# Patient Record
Sex: FEMALE | Race: WHITE | NOT HISPANIC OR LATINO | Employment: FULL TIME | URBAN - METROPOLITAN AREA
[De-identification: names, ages, dates, MRNs, and addresses within clinical notes are randomized per-mention and may not be internally consistent; named-entity substitution may affect disease eponyms.]

---

## 2017-01-10 ENCOUNTER — OFFICE VISIT (OUTPATIENT)
Dept: OBSTETRICS AND GYNECOLOGY | Facility: CLINIC | Age: 32
End: 2017-01-10
Payer: COMMERCIAL

## 2017-01-10 VITALS
SYSTOLIC BLOOD PRESSURE: 118 MMHG | WEIGHT: 153.88 LBS | HEIGHT: 60 IN | BODY MASS INDEX: 30.21 KG/M2 | DIASTOLIC BLOOD PRESSURE: 80 MMHG

## 2017-01-10 PROCEDURE — 99999 PR PBB SHADOW E&M-EST. PATIENT-LVL III: CPT | Mod: PBBFAC,,, | Performed by: OBSTETRICS & GYNECOLOGY

## 2017-01-10 PROCEDURE — 0503F POSTPARTUM CARE VISIT: CPT | Mod: S$GLB,,, | Performed by: OBSTETRICS & GYNECOLOGY

## 2017-01-10 NOTE — PROGRESS NOTES
Johanna Ocasio is a 31 y.o. female  presents for a postpartum visit.  She is status post  6 weeks ago.  Her hospitalization was not complicated.  She is breastfeeding.  She desires condoms for contraception, WILL CALL WHEN SHE FINISHED PUMPING FOR COMBO OCP.  She denies postpartum depression.    female VE FOR EK  Her last pap was     Past Medical History   Diagnosis Date    Abnormal Pap smear of cervix      Colposcopy    Early childhood epilepsy, myoclonic      last seizure age 14; Venous hemangioma on MRI    History of migraine headaches 2016    Migraine headache      History reviewed. No pertinent past surgical history.  Review of patient's allergies indicates:  No Known Allergies    Current Outpatient Prescriptions:     albuterol 90 mcg/actuation inhaler, Inhale 2 puffs into the lungs every 6 (six) hours as needed for Wheezing., Disp: 18 g, Rfl: 0    oxycodone-acetaminophen (PERCOCET) 5-325 mg per tablet, Take 1 tablet by mouth every 4 (four) hours as needed., Disp: 20 tablet, Rfl: 0      Vitals:    01/10/17 0923   BP: 118/80       ABDOMEN: Soft. No tenderness or masses. No hepatosplenomegaly. No hernias.  BREASTS: normal  PELVIC: External female genitalia without lesions, well-healed.  Female hair distribution. Adequate perineal body without evident defect, Normal urethral meatus. Vagina moist and well rugated without lesions or discharge. Cervix pink without lesions, discharge or tenderness. Uterus is 4-6 week size, regular, mobile and nontender. Adnexa without masses or tenderness.    Assessment:  Normal postpartum exam    Plan:  Routine follow up.

## 2017-02-07 ENCOUNTER — PATIENT MESSAGE (OUTPATIENT)
Dept: OBSTETRICS AND GYNECOLOGY | Facility: CLINIC | Age: 32
End: 2017-02-07

## 2017-03-31 ENCOUNTER — PATIENT MESSAGE (OUTPATIENT)
Dept: OBSTETRICS AND GYNECOLOGY | Facility: CLINIC | Age: 32
End: 2017-03-31

## 2017-05-03 ENCOUNTER — PATIENT MESSAGE (OUTPATIENT)
Dept: OBSTETRICS AND GYNECOLOGY | Facility: CLINIC | Age: 32
End: 2017-05-03

## 2017-06-10 ENCOUNTER — NURSE TRIAGE (OUTPATIENT)
Dept: ADMINISTRATIVE | Facility: CLINIC | Age: 32
End: 2017-06-10

## 2017-06-10 NOTE — TELEPHONE ENCOUNTER
"  Reason for Disposition   Eye pain present > 24 hours    Answer Assessment - Initial Assessment Questions  1. LOCATION: Location: "What's red, the eyeball or the outer eyelids?" (Note: when callers say the eye is red, they usually mean the sclera is red)        Corner of right eye  2. REDNESS OF SCLERA: "Is the redness in one or both eyes?" "When did the redness start?"       Yes   3. ONSET: "When did the eye become red?" (e.g., hours, days)       Three days   4. EYELIDS: "Are the eyelids red or swollen?" If so, ask: "How much?"       No   5. VISION: "Is there any difficulty seeing clearly?"       No   6. ITCHING: "Does it feel itchy?" If so ask: "How bad is it" (e.g., Scale 1-10; or mild, moderate, severe)      No   7. PAIN: "Is there any pain? If so, ask: "How bad is it?" (e.g., Scale 1-10; or mild, moderate, severe)      1/10  8. CONTACT LENS: "Do you wear contacts?"      No   9. CAUSE: "What do you think is causing the redness?"      Unsure   10. OTHER SYMPTOMS: "Do you have any other symptoms?" (e.g., fever, runny nose, cough, vomiting)        No    Protocols used: ST EYE - RED WITHOUT PUS-A-AH    "

## 2017-06-12 ENCOUNTER — PATIENT MESSAGE (OUTPATIENT)
Dept: OBSTETRICS AND GYNECOLOGY | Facility: CLINIC | Age: 32
End: 2017-06-12

## 2017-06-12 DIAGNOSIS — Z20.828 VIRAL DISEASE EXPOSURE: Primary | ICD-10-CM

## 2017-06-12 NOTE — TELEPHONE ENCOUNTER
MESSAGE TO PATIENT:  Per ACOG criteria, we don't routinely screen for Hepatitis C.  I will enter an order for you to be screened so you can be sure that you're not infected.   I hope that things settle down for him and that the Hepatitis C ends up being manageable.

## 2017-06-13 ENCOUNTER — LAB VISIT (OUTPATIENT)
Dept: LAB | Facility: HOSPITAL | Age: 32
End: 2017-06-13
Attending: OBSTETRICS & GYNECOLOGY
Payer: COMMERCIAL

## 2017-06-13 DIAGNOSIS — Z3A.35 PREGNANCY WITH 35 COMPLETED WEEKS GESTATION: ICD-10-CM

## 2017-06-13 DIAGNOSIS — Z20.828 VIRAL DISEASE EXPOSURE: ICD-10-CM

## 2017-06-13 LAB
BASOPHILS # BLD AUTO: 0.03 K/UL
BASOPHILS NFR BLD: 0.4 %
DIFFERENTIAL METHOD: NORMAL
EOSINOPHIL # BLD AUTO: 0.5 K/UL
EOSINOPHIL NFR BLD: 5.7 %
ERYTHROCYTE [DISTWIDTH] IN BLOOD BY AUTOMATED COUNT: 12.9 %
HCT VFR BLD AUTO: 41.9 %
HGB BLD-MCNC: 13.5 G/DL
HIV 1+2 AB+HIV1 P24 AG SERPL QL IA: NEGATIVE
LYMPHOCYTES # BLD AUTO: 2.5 K/UL
LYMPHOCYTES NFR BLD: 31.7 %
MCH RBC QN AUTO: 29.2 PG
MCHC RBC AUTO-ENTMCNC: 32.2 %
MCV RBC AUTO: 91 FL
MONOCYTES # BLD AUTO: 0.5 K/UL
MONOCYTES NFR BLD: 6 %
NEUTROPHILS # BLD AUTO: 4.4 K/UL
NEUTROPHILS NFR BLD: 56.1 %
PLATELET # BLD AUTO: 305 K/UL
PMV BLD AUTO: 9.8 FL
RBC # BLD AUTO: 4.62 M/UL
RPR SER QL: NORMAL
WBC # BLD AUTO: 7.85 K/UL

## 2017-06-13 PROCEDURE — 36415 COLL VENOUS BLD VENIPUNCTURE: CPT

## 2017-06-13 PROCEDURE — 85025 COMPLETE CBC W/AUTO DIFF WBC: CPT

## 2017-06-13 PROCEDURE — 87522 HEPATITIS C REVRS TRNSCRPJ: CPT

## 2017-06-13 PROCEDURE — 86592 SYPHILIS TEST NON-TREP QUAL: CPT

## 2017-06-13 PROCEDURE — 86703 HIV-1/HIV-2 1 RESULT ANTBDY: CPT

## 2017-06-14 LAB
HCV LOG: <1.08 LOG (10) IU/ML
HCV RNA QUANT PCR: <12 IU/ML
HCV, QUALITATIVE: NOT DETECTED IU/ML

## 2017-07-24 ENCOUNTER — OFFICE VISIT (OUTPATIENT)
Dept: FAMILY MEDICINE | Facility: CLINIC | Age: 32
End: 2017-07-24
Payer: COMMERCIAL

## 2017-07-24 VITALS
TEMPERATURE: 98 F | WEIGHT: 153.69 LBS | BODY MASS INDEX: 30.17 KG/M2 | OXYGEN SATURATION: 98 % | SYSTOLIC BLOOD PRESSURE: 118 MMHG | HEIGHT: 60 IN | DIASTOLIC BLOOD PRESSURE: 78 MMHG | HEART RATE: 88 BPM

## 2017-07-24 DIAGNOSIS — M77.12 LATERAL EPICONDYLITIS OF LEFT ELBOW: ICD-10-CM

## 2017-07-24 DIAGNOSIS — M79.602 LEFT ARM PAIN: Primary | ICD-10-CM

## 2017-07-24 PROCEDURE — 99999 PR PBB SHADOW E&M-EST. PATIENT-LVL III: CPT | Mod: PBBFAC,,, | Performed by: NURSE PRACTITIONER

## 2017-07-24 PROCEDURE — 99204 OFFICE O/P NEW MOD 45 MIN: CPT | Mod: S$GLB,,, | Performed by: NURSE PRACTITIONER

## 2017-07-24 RX ORDER — NAPROXEN 375 MG/1
375 TABLET ORAL 2 TIMES DAILY WITH MEALS
Qty: 60 TABLET | Refills: 1 | Status: SHIPPED | OUTPATIENT
Start: 2017-07-24 | End: 2017-08-23

## 2017-07-24 RX ORDER — CYCLOBENZAPRINE HCL 5 MG
5 TABLET ORAL 3 TIMES DAILY PRN
Qty: 30 TABLET | Refills: 1 | Status: SHIPPED | OUTPATIENT
Start: 2017-07-24 | End: 2017-08-03

## 2017-07-24 NOTE — PROGRESS NOTES
This dictation has been generated using Dragon Dictation some phonetic errors may occur.     Johanna was seen today for hand pain.    Diagnoses and all orders for this visit:    Left arm pain    Lateral epicondylitis of left elbow    Other orders  -     naproxen (NAPROSYN) 375 MG tablet; Take 1 tablet (375 mg total) by mouth 2 (two) times daily with meals.  -     cyclobenzaprine (FLEXERIL) 5 MG tablet; Take 1 tablet (5 mg total) by mouth 3 (three) times daily as needed for Muscle spasms.      nsaid therapy for at least 2 weeks.   4mo daughter. Does not breast feed.    Return if symptoms worsen or fail to improve.      ________________________________________________________________  ________________________________________________________________        Chief Complaint   Patient presents with    Hand Pain     History of present illness  This 32 y.o. presents today for complaint of left elbow pain.  This patient is right-hand dominant.  She denies a history of injury.  She works as a nurse and radiology.  She does have a 4-month-old daughter who is in an infant carrier.  We discussed the mechanics of lifting and identified this as a possible source for exacerbation of her problems.  I asked her to be more aware of the movements that might exacerbate.  She is not breast-feeding her daughter.  She hasn't tried any medications.  Symptoms have been present for 2-3 weeks.  Review of systems  No fever or chills  No chest pain or shortness of breath.  Patient denies rash  Past medical and social history reviewed.    Past Medical History:   Diagnosis Date    Abnormal Pap smear of cervix 2011    Colposcopy    Early childhood epilepsy, myoclonic     last seizure age 14; Venous hemangioma on MRI    History of migraine headaches 5/12/2016    Migraine headache        History reviewed. No pertinent surgical history.    Family History   Problem Relation Age of Onset    Hypertension Mother     Hypertension Father     Cataracts  Maternal Grandfather     Diabetes Paternal Grandmother     Cancer Paternal Grandfather      colon cancer    Breast cancer Neg Hx     Colon cancer Neg Hx     Ovarian cancer Neg Hx     Melanoma Neg Hx     Lupus Neg Hx     Psoriasis Neg Hx        Social History     Social History    Marital status:      Spouse name: N/A    Number of children: N/A    Years of education: N/A     Social History Main Topics    Smoking status: Never Smoker    Smokeless tobacco: None      Comment: The patient has just graduated from nursing school.  She will begin working at [employer] in 3 Days    Alcohol use No      Comment: Socially    Drug use: No    Sexual activity: Yes     Partners: Male     Birth control/ protection: None     Other Topics Concern    None     Social History Narrative    None       Current Outpatient Prescriptions   Medication Sig Dispense Refill    cyclobenzaprine (FLEXERIL) 5 MG tablet Take 1 tablet (5 mg total) by mouth 3 (three) times daily as needed for Muscle spasms. 30 tablet 1    naproxen (NAPROSYN) 375 MG tablet Take 1 tablet (375 mg total) by mouth 2 (two) times daily with meals. 60 tablet 1     No current facility-administered medications for this visit.        Review of patient's allergies indicates:  No Known Allergies    Physical examination  Vitals Reviewed  Gen. Well-dressed well-nourished no apparent distress  Skin warm dry and intact.  No rashes noted.  Neck is supple without adenopathy  Chest.  Respirations are even unlabored.    Neuro. Awake alert oriented x4.  Normal judgment and cognition noted.  Tinels and Phalen is negative.  Extremities no clubbing cyanosis or edema noted.  Full range of motion of left elbow.  Tenderness with palpation over the lateral epicondyles.  Patient does have tenderness with rotation of the radial head.  She has some palpable spasms of the forearm.    Call or return to clinic prn if these symptoms worsen or fail to improve as  anticipated.    Answers for HPI/ROS submitted by the patient on 7/21/2017   activity change: No  unexpected weight change: No  neck pain: No  hearing loss: No  rhinorrhea: No  trouble swallowing: No  eye discharge: No  visual disturbance: No  chest tightness: No  wheezing: No  chest pain: No  palpitations: No  blood in stool: No  constipation: No  vomiting: No  diarrhea: No  polydipsia: No  polyuria: No  difficulty urinating: No  hematuria: No  menstrual problem: No  dysuria: No  joint swelling: No  arthralgias: No  headaches: Yes  weakness: No  confusion: No  dysphoric mood: No

## 2017-08-30 ENCOUNTER — OFFICE VISIT (OUTPATIENT)
Dept: OPTOMETRY | Facility: CLINIC | Age: 32
End: 2017-08-30
Payer: COMMERCIAL

## 2017-08-30 DIAGNOSIS — H52.13 MYOPIA, BILATERAL: Primary | ICD-10-CM

## 2017-08-30 DIAGNOSIS — H49.9 EXTRAOCULAR MUSCLE PALSY OF LEFT EYE: ICD-10-CM

## 2017-08-30 DIAGNOSIS — H21.301: ICD-10-CM

## 2017-08-30 PROCEDURE — 99999 PR PBB SHADOW E&M-EST. PATIENT-LVL II: CPT | Mod: PBBFAC,,, | Performed by: OPTOMETRIST

## 2017-08-30 PROCEDURE — 92014 COMPRE OPH EXAM EST PT 1/>: CPT | Mod: S$GLB,,, | Performed by: OPTOMETRIST

## 2017-08-30 PROCEDURE — 92015 DETERMINE REFRACTIVE STATE: CPT | Mod: S$GLB,,, | Performed by: OPTOMETRIST

## 2017-08-30 NOTE — PROGRESS NOTES
HPI     Patient's age: 32 y.o.    Approximate date of last eye examination:  1/5/16  Name of last eye doctor seen: Dr. Mariano    Pt states that she is here to get exam and new prescription for glasses    Wears glasses? yes       Wears CLs?:  no             Headaches?  no  Eye pain/discomfort?  no                                                                                     Flashes?  no  Floaters?  no  Diplopia/Double vision?  No    Patient's Ocular History:         Any eye surgeries? no         Any eye injury?  no         Any treatment for eye disease?  no  Family history of eye disease?  No    Significant patient medical history:         1. Diabetes?  no       If yes, IDDM or NIDDM? no   2. HBP?  no                 ! OTC eyedrops currently using:  no   ! Prescription eye meds currently using:  no         Last edited by Fatmata Curry MA on 8/30/2017  8:16 AM. (History)            Assessment /Plan     For exam results, see Encounter Report.    Myopia, bilateral   Rx specs    Cyst of iris, right   Stable, normal angle with gonio    Extraocular muscle palsy of left eye   Pt unable to look to left with OS   Longstanding, stable    Good internal ocular health, monitor yearly

## 2018-04-26 NOTE — PROGRESS NOTES
This note was created by combination of typed  and Dragon dictation.  Transcription errors may be present.  If there are any questions, please contact me.    Assessment / Plan:   Bacterial sinusitis - amoxicillin BID x 10 days.  -     amoxicillin (AMOXIL) 875 MG tablet; Take 1 tablet (875 mg total) by mouth every 12 (twelve) hours.  Dispense: 20 tablet; Refill: 0          There are no discontinued medications.  Modified Medications    No medications on file     New Prescriptions    No medications on file         Follow Up: No Follow-up on file.      Subjective:     Chief Complaint   Patient presents with    Sinus Problem    Cough       HPI  Johanna is a 33 y.o. female, last appointment with this clinic was Visit date not found.    Patient's last menstrual period was 04/19/2018.    Healthy  Hx of migraine    2 weeks of sinus congestion and now purulent sputum.  Sick contacts- daughter with similar symptoms.  Some allergies as well.  OTC mucinex D, dayquil, sudafed. Some cough but no real chest congestion.      Answers for HPI/ROS submitted by the patient on 4/26/2018   activity change: No  unexpected weight change: No  rhinorrhea: Yes  trouble swallowing: No  visual disturbance: No  chest tightness: No  polyuria: No  difficulty urinating: No  menstrual problem: No  joint swelling: No  arthralgias: No  confusion: No  dysphoric mood: No      Patient Care Team:  Primary Doctor No as PCP - General    Patient Active Problem List    Diagnosis Date Noted    GBS (group B Streptococcus carrier), +RV culture, currently pregnant 11/05/2016    Prenatal care, first pregnancy 05/12/2016    History of migraine headaches 05/12/2016    History of seizures as a child - last seizure age 14, history of venous hemangioma on brain 05/12/2016     SEE 2006 MRI [facility]          PAST MEDICAL HISTORY:  Past Medical History:   Diagnosis Date    Abnormal Pap smear of cervix 2011    Colposcopy    Early childhood epilepsy,  myoclonic     last seizure age 14; Venous hemangioma on MRI    History of migraine headaches 5/12/2016    Migraine headache        PAST SURGICAL HISTORY:  History reviewed. No pertinent surgical history.    SOCIAL HISTORY:  Social History     Social History    Marital status:      Spouse name: N/A    Number of children: N/A    Years of education: N/A     Occupational History    Not on file.     Social History Main Topics    Smoking status: Never Smoker    Smokeless tobacco: Never Used    Alcohol use No      Comment: Socially    Drug use: No    Sexual activity: Yes     Partners: Male     Birth control/ protection: None     Other Topics Concern    Not on file     Social History Narrative    No narrative on file       ALLERGIES AND MEDICATIONS: updated and reviewed.  Review of patient's allergies indicates:  No Known Allergies  No current outpatient prescriptions on file.     No current facility-administered medications for this visit.        Review of Systems   HENT: Negative for hearing loss.    Eyes: Negative for discharge.   Respiratory: Negative for wheezing.    Cardiovascular: Negative for chest pain and palpitations.   Gastrointestinal: Negative for blood in stool, constipation, diarrhea and vomiting.   Genitourinary: Negative for dysuria and hematuria.   Musculoskeletal: Negative for neck pain.   Neurological: Positive for headaches. Negative for weakness.   Endo/Heme/Allergies: Negative for polydipsia.       Objective:   Physical Exam   Vitals:    04/27/18 0812   BP: 118/74   Pulse: 78   Temp: 98.3 °F (36.8 °C)   SpO2: 97%   Weight: 69 kg (152 lb 1.9 oz)   Height: 5' (1.524 m)    Body mass index is 29.71 kg/m².  Weight: 69 kg (152 lb 1.9 oz)   Height: 5' (152.4 cm)     Physical Exam   Constitutional: She is oriented to person, place, and time. She appears well-developed and well-nourished.   HENT:   TMs grey/clear bilaterally.  OP no erythema no exudates   Eyes: EOM are normal.   Neck: Neck  supple.   Cardiovascular: Normal rate, regular rhythm and normal heart sounds.    Pulmonary/Chest: Effort normal and breath sounds normal. She has no wheezes.   Lymphadenopathy:     She has no cervical adenopathy.   Neurological: She is alert and oriented to person, place, and time.   Skin: Skin is warm and dry.   Psychiatric: She has a normal mood and affect. Her behavior is normal.

## 2018-04-27 ENCOUNTER — OFFICE VISIT (OUTPATIENT)
Dept: FAMILY MEDICINE | Facility: CLINIC | Age: 33
End: 2018-04-27
Payer: COMMERCIAL

## 2018-04-27 VITALS
DIASTOLIC BLOOD PRESSURE: 74 MMHG | HEIGHT: 60 IN | SYSTOLIC BLOOD PRESSURE: 118 MMHG | OXYGEN SATURATION: 97 % | TEMPERATURE: 98 F | WEIGHT: 152.13 LBS | BODY MASS INDEX: 29.86 KG/M2 | HEART RATE: 78 BPM

## 2018-04-27 DIAGNOSIS — B96.89 BACTERIAL SINUSITIS: Primary | ICD-10-CM

## 2018-04-27 DIAGNOSIS — J32.9 BACTERIAL SINUSITIS: Primary | ICD-10-CM

## 2018-04-27 PROCEDURE — 99999 PR PBB SHADOW E&M-EST. PATIENT-LVL III: CPT | Mod: PBBFAC,,, | Performed by: INTERNAL MEDICINE

## 2018-04-27 PROCEDURE — 99213 OFFICE O/P EST LOW 20 MIN: CPT | Mod: S$GLB,,, | Performed by: INTERNAL MEDICINE

## 2018-04-27 RX ORDER — AMOXICILLIN 875 MG/1
875 TABLET, FILM COATED ORAL EVERY 12 HOURS
Qty: 20 TABLET | Refills: 0 | Status: SHIPPED | OUTPATIENT
Start: 2018-04-27 | End: 2018-05-07

## 2018-07-31 ENCOUNTER — OFFICE VISIT (OUTPATIENT)
Dept: FAMILY MEDICINE | Facility: CLINIC | Age: 33
End: 2018-07-31
Payer: COMMERCIAL

## 2018-07-31 VITALS
OXYGEN SATURATION: 98 % | WEIGHT: 151.69 LBS | BODY MASS INDEX: 29.78 KG/M2 | TEMPERATURE: 99 F | HEART RATE: 70 BPM | HEIGHT: 60 IN | SYSTOLIC BLOOD PRESSURE: 118 MMHG | DIASTOLIC BLOOD PRESSURE: 68 MMHG

## 2018-07-31 DIAGNOSIS — S63.502A SPRAIN OF LEFT WRIST, INITIAL ENCOUNTER: Primary | ICD-10-CM

## 2018-07-31 PROCEDURE — 99214 OFFICE O/P EST MOD 30 MIN: CPT | Mod: S$GLB,,, | Performed by: NURSE PRACTITIONER

## 2018-07-31 PROCEDURE — 3008F BODY MASS INDEX DOCD: CPT | Mod: CPTII,S$GLB,, | Performed by: NURSE PRACTITIONER

## 2018-07-31 PROCEDURE — 99999 PR PBB SHADOW E&M-EST. PATIENT-LVL III: CPT | Mod: PBBFAC,,, | Performed by: NURSE PRACTITIONER

## 2018-07-31 RX ORDER — NAPROXEN 500 MG/1
500 TABLET ORAL 2 TIMES DAILY WITH MEALS
Qty: 28 TABLET | Refills: 0 | Status: SHIPPED | OUTPATIENT
Start: 2018-07-31 | End: 2018-08-14

## 2018-09-04 ENCOUNTER — TELEPHONE (OUTPATIENT)
Dept: OBSTETRICS AND GYNECOLOGY | Facility: CLINIC | Age: 33
End: 2018-09-04

## 2018-09-04 DIAGNOSIS — Z34.90 PREGNANCY, UNSPECIFIED GESTATIONAL AGE: Primary | ICD-10-CM

## 2018-09-04 NOTE — TELEPHONE ENCOUNTER
----- Message from Christie Mckeon MA sent at 9/4/2018  3:57 PM CDT -----  Contact: Johanna  Please scheduled dating and new ob visit LMP 08/08/2018  ----- Message -----  From: Jacqui Stanford  Sent: 9/4/2018   3:53 PM  To: Ke BRASWELL Staff    Can the clinic reply in [facility]: yes    Who Called: Johanna    Date of Positive Preg Test: 09/04/2018    1st day of Last Menstrual Cycle: 08/08/2018    List Any Difficulties: no    What Number to Call Back: [phone]

## 2018-09-24 ENCOUNTER — PATIENT MESSAGE (OUTPATIENT)
Dept: OBSTETRICS AND GYNECOLOGY | Facility: CLINIC | Age: 33
End: 2018-09-24

## 2018-10-03 ENCOUNTER — TELEPHONE (OUTPATIENT)
Dept: NEUROLOGY | Facility: CLINIC | Age: 33
End: 2018-10-03

## 2018-10-03 ENCOUNTER — DOCUMENTATION ONLY (OUTPATIENT)
Dept: NEUROLOGY | Facility: HOSPITAL | Age: 33
End: 2018-10-03

## 2018-10-03 ENCOUNTER — HOSPITAL ENCOUNTER (OUTPATIENT)
Dept: RADIOLOGY | Facility: HOSPITAL | Age: 33
Discharge: HOME | End: 2018-10-03
Attending: PSYCHIATRY & NEUROLOGY
Payer: COMMERCIAL

## 2018-10-03 DIAGNOSIS — D18.02 BRAIN VENOUS ANGIOMA: ICD-10-CM

## 2018-10-03 DIAGNOSIS — D18.02 BRAIN VENOUS ANGIOMA: Primary | ICD-10-CM

## 2018-10-03 PROCEDURE — 70551 MRI BRAIN STEM W/O DYE: CPT | Mod: 26,,, | Performed by: RADIOLOGY

## 2018-10-03 PROCEDURE — 70551 MRI BRAIN STEM W/O DYE: CPT | Mod: TC

## 2018-10-03 NOTE — PROGRESS NOTES
32 yo female with hx of cavernous angioma. Contacted me with concerns after severe sudden onset headache worrisome for sentinel hemorrhage from cerebellar angioma.  Will order cerebral angiogram to further evaluate headache etiology and to ensure stable angioma which may be the cause of her symptoms.    Yonny Garcia MD  Vascular and Interventional Neurology Staff  Director of [facility]  [facility]  [phone]

## 2018-10-04 ENCOUNTER — OFFICE VISIT (OUTPATIENT)
Dept: OBSTETRICS AND GYNECOLOGY | Facility: CLINIC | Age: 33
End: 2018-10-04
Payer: COMMERCIAL

## 2018-10-04 ENCOUNTER — LAB VISIT (OUTPATIENT)
Dept: LAB | Facility: OTHER | Age: 33
End: 2018-10-04
Payer: COMMERCIAL

## 2018-10-04 ENCOUNTER — PROCEDURE VISIT (OUTPATIENT)
Dept: OBSTETRICS AND GYNECOLOGY | Facility: CLINIC | Age: 33
End: 2018-10-04
Payer: COMMERCIAL

## 2018-10-04 VITALS
HEIGHT: 60 IN | DIASTOLIC BLOOD PRESSURE: 80 MMHG | BODY MASS INDEX: 29.7 KG/M2 | SYSTOLIC BLOOD PRESSURE: 110 MMHG | WEIGHT: 151.25 LBS

## 2018-10-04 DIAGNOSIS — N91.5 OLIGOMENORRHEA, UNSPECIFIED TYPE: ICD-10-CM

## 2018-10-04 DIAGNOSIS — Z34.90 PREGNANCY, UNSPECIFIED GESTATIONAL AGE: ICD-10-CM

## 2018-10-04 DIAGNOSIS — N91.5 OLIGOMENORRHEA, UNSPECIFIED TYPE: Primary | ICD-10-CM

## 2018-10-04 DIAGNOSIS — Z32.01 POSITIVE PREGNANCY TEST: ICD-10-CM

## 2018-10-04 DIAGNOSIS — Z36.82 ENCOUNTER FOR (NT) NUCHAL TRANSLUCENCY SCAN: ICD-10-CM

## 2018-10-04 LAB
ABO + RH BLD: NORMAL
B-HCG UR QL: POSITIVE
BASOPHILS # BLD AUTO: 0.04 K/UL
BASOPHILS NFR BLD: 0.6 %
BLD GP AB SCN CELLS X3 SERPL QL: NORMAL
C TRACH DNA SPEC QL NAA+PROBE: NOT DETECTED
CTP QC/QA: YES
DIFFERENTIAL METHOD: NORMAL
EOSINOPHIL # BLD AUTO: 0.4 K/UL
EOSINOPHIL NFR BLD: 5.2 %
ERYTHROCYTE [DISTWIDTH] IN BLOOD BY AUTOMATED COUNT: 13.2 %
HCT VFR BLD AUTO: 39.3 %
HGB BLD-MCNC: 12.9 G/DL
LYMPHOCYTES # BLD AUTO: 2.2 K/UL
LYMPHOCYTES NFR BLD: 30.2 %
MCH RBC QN AUTO: 30.4 PG
MCHC RBC AUTO-ENTMCNC: 32.8 G/DL
MCV RBC AUTO: 93 FL
MONOCYTES # BLD AUTO: 0.5 K/UL
MONOCYTES NFR BLD: 7 %
N GONORRHOEA DNA SPEC QL NAA+PROBE: NOT DETECTED
NEUTROPHILS # BLD AUTO: 4.1 K/UL
NEUTROPHILS NFR BLD: 56.9 %
PLATELET # BLD AUTO: 299 K/UL
PMV BLD AUTO: 10.3 FL
RBC # BLD AUTO: 4.25 M/UL
RPR SER QL: NORMAL
WBC # BLD AUTO: 7.24 K/UL

## 2018-10-04 PROCEDURE — 86703 HIV-1/HIV-2 1 RESULT ANTBDY: CPT

## 2018-10-04 PROCEDURE — 81025 URINE PREGNANCY TEST: CPT | Mod: S$GLB,,, | Performed by: NURSE PRACTITIONER

## 2018-10-04 PROCEDURE — 87491 CHLMYD TRACH DNA AMP PROBE: CPT

## 2018-10-04 PROCEDURE — 76815 OB US LIMITED FETUS(S): CPT | Mod: S$GLB,,, | Performed by: PEDIATRICS

## 2018-10-04 PROCEDURE — 99214 OFFICE O/P EST MOD 30 MIN: CPT | Mod: 25,S$GLB,, | Performed by: NURSE PRACTITIONER

## 2018-10-04 PROCEDURE — 3008F BODY MASS INDEX DOCD: CPT | Mod: CPTII,S$GLB,, | Performed by: NURSE PRACTITIONER

## 2018-10-04 PROCEDURE — 99999 PR PBB SHADOW E&M-EST. PATIENT-LVL III: CPT | Mod: PBBFAC,,, | Performed by: NURSE PRACTITIONER

## 2018-10-04 PROCEDURE — 86762 RUBELLA ANTIBODY: CPT

## 2018-10-04 PROCEDURE — 87340 HEPATITIS B SURFACE AG IA: CPT

## 2018-10-04 PROCEDURE — 86592 SYPHILIS TEST NON-TREP QUAL: CPT

## 2018-10-04 PROCEDURE — 87086 URINE CULTURE/COLONY COUNT: CPT

## 2018-10-04 PROCEDURE — 86901 BLOOD TYPING SEROLOGIC RH(D): CPT

## 2018-10-04 PROCEDURE — 85025 COMPLETE CBC W/AUTO DIFF WBC: CPT

## 2018-10-04 PROCEDURE — 76817 TRANSVAGINAL US OBSTETRIC: CPT | Mod: S$GLB,,, | Performed by: PEDIATRICS

## 2018-10-04 NOTE — PROGRESS NOTES
CC: Positive Pregnancy Test    HISTORY OF PRESENT ILLNESS:    Johanna Ocasio is a 33 y.o. female, ,  Presents today for a routine exam complaining of amenorrhea and positive home urine pregnancy test.  Patient's last menstrual period was 2018 (approximate).   She is not currently on any contraception.  Reports nausea. Reports breast tenderness. Denies vaginal bleeding and pelvic pain.  Medical h/o migraines, epilepsy (last seizure at age 14), and has h/o venous hemangioma on brain. Reports she was doing Cross Fit for the past several months and began having HA after exercise.  She had a MRI yesterday with out contract- no change from last MRI several years ago.   Prior  X 1 - full term, pregnancy was uncomplicated.  She pushed for 1 hour and then had a severe PP HA- which resolved with 1 dose of IV Tylenol.   Reports family h/o Down's syndrome - paternal cousin.  FOB with family h;o Down's syndrome in distant cousin.  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        ROS:  GENERAL: No weight changes. No swelling. No fatigue. No fever.  CARDIOVASCULAR: No chest pain. No shortness of breath. No leg cramps.   NEUROLOGICAL: No headaches. No vision changes.  BREASTS: No pain. No lumps. No discharge.  ABDOMEN: No pain. No diarrhea. No constipation.  REPRODUCTIVE: No abnormal bleeding.   VULVA: No pain. No lesions. No itching.  VAGINA: No relaxation. No itching. No odor. No discharge. No lesions.  URINARY: No incontinence. No nocturia. No frequency. No dysuria.    MEDICATIONS AND ALLERGIES:  Reviewed        COMPREHENSIVE GYN HISTORY:  PAP History: Denies abnormal Paps.  Infection History: Denies STDs. Denies PID.  Benign History: Denies uterine fibroids. Denies ovarian cysts. Denies endometriosis. Denies other conditions.  Cancer History: Denies cervical cancer. Denies uterine cancer or hyperplasia. Denies ovarian cancer. Denies vulvar cancer or pre-cancer. Denies vaginal cancer or pre-cancer. Denies breast  cancer. Denies colon cancer.  Sexual Activity History: Reports currently being sexually active  Menstrual History: None.  Contraception: None    /80 (BP Location: Right arm, Patient Position: Sitting, BP Method: Medium (Manual))   Ht 5' (1.524 m)   Wt 68.6 kg (151 lb 3.8 oz)   LMP 2018 (Approximate)   Breastfeeding? No   BMI 29.54 kg/m²     PE:  AFFECT: Calm, alert and oriented X 3. Interactive during exam  GENERAL: Appears well-nourished, well-developed, in no acute distress.  HEAD: Normocephalic, atruamatic  TEETH: Good dentition.  THYROID: No thyromegally   BREASTS: No masses, skin changes, nipple discharge or adenopathy bilaterally.  SKIN: Normal for race, warm, & dry. No lesions or rashes.  LUNGS: Easy and unlabored, clear to auscultation bilaterally.  HEART: Regular rate and rhythm   ABDOMEN: Soft and nontender without masses or organomegally.  VULVA: No lesions, masses or tenderness.  VAGINA: Moist and well rugated without lesions or discharge.  CERVIX: Moist and pink without lesions, discharge or tenderness.      UTERUS SIZE: 8 week size, nontender and without masses.  ADNEXA: No masses or tenderness.  ESTIMATE OF PELVIC CAPACITY: Adequate  EXTREMITIES: No cyanosis, clubbing or edema. No calf tenderness.  LYMPH NODES: No axillary or inguinal adenopathy.    PROCEDURES:  UPT Positive  Genprobe  Pap      ASSESSMENT/PLAN:  Amenorrhea  Positive urine pregnancy test (ANURAG: 5/15/19, EGA: 8w1d based on LMP)    -  Routine prenatal care    Nausea and vomiting in pregnancy    -  Education regarding lifestyle and dietary modifications    -  Advised use of B6/Unisom. Pt will notify us if no relief/worsening symptoms, will consider Zofran if needed.      1st TRIMESTER COUNSELING: Discussed all, booklet provided:  Common complaints of pregnancy  HIV and other routine prenatal tests including  genetic screening  Risk factors identified by prenatal history  Oriented to practice - discussed  anticipated course of prenatal care & indications for Ultrasound  Childbirth classes/Hospital facilities   Nutrition and weight gain counseling  Toxoplasmosis precautions (Cats/Raw Meat)  Sexual activity and exercise  Environmental/Work hazards  Travel  Tobacco (Ask, Advise, Assess, Assist, and Arrange), as well as alcohol and drug use  Use of any medications (Including supplements, Vitamins, Herbs, or OTC Drugs)  Domestic violence  Seat belt use      TERATOLOGY COUNSELING:   Discussed indications and options for aneuploidy screening - pamphlets given    -  Pt desires NT  and orders placed    Dating US later today  FOLLOW-UP in 4 weeks with Dr. Ke Fuchs NP    OB/GYN

## 2018-10-04 NOTE — PROCEDURES
Indication  ========    Estimation of gestational age.    History  ======    Previous Outcomes   2  Para 1    Method  ======    Transvaginal ultrasound examination. View: Good view.    Pregnancy  =========    Fishman pregnancy. Number of fetuses: 1.    Dating  ======    LMP on: 2018  GA by LMP 8 w + 1 d  ANURAG by LMP: 5/15/2019  Ultrasound examination on: 10/4/2018  GA by U/S based upon: CRL  GA by U/S 8 w + 0 d  ANURAG by U/S: 2019    Assessment  ==========    Gestational sac: visualized  Location: intrauterine  Yolk sac: visualized  Amniotic sac: visualized  Embryo: visualized  CRL 15.7 mm 8w 0d Hadlock  Cardiac activity: present   bpm    Maternal Structures  ===============    Uterus / Cervix  Uterus position: retroverted  Ovaries / Tubes / Adnexa  Rt ovary: Visualized  Rt ovary D1 3.1 cm  Rt ovary D2 2.1 cm  Rt ovary D3 1.5 cm  Rt ovary mean 2.2 cm  Rt ovary vol 5.0 cm³  Lt ovary: Visualized  Lt ovarian corpus luteum: visualized  Lt ovary D1 3.5 cm  Lt ovary D2 2.4 cm  Lt ovary D3 2.0 cm  Lt ovary mean 2.6 cm  Lt ovary vol 8.9 cm³  Lt ovarian corpus luteum D1 23.0 mm  Lt ovarian corpus luteum D2 15.0 mm  Lt ovarian corpus luteum D3 19.0 mm  Pouch of Jose / Other Structures  Free fluid: Free fluid visualized  Pouch of Jose largest pool D1 14.0 mm  Pouch of Jose largest pool D2 6.0 mm          Impression  =========    Single viable intrauterine pregnancy consistent with LMP dating.  Embryo grossly WNL with normal cardiac activity.    Normal uterus, cervix and adnexae as noted above.  Small amount of fluid seen in cul-de-sac.            Recommendation  ==============    Repeat ultrasound study as clinically indicated

## 2018-10-05 ENCOUNTER — PATIENT MESSAGE (OUTPATIENT)
Dept: OBSTETRICS AND GYNECOLOGY | Facility: CLINIC | Age: 33
End: 2018-10-05

## 2018-10-05 LAB
BACTERIA UR CULT: NO GROWTH
HBV SURFACE AG SERPL QL IA: NEGATIVE
HIV 1+2 AB+HIV1 P24 AG SERPL QL IA: NEGATIVE
RUBV IGG SER-ACNC: 27.2 IU/ML
RUBV IGG SER-IMP: REACTIVE

## 2018-10-22 ENCOUNTER — PATIENT MESSAGE (OUTPATIENT)
Dept: OBSTETRICS AND GYNECOLOGY | Facility: CLINIC | Age: 33
End: 2018-10-22

## 2018-10-22 RX ORDER — ONDANSETRON 4 MG/1
4 TABLET, FILM COATED ORAL DAILY PRN
Qty: 30 TABLET | Refills: 1 | Status: SHIPPED | OUTPATIENT
Start: 2018-10-22 | End: 2019-02-14 | Stop reason: SDUPTHER

## 2018-10-30 ENCOUNTER — ROUTINE PRENATAL (OUTPATIENT)
Dept: OBSTETRICS AND GYNECOLOGY | Facility: CLINIC | Age: 33
End: 2018-10-30
Payer: COMMERCIAL

## 2018-10-30 VITALS — SYSTOLIC BLOOD PRESSURE: 110 MMHG | BODY MASS INDEX: 28.42 KG/M2 | WEIGHT: 145.5 LBS | DIASTOLIC BLOOD PRESSURE: 78 MMHG

## 2018-10-30 DIAGNOSIS — Z3A.12 PREGNANCY WITH 12 COMPLETED WEEKS GESTATION: Primary | ICD-10-CM

## 2018-10-30 PROCEDURE — 99999 PR PBB SHADOW E&M-EST. PATIENT-LVL II: CPT | Mod: PBBFAC,,, | Performed by: OBSTETRICS & GYNECOLOGY

## 2018-10-30 PROCEDURE — 0500F INITIAL PRENATAL CARE VISIT: CPT | Mod: S$GLB,,, | Performed by: OBSTETRICS & GYNECOLOGY

## 2018-10-30 NOTE — PROGRESS NOTES
"No FM YET, NO STRONG CRAMPING, BLEEDING.  HAS NT SCREENING PLANNED TOMORROW.  NO MORE SEVERE H/A AND WILL GO BACK TO CROSSFIT "LIGHT" - HAD EVAL AND STABLE BRAIN HEMANGIOMA, COUNSELED BY NEURO AND VASC NEUROLOGY SHOULD NOT BE AN ISSUE FOR DELIVERY.  HAS RECEIVED FLU VACCINE.  VISIT 4 WEEKS  "

## 2018-10-31 ENCOUNTER — PROCEDURE VISIT (OUTPATIENT)
Dept: MATERNAL FETAL MEDICINE | Facility: CLINIC | Age: 33
End: 2018-10-31
Payer: COMMERCIAL

## 2018-10-31 ENCOUNTER — LAB VISIT (OUTPATIENT)
Dept: LAB | Facility: OTHER | Age: 33
End: 2018-10-31
Attending: OBSTETRICS & GYNECOLOGY
Payer: COMMERCIAL

## 2018-10-31 VITALS — WEIGHT: 147.94 LBS | BODY MASS INDEX: 28.89 KG/M2

## 2018-10-31 DIAGNOSIS — Z36.82 ENCOUNTER FOR (NT) NUCHAL TRANSLUCENCY SCAN: ICD-10-CM

## 2018-10-31 DIAGNOSIS — Z36.89 ENCOUNTER FOR FETAL ANATOMIC SURVEY: Primary | ICD-10-CM

## 2018-10-31 PROCEDURE — 99499 UNLISTED E&M SERVICE: CPT | Mod: S$GLB,,, | Performed by: PEDIATRICS

## 2018-10-31 PROCEDURE — 76801 OB US < 14 WKS SINGLE FETUS: CPT | Mod: S$GLB,,, | Performed by: PEDIATRICS

## 2018-10-31 PROCEDURE — 36415 COLL VENOUS BLD VENIPUNCTURE: CPT

## 2018-10-31 PROCEDURE — 76813 OB US NUCHAL MEAS 1 GEST: CPT | Mod: S$GLB,,, | Performed by: PEDIATRICS

## 2018-10-31 PROCEDURE — 81508 FTL CGEN ABNOR TWO PROTEINS: CPT

## 2018-10-31 NOTE — PROGRESS NOTES
Indication  ========    First trimester screening.    History  ======    Previous Outcomes   2  Para 1    Method  ======    [place] E10, Transabdominal ultrasound examination. View: Good view.    Pregnancy  =========    Fishman pregnancy. Number of fetuses: 1.    Dating  ======    Cycle: regular cycle  Ultrasound examination on: 10/31/2018  GA by U/S based upon: CRL  GA by U/S 12 w + 0 d  ANURAG by U/S: 5/15/2019    General Evaluation  ==============    Cardiac activity: present.  Amniotic fluid: normal amount.    Fetal Biometry  ============    CRL 54.0 mm 12w 0d Hadlock  NT 0.9 mm   bpm    Fetal Anatomy  ============    The following structures appear normal:  Cranium.    The following structures were visualized:  Arms. Legs.    Maternal Structures  ===============    Uterus / Cervix  Uterus: Normal  Uterus position: retroverted  Ovaries / Tubes / Adnexa  Rt ovary: Visualized  Lt ovary: Visualized  Pouch of Jose / Other Structures  Cul de Sac: Visualized          Impression  =========    Fetal size is consistent with established dating, and normal fetal heart motion is seen. The nuchal translucency is measured, and a sample of  maternal blood will be sent today for the first portion of the sequential screen.            Recommendation  ==============    First portion of sequential screening completed today. Results to be sent to primary pregnancy care provider. Recommend to complete second  blood draw beyond 15 weeks EGA of pregnancy. Ultrasound for fetal anatomical survey scheduled by [facility] today for 19-20 weeks EGA.    Thank you again for allowing us to participate in the care of your patients. If you have any questions concerning today's consultation, feel free  to contact me or one of my partners. We can be reached at [phone] during normal business hours. If you have a question after normal  business hours, please contact Labor and Delivery at [phone].

## 2018-11-02 LAB
# FETUSES US: NORMAL
AGE AT DELIVERY: 33
B-HCG MOM SERPL: NORMAL
B-HCG SERPL-ACNC: 92.1 IU/ML
FET CRL US.MEAS: 54 MM
FET NASAL BONE LENGTH US.MEAS: NORMAL MM
FET NUCHAL FOLD MOM THICKNESS US.MEAS: NORMAL
FET NUCHAL FOLD THICKNESS US.MEAS: 1 MM
FET TS 21 RISK FROM MAT AGE: NORMAL
GA (DAYS): 0 D
GA (WEEKS): 12 WK
IDDM PATIENT QL: NORMAL
INTEGRATED SCN PATIENT-IMP: NEGATIVE
PAPP-A MOM SERPL: NORMAL
PAPP-A SERPL-MCNC: 738.7 NG/ML
SEQUENTIAL SCREEN I INTERP.: NORMAL
SMOKING STATUS FTND: NO
TS 18 RISK FETUS: NORMAL
TS 21 RISK FETUS: NORMAL
US DATE: NORMAL

## 2018-11-07 ENCOUNTER — PATIENT MESSAGE (OUTPATIENT)
Dept: ADMINISTRATIVE | Facility: OTHER | Age: 33
End: 2018-11-07

## 2018-11-07 ENCOUNTER — PATIENT MESSAGE (OUTPATIENT)
Dept: OBSTETRICS AND GYNECOLOGY | Facility: CLINIC | Age: 33
End: 2018-11-07

## 2018-11-10 ENCOUNTER — PATIENT MESSAGE (OUTPATIENT)
Dept: OBSTETRICS AND GYNECOLOGY | Facility: CLINIC | Age: 33
End: 2018-11-10

## 2018-11-15 ENCOUNTER — PATIENT OUTREACH (OUTPATIENT)
Dept: OTHER | Facility: OTHER | Age: 33
End: 2018-11-15

## 2018-11-16 NOTE — TELEPHONE ENCOUNTER
Initial introduction with Mrs. Johanna Ocasio completed and the role of the health coaches for [facility] was explained.     Reviewed the importance of taking weights and blood pressure readings, using the health  as a resource for physical activity and dietary habits, and that [facility] messages will be sent for weeks 20, 30, and 37 home urine tests.  Reviewed that the patient should contact her OB team with any specific questions regarding her pregnancy, and to contact [facility] or 911 in the case of a medical emergency.

## 2018-11-27 ENCOUNTER — ROUTINE PRENATAL (OUTPATIENT)
Dept: OBSTETRICS AND GYNECOLOGY | Facility: CLINIC | Age: 33
End: 2018-11-27
Payer: COMMERCIAL

## 2018-11-27 ENCOUNTER — TELEPHONE (OUTPATIENT)
Dept: OBSTETRICS AND GYNECOLOGY | Facility: CLINIC | Age: 33
End: 2018-11-27

## 2018-11-27 VITALS
BODY MASS INDEX: 29.15 KG/M2 | SYSTOLIC BLOOD PRESSURE: 113 MMHG | DIASTOLIC BLOOD PRESSURE: 79 MMHG | WEIGHT: 149.25 LBS

## 2018-11-27 DIAGNOSIS — Z34.82 PRENATAL CARE, SUBSEQUENT PREGNANCY IN SECOND TRIMESTER: Primary | ICD-10-CM

## 2018-11-27 PROCEDURE — 99999 PR PBB SHADOW E&M-EST. PATIENT-LVL II: CPT | Mod: PBBFAC,,, | Performed by: NURSE PRACTITIONER

## 2018-11-27 PROCEDURE — 0502F SUBSEQUENT PRENATAL CARE: CPT | Mod: S$GLB,,, | Performed by: NURSE PRACTITIONER

## 2018-11-27 RX ORDER — BUTALBITAL, ACETAMINOPHEN AND CAFFEINE 50; 325; 40 MG/1; MG/1; MG/1
1 CAPSULE ORAL EVERY 6 HOURS PRN
Qty: 30 CAPSULE | Refills: 2 | Status: SHIPPED | OUTPATIENT
Start: 2018-11-27 | End: 2018-12-27

## 2018-11-27 NOTE — PROGRESS NOTES
"C/o clear nasal congestion, throbbing headache ("not sinus") and w/o fever, ST, cough; Tylenol not helping; Advised Sudafed, Flonase, salt water nose drops, fluids, rest and Fiorcet Rx per Dr Dempsey prn; Denies cramping, bleeding; Discussed diet, wt gain, second trimester; Saw the movie; Mat Screen II today; Has MFM Anatomy U/S 12-20-18 and visit in 8 weeks.  "

## 2018-11-29 ENCOUNTER — TELEPHONE (OUTPATIENT)
Dept: OBSTETRICS AND GYNECOLOGY | Facility: CLINIC | Age: 33
End: 2018-11-29

## 2018-11-29 NOTE — TELEPHONE ENCOUNTER
----- Message from Jacqui Stanford sent at 11/29/2018 11:38 AM CST -----  Contact: shyam  Name of Who is Calling: shyam [facility]       What is the request in detail: keyur is requesting that the butalbital-acetaminophen-caff -40 mg -40 mg Cap be changed to tablets so that the patient out of pocket is a little cheaper       Can the clinic reply by [facility]: no      What Number to Call Back if not in [facility]: [phone]

## 2018-11-30 ENCOUNTER — TELEPHONE (OUTPATIENT)
Dept: OBSTETRICS AND GYNECOLOGY | Facility: CLINIC | Age: 33
End: 2018-11-30

## 2018-11-30 ENCOUNTER — PATIENT MESSAGE (OUTPATIENT)
Dept: OBSTETRICS AND GYNECOLOGY | Facility: CLINIC | Age: 33
End: 2018-11-30

## 2018-11-30 RX ORDER — AZITHROMYCIN 250 MG/1
TABLET, FILM COATED ORAL
Qty: 6 TABLET | Refills: 0 | Status: SHIPPED | OUTPATIENT
Start: 2018-11-30 | End: 2018-12-13

## 2018-11-30 NOTE — TELEPHONE ENCOUNTER
----- Message from Rachel Pace MA sent at 11/30/2018  2:20 PM CST -----  Contact: Pt      ----- Message -----  From: Rosaura Sheridan  Sent: 11/30/2018   1:09 PM  To: Ke BRASWELL Staff    Name of Who is Calling:BRAYAN PERKINS [[id number]]    What is the request in detail: patient would l;ludin a call back for antibiotics for sinus infection Please contact to further discuss and advise     Can the clinic reply by [facility]:     What Number to Call Back if not in [facility]: ext [phone]

## 2018-12-01 NOTE — TELEPHONE ENCOUNTER
"  Sounds like bacteria have entered the scenario and you could benefit from antibiotics.  I'll send a prescription for a Z-Pack to your pharmacy; please don't stop attempts to improve drainage.  Hang your head over a steaming pot of water with a towel draped over to make a "tent."  Use a nettipot, nasal saline, flonase.  If theres enough drainage, it can help your sinuses feel better.  Hope this does the trick, but if not please let me know.  "

## 2018-12-13 ENCOUNTER — OFFICE VISIT (OUTPATIENT)
Dept: OPTOMETRY | Facility: CLINIC | Age: 33
End: 2018-12-13
Payer: COMMERCIAL

## 2018-12-13 DIAGNOSIS — H21.301: ICD-10-CM

## 2018-12-13 DIAGNOSIS — H49.9 EXTRAOCULAR MUSCLE PALSY OF LEFT EYE: ICD-10-CM

## 2018-12-13 DIAGNOSIS — H52.13 MYOPIA, BILATERAL: Primary | ICD-10-CM

## 2018-12-13 PROCEDURE — 92012 INTRM OPH EXAM EST PATIENT: CPT | Mod: S$GLB,,, | Performed by: OPTOMETRIST

## 2018-12-13 PROCEDURE — 99999 PR PBB SHADOW E&M-EST. PATIENT-LVL II: CPT | Mod: PBBFAC,,, | Performed by: OPTOMETRIST

## 2018-12-13 PROCEDURE — 92015 DETERMINE REFRACTIVE STATE: CPT | Mod: S$GLB,,, | Performed by: OPTOMETRIST

## 2018-12-13 NOTE — PROGRESS NOTES
HPI     33yr old female present for Routine eye exam. Patient says she's here for   updated glasses prescription. Patient denies any changes with her vision   OU since last eye exam. Patient seeing floaters on occasion after looking   into light/sunlight, no flashes of light. Patient denies eye pain and   headaches. Patient using AT's PRN-OU, for redness and itching.     Last edited by Mario Alexander MA on 12/13/2018  8:12 AM. (History)            Assessment /Plan     For exam results, see Encounter Report.      Myopia, bilateral              Rx specs     Cyst of iris, right              Stable, monitor angle with gonio next visit                 Prior goino WNL     Extraocular muscle palsy of left eye              Pt unable to look to left with OS              Longstanding, stable     Good internal ocular health, monitor yearly

## 2018-12-20 ENCOUNTER — PROCEDURE VISIT (OUTPATIENT)
Dept: MATERNAL FETAL MEDICINE | Facility: CLINIC | Age: 33
End: 2018-12-20
Payer: COMMERCIAL

## 2018-12-20 DIAGNOSIS — Z36.89 ENCOUNTER FOR FETAL ANATOMIC SURVEY: ICD-10-CM

## 2018-12-20 PROCEDURE — 76805 OB US >/= 14 WKS SNGL FETUS: CPT | Mod: S$GLB,,, | Performed by: PEDIATRICS

## 2018-12-20 PROCEDURE — 99499 UNLISTED E&M SERVICE: CPT | Mod: S$GLB,,, | Performed by: PEDIATRICS

## 2018-12-20 NOTE — PROGRESS NOTES
Indication  ========    Fetal anatomy survey.    History  ======    Previous Outcomes   2  Para 1    Pregnancy History  ==============    Maternal Lab Tests  Test: Sequential Screen  Result: Negative  Wants to know gender: yes    Method  ======    2D Color Doppler, , Voluson E10, Transabdominal ultrasound examination. View: Good view.    Pregnancy  =========    Fishman pregnancy. Number of fetuses: 1.    Dating  ======    LMP on: 2018  Cycle: regular cycle  GA by LMP 19 w + 1 d  ANURAG by LMP: 5/15/2019  Ultrasound examination on: 2018  GA by U/S based upon: AC, BPD, Femur, HC  GA by U/S 18 w + 5 d  ANURAG by U/S: 2019  Assigned: Dating performed on 2018, based on the LMP  Assigned GA 19 w + 1 d  Assigned ANURAG: 5/15/2019    General Evaluation  ==============    Cardiac activity: present.  bpm.  Fetal movements: visualized.  Presentation: breech.  Placenta:  Placental site: posterior.  Umbilical cord: Cord vessels: 3 vessel cord. Cord insertion: placental insertion: normal.  Amniotic fluid: normal amount.    Fetal Biometry  ============    Fetal Biometry  BPD 41.0 mm 18w 3d Hadlock  OFD 53.7 mm 19w 1d Alfonso  .5 mm 18w 2d Hadlock  .2 mm 19w 3d Hadlock  Femur 29.0 mm 18w 6d Hadlock  Cerebellum tr 20.1 mm 20w 0d Hurst  CM 3.5 mm  Nuchal fold 4.44 mm  Humerus 30.2 mm 20w 0d Alfonso   g  Calculated by: Hadlock (BPD-HC-AC-FL)  EFW (lb) 0 lb  EFW (oz) 10 oz  Cephalic index 0.76  HC / AC 1.09  FL / BPD 0.71  FL / AC 0.21   bpm  Head / Face / Neck   5.8 mm    Fetal Anatomy  ============    Cranium: normal  Lateral ventricles: normal  Choroid plexus: normal  Midline falx: normal  Cavum septi pellucidi: normal  Cerebellum: normal  Cisterna magna: normal  Rt lateral ventricle: normal  Lt lateral ventricle: normal  Rt choroid plexus: normal  Lt choroid plexus: normal  Lips: normal  Profile: normal  Nose: normal  4-chamber  view: normal  RVOT: normal  LVOT: normal  Situs: normal  Aortic arch: normal  Cardiac position: normal  Cardiac axis: normal  Cardiac size: normal  Cardiac rhythm: normal  Rt lung: normal  Lt lung: normal  Diaphragm: normal  Cord insertion: normal  Stomach: normal  Kidneys: normal  Bladder: normal  Genitals: normal  Abdom. wall: appears normal  Abdom. cavity: normal  Rt kidney: normal  Lt kidney: normal  Liver: normal  Bowel: normal  Rt renal artery: normal  Lt renal artery: normal  Cervical spine: normal  Thoracic spine: normal  Lumbar spine: normal  Sacral spine: normal  Arms: normal  Legs: normal  Rt arm: normal  Lt arm: normal  Rt hand: present  Lt hand: present  Rt leg: normal  Lt leg: normal  Rt foot: normal  Lt foot: normal  Gender: female  Wants to know gender: yes    Maternal Structures  ===============    Uterus / Cervix  Uterus: Normal  Cervical length 42.0 mm  Ovaries / Tubes / Adnexa  Rt ovary: Visualized  Lt ovary: Visualized          Impression  =========    A osorio living IUP is identified. Fetal size is appropriate for established dating. No fetal structural malformations are identified. Cervical  length is normal, and placental location is normal without evidence of previa.    Follow-up ultrasound as clinically indicated.          Recommendation  ==============      Thank you again for allowing us to participate in the care of your patients. If you have any questions concerning today's consultation, feel free  to contact me or one of my partners. We can be reached at [phone] during normal business hours. If you have a question after normal  business hours, please contact Labor and Delivery at [phone].

## 2018-12-27 ENCOUNTER — PATIENT MESSAGE (OUTPATIENT)
Dept: ADMINISTRATIVE | Facility: OTHER | Age: 33
End: 2018-12-27

## 2019-01-22 ENCOUNTER — ROUTINE PRENATAL (OUTPATIENT)
Dept: OBSTETRICS AND GYNECOLOGY | Facility: CLINIC | Age: 34
End: 2019-01-22
Payer: COMMERCIAL

## 2019-01-22 VITALS — DIASTOLIC BLOOD PRESSURE: 80 MMHG | BODY MASS INDEX: 30.57 KG/M2 | WEIGHT: 156.5 LBS | SYSTOLIC BLOOD PRESSURE: 120 MMHG

## 2019-01-22 DIAGNOSIS — Z3A.24 PREGNANCY WITH 24 COMPLETED WEEKS GESTATION: Primary | ICD-10-CM

## 2019-01-22 PROCEDURE — 0502F SUBSEQUENT PRENATAL CARE: CPT | Mod: S$GLB,,, | Performed by: OBSTETRICS & GYNECOLOGY

## 2019-01-22 PROCEDURE — 99999 PR PBB SHADOW E&M-EST. PATIENT-LVL II: CPT | Mod: PBBFAC,,, | Performed by: OBSTETRICS & GYNECOLOGY

## 2019-01-22 PROCEDURE — 99999 PR PBB SHADOW E&M-EST. PATIENT-LVL II: ICD-10-PCS | Mod: PBBFAC,,, | Performed by: OBSTETRICS & GYNECOLOGY

## 2019-01-22 PROCEDURE — 0502F PR SUBSEQUENT PRENATAL CARE: ICD-10-PCS | Mod: S$GLB,,, | Performed by: OBSTETRICS & GYNECOLOGY

## 2019-01-22 NOTE — PROGRESS NOTES
GOOD FM, NO UC AND NO PV LOSS.  NO C/O.  DAUGHTER JUST HAD BMT AND GOING TODAY FOR F/U WITH ENT.  NEXT VISIT 3-4 WEEKS WITH LABS AND TDAP

## 2019-02-04 ENCOUNTER — PATIENT MESSAGE (OUTPATIENT)
Dept: OBSTETRICS AND GYNECOLOGY | Facility: CLINIC | Age: 34
End: 2019-02-04

## 2019-02-04 ENCOUNTER — OFFICE VISIT (OUTPATIENT)
Dept: INTERNAL MEDICINE | Facility: CLINIC | Age: 34
End: 2019-02-04
Payer: COMMERCIAL

## 2019-02-04 VITALS
TEMPERATURE: 99 F | BODY MASS INDEX: 31.68 KG/M2 | DIASTOLIC BLOOD PRESSURE: 70 MMHG | OXYGEN SATURATION: 98 % | SYSTOLIC BLOOD PRESSURE: 114 MMHG | WEIGHT: 161.38 LBS | HEART RATE: 123 BPM | HEIGHT: 60 IN

## 2019-02-04 DIAGNOSIS — R09.89 RESPIRATORY SYMPTOMS: Primary | ICD-10-CM

## 2019-02-04 LAB
INFLUENZA A, MOLECULAR: NEGATIVE
INFLUENZA B, MOLECULAR: NEGATIVE
SPECIMEN SOURCE: NORMAL

## 2019-02-04 PROCEDURE — 99999 PR PBB SHADOW E&M-EST. PATIENT-LVL IV: ICD-10-PCS | Mod: PBBFAC,,, | Performed by: INTERNAL MEDICINE

## 2019-02-04 PROCEDURE — 99213 PR OFFICE/OUTPT VISIT, EST, LEVL III, 20-29 MIN: ICD-10-PCS | Mod: S$GLB,,, | Performed by: INTERNAL MEDICINE

## 2019-02-04 PROCEDURE — 3008F PR BODY MASS INDEX (BMI) DOCUMENTED: ICD-10-PCS | Mod: CPTII,S$GLB,, | Performed by: INTERNAL MEDICINE

## 2019-02-04 PROCEDURE — 87502 INFLUENZA DNA AMP PROBE: CPT

## 2019-02-04 PROCEDURE — 3008F BODY MASS INDEX DOCD: CPT | Mod: CPTII,S$GLB,, | Performed by: INTERNAL MEDICINE

## 2019-02-04 PROCEDURE — 99999 PR PBB SHADOW E&M-EST. PATIENT-LVL IV: CPT | Mod: PBBFAC,,, | Performed by: INTERNAL MEDICINE

## 2019-02-04 PROCEDURE — 99213 OFFICE O/P EST LOW 20 MIN: CPT | Mod: S$GLB,,, | Performed by: INTERNAL MEDICINE

## 2019-02-04 RX ORDER — ALBUTEROL SULFATE 90 UG/1
2 AEROSOL, METERED RESPIRATORY (INHALATION) EVERY 6 HOURS PRN
Qty: 18 G | Refills: 1 | Status: SHIPPED | OUTPATIENT
Start: 2019-02-04 | End: 2020-02-04

## 2019-02-04 RX ORDER — BUTALBITAL, ACETAMINOPHEN AND CAFFEINE 50; 325; 40 MG/1; MG/1; MG/1
1 TABLET ORAL EVERY 4 HOURS PRN
COMMUNITY
End: 2021-06-01

## 2019-02-04 NOTE — PROGRESS NOTES
"Subjective:      Patient ID: Johanna Ocasio is a 33 y.o. female.    Chief Complaint: Cough; Headache; and Chills    HPI:  HPI   Patient is currently 6 months pregnant, primarily wanted to know if she had the flu, she has contacted her OB Dr. Dempsey.    Patient is here because she feels "terrible". Her  was ill last week but the flu was not checked.     She feels hot, chills, but not fever. No sinus congestion, positive cough, no nausea or vomiting. She has had some muscle aches. She is week and tired. She has no new urinary symptoms.  Patient Active Problem List   Diagnosis    Prenatal care, first pregnancy    History of migraine headaches    History of seizures as a child - last seizure age 14, history of venous hemangioma on brain    GBS (group B Streptococcus carrier), +RV culture, currently pregnant    Brain venous angioma     Past Medical History:   Diagnosis Date    Abnormal Pap smear of cervix 2011    Colposcopy    Brain venous angioma 10/3/2018    Early childhood epilepsy, myoclonic     last seizure age 14; Venous hemangioma on MRI    History of migraine headaches 5/12/2016    Migraine headache      No past surgical history on file.  Family History   Problem Relation Age of Onset    Hypertension Mother     Hypertension Father     Cataracts Maternal Grandfather     Diabetes Paternal Grandmother     Cancer Paternal Grandfather         colon cancer    Breast cancer Neg Hx     Colon cancer Neg Hx     Ovarian cancer Neg Hx     Melanoma Neg Hx     Lupus Neg Hx     Psoriasis Neg Hx      Review of Systems see above  Objective:     Vitals:    02/04/19 1536   BP: 114/70   Pulse: (!) 123   Temp: 98.9 °F (37.2 °C)   SpO2: 98%   Weight: 73.2 kg (161 lb 6 oz)   Height: 5' (1.524 m)   PainSc:   5     Body mass index is 31.52 kg/m².  Physical Exam   Constitutional: She is oriented to person, place, and time. She appears well-developed and well-nourished. No distress.   HENT:   Sinus congestion " mild  Post nasal drip  No sinus tenderness  Throat no exudate   Neck: Carotid bruit is not present. No thyromegaly present.   Cardiovascular: Regular rhythm and normal heart sounds. PMI is not displaced.   Initially fast rate after walking   Pulmonary/Chest: Effort normal and breath sounds normal. She has no wheezes.   Cough in upper airways   Abdominal: Soft. Bowel sounds are normal. She exhibits no distension. There is no tenderness.   Musculoskeletal: She exhibits no edema.   Neurological: She is alert and oriented to person, place, and time.     Assessment:     1. Respiratory symptoms    We will check for flu    Nose  Saline wash: Arm and Hammer  Flonase  Antihistamine    Larynx:  Not talking  Hot tea , honey and lemon    Lung:  Albuterol    Patient awaiting response from Dr. Dempsey    Plan:   Johanna was seen today for cough, headache and chills.    Diagnoses and all orders for this visit:    Respiratory symptoms  -     Influenza A & B by Molecular    Other orders  -     albuterol (PROVENTIL HFA) 90 mcg/actuation inhaler; Inhale 2 puffs into the lungs every 6 (six) hours as needed for Wheezing. Rescue        Problem List Items Addressed This Visit     None      Visit Diagnoses     Respiratory symptoms    -  Primary    Relevant Orders    Influenza A & B by Molecular (Completed)        Orders Placed This Encounter   Procedures    Influenza A & B by Molecular     No Follow-up on file.     Medication List           Accurate as of 2/4/19 11:59 PM. If you have any questions, ask your nurse or doctor.               START taking these medications    albuterol 90 mcg/actuation inhaler  Commonly known as:  PROVENTIL HFA  Inhale 2 puffs into the lungs every 6 (six) hours as needed for Wheezing. Rescue  Started by:  Ana Paul MD        CONTINUE taking these medications    butalbital-acetaminophen-caffeine -40 mg -40 mg per tablet  Commonly known as:  FIORICET, ESGIC     ondansetron 4 MG tablet  Commonly  known as:  ZOFRAN  Take 1 tablet (4 mg total) by mouth daily as needed for Nausea.     PRENATAL 1+1 ORAL           Where to Get Your Medications      These medications were sent to [facility] - [place] - [address]  [address] [place] [address]    Phone:  [phone]   · albuterol 90 mcg/actuation inhaler

## 2019-02-04 NOTE — PATIENT INSTRUCTIONS
We will check for flu    Nose  Saline wash: Arm and Hammer  Flonase  Antihistamine    Larynx:  Not talking  Hot tea , honey and lemon    Lung:  Albuterol

## 2019-02-05 ENCOUNTER — PATIENT MESSAGE (OUTPATIENT)
Dept: OBSTETRICS AND GYNECOLOGY | Facility: CLINIC | Age: 34
End: 2019-02-05

## 2019-02-05 ENCOUNTER — PATIENT MESSAGE (OUTPATIENT)
Dept: INTERNAL MEDICINE | Facility: CLINIC | Age: 34
End: 2019-02-05

## 2019-02-05 ENCOUNTER — TELEPHONE (OUTPATIENT)
Dept: INTERNAL MEDICINE | Facility: CLINIC | Age: 34
End: 2019-02-05

## 2019-02-08 ENCOUNTER — TELEPHONE (OUTPATIENT)
Dept: OBSTETRICS AND GYNECOLOGY | Facility: CLINIC | Age: 34
End: 2019-02-08

## 2019-02-08 ENCOUNTER — OFFICE VISIT (OUTPATIENT)
Dept: URGENT CARE | Facility: CLINIC | Age: 34
End: 2019-02-08
Payer: COMMERCIAL

## 2019-02-08 VITALS
BODY MASS INDEX: 30.82 KG/M2 | HEIGHT: 60 IN | WEIGHT: 157 LBS | OXYGEN SATURATION: 100 % | TEMPERATURE: 98 F | HEART RATE: 108 BPM | RESPIRATION RATE: 20 BRPM | DIASTOLIC BLOOD PRESSURE: 82 MMHG | SYSTOLIC BLOOD PRESSURE: 128 MMHG

## 2019-02-08 DIAGNOSIS — H66.91 RIGHT OTITIS MEDIA, UNSPECIFIED OTITIS MEDIA TYPE: ICD-10-CM

## 2019-02-08 DIAGNOSIS — J98.01 BRONCHOSPASM: ICD-10-CM

## 2019-02-08 DIAGNOSIS — J40 BRONCHITIS: Primary | ICD-10-CM

## 2019-02-08 PROCEDURE — 99214 PR OFFICE/OUTPT VISIT, EST, LEVL IV, 30-39 MIN: ICD-10-PCS | Mod: S$GLB,,, | Performed by: FAMILY MEDICINE

## 2019-02-08 PROCEDURE — 99214 OFFICE O/P EST MOD 30 MIN: CPT | Mod: S$GLB,,, | Performed by: FAMILY MEDICINE

## 2019-02-08 PROCEDURE — 94640 AIRWAY INHALATION TREATMENT: CPT | Mod: S$GLB,,, | Performed by: FAMILY MEDICINE

## 2019-02-08 PROCEDURE — 3008F BODY MASS INDEX DOCD: CPT | Mod: CPTII,S$GLB,, | Performed by: FAMILY MEDICINE

## 2019-02-08 PROCEDURE — 94640 PR INHAL RX, AIRWAY OBST/DX SPUTUM INDUCT: ICD-10-PCS | Mod: S$GLB,,, | Performed by: FAMILY MEDICINE

## 2019-02-08 PROCEDURE — 3008F PR BODY MASS INDEX (BMI) DOCUMENTED: ICD-10-PCS | Mod: CPTII,S$GLB,, | Performed by: FAMILY MEDICINE

## 2019-02-08 RX ORDER — ALBUTEROL SULFATE 0.83 MG/ML
2.5 SOLUTION RESPIRATORY (INHALATION) EVERY 4 HOURS PRN
Qty: 1 BOX | Refills: 0 | Status: SHIPPED | OUTPATIENT
Start: 2019-02-08 | End: 2021-12-01 | Stop reason: SDUPTHER

## 2019-02-08 RX ORDER — ALBUTEROL SULFATE 0.83 MG/ML
2.5 SOLUTION RESPIRATORY (INHALATION)
Status: COMPLETED | OUTPATIENT
Start: 2019-02-08 | End: 2019-02-08

## 2019-02-08 RX ORDER — AMOXICILLIN 875 MG/1
875 TABLET, FILM COATED ORAL 2 TIMES DAILY
Qty: 20 TABLET | Refills: 0 | Status: SHIPPED | OUTPATIENT
Start: 2019-02-08 | End: 2019-02-18

## 2019-02-08 RX ADMIN — ALBUTEROL SULFATE 2.5 MG: 0.83 SOLUTION RESPIRATORY (INHALATION) at 06:02

## 2019-02-08 NOTE — TELEPHONE ENCOUNTER
----- Message from Daniela Shepherd sent at 2/8/2019  3:16 PM CST -----  Contact: pt  Name of Who is Calling: BRAYAN PERKINS [[id number]]      What is the request in detail: pt is calling in regards to needing antibiotics.. Please advise      Can the clinic reply by [facility]: no      What [facility] Back [facility]:[phone]

## 2019-02-08 NOTE — PROGRESS NOTES
"Subjective:       Patient ID: Johanna Ocasio is a 33 y.o. female.    Vitals:  height is 5' (1.524 m) and weight is 71.2 kg (157 lb). Her oral temperature is 98.4 °F (36.9 °C). Her blood pressure is 128/82 and her pulse is 108. Her respiration is 20 and oxygen saturation is 100%.     Chief Complaint: URI    This is a 33 y.o. female   with Past Medical History:  2011: Abnormal Pap smear of cervix      Comment:  Colposcopy  10/3/2018: Brain venous angioma  No date: Early childhood epilepsy, myoclonic      Comment:  last seizure age 14; Venous hemangioma on MRI  5/12/2016: History of migraine headaches  No date: Migraine headache   and History reviewed. No pertinent surgical history.    33-year-old female who is at 26 weeks gestation, who presents today with a chief complaint of cold symptoms that began four days ago.  She was seen 2/4/19 with congestion,, wheezing, productive cough and sinus pressure.  She was given an albuterol inhaler, Zyrtec, and Flonase.  She states she was told by her pcp "to come to urgent care to be put on antibiotics because her symptoms have failed to get better."  She does have a history of reactive airways.  Complains of bilateral ear pain, and green sputum for several days      URI    This is a new problem. The current episode started in the past 7 days. The problem has been gradually worsening. There has been no fever. Associated symptoms include congestion, coughing, rhinorrhea and wheezing. Pertinent negatives include no ear pain, nausea, rash, sinus pain, sore throat or vomiting. Treatments tried: tylenol, flonase, zyrtec. The treatment provided mild relief.       Constitution: Negative for chills, sweating, fatigue and fever.   HENT: Positive for congestion. Negative for ear pain, sinus pain, sinus pressure, sore throat and voice change.    Neck: Negative for painful lymph nodes.   Eyes: Negative for eye redness.   Respiratory: Positive for cough, sputum production and wheezing. " Negative for chest tightness, bloody sputum, COPD, shortness of breath, stridor and asthma.    Gastrointestinal: Negative for nausea and vomiting.   Musculoskeletal: Negative for muscle ache.   Skin: Negative for rash.   Allergic/Immunologic: Negative for seasonal allergies and asthma.   Hematologic/Lymphatic: Negative for swollen lymph nodes.       Objective:      Physical Exam   Constitutional: She is oriented to person, place, and time. She appears well-developed and well-nourished. She is cooperative.  Non-toxic appearance. She does not appear ill. No distress.   HENT:   Head: Normocephalic and atraumatic.   Right Ear: Hearing, tympanic membrane and ear canal normal.   Left Ear: Hearing, tympanic membrane and ear canal normal.   Nose: Nose normal. No mucosal edema, rhinorrhea or nasal deformity. No epistaxis. Right sinus exhibits no maxillary sinus tenderness and no frontal sinus tenderness. Left sinus exhibits no maxillary sinus tenderness and no frontal sinus tenderness.   Mouth/Throat: Uvula is midline, oropharynx is clear and moist and mucous membranes are normal. No trismus in the jaw. Normal dentition. No uvula swelling. No oropharyngeal exudate or posterior oropharyngeal erythema.   Right TM is injected, red, with serous changes.\  Left TM injected, less red   Eyes: Conjunctivae, EOM and lids are normal. Pupils are equal, round, and reactive to light. No scleral icterus.   Sclera clear bilat   Neck: Trachea normal, normal range of motion, full passive range of motion without pain and phonation normal. Neck supple.   Cardiovascular: Normal rate, regular rhythm, normal heart sounds, intact distal pulses and normal pulses.   Pulmonary/Chest: Effort normal. No stridor. No respiratory distress. She has wheezes. She has no rales.   Diffuse inspiratory and expiratory wheezes.  Repeat exam after albuterol nebulizer treatment revealed improved air movement, although persistent end expiratory wheezes.  No rales    Abdominal: Soft. Normal appearance and bowel sounds are normal. She exhibits no distension and no mass. There is no tenderness. There is no guarding.   Musculoskeletal: Normal range of motion. She exhibits no edema or deformity.   Lymphadenopathy:     She has no cervical adenopathy.   Neurological: She is alert and oriented to person, place, and time. She exhibits normal muscle tone. Coordination normal.   Skin: Skin is warm, dry and intact. She is not diaphoretic. No pallor.   Psychiatric: She has a normal mood and affect. Her speech is normal and behavior is normal. Judgment and thought content normal. Cognition and memory are normal.   Nursing note and vitals reviewed.      Assessment:       1. Bronchitis    2. Bronchospasm    3. Right otitis media, unspecified otitis media type        Plan:       Does not seem to be using the MDI effectively.  Given Rx for spacer, as well as nebulizer    Bronchitis  -     amoxicillin (AMOXIL) 875 MG tablet; Take 1 tablet (875 mg total) by mouth 2 (two) times daily. for 10 days  Dispense: 20 tablet; Refill: 0    Bronchospasm  -     albuterol nebulizer solution 2.5 mg  -     albuterol (PROVENTIL) 2.5 mg /3 mL (0.083 %) nebulizer solution; Take 3 mLs (2.5 mg total) by nebulization every 4 (four) hours as needed for Wheezing. Rescue  Dispense: 1 Box; Refill: 0    Right otitis media, unspecified otitis media type  -     amoxicillin (AMOXIL) 875 MG tablet; Take 1 tablet (875 mg total) by mouth 2 (two) times daily. for 10 days  Dispense: 20 tablet; Refill: 0    USE THE ALBUTEROL, EITHER NEBULIZER OR INHALER WITH SPACER, EVERY 4 HR, IF YOU ARE WHEEZING AT ALL.    FOLLOW-UP ON MONDAY WITH YOUR PRIMARY CARE PROVIDER TO RE-EVALUATE.    Make sure that you follow up with your primary care doctor in the next 2-5 days if needed .  Return to the Urgent Care if signs or symptoms change and certainly if you have worsening symptoms go to the nearest emergency department for further evaluation.

## 2019-02-08 NOTE — TELEPHONE ENCOUNTER
Spoke with pt  Pt is having a severe sinus infection with green discharge  Pt is not having fever at this time.  Pt reports fluid in her ears.  Pt has been using over the counter medications to treat symptoms.    Pt is wanting antibiotics called in.    Pt was instructed that she should go to Urgent Care or her PCP for evaluation as providers will not call in antibiotics without a exam.    Pt verbalized understanding.

## 2019-02-08 NOTE — TELEPHONE ENCOUNTER
LVM:  Good afternoon Ms Michele, this is Denise from Dr Dempsey's office retuning your call.   Please call our office back at [phone].    Thank You

## 2019-02-08 NOTE — TELEPHONE ENCOUNTER
----- Message from Alma Rosa Reggie sent at 2/8/2019  3:55 PM CST -----  Contact: pt   Name of Who is Calling: BRAYAN PERKINS [[id number]]      What is the request in detail: Patient states she is returning a call from Denise, pleased pt on hold for a seconded and she hung up. Please contact to further discuss and advise      Can the clinic reply by [facility]: No       What [facility] Back [facility] [phone]

## 2019-02-09 NOTE — PATIENT INSTRUCTIONS
Bronchitis with Wheezing (Viral or Bacterial: Adult)    Bronchitis is an infection of the air passages. It often occurs during a cold and is usually caused by a virus. Symptoms include cough with mucus (phlegm) and low-grade fever. This illness is contagious during the first few days and is spread through the air by coughing and sneezing, or by direct contact (touching the sick person and then touching your own eyes, nose, or mouth).  If there is a lot of inflammation, air flow is restricted. The air passages may also go into spasm, especially if you have asthma. This causes wheezing and difficulty breathing even in people who do not have asthma.  Bronchitis usually lasts 7 to 14 days. The wheezing should improve with treatment during the first week. An inhaler is often prescribed to relax the air passages and stop wheezing. Antibiotics will be prescribed if your doctor thinks there is also a secondary bacterial infection.  Home care  · If symptoms are severe, rest at home for the first 2 to 3 days. When you go back to your usual activities, don't let yourself get too tired.  · Do not smoke. Also avoid being exposed to secondhand smoke.  · You may use over-the-counter medicine to control fever or pain, unless another medicine was prescribed. Note: If you have chronic liver or kidney disease or have ever had a stomach ulcer or gastrointestinal bleeding, talk with your healthcare provider before using these medicines. Also talk to your provider if you are taking medicine to prevent blood clots.) Aspirin should never be given to anyone younger than 18 years of age who is ill with a viral infection or fever. It may cause severe liver or brain damage.  · Your appetite may be poor, so a light diet is fine. Avoid dehydration by drinking 6 to 8 glasses of fluids per day (such as water, soft drinks, sports drinks, juices, tea, or soup). Extra fluids will help loosen secretions in the nose and lungs.  · Over-the-counter  cough, cold, and sore-throat medicines will not shorten the length of the illness, but they may be helpful to reduce symptoms. (Note: Do not use decongestants if you have high blood pressure.)  · If you were given an inhaler, use it exactly as directed. If you need to use it more often than prescribed, your condition may be worsening. If this happens, contact your healthcare provider.  · If prescribed, finish all antibiotic medicine, even if you are feeling better after only a few days.  Follow-up care  Follow up with your healthcare provider, or as advised. If you had an X-ray or ECG (electrocardiogram), a specialist will review it. You will be notified of any new findings that may affect your care.  Note: If you are age 65 or older, or if you have a chronic lung disease or condition that affects your immune system, or you smoke, talk to your healthcare provider about having a pneumococcal vaccinations and a yearly influenza vaccination (flu shot).  When to seek medical advice  Call your healthcare provider right away if any of these occur:  · Fever of 100.4°F (38°C) or higher  · Coughing up increasing amounts of colored sputum  · Weakness, drowsiness, headache, facial pain, ear pain, or a stiff neck  Call 911, or get immediate medical care  Contact emergency services right away if any of these occur.  · Coughing up blood  · Worsening weakness, drowsiness, headache, or stiff neck  · Increased wheezing not helped with medication, shortness of breath, or pain with breathing  Date Last Reviewed: 9/13/2015  © 3492-9199 [employer]. [address] [place] [address], [place] [address]. All rights reserved. This information is not intended as a substitute for professional medical care. Always follow your healthcare professional's instructions.         USE THE ALBUTEROL, EITHER NEBULIZER OR INHALER WITH SPACER, EVERY 4 HR, IF YOU ARE WHEEZING AT ALL.    FOLLOW-UP ON MONDAY WITH YOUR PRIMARY CARE PROVIDER TO  RE-EVALUATE.    Make sure that you follow up with your primary care doctor in the next 2-5 days if needed .  Return to the Urgent Care if signs or symptoms change and certainly if you have worsening symptoms go to the nearest emergency department for further evaluation.

## 2019-02-12 ENCOUNTER — LAB VISIT (OUTPATIENT)
Dept: LAB | Facility: HOSPITAL | Age: 34
End: 2019-02-12
Attending: OBSTETRICS & GYNECOLOGY
Payer: COMMERCIAL

## 2019-02-12 DIAGNOSIS — Z3A.24 PREGNANCY WITH 24 COMPLETED WEEKS GESTATION: ICD-10-CM

## 2019-02-12 LAB
BASOPHILS # BLD AUTO: 0.02 K/UL
BASOPHILS NFR BLD: 0.2 %
DIFFERENTIAL METHOD: ABNORMAL
EOSINOPHIL # BLD AUTO: 0.4 K/UL
EOSINOPHIL NFR BLD: 4.3 %
ERYTHROCYTE [DISTWIDTH] IN BLOOD BY AUTOMATED COUNT: 13.1 %
GLUCOSE SERPL-MCNC: 98 MG/DL
HCT VFR BLD AUTO: 36.5 %
HGB BLD-MCNC: 11.8 G/DL
IMM GRANULOCYTES # BLD AUTO: 0.05 K/UL
IMM GRANULOCYTES NFR BLD AUTO: 0.6 %
LYMPHOCYTES # BLD AUTO: 1.8 K/UL
LYMPHOCYTES NFR BLD: 19.6 %
MCH RBC QN AUTO: 30 PG
MCHC RBC AUTO-ENTMCNC: 32.3 G/DL
MCV RBC AUTO: 93 FL
MONOCYTES # BLD AUTO: 0.4 K/UL
MONOCYTES NFR BLD: 4.9 %
NEUTROPHILS # BLD AUTO: 6.3 K/UL
NEUTROPHILS NFR BLD: 70.4 %
NRBC BLD-RTO: 0 /100 WBC
PLATELET # BLD AUTO: 265 K/UL
PMV BLD AUTO: 9.5 FL
RBC # BLD AUTO: 3.93 M/UL
WBC # BLD AUTO: 9.01 K/UL

## 2019-02-12 PROCEDURE — 36415 COLL VENOUS BLD VENIPUNCTURE: CPT

## 2019-02-12 PROCEDURE — 82950 GLUCOSE TEST: CPT

## 2019-02-12 PROCEDURE — 85025 COMPLETE CBC W/AUTO DIFF WBC: CPT

## 2019-02-14 RX ORDER — ONDANSETRON 4 MG/1
4 TABLET, FILM COATED ORAL DAILY PRN
Qty: 30 TABLET | Refills: 1 | Status: SHIPPED | OUTPATIENT
Start: 2019-02-14 | End: 2020-01-15

## 2019-02-19 ENCOUNTER — ROUTINE PRENATAL (OUTPATIENT)
Dept: OBSTETRICS AND GYNECOLOGY | Facility: CLINIC | Age: 34
End: 2019-02-19
Payer: COMMERCIAL

## 2019-02-19 VITALS
DIASTOLIC BLOOD PRESSURE: 70 MMHG | WEIGHT: 160.94 LBS | BODY MASS INDEX: 31.43 KG/M2 | SYSTOLIC BLOOD PRESSURE: 118 MMHG

## 2019-02-19 DIAGNOSIS — Z3A.27 PREGNANCY WITH 27 COMPLETED WEEKS GESTATION: Primary | ICD-10-CM

## 2019-02-19 PROCEDURE — 0502F PR SUBSEQUENT PRENATAL CARE: ICD-10-PCS | Mod: CPTII,S$GLB,, | Performed by: OBSTETRICS & GYNECOLOGY

## 2019-02-19 PROCEDURE — 99999 PR PBB SHADOW E&M-EST. PATIENT-LVL II: CPT | Mod: PBBFAC,,, | Performed by: OBSTETRICS & GYNECOLOGY

## 2019-02-19 PROCEDURE — 99999 PR PBB SHADOW E&M-EST. PATIENT-LVL II: ICD-10-PCS | Mod: PBBFAC,,, | Performed by: OBSTETRICS & GYNECOLOGY

## 2019-02-19 PROCEDURE — 0502F SUBSEQUENT PRENATAL CARE: CPT | Mod: CPTII,S$GLB,, | Performed by: OBSTETRICS & GYNECOLOGY

## 2019-02-19 NOTE — PROGRESS NOTES
GOOD FM, NO UC AND NO PV LOSS.  LABS DONE LAST WEEK AND TDAP NOW SINCE URI HAS GOTTEN BETTER.  VISITS 2 AND 4 WEEKS

## 2019-02-21 ENCOUNTER — IMMUNIZATION (OUTPATIENT)
Dept: PHARMACY | Facility: CLINIC | Age: 34
End: 2019-02-21
Payer: COMMERCIAL

## 2019-03-07 ENCOUNTER — PATIENT MESSAGE (OUTPATIENT)
Dept: ADMINISTRATIVE | Facility: OTHER | Age: 34
End: 2019-03-07

## 2019-03-12 ENCOUNTER — ROUTINE PRENATAL (OUTPATIENT)
Dept: OBSTETRICS AND GYNECOLOGY | Facility: CLINIC | Age: 34
End: 2019-03-12
Payer: COMMERCIAL

## 2019-03-12 VITALS
WEIGHT: 163.13 LBS | BODY MASS INDEX: 31.86 KG/M2 | SYSTOLIC BLOOD PRESSURE: 116 MMHG | DIASTOLIC BLOOD PRESSURE: 68 MMHG

## 2019-03-12 DIAGNOSIS — Z3A.30 PREGNANCY WITH 30 COMPLETED WEEKS GESTATION: Primary | ICD-10-CM

## 2019-03-12 PROCEDURE — 99999 PR PBB SHADOW E&M-EST. PATIENT-LVL II: ICD-10-PCS | Mod: PBBFAC,,, | Performed by: OBSTETRICS & GYNECOLOGY

## 2019-03-12 PROCEDURE — 0502F SUBSEQUENT PRENATAL CARE: CPT | Mod: CPTII,S$GLB,, | Performed by: OBSTETRICS & GYNECOLOGY

## 2019-03-12 PROCEDURE — 99999 PR PBB SHADOW E&M-EST. PATIENT-LVL II: CPT | Mod: PBBFAC,,, | Performed by: OBSTETRICS & GYNECOLOGY

## 2019-03-12 PROCEDURE — 0502F PR SUBSEQUENT PRENATAL CARE: ICD-10-PCS | Mod: CPTII,S$GLB,, | Performed by: OBSTETRICS & GYNECOLOGY

## 2019-03-12 RX ORDER — PEAK FLOW METER
EACH MISCELLANEOUS
Refills: 0 | COMMUNITY
Start: 2019-02-08

## 2019-03-12 NOTE — PROGRESS NOTES
GOOD FM, NO UC AND NO PV LOSS.  COMFORT MEASURES HEARTBURN, BACK PAIN.  GOING TO SEE EZRA IN [place] AT END OF THE MONTH.  F/U 2 WEEKS

## 2019-03-26 ENCOUNTER — PATIENT MESSAGE (OUTPATIENT)
Dept: OBSTETRICS AND GYNECOLOGY | Facility: CLINIC | Age: 34
End: 2019-03-26

## 2019-03-26 ENCOUNTER — ROUTINE PRENATAL (OUTPATIENT)
Dept: OBSTETRICS AND GYNECOLOGY | Facility: CLINIC | Age: 34
End: 2019-03-26
Payer: COMMERCIAL

## 2019-03-26 VITALS
WEIGHT: 169.75 LBS | BODY MASS INDEX: 33.15 KG/M2 | DIASTOLIC BLOOD PRESSURE: 78 MMHG | SYSTOLIC BLOOD PRESSURE: 112 MMHG

## 2019-03-26 DIAGNOSIS — Z3A.32 PREGNANCY WITH 32 COMPLETED WEEKS GESTATION: Primary | ICD-10-CM

## 2019-03-26 PROCEDURE — 99999 PR PBB SHADOW E&M-EST. PATIENT-LVL II: ICD-10-PCS | Mod: PBBFAC,,, | Performed by: OBSTETRICS & GYNECOLOGY

## 2019-03-26 PROCEDURE — 99999 PR PBB SHADOW E&M-EST. PATIENT-LVL II: CPT | Mod: PBBFAC,,, | Performed by: OBSTETRICS & GYNECOLOGY

## 2019-03-26 PROCEDURE — 0502F SUBSEQUENT PRENATAL CARE: CPT | Mod: CPTII,S$GLB,, | Performed by: OBSTETRICS & GYNECOLOGY

## 2019-03-26 PROCEDURE — 0502F PR SUBSEQUENT PRENATAL CARE: ICD-10-PCS | Mod: CPTII,S$GLB,, | Performed by: OBSTETRICS & GYNECOLOGY

## 2019-03-26 NOTE — PROGRESS NOTES
GOOD FM, NO UC AND NO PV LOSS. MORE PELVIC PRESSURE WITH WALKING, REVIEWED WARNING SIGNS AND DELIV AT 39 WEEKS FIRST PREGNANCY.  TO [facility] THIS WEEKEND - FOR PoM CERVIX LFT/ 20%/ -3, MED, POST.   SVE AND GBS NEXT, WAS POS LAST PREGNANCY

## 2019-03-27 ENCOUNTER — HOSPITAL ENCOUNTER (INPATIENT)
Facility: OTHER | Age: 34
LOS: 2 days | Discharge: HOME | End: 2019-03-29
Attending: OBSTETRICS & GYNECOLOGY | Admitting: OBSTETRICS & GYNECOLOGY
Payer: COMMERCIAL

## 2019-03-27 ENCOUNTER — ANESTHESIA (OUTPATIENT)
Dept: OBSTETRICS AND GYNECOLOGY | Facility: OTHER | Age: 34
End: 2019-03-27

## 2019-03-27 ENCOUNTER — ANESTHESIA EVENT (OUTPATIENT)
Dept: OBSTETRICS AND GYNECOLOGY | Facility: OTHER | Age: 34
End: 2019-03-27

## 2019-03-27 DIAGNOSIS — O60.03 PRETERM LABOR IN THIRD TRIMESTER WITHOUT DELIVERY: ICD-10-CM

## 2019-03-27 DIAGNOSIS — O60.00 PRETERM LABOR: ICD-10-CM

## 2019-03-27 LAB
ABO + RH BLD: NORMAL
ALLENS TEST: ABNORMAL
ALLENS TEST: ABNORMAL
BASOPHILS # BLD AUTO: 0.01 K/UL (ref 0–0.2)
BASOPHILS # BLD AUTO: 0.05 K/UL (ref 0–0.2)
BASOPHILS NFR BLD: 0.1 % (ref 0–1.9)
BASOPHILS NFR BLD: 0.5 % (ref 0–1.9)
BLD GP AB SCN CELLS X3 SERPL QL: NORMAL
DELSYS: ABNORMAL
DELSYS: ABNORMAL
DIFFERENTIAL METHOD: ABNORMAL
DIFFERENTIAL METHOD: ABNORMAL
EOSINOPHIL # BLD AUTO: 0 K/UL (ref 0–0.5)
EOSINOPHIL # BLD AUTO: 0.7 K/UL (ref 0–0.5)
EOSINOPHIL NFR BLD: 0 % (ref 0–8)
EOSINOPHIL NFR BLD: 6.2 % (ref 0–8)
ERYTHROCYTE [DISTWIDTH] IN BLOOD BY AUTOMATED COUNT: 13.5 % (ref 11.5–14.5)
ERYTHROCYTE [DISTWIDTH] IN BLOOD BY AUTOMATED COUNT: 13.5 % (ref 11.5–14.5)
HCO3 UR-SCNC: 18.3 MMOL/L (ref 24–28)
HCO3 UR-SCNC: 19.8 MMOL/L (ref 24–28)
HCT VFR BLD AUTO: 33.6 % (ref 37–48.5)
HCT VFR BLD AUTO: 36 % (ref 37–48.5)
HGB BLD-MCNC: 11.2 G/DL (ref 12–16)
HGB BLD-MCNC: 12 G/DL (ref 12–16)
HIV 1+2 AB+HIV1 P24 AG SERPL QL IA: NEGATIVE
LYMPHOCYTES # BLD AUTO: 1.3 K/UL (ref 1–4.8)
LYMPHOCYTES # BLD AUTO: 2.6 K/UL (ref 1–4.8)
LYMPHOCYTES NFR BLD: 25.3 % (ref 18–48)
LYMPHOCYTES NFR BLD: 7.9 % (ref 18–48)
MCH RBC QN AUTO: 29.5 PG (ref 27–31)
MCH RBC QN AUTO: 29.5 PG (ref 27–31)
MCHC RBC AUTO-ENTMCNC: 33.3 G/DL (ref 32–36)
MCHC RBC AUTO-ENTMCNC: 33.3 G/DL (ref 32–36)
MCV RBC AUTO: 88 FL (ref 82–98)
MCV RBC AUTO: 89 FL (ref 82–98)
MONOCYTES # BLD AUTO: 0.7 K/UL (ref 0.3–1)
MONOCYTES # BLD AUTO: 1.1 K/UL (ref 0.3–1)
MONOCYTES NFR BLD: 10.1 % (ref 4–15)
MONOCYTES NFR BLD: 4.1 % (ref 4–15)
NEUTROPHILS # BLD AUTO: 14.9 K/UL (ref 1.8–7.7)
NEUTROPHILS # BLD AUTO: 6 K/UL (ref 1.8–7.7)
NEUTROPHILS NFR BLD: 57.6 % (ref 38–73)
NEUTROPHILS NFR BLD: 87.7 % (ref 38–73)
PCO2 BLDA: 29.5 MMHG (ref 35–45)
PCO2 BLDA: 33.5 MMHG (ref 35–45)
PH SMN: 7.38 [PH] (ref 7.35–7.45)
PH SMN: 7.4 [PH] (ref 7.35–7.45)
PLATELET # BLD AUTO: 280 K/UL (ref 150–350)
PLATELET # BLD AUTO: 285 K/UL (ref 150–350)
PMV BLD AUTO: 10.2 FL (ref 9.2–12.9)
PMV BLD AUTO: 9.9 FL (ref 9.2–12.9)
PO2 BLDA: 22 MMHG (ref 80–100)
PO2 BLDA: 27 MMHG (ref 80–100)
POC BE: -5 MMOL/L
POC BE: -6 MMOL/L
POC SATURATED O2: 36 % (ref 95–100)
POC SATURATED O2: 53 % (ref 95–100)
RBC # BLD AUTO: 3.8 M/UL (ref 4–5.4)
RBC # BLD AUTO: 4.07 M/UL (ref 4–5.4)
SAMPLE: ABNORMAL
SAMPLE: ABNORMAL
SITE: ABNORMAL
SITE: ABNORMAL
WBC # BLD AUTO: 10.45 K/UL (ref 3.9–12.7)
WBC # BLD AUTO: 16.94 K/UL (ref 3.9–12.7)

## 2019-03-27 PROCEDURE — 59400 PR FULL ROUT OBSTE CARE,VAGINAL DELIV: ICD-10-PCS | Mod: AT,,, | Performed by: OBSTETRICS & GYNECOLOGY

## 2019-03-27 PROCEDURE — 25000003 PHARM REV CODE 250: Performed by: STUDENT IN AN ORGANIZED HEALTH CARE EDUCATION/TRAINING PROGRAM

## 2019-03-27 PROCEDURE — 86703 HIV-1/HIV-2 1 RESULT ANTBDY: CPT

## 2019-03-27 PROCEDURE — 88307 TISSUE EXAM BY PATHOLOGIST: CPT | Performed by: PATHOLOGY

## 2019-03-27 PROCEDURE — 59025 OBTAIN FETAL NONSTRESS TEST (NST): ICD-10-PCS | Mod: 26,,, | Performed by: OBSTETRICS & GYNECOLOGY

## 2019-03-27 PROCEDURE — 82803 BLOOD GASES ANY COMBINATION: CPT

## 2019-03-27 PROCEDURE — 99285 EMERGENCY DEPT VISIT HI MDM: CPT | Mod: 25,,, | Performed by: OBSTETRICS & GYNECOLOGY

## 2019-03-27 PROCEDURE — 86850 RBC ANTIBODY SCREEN: CPT

## 2019-03-27 PROCEDURE — 96372 THER/PROPH/DIAG INJ SC/IM: CPT

## 2019-03-27 PROCEDURE — 72200004 HC VAGINAL DELIVERY LEVEL I

## 2019-03-27 PROCEDURE — 99285 EMERGENCY DEPT VISIT HI MDM: CPT | Mod: 25

## 2019-03-27 PROCEDURE — 88307 TISSUE SPECIMEN TO PATHOLOGY - SURGERY: ICD-10-PCS | Mod: 26,,, | Performed by: PATHOLOGY

## 2019-03-27 PROCEDURE — 99285 PR EMERGENCY DEPT VISIT,LEVEL V: ICD-10-PCS | Mod: 25,,, | Performed by: OBSTETRICS & GYNECOLOGY

## 2019-03-27 PROCEDURE — 59025 FETAL NON-STRESS TEST: CPT

## 2019-03-27 PROCEDURE — 36415 COLL VENOUS BLD VENIPUNCTURE: CPT

## 2019-03-27 PROCEDURE — 88307 TISSUE EXAM BY PATHOLOGIST: CPT | Mod: 26,,, | Performed by: PATHOLOGY

## 2019-03-27 PROCEDURE — 59400 OBSTETRICAL CARE: CPT | Mod: AT,,, | Performed by: OBSTETRICS & GYNECOLOGY

## 2019-03-27 PROCEDURE — 59025 FETAL NON-STRESS TEST: CPT | Mod: 26,,, | Performed by: OBSTETRICS & GYNECOLOGY

## 2019-03-27 PROCEDURE — 86592 SYPHILIS TEST NON-TREP QUAL: CPT

## 2019-03-27 PROCEDURE — 11000001 HC ACUTE MED/SURG PRIVATE ROOM

## 2019-03-27 PROCEDURE — 99900035 HC TECH TIME PER 15 MIN (STAT)

## 2019-03-27 PROCEDURE — 94761 N-INVAS EAR/PLS OXIMETRY MLT: CPT

## 2019-03-27 PROCEDURE — 85025 COMPLETE CBC W/AUTO DIFF WBC: CPT

## 2019-03-27 PROCEDURE — 63600175 PHARM REV CODE 636 W HCPCS: Performed by: OBSTETRICS & GYNECOLOGY

## 2019-03-27 RX ORDER — ONDANSETRON 8 MG/1
8 TABLET, ORALLY DISINTEGRATING ORAL EVERY 8 HOURS PRN
Status: DISCONTINUED | OUTPATIENT
Start: 2019-03-27 | End: 2019-03-29 | Stop reason: HOSPADM

## 2019-03-27 RX ORDER — CEFAZOLIN SODIUM 2 G/50ML
2 SOLUTION INTRAVENOUS
Status: DISCONTINUED | OUTPATIENT
Start: 2019-03-27 | End: 2019-03-27

## 2019-03-27 RX ORDER — OXYTOCIN/RINGER'S LACTATE 20/1000 ML
41.7 PLASTIC BAG, INJECTION (ML) INTRAVENOUS CONTINUOUS
Status: DISCONTINUED | OUTPATIENT
Start: 2019-03-27 | End: 2019-03-27

## 2019-03-27 RX ORDER — ONDANSETRON 8 MG/1
8 TABLET, ORALLY DISINTEGRATING ORAL EVERY 8 HOURS PRN
Status: DISCONTINUED | OUTPATIENT
Start: 2019-03-27 | End: 2019-03-27

## 2019-03-27 RX ORDER — ACETAMINOPHEN 325 MG/1
650 TABLET ORAL EVERY 6 HOURS PRN
Status: DISCONTINUED | OUTPATIENT
Start: 2019-03-27 | End: 2019-03-29 | Stop reason: HOSPADM

## 2019-03-27 RX ORDER — LIDOCAINE HYDROCHLORIDE 10 MG/ML
INJECTION INFILTRATION; PERINEURAL
Status: DISPENSED
Start: 2019-03-27 | End: 2019-03-27

## 2019-03-27 RX ORDER — SODIUM CHLORIDE 9 MG/ML
INJECTION, SOLUTION INTRAVENOUS
Status: DISCONTINUED | OUTPATIENT
Start: 2019-03-27 | End: 2019-03-27

## 2019-03-27 RX ORDER — HYDROCODONE BITARTRATE AND ACETAMINOPHEN 10; 325 MG/1; MG/1
1 TABLET ORAL EVERY 4 HOURS PRN
Status: DISCONTINUED | OUTPATIENT
Start: 2019-03-27 | End: 2019-03-29 | Stop reason: HOSPADM

## 2019-03-27 RX ORDER — OXYTOCIN/RINGER'S LACTATE 20/1000 ML
333 PLASTIC BAG, INJECTION (ML) INTRAVENOUS CONTINUOUS
Status: DISCONTINUED | OUTPATIENT
Start: 2019-03-27 | End: 2019-03-27

## 2019-03-27 RX ORDER — MISOPROSTOL 200 UG/1
600 TABLET ORAL
Status: DISCONTINUED | OUTPATIENT
Start: 2019-03-27 | End: 2019-03-29 | Stop reason: HOSPADM

## 2019-03-27 RX ORDER — OXYTOCIN/RINGER'S LACTATE 20/1000 ML
41.65 PLASTIC BAG, INJECTION (ML) INTRAVENOUS CONTINUOUS
Status: ACTIVE | OUTPATIENT
Start: 2019-03-27 | End: 2019-03-27

## 2019-03-27 RX ORDER — HYDROCODONE BITARTRATE AND ACETAMINOPHEN 5; 325 MG/1; MG/1
1 TABLET ORAL EVERY 4 HOURS PRN
Status: DISCONTINUED | OUTPATIENT
Start: 2019-03-27 | End: 2019-03-29 | Stop reason: HOSPADM

## 2019-03-27 RX ORDER — DIPHENHYDRAMINE HCL 25 MG
25 CAPSULE ORAL EVERY 4 HOURS PRN
Status: DISCONTINUED | OUTPATIENT
Start: 2019-03-27 | End: 2019-03-29 | Stop reason: HOSPADM

## 2019-03-27 RX ORDER — DIPHENHYDRAMINE HYDROCHLORIDE 50 MG/ML
25 INJECTION INTRAMUSCULAR; INTRAVENOUS EVERY 4 HOURS PRN
Status: DISCONTINUED | OUTPATIENT
Start: 2019-03-27 | End: 2019-03-29 | Stop reason: HOSPADM

## 2019-03-27 RX ORDER — IBUPROFEN 600 MG/1
600 TABLET ORAL EVERY 6 HOURS
Status: DISCONTINUED | OUTPATIENT
Start: 2019-03-27 | End: 2019-03-29 | Stop reason: HOSPADM

## 2019-03-27 RX ORDER — FENTANYL CITRATE 50 UG/ML
INJECTION, SOLUTION INTRAMUSCULAR; INTRAVENOUS
Status: DISCONTINUED
Start: 2019-03-27 | End: 2019-03-27 | Stop reason: WASHOUT

## 2019-03-27 RX ORDER — ALBUTEROL SULFATE 90 UG/1
2 AEROSOL, METERED RESPIRATORY (INHALATION) EVERY 6 HOURS PRN
Status: DISCONTINUED | OUTPATIENT
Start: 2019-03-27 | End: 2019-03-29 | Stop reason: HOSPADM

## 2019-03-27 RX ORDER — BETAMETHASONE SODIUM PHOSPHATE AND BETAMETHASONE ACETATE 3; 3 MG/ML; MG/ML
12 INJECTION, SUSPENSION INTRA-ARTICULAR; INTRALESIONAL; INTRAMUSCULAR; SOFT TISSUE ONCE
Status: DISCONTINUED | OUTPATIENT
Start: 2019-03-27 | End: 2019-03-27

## 2019-03-27 RX ORDER — SODIUM CHLORIDE, SODIUM LACTATE, POTASSIUM CHLORIDE, CALCIUM CHLORIDE 600; 310; 30; 20 MG/100ML; MG/100ML; MG/100ML; MG/100ML
INJECTION, SOLUTION INTRAVENOUS CONTINUOUS
Status: DISCONTINUED | OUTPATIENT
Start: 2019-03-27 | End: 2019-03-27

## 2019-03-27 RX ORDER — DOCUSATE SODIUM 100 MG/1
200 CAPSULE, LIQUID FILLED ORAL 2 TIMES DAILY PRN
Status: DISCONTINUED | OUTPATIENT
Start: 2019-03-27 | End: 2019-03-29 | Stop reason: HOSPADM

## 2019-03-27 RX ORDER — FENTANYL/BUPIVACAINE/NS/PF 2MCG/ML-.1
PLASTIC BAG, INJECTION (ML) INJECTION
Status: DISCONTINUED
Start: 2019-03-27 | End: 2019-03-27 | Stop reason: WASHOUT

## 2019-03-27 RX ADMIN — IBUPROFEN 600 MG: 600 TABLET ORAL at 11:03

## 2019-03-27 RX ADMIN — IBUPROFEN 600 MG: 600 TABLET ORAL at 12:03

## 2019-03-27 RX ADMIN — IBUPROFEN 600 MG: 600 TABLET ORAL at 06:03

## 2019-03-27 RX ADMIN — BETAMETHASONE SODIUM PHOSPHATE AND BETAMETHASONE ACETATE 12 MG: 3; 3 INJECTION, SUSPENSION INTRA-ARTICULAR; INTRALESIONAL; INTRAMUSCULAR at 04:03

## 2019-03-27 NOTE — H&P
HISTORY AND PHYSICAL                                                OBSTETRICS          Subjective:      Johanna Ocasio is a 33 y.o. [id number] at 33w0d presents complaining of contractions. Patient states that she began to feel increased pelvic pressure yesterday morning. Her cervix was checked in clinic and was fingertip, thick, and high. Around 1700, she began to experience contractions. She states that her contractions are now back to back, about 2 mins apart, with severe pelvic pain and pressure. She denies vaginal bleeding and LOF. Fetal Movement: normal.    In the LISA, patient was found to be 8cm and oscar every 2 mins. Patient admitted to L&D for imminent  delivery.      Review of Systems   Constitutional: Negative for activity change, appetite change, chills, fatigue and fever.   HENT: Negative for congestion and rhinorrhea.    Eyes: Negative for visual disturbance.   Respiratory: Negative for cough, shortness of breath and wheezing.    Cardiovascular: Negative for chest pain and palpitations.   Gastrointestinal: Positive for abdominal pain and nausea. Negative for diarrhea and vomiting.   Genitourinary: Positive for pelvic pain. Negative for dysuria, vaginal bleeding and vaginal discharge.   Musculoskeletal: Positive for back pain.   Skin: Negative for rash.   Neurological: Negative for dizziness, light-headedness and headaches.   Psychiatric/Behavioral: The patient is nervous/anxious.        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    All: Review of patient's allergies indicates:  No Known Allergies    Meds:   Medications Prior to Admission   Medication Sig Dispense Refill Last Dose    albuterol  (PROVENTIL HFA) 90 mcg/actuation inhaler Inhale 2 puffs into the lungs every 6 (six) hours as needed for Wheezing. Rescue 18 g 1 Taking    albuterol (PROVENTIL) 2.5 mg /3 mL (0.083 %) nebulizer solution Take 3 mLs (2.5 mg total) by nebulization every 4 (four) hours as needed for Wheezing. Rescue 1 Box 0 Taking    butalbital-acetaminophen-caffeine -40 mg (FIORICET, ESGIC) -40 mg per tablet Take 1 tablet by mouth every 4 (four) hours as needed for Pain.   Taking    ondansetron (ZOFRAN) 4 MG tablet Take 1 tablet (4 mg total) by mouth daily as needed for Nausea. 30 tablet 1 Taking    prenatal vit/iron fum/folic ac (PRENATAL 1+1 ORAL) Take by mouth.   Taking    VIOS AEROSOL DELIVERY SYSTEM Martha USE AS DIRECTED  0 Taking       SH:   Social History     Socioeconomic History    Marital status:      Spouse name: Not on file    Number of children: Not on file    Years of education: Not on file    Highest education level: Not on file   Occupational History    Not on file   Social Needs    Financial resource strain: Not on file    Food insecurity:     Worry: Not on file     Inability: Not on file    Transportation needs:     Medical: Not on file     Non-medical: Not on file   Tobacco Use    Smoking status: Never Smoker    Smokeless tobacco: Never Used   Substance and Sexual Activity    Alcohol use: No     Comment: Socially/ pre pregnancy     Drug use: No    Sexual activity: Yes     Partners: Male     Birth control/protection: None   Lifestyle    Physical activity:     Days per week: Not on file     Minutes per session: Not on file    Stress: Not on file   Relationships    Social connections:     Talks on phone: Not on file     Gets together: Not on file     Attends Jew service: Not on file     Active member of club or organization: Not on file     Attends meetings of clubs or organizations: Not on file     Relationship status: Not on file    Intimate partner violence:     Fear of  current or ex partner: Not on file     Emotionally abused: Not on file     Physically abused: Not on file     Forced sexual activity: Not on file   Other Topics Concern    Are you pregnant or think you may be? Not Asked    Breast-feeding Not Asked   Social History Narrative    Not on file       FH:   Family History   Problem Relation Age of Onset    Hypertension Mother     Hypertension Father     Cataracts Maternal Grandfather     Diabetes Paternal Grandmother     Cancer Paternal Grandfather         colon cancer    Breast cancer Neg Hx     Colon cancer Neg Hx     Ovarian cancer Neg Hx     Melanoma Neg Hx     Lupus Neg Hx     Psoriasis Neg Hx        OBHx:   OB History    Para Term  AB Living   2 1 1 0 0 1   SAB TAB Ectopic Multiple Live Births   0 0 0 0 1      # Outcome Date GA Lbr Baldemar/2nd Weight Sex Delivery Anes PTL Lv   2 Current            1 Term 16 38w4d  3.204 kg (7 lb 1 oz) F Vag-Spont EPI, Spinal N JOANNE      Name: ADAMA PERKINS      Apgar1: 6  Apgar5: 9       Objective:       BP (!) 140/103   Pulse 97   Temp 97.4 °F (36.3 °C) (Temporal)   Resp 20   Ht 5' (1.524 m)   Wt 76.2 kg (168 lb)   LMP 2018 (Approximate)   SpO2 99%   Breastfeeding? No   BMI 32.81 kg/m²     Vitals:    19 0435 19 0440 19 0445   BP: (!) 137/97 (!) 140/103    Pulse: 93 97    Resp: 20     Temp: 97.4 °F (36.3 °C)     TempSrc: Temporal     SpO2: 99% 99%    Weight:   76.2 kg (168 lb)   Height:   5' (1.524 m)       General:   alert, appears stated age and cooperative, distressed   HENT:  normocephalic, atraumatic   Eyes:  extraocular movements and conjunctivae normal   Neck:  supple, range of motion normal, no thyromegaly   Lungs:   no respiratory distress   Heart:   regular rate   Abdomen:  soft, non-tender, non-distended but gravid, no rebound or guarding    Extremities negative edema, negative erythema   FHT: 130, minimal BTBV, -accels, -decels;  Cat 2 (non-reassuring)                  TOCO: Q 2 minutes   Presentations: cephalic by ultrasound   Cervix:     Dilation: 8cm    Effacement: 90%    Station:  -2    Consistency: soft    Position: anterior     Lab Review  Blood Type A POS  GBBS: unknown  Rubella: Immune  RPR: NR  HIV: negative  HepB: negative       Assessment:     33w0d weeks gestation admitted for imminent  delivery.    Active Hospital Problems    Diagnosis  POA     (spontaneous vaginal delivery) [O80]  Not Applicable      Resolved Hospital Problems   No resolved problems to display.          Plan:   1.  labor:  - Risks, benefits, alternatives and possible complications have been discussed in detail with the patient.   - Consents signed and to chart  - Admit to Labor and Delivery unit  - Draw CBC, T&S  - SVE: /-2 with bulging membranes, CTX every 2 mins  - BMZ injection administered in LISA  - PCN ordered for GBS unknown  - To OR for epidural and delivery      Post-Partum Hemorrhage risk - low     Ana Purdy MD  OBGYN, PGY-1

## 2019-03-27 NOTE — NURSING
SensorDynamicshony pump, tubing, collections containers and labels brought to bedside.  Discussed proper pump setting of initiation phase.  Instructed on proper usage of pump and to adjust suction according to maximum comfort level.  Verified appropriate flange fit.  Educated on the frequency and duration of pumping in order to promote and maintain a full milk supply.  Hands on pumping technique reviewed.  Encouraged hand expression after pumping.  Instructed on cleaning of breast pump parts.  Written instructions also given.  Pt verbalized understanding and appropriate recall for proper milk handling, collection, labeling, storage and transportation.

## 2019-03-27 NOTE — PLAN OF CARE
"Problem: Adult Inpatient Plan of Care  Goal: Plan of Care Review  Outcome: Ongoing (interventions implemented as appropriate)  LACTATION NOTE:  Mother will double pump breasts "8 or more in 24" using the Initiation pumping pattern with the most suction that is comfortable; will care for double collection kit as discussed; will store, label, and transport milk as discussed; encouraged mother to pump at the baby's bedside and to view baby on NICView while  and pumping; encouraged to call for assistance prn.      "

## 2019-03-27 NOTE — ED PROVIDER NOTES
Encounter Date: 3/27/2019       History     Chief Complaint   Patient presents with    Contractions     Johanna Ocasio is a 33 y.o. [id number] at 33w0d presents complaining of contractions. Patient states that she began to feel increased pelvic pressure yesterday morning. Her cervix was checked in clinic and was fingertip, thick, and high. Around 1700, she began to experience contractions. She states that her contractions are now back to back, about 2 mins apart, with severe pelvic pain and pressure. She denies vaginal bleeding and LOF. Fetal Movement: normal.          Review of patient's allergies indicates:  No Known Allergies  Past Medical History:   Diagnosis Date    Abnormal Pap smear of cervix     Colposcopy    Brain venous angioma 10/3/2018    Early childhood epilepsy, myoclonic     last seizure age 14; Venous hemangioma on MRI    History of migraine headaches 2016    Migraine headache      No past surgical history on file.  Family History   Problem Relation Age of Onset    Hypertension Mother     Hypertension Father     Cataracts Maternal Grandfather     Diabetes Paternal Grandmother     Cancer Paternal Grandfather         colon cancer    Breast cancer Neg Hx     Colon cancer Neg Hx     Ovarian cancer Neg Hx     Melanoma Neg Hx     Lupus Neg Hx     Psoriasis Neg Hx      Social History     Tobacco Use    Smoking status: Never Smoker    Smokeless tobacco: Never Used   Substance Use Topics    Alcohol use: No     Comment: Socially/ pre pregnancy     Drug use: No     Review of Systems   Constitutional: Negative for activity change, appetite change, chills, fatigue and fever.   HENT: Negative for congestion and rhinorrhea.    Eyes: Negative for visual disturbance.   Respiratory: Negative for cough, shortness of breath and wheezing.    Cardiovascular: Negative for chest pain and palpitations.   Gastrointestinal: Positive for abdominal pain and nausea. Negative for diarrhea and  vomiting.   Genitourinary: Positive for pelvic pain. Negative for dysuria, vaginal bleeding and vaginal discharge.   Musculoskeletal: Positive for back pain.   Skin: Negative for rash.   Neurological: Negative for dizziness, light-headedness and headaches.   Psychiatric/Behavioral: The patient is nervous/anxious.        Physical Exam     Initial Vitals [03/27/19 0435]   BP Pulse Resp Temp SpO2   (!) 137/97 93 20 97.4 °F (36.3 °C) 99 %      MAP       --         Physical Exam    Vitals reviewed.  Constitutional: She appears well-developed and well-nourished. She is not diaphoretic. She appears distressed.   HENT:   Head: Normocephalic and atraumatic.   Cardiovascular: Normal rate, regular rhythm and normal heart sounds.   Pulmonary/Chest: No respiratory distress.   Abdominal: Soft. There is no tenderness. There is no rebound and no guarding.   Musculoskeletal: She exhibits no edema or tenderness.   Neurological: She is alert and oriented to person, place, and time.   Skin: Skin is warm and dry.   Psychiatric: She has a normal mood and affect. Her behavior is normal. Judgment and thought content normal.     OB LABOR EXAM:   Pre-Term Labor: Yes.   Membranes ruptured: No.   Method: Sterile vaginal exam per MD.   Vaginal Bleeding: none present.   Engagement: engaged.   Dilatation: 8.   Station: -2.   Effacement: 90%.             ED Course   Obtain Fetal nonstress test (NST)  Date/Time: 3/27/2019 6:12 AM  Performed by: Ana Purdy MD  Authorized by: Shadia Arevalo MD     Nonstress Test:     Variability:  <5 BPM    Decelerations:  None    Accelerations:  Absent    Baseline:  130    Contractions:  Regular    Contraction Frequency:  Q 2 mins  Biophysical Profile:     Nonstress Test Interpretation: nonreactive      Overall Impression:  Reassuring and Non - reassuring      Labs Reviewed - No data to display       Imaging Results    None          Medical Decision Making:   ED Management:  - /97,  otherwise vital signs stable  - FHTs baseline 130, non-reactive with minimal variability  - SVE: /-2 with bulging bag  - Admit to L&D for  labor   - Consents reviewed and signed  - Fetus cephalic on bedside ultrasound  - BMZ administered in LISA  - PCN ordered for GBS unknown                      Clinical Impression:       ICD-10-CM ICD-9-CM   1.  labor in third trimester without delivery O60.03 644.03   2.  labor O60.00 644.00

## 2019-03-27 NOTE — NURSING
Instruct the mother to:   Sit upright and lean forward if possible.   Apply warm, wet baby blanket/towel over breasts for a few minutes followed by gentle breast massage.   Form a C with her hand and place it about 1 inch back from the areola with the nipple centered between her thumb and index finger.   Press, compress, relax:  apply pressure in an inward direction toward the breast without stretching the tissue and then compress the breast tissue between her fingers for a few minutes.   Rotate placement of fingers on the breasts to facilitate emptying.   Collect expressed colostrum/ human milk with a spoon/cup and feed immediately to the baby or place it directly into a sterile storage container for later use.  If stored for later use, place the babys breast milk label (with the date and time of collection and the names of medications) on the container.  Educated on proper handling and storage of expressed breastmilk.  Pt states understanding, verbalized appropriate recall and return demonstrated.

## 2019-03-27 NOTE — L&D DELIVERY NOTE
[facility]  Vaginal Delivery   Operative Note    SUMMARY     Patient unable to get epidural in time for delivery. Following SROM of large amount of mostly clear fluid, infant was found to be +3 station. Normal spontaneous vaginal delivery of live infant after about 6 maternal pushes. Skin to skin was unable to be performed due to infant need for further resuscitation at the warmer by NICU.  Infant delivered position OA over intact perineum.  Nuchal cord: No.    Spontaneous delivery of placenta and IV pitocin given noting good uterine tone. Cord gases and cord blood obtained. 2nd degree laceration noted and 11cc of 1% lidocaine was administered at laceration. The laceration was repaired in the normal fashion with 2-0 and 3-0 vicryl. Patient tolerated delivery well. Sponge needle and lap counted correctly x2.    Indications:  labor  Pregnancy complicated by:   Patient Active Problem List   Diagnosis    History of migraine headaches    History of seizures as a child - last seizure age 14, history of venous hemangioma on brain    Brain venous angioma     (spontaneous vaginal delivery)     Admitting GA: 33w0d    Delivery Information for  Morales Ocasio    Birth information:  YOB: 2018   Time of birth: 5:11 AM   Sex: female   Head Delivery Date/Time: 3/27/2019  5:11 AM   Delivery type: Vaginal, Spontaneous   Gestational Age: 33w0d    Delivery Providers    Delivering clinician:  Lise Gonzales MD   Provider Role    Ginger Arteaga RN Circulator    Sherie Avila, KIRAN Charge Nurse    ST [place] Technician    Ana Purdy MD Resident    Anastasiia Rosado MD Resident            Measurements    Weight:  2149 g  Length:           Apgars    Living status:  Living  Apgars:   1 min.:   5 min.:   10 min.:   15 min.:   20 min.:     Skin color:   0  1  2      Heart rate:   1  1  2      Reflex irritability:   1  1  0      Muscle tone:   2  1  1       Respiratory effort:   1  1  2      Total:   5  5  7      Apgars assigned by:  NICU         Operative Delivery    Forceps attempted?:  No  Vacuum extractor attempted?:  No         Shoulder Dystocia    Shoulder dystocia present?:  No           Presentation    Presentation:  Vertex  Position:  Middle Occiput Anterior           Interventions/Resuscitation    Method:  NICU Attended       Cord    Vessels:  3 vessels  Complications:  None  Delayed Cord Clamping?:  No  Cord Blood Disposition:  Sent with Baby  Gases Sent?:  No  Stem Cell Collection (by MD):  No       Placenta    Placenta delivery date/time:  3/27/2019 0515  Placenta removal:  Spontaneous  Placenta appearance:  Intact  Placenta disposition:  pathology           Labor Events:       labor: Yes     Labor Onset Date/Time:         Dilation Complete Date/Time: 2019 05:09     Start Pushing Date/Time: 2019 05:09     Rupture Date/Time:              Rupture type:           Fluid Amount:        Fluid Color:        Fluid Odor:        Membrane Status (PeriCalm): SRM (Spontaneous Rupture)      Rupture Date/Time (PeriCalm): 2019 05:09:00      Fluid Amount (PeriCalm): Large      Fluid Color (PeriCalm):         steroids: Partial Course     Antibiotics given for GBS: No     Induction:       Indications for induction:        Augmentation:       Indications for augmentation:       Labor complications: None     Additional complications:  (Premature) [place] (Other)        Cervical ripening:                     Delivery:      Episiotomy: None     Indication for Episiotomy:       Perineal Lacerations: 2nd Repaired:  Yes   Periurethral Laceration:   Repaired:     Labial Laceration:   Repaired:     Sulcus Laceration:   Repaired:     Vaginal Laceration:   Repaired:     Cervical Laceration:   Repaired:     Repair suture:       Repair # of packets: 3     Last Value - EBL - Nursing (mL): 125     Sum - EBL - Nursing (mL): 125     Last Value - EBL  - Anesthesia (mL):      Calculated QBL (mL):        Vaginal Sweep Performed: Yes     Surgicount Correct: No       Other providers:       Anesthesia    Method:  None          Details (if applicable):  Trial of Labor      Categorization:      Priority:     Indications for :     Incision Type:       Additional  information:  Forceps:    Vacuum:    Breech:    Observed anomalies    Other (Comments):         Ana Purdy MD  OBGYN, PGY-1

## 2019-03-28 LAB — RPR SER QL: NORMAL

## 2019-03-28 PROCEDURE — 25000003 PHARM REV CODE 250: Performed by: STUDENT IN AN ORGANIZED HEALTH CARE EDUCATION/TRAINING PROGRAM

## 2019-03-28 PROCEDURE — 99900035 HC TECH TIME PER 15 MIN (STAT)

## 2019-03-28 PROCEDURE — 94761 N-INVAS EAR/PLS OXIMETRY MLT: CPT

## 2019-03-28 PROCEDURE — 11000001 HC ACUTE MED/SURG PRIVATE ROOM

## 2019-03-28 RX ORDER — HYDROCORTISONE 25 MG/G
CREAM TOPICAL 2 TIMES DAILY
Status: DISCONTINUED | OUTPATIENT
Start: 2019-03-28 | End: 2019-03-29 | Stop reason: HOSPADM

## 2019-03-28 RX ORDER — IBUPROFEN 600 MG/1
600 TABLET ORAL EVERY 6 HOURS
Qty: 30 TABLET | Refills: 0 | Status: SHIPPED | OUTPATIENT
Start: 2019-03-28 | End: 2021-06-01

## 2019-03-28 RX ADMIN — IBUPROFEN 600 MG: 600 TABLET ORAL at 11:03

## 2019-03-28 RX ADMIN — IBUPROFEN 600 MG: 600 TABLET ORAL at 12:03

## 2019-03-28 RX ADMIN — HYDROCORTISONE 2.5%: 25 CREAM TOPICAL at 12:03

## 2019-03-28 RX ADMIN — IBUPROFEN 600 MG: 600 TABLET ORAL at 06:03

## 2019-03-28 RX ADMIN — HYDROCORTISONE 2.5%: 25 CREAM TOPICAL at 08:03

## 2019-03-28 RX ADMIN — IBUPROFEN 600 MG: 600 TABLET ORAL at 05:03

## 2019-03-28 NOTE — PROGRESS NOTES
POSTPARTUM PROGRESS NOTE     Johanna Ocasio is a 33 y.o. female PPD #1 status post Spontaneous vaginal delivery at 33w0d in a pregnancy complicated by  delivery , h/o of brain angioma, h/o of migraines.   Patient is doing well this morning. She denies nausea, vomiting, fever or chills.  Patient reports no abdominal pain that is well relieved by oral pain medications. Lochia is mild to moderate . Patient is voiding without difficulty and ambulating with no difficulty. She has passed flatus, and has not had BM.  Patient does plan to breast feed. Per EK for contraception. Pumping for infant in NICU.     Objective:       Temp:  [96.2 °F (35.7 °C)-98.2 °F (36.8 °C)] 98.2 °F (36.8 °C)  Pulse:  [80-93] 93  Resp:  [18] 18  SpO2:  [97 %-99 %] 97 %  BP: (110-140)/(64-78) 110/64    General:   alert, appears stated age and cooperative   Lungs:   clear to auscultation bilaterally   Heart:   regular rate and rhythm, S1, S2 normal, no murmur, click, rub or gallop   Abdomen:  soft, non-tender; bowel sounds normal; no masses,  no organomegaly   Uterus:  firm located at the umblicus.            Extremities: peripheral pulses normal, no pedal edema, no clubbing or cyanosis     Lab Review  No results found for this or any previous visit (from the past 4 hour(s)).    I/O    Intake/Output Summary (Last 24 hours) at 3/28/2019 0629  Last data filed at 3/27/2019 0700  Gross per 24 hour   Intake 1700 ml   Output 500 ml   Net 1200 ml        Assessment:     Patient Active Problem List   Diagnosis    History of migraine headaches    History of seizures as a child - last seizure age 14, history of venous hemangioma on brain    Brain venous angioma     (spontaneous vaginal delivery)        Plan:   1. Postpartum care:  - Patient doing well. Continue routine management and advances.  - Continue PO pain meds. Pain well controlled.  - Heme: H/h  >   - Encourage ambulation  - Contraception per EK  - Lactation following along    - Rh status A pos          Dispo: As patient meets milestones, will plan to discharge tomorrow.    Maxine Kessler

## 2019-03-29 VITALS
DIASTOLIC BLOOD PRESSURE: 87 MMHG | TEMPERATURE: 98 F | RESPIRATION RATE: 18 BRPM | SYSTOLIC BLOOD PRESSURE: 121 MMHG | OXYGEN SATURATION: 97 % | WEIGHT: 168 LBS | HEIGHT: 60 IN | BODY MASS INDEX: 32.98 KG/M2 | HEART RATE: 86 BPM

## 2019-03-29 PROCEDURE — 25000003 PHARM REV CODE 250: Performed by: STUDENT IN AN ORGANIZED HEALTH CARE EDUCATION/TRAINING PROGRAM

## 2019-03-29 PROCEDURE — 99900035 HC TECH TIME PER 15 MIN (STAT)

## 2019-03-29 RX ADMIN — HYDROCORTISONE 2.5%: 25 CREAM TOPICAL at 12:03

## 2019-03-29 RX ADMIN — IBUPROFEN 600 MG: 600 TABLET ORAL at 05:03

## 2019-03-29 RX ADMIN — IBUPROFEN 600 MG: 600 TABLET ORAL at 12:03

## 2019-03-29 NOTE — PLAN OF CARE
Problem: Breastfeeding  Goal: Effective Breastfeeding  Outcome: Ongoing (interventions implemented as appropriate)  Lactation note:   did DC teaching for NICU mother pumping for her baby. Mother has NICU breastfeeding guide for lactation. Reviewed how to work pump on maintain mode, how to keep track of pumpings, how to label nicu breastmilk, how to clean pump parts and bring milk to NICU. Mother aware to pump 8 or more times a day for 15-20 minutes. She has been pumpingn at least 8 times daily and getting about 1 ounce each time. Mother is aware of proper techniques for transporting her breastmilk and is aware of the written instructions in her blue folder. Mother is using a medela and lansinoh breast pump at home and is aware that she can use the [employer] breastpump at the baby's bedside when she visits. Mother has the [facility] phone number and the [facility] phone number to call for further questions.

## 2019-03-29 NOTE — PLAN OF CARE
Problem: Adult Inpatient Plan of Care  Goal: Plan of Care Review  Outcome: Outcome(s) achieved Date Met: 03/29/19  Pt ambulating, voiding, and passing flatus. Pt tolerating PO well and no SS of distress at this time. Pt's pain well controlled throughout shift by oral pain medication. Pt's bleeding has been light throughout shift and fundus is firm. Mother only care guide was reviewed with pt. Pt verbalized understanding of the mother only care guide. Pt also verbalized understanding of needing to follow up with her OBGYN for a post partum visit. All VSS at this time.

## 2019-03-29 NOTE — DISCHARGE SUMMARY
Delivery Discharge Summary  Obstetrics      Primary OB Clinician: Twyla Dempsey MD      Admission date: 3/27/2019  Discharge date: 2019    Disposition: To home, self care    Discharge Diagnosis List:      Patient Active Problem List   Diagnosis    History of migraine headaches    History of seizures as a child - last seizure age 14, history of venous hemangioma on brain    Brain venous angioma     (spontaneous vaginal delivery)       Procedure:     Hospital Course:  Johanna Ocasio is a 33 y.o. now , PPD #2 who was admitted on 3/27/2019 at 33w0d for  labor. Patient was subsequently admitted to labor and delivery unit with signed consents. She received a dose of steroids and quickly progressed.    Labor course was uncomplicated and resulted in  without complications.     Please see delivery note for further details. Her postpartum course was uncomplicated. On discharge day, patient's pain is controlled with oral pain medications. Pt is tolerating ambulation without SOB or CP, and regular diet without N/V. Reports lochia is mild. Denies any HA, vision changes, F/C, LE swelling. Denies any breast pain/soreness.    Pt in stable condition and ready for discharge. She has been instructed to start and/or continue medications and follow up with her obstetrics provider as listed below.    Pertinent studies:  CBC  Recent Labs   Lab 19  0440 19  1840   WBC 10.45 16.94*   HGB 12.0 11.2*   HCT 36.0* 33.6*   MCV 89 88    285          Immunization History   Administered Date(s) Administered    DTaP 1986, 1986, 1986, 1991    HIB 1988    Hepatitis B 1997, 1997, 1998    Influenza 2011, 2013    MMR 1987, 1998, 2016    Measles 1998    Meningococcal 2007    Meningococcal Conjugate (MCV4P) 2007    Mumps 1998    OPV 1986, 1986, 1986, 1991    Rubella  01/01/1998    Td (ADULT) 11/01/2007    Tdap 11/27/2007, 10/14/2016, 02/21/2019        Delivery:    Episiotomy: None   Lacerations: 2nd   Repair suture:     Repair # of packets: 3   Blood loss (ml): 125     Birth information:  YOB: 2018   Time of birth: 5:11 AM   Sex: female   Delivery type: Vaginal, Spontaneous   Gestational Age: 33w0d    Delivery Clinician:      Other providers:       Additional  information:  Forceps:    Vacuum:    Breech:    Observed anomalies      Living?:           APGARS  One minute Five minutes Ten minutes   Skin color:         Heart rate:         Grimace:         Muscle tone:         Breathing:         Totals: 5  5  7      Placenta: Delivered:       appearance      Patient Instructions:   Current Discharge Medication List      START taking these medications    Details   ibuprofen (ADVIL,MOTRIN) 600 MG tablet Take 1 tablet (600 mg total) by mouth every 6 (six) hours.  Qty: 30 tablet, Refills: 0         CONTINUE these medications which have NOT CHANGED    Details   albuterol (PROVENTIL HFA) 90 mcg/actuation inhaler Inhale 2 puffs into the lungs every 6 (six) hours as needed for Wheezing. Rescue  Qty: 18 g, Refills: 1      albuterol (PROVENTIL) 2.5 mg /3 mL (0.083 %) nebulizer solution Take 3 mLs (2.5 mg total) by nebulization every 4 (four) hours as needed for Wheezing. Rescue  Qty: 1 Box, Refills: 0    Associated Diagnoses: Bronchospasm      butalbital-acetaminophen-caffeine -40 mg (FIORICET, ESGIC) -40 mg per tablet Take 1 tablet by mouth every 4 (four) hours as needed for Pain.      ondansetron (ZOFRAN) 4 MG tablet Take 1 tablet (4 mg total) by mouth daily as needed for Nausea.  Qty: 30 tablet, Refills: 1      prenatal vit/iron fum/folic ac (PRENATAL 1+1 ORAL) Take by mouth.      VIOS AEROSOL DELIVERY SYSTEM Martha USE AS DIRECTED  Refills: 0             Discharge Procedure Orders   Diet Adult Regular     Other restrictions (specify):   Order Comments: Pelvic rest  for at least 6 weeks. Nothing in vagina - no sex, tampons, or douching for 6 weeks     Notify your health care provider if you experience any of the following:   Order Comments: Heavy vaginal bleeding saturating more than 1 pad per hour for at least 2 hours.     Notify your health care provider if you experience any of the following:  increased confusion or weakness     Notify your health care provider if you experience any of the following:  persistent dizziness, light-headedness, or visual disturbances     Notify your health care provider if you experience any of the following:  severe persistent headache     Notify your health care provider if you experience any of the following:  difficulty breathing or increased cough     Notify your health care provider if you experience any of the following:  severe uncontrolled pain     Notify your health care provider if you experience any of the following:  persistent nausea and vomiting or diarrhea     Notify your health care provider if you experience any of the following:  temperature >100.4     Activity as tolerated       Follow-up Information     Schedule an appointment as soon as possible for a visit with Twyla Dempsey MD.    Specialties:  Obstetrics, Obstetrics and Gynecology  Why:  Postpartum visit  Contact information:  [address] [address]  [phone]                    Maxine Kessler M.D.  OBGYN  PGY2

## 2019-03-29 NOTE — PROGRESS NOTES
POSTPARTUM PROGRESS NOTE     Johanna Ocasio is a 33 y.o. female PPD #2 status post Spontaneous vaginal delivery at 33w0d in a pregnancy complicated by  delivery , h/o of brain angioma, h/o of migraines.   Patient is doing well this morning. She denies nausea, vomiting, fever or chills.  Patient reports no abdominal pain that is well relieved by oral pain medications. Lochia is mild to moderate . Patient is voiding without difficulty and ambulating with no difficulty. She has passed flatus, and has not had BM.  Patient does plan to breast feed. Per EK for contraception. Pumping for infant in NICU.     Objective:       Temp:  [98.1 °F (36.7 °C)-98.6 °F (37 °C)] 98.1 °F (36.7 °C)  Pulse:  [85-90] 85  Resp:  [16-18] 16  SpO2:  [96 %-97 %] 96 %  BP: (110-118)/(73-80) 110/73    General:   alert, appears stated age and cooperative   Lungs:   clear to auscultation bilaterally   Heart:   regular rate and rhythm, S1, S2 normal, no murmur, click, rub or gallop   Abdomen:  soft, non-tender; bowel sounds normal; no masses,  no organomegaly   Uterus:  firm located at the umblicus.            Extremities: peripheral pulses normal, no pedal edema, no clubbing or cyanosis     Lab Review  No results found for this or any previous visit (from the past 4 hour(s)).    I/O  No intake or output data in the 24 hours ending 19 0635     Assessment:     Patient Active Problem List   Diagnosis    History of migraine headaches    History of seizures as a child - last seizure age 14, history of venous hemangioma on brain    Brain venous angioma     (spontaneous vaginal delivery)        Plan:   1. Postpartum care:  - Patient doing well. Continue routine management and advances.  - Continue PO pain meds. Pain well controlled.  - Heme: H/h  >   - Encourage ambulation  - Contraception per EK  - Lactation following along   - Rh status A pos          Dispo: As patient meets milestones, will plan to discharge  today    Maxine Kessler

## 2019-03-29 NOTE — PLAN OF CARE
Problem: Adult Inpatient Plan of Care  Goal: Plan of Care Review  Pt currently on RA  . Pt in no distress at this time.  Will continue to monitor.

## 2019-04-01 ENCOUNTER — PATIENT MESSAGE (OUTPATIENT)
Dept: OBSTETRICS AND GYNECOLOGY | Facility: CLINIC | Age: 34
End: 2019-04-01

## 2019-04-03 ENCOUNTER — TELEPHONE (OUTPATIENT)
Dept: OBSTETRICS AND GYNECOLOGY | Facility: OTHER | Age: 34
End: 2019-04-03

## 2019-04-03 NOTE — TELEPHONE ENCOUNTER
Patient doing well. Infant still in the NICU so is back and forth everyday. PP follow up appt is scheduled. No issues or concerns.

## 2019-05-08 ENCOUNTER — POSTPARTUM VISIT (OUTPATIENT)
Dept: OBSTETRICS AND GYNECOLOGY | Facility: CLINIC | Age: 34
End: 2019-05-08
Payer: COMMERCIAL

## 2019-05-08 VITALS — WEIGHT: 154.13 LBS | BODY MASS INDEX: 30.26 KG/M2 | HEIGHT: 60 IN

## 2019-05-08 PROCEDURE — 0503F PR POSTPARTUM CARE VISIT: ICD-10-PCS | Mod: S$GLB,,, | Performed by: OBSTETRICS & GYNECOLOGY

## 2019-05-08 PROCEDURE — 0503F POSTPARTUM CARE VISIT: CPT | Mod: S$GLB,,, | Performed by: OBSTETRICS & GYNECOLOGY

## 2019-05-08 PROCEDURE — 87624 HPV HI-RISK TYP POOLED RSLT: CPT

## 2019-05-08 PROCEDURE — 99999 PR PBB SHADOW E&M-EST. PATIENT-LVL II: CPT | Mod: PBBFAC,,, | Performed by: OBSTETRICS & GYNECOLOGY

## 2019-05-08 PROCEDURE — 88175 CYTOPATH C/V AUTO FLUID REDO: CPT

## 2019-05-08 PROCEDURE — 99999 PR PBB SHADOW E&M-EST. PATIENT-LVL II: ICD-10-PCS | Mod: PBBFAC,,, | Performed by: OBSTETRICS & GYNECOLOGY

## 2019-05-08 NOTE — PROGRESS NOTES
"Johanna Ocasio is a 34 y.o. female  presents for a postpartum visit.  She is status post  6 weeks ago.  Her hospitalization was complicated.  She is breastfeeding.  She desires condoms for contraception, PLANNING VASECT.  She denies postpartum depression, BUT AOMEWHAT TEARFUL INFANT STILL IN NICU WORKING THRU FEEDING ISSUES.  3/27/2019 BOBBI FOR EK 33W PRECIPITOUD, 2ND DEGREE REPAIR  COTESTING SUBMITTED  "[facility]-NO RN IN IR.  HX  FEMALE 'VERA' VE FOR EK.  A POS  CHILDHOOD SX, CAVERNOUS ANGIOMA BRAIN, BRAIN MRI 10/2018, NO ANTICIPATED PROBS DELIVERY.  ITS A GIRL, PTD AT 33 WEEKS THIS PREGNANCY"    Past Medical History:   Diagnosis Date    Abnormal Pap smear of cervix     Colposcopy    Brain venous angioma 10/3/2018    Early childhood epilepsy, myoclonic     last seizure age 14; Venous hemangioma on MRI    History of migraine headaches 2016    Migraine headache      History reviewed. No pertinent surgical history.  Review of patient's allergies indicates:  No Known Allergies    Current Outpatient Medications:     albuterol (PROVENTIL HFA) 90 mcg/actuation inhaler, Inhale 2 puffs into the lungs every 6 (six) hours as needed for Wheezing. Rescue, Disp: 18 g, Rfl: 1    albuterol (PROVENTIL) 2.5 mg /3 mL (0.083 %) nebulizer solution, Take 3 mLs (2.5 mg total) by nebulization every 4 (four) hours as needed for Wheezing. Rescue, Disp: 1 Box, Rfl: 0    ibuprofen (ADVIL,MOTRIN) 600 MG tablet, Take 1 tablet (600 mg total) by mouth every 6 (six) hours., Disp: 30 tablet, Rfl: 0    ondansetron (ZOFRAN) 4 MG tablet, Take 1 tablet (4 mg total) by mouth daily as needed for Nausea., Disp: 30 tablet, Rfl: 1    prenatal vit/iron fum/folic ac (PRENATAL 1+1 ORAL), Take by mouth., Disp: , Rfl:     VIOS AEROSOL DELIVERY SYSTEM Martha, USE AS DIRECTED, Disp: , Rfl: 0    butalbital-acetaminophen-caffeine -40 mg (FIORICET, ESGIC) -40 mg per tablet, Take 1 tablet by mouth every 4 (four) hours as " needed for Pain., Disp: , Rfl:       There were no vitals filed for this visit.    ABDOMEN: Soft. No tenderness or masses. No hepatosplenomegaly. No hernias.  BREASTS: exam deferred  PELVIC: External female genitalia without lesions, well-healed.  Female hair distribution. Adequate perineal body without evident defect, Normal urethral meatus. Vagina moist and well rugated without lesions or discharge. Cervix pink without lesions, discharge or tenderness. Uterus is 4-6 week size, regular, mobile and nontender. Adnexa without masses or tenderness.    Assessment:  Normal postpartum exam    Plan:  Routine follow up.

## 2019-05-14 LAB
HPV HR 12 DNA CVX QL NAA+PROBE: NEGATIVE
HPV16 AG SPEC QL: NEGATIVE
HPV18 DNA SPEC QL NAA+PROBE: NEGATIVE

## 2019-05-19 ENCOUNTER — PATIENT MESSAGE (OUTPATIENT)
Dept: OBSTETRICS AND GYNECOLOGY | Facility: HOSPITAL | Age: 34
End: 2019-05-19

## 2019-08-26 ENCOUNTER — LAB VISIT (OUTPATIENT)
Dept: LAB | Facility: HOSPITAL | Age: 34
End: 2019-08-26
Attending: MEDICAL GENETICS
Payer: COMMERCIAL

## 2019-08-26 DIAGNOSIS — Z82.79 FAMILY HISTORY OF CONGENITAL OR GENETIC CONDITION: Primary | ICD-10-CM

## 2019-08-26 DIAGNOSIS — Z82.79 FAMILY HISTORY OF CONGENITAL OR GENETIC CONDITION: ICD-10-CM

## 2019-08-26 PROCEDURE — 36415 COLL VENOUS BLD VENIPUNCTURE: CPT

## 2019-09-18 LAB
GENETIC COUNSELING?: YES
GENSO SPECIMEN TYPE: NORMAL
MISCELLANEOUS GENETIC TEST NAME: NORMAL
PARTENTAL OR SIBLING TESTING?: YES
REFERENCE LAB: NORMAL
TEST RESULT: NORMAL

## 2019-12-11 ENCOUNTER — PATIENT MESSAGE (OUTPATIENT)
Dept: OBSTETRICS AND GYNECOLOGY | Facility: CLINIC | Age: 34
End: 2019-12-11

## 2019-12-11 RX ORDER — BUPROPION HYDROCHLORIDE 150 MG/1
150 TABLET ORAL EVERY MORNING
Qty: 30 TABLET | Refills: 6 | Status: SHIPPED | OUTPATIENT
Start: 2019-12-11 | End: 2021-06-01

## 2020-01-15 RX ORDER — ONDANSETRON 4 MG/1
4 TABLET, FILM COATED ORAL EVERY 12 HOURS PRN
Qty: 30 TABLET | Refills: 0 | Status: SHIPPED | OUTPATIENT
Start: 2020-01-15 | End: 2020-05-17

## 2020-04-21 DIAGNOSIS — Z01.84 ANTIBODY RESPONSE EXAMINATION: ICD-10-CM

## 2020-04-23 ENCOUNTER — LAB VISIT (OUTPATIENT)
Dept: LAB | Facility: HOSPITAL | Age: 35
End: 2020-04-23
Attending: INTERNAL MEDICINE
Payer: COMMERCIAL

## 2020-04-23 DIAGNOSIS — Z01.84 ANTIBODY RESPONSE EXAMINATION: ICD-10-CM

## 2020-04-23 LAB — SARS-COV-2 IGG SERPL QL IA: NEGATIVE

## 2020-04-23 PROCEDURE — 86769 SARS-COV-2 COVID-19 ANTIBODY: CPT

## 2020-04-23 PROCEDURE — 36415 COLL VENOUS BLD VENIPUNCTURE: CPT

## 2020-05-17 RX ORDER — ONDANSETRON 4 MG/1
TABLET, FILM COATED ORAL
Qty: 30 TABLET | Refills: 0 | Status: SHIPPED | OUTPATIENT
Start: 2020-05-17 | End: 2020-08-24

## 2020-06-30 ENCOUNTER — OFFICE VISIT (OUTPATIENT)
Dept: OPTOMETRY | Facility: CLINIC | Age: 35
End: 2020-06-30
Payer: COMMERCIAL

## 2020-06-30 DIAGNOSIS — H49.9 EXTRAOCULAR MUSCLE PALSY OF LEFT EYE: ICD-10-CM

## 2020-06-30 DIAGNOSIS — H21.301: ICD-10-CM

## 2020-06-30 DIAGNOSIS — H52.13 MYOPIA, BILATERAL: Primary | ICD-10-CM

## 2020-06-30 PROCEDURE — 92012 PR EYE EXAM, EST PATIENT,INTERMED: ICD-10-PCS | Mod: S$GLB,,, | Performed by: OPTOMETRIST

## 2020-06-30 PROCEDURE — 99999 PR PBB SHADOW E&M-EST. PATIENT-LVL II: ICD-10-PCS | Mod: PBBFAC,,, | Performed by: OPTOMETRIST

## 2020-06-30 PROCEDURE — 92015 DETERMINE REFRACTIVE STATE: CPT | Mod: S$GLB,,, | Performed by: OPTOMETRIST

## 2020-06-30 PROCEDURE — 99999 PR PBB SHADOW E&M-EST. PATIENT-LVL II: CPT | Mod: PBBFAC,,, | Performed by: OPTOMETRIST

## 2020-06-30 PROCEDURE — 92012 INTRM OPH EXAM EST PATIENT: CPT | Mod: S$GLB,,, | Performed by: OPTOMETRIST

## 2020-06-30 PROCEDURE — 92015 PR REFRACTION: ICD-10-PCS | Mod: S$GLB,,, | Performed by: OPTOMETRIST

## 2020-06-30 NOTE — PROGRESS NOTES
HPI     RAMESH:12/2018  Glasses? Yes   Contacts? 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 Daughter-cataract   Eye gtts?[place]     (-) Flashes (+) Floaters Ou occasional  unchanged (-) Mucous   (-) Tearing (-) Itching (-) Burning   (-) Headaches (-) Eye Pain/discomfort (-) Irritation   (-) Redness (-) Double vision (-) Blurry vision    Diabetic? (-)  A1c?  (Hemoglobin A1C       Date                     Value               Ref Range             Status                04/28/2016               5.2                 4.5 - 6.2 %           Final            ----------)        Last edited by Carey Hou on 6/30/2020  8:22 AM. (History)            Assessment /Plan     For exam results, see Encounter Report.      Myopia, bilateral              Rx specs     Cyst of iris, right              Stable, per gonio 2017 :normal angle      Extraocular muscle palsy of left eye              Pt unable to look to left with OS              Longstanding, stable     Pt refused DFE today

## 2020-11-19 ENCOUNTER — CLINICAL SUPPORT (OUTPATIENT)
Dept: URGENT CARE | Facility: CLINIC | Age: 35
End: 2020-11-19

## 2020-11-19 ENCOUNTER — TELEPHONE (OUTPATIENT)
Dept: PRIMARY CARE CLINIC | Facility: CLINIC | Age: 35
End: 2020-11-19

## 2020-11-19 DIAGNOSIS — U07.1 COVID-19 VIRUS DETECTED: ICD-10-CM

## 2020-11-19 DIAGNOSIS — R43.2 ABNORMAL SENSE OF TASTE: ICD-10-CM

## 2020-11-19 DIAGNOSIS — R43.2 ABNORMAL SENSE OF TASTE: Primary | ICD-10-CM

## 2020-11-19 LAB
CTP QC/QA: YES
SARS-COV-2 RDRP RESP QL NAA+PROBE: POSITIVE

## 2020-11-19 PROCEDURE — U0002 COVID-19 LAB TEST NON-CDC: HCPCS | Mod: QW,S$GLB,, | Performed by: INTERNAL MEDICINE

## 2020-11-19 PROCEDURE — U0002: ICD-10-PCS | Mod: QW,S$GLB,, | Performed by: INTERNAL MEDICINE

## 2020-11-20 ENCOUNTER — TELEPHONE (OUTPATIENT)
Dept: PRIMARY CARE CLINIC | Facility: CLINIC | Age: 35
End: 2020-11-20

## 2020-11-27 ENCOUNTER — PATIENT MESSAGE (OUTPATIENT)
Dept: OBSTETRICS AND GYNECOLOGY | Facility: CLINIC | Age: 35
End: 2020-11-27

## 2021-04-14 ENCOUNTER — IMMUNIZATION (OUTPATIENT)
Dept: INTERNAL MEDICINE | Facility: CLINIC | Age: 36
End: 2021-04-14
Payer: COMMERCIAL

## 2021-04-14 DIAGNOSIS — Z23 NEED FOR VACCINATION: Primary | ICD-10-CM

## 2021-04-14 PROCEDURE — 91300 COVID-19, MRNA, LNP-S, PF, 30 MCG/0.3 ML DOSE VACCINE: CPT | Mod: S$GLB,,, | Performed by: INTERNAL MEDICINE

## 2021-04-14 PROCEDURE — 0001A COVID-19, MRNA, LNP-S, PF, 30 MCG/0.3 ML DOSE VACCINE: ICD-10-PCS | Mod: CV19,S$GLB,, | Performed by: INTERNAL MEDICINE

## 2021-04-14 PROCEDURE — 91300 COVID-19, MRNA, LNP-S, PF, 30 MCG/0.3 ML DOSE VACCINE: ICD-10-PCS | Mod: S$GLB,,, | Performed by: INTERNAL MEDICINE

## 2021-04-14 PROCEDURE — 0001A COVID-19, MRNA, LNP-S, PF, 30 MCG/0.3 ML DOSE VACCINE: CPT | Mod: CV19,S$GLB,, | Performed by: INTERNAL MEDICINE

## 2021-05-05 ENCOUNTER — IMMUNIZATION (OUTPATIENT)
Dept: INTERNAL MEDICINE | Facility: CLINIC | Age: 36
End: 2021-05-05
Payer: COMMERCIAL

## 2021-05-05 DIAGNOSIS — Z23 NEED FOR VACCINATION: Primary | ICD-10-CM

## 2021-05-05 PROCEDURE — 91300 COVID-19, MRNA, LNP-S, PF, 30 MCG/0.3 ML DOSE VACCINE: CPT | Mod: PBBFAC | Performed by: INTERNAL MEDICINE

## 2021-05-05 PROCEDURE — 0002A COVID-19, MRNA, LNP-S, PF, 30 MCG/0.3 ML DOSE VACCINE: CPT | Mod: PBBFAC | Performed by: INTERNAL MEDICINE

## 2021-05-13 ENCOUNTER — CLINICAL SUPPORT (OUTPATIENT)
Dept: OTHER | Facility: CLINIC | Age: 36
End: 2021-05-13
Payer: COMMERCIAL

## 2021-05-13 DIAGNOSIS — Z00.8 ENCOUNTER FOR OTHER GENERAL EXAMINATION: ICD-10-CM

## 2021-05-14 VITALS — HEIGHT: 60 IN | BODY MASS INDEX: 30.08 KG/M2

## 2021-05-14 LAB
GLUCOSE SERPL-MCNC: 79 MG/DL (ref 60–140)
HDLC SERPL-MCNC: 75 MG/DL
POC CHOLESTEROL, LDL (DOCK): 87 MG/DL
POC CHOLESTEROL, TOTAL: 190 MG/DL
TRIGL SERPL-MCNC: 140 MG/DL

## 2021-06-01 ENCOUNTER — OFFICE VISIT (OUTPATIENT)
Dept: OBSTETRICS AND GYNECOLOGY | Facility: CLINIC | Age: 36
End: 2021-06-01
Payer: COMMERCIAL

## 2021-06-01 VITALS — DIASTOLIC BLOOD PRESSURE: 68 MMHG | BODY MASS INDEX: 32.03 KG/M2 | SYSTOLIC BLOOD PRESSURE: 114 MMHG | WEIGHT: 164 LBS

## 2021-06-01 DIAGNOSIS — Z01.419 VISIT FOR GYNECOLOGIC EXAMINATION: Primary | ICD-10-CM

## 2021-06-01 PROCEDURE — 99999 PR PBB SHADOW E&M-EST. PATIENT-LVL II: CPT | Mod: PBBFAC,,, | Performed by: OBSTETRICS & GYNECOLOGY

## 2021-06-01 PROCEDURE — 3008F BODY MASS INDEX DOCD: CPT | Mod: CPTII,S$GLB,, | Performed by: OBSTETRICS & GYNECOLOGY

## 2021-06-01 PROCEDURE — 99395 PR PREVENTIVE VISIT,EST,18-39: ICD-10-PCS | Mod: S$GLB,,, | Performed by: OBSTETRICS & GYNECOLOGY

## 2021-06-01 PROCEDURE — 99999 PR PBB SHADOW E&M-EST. PATIENT-LVL II: ICD-10-PCS | Mod: PBBFAC,,, | Performed by: OBSTETRICS & GYNECOLOGY

## 2021-06-01 PROCEDURE — 3008F PR BODY MASS INDEX (BMI) DOCUMENTED: ICD-10-PCS | Mod: CPTII,S$GLB,, | Performed by: OBSTETRICS & GYNECOLOGY

## 2021-06-01 PROCEDURE — 99395 PREV VISIT EST AGE 18-39: CPT | Mod: S$GLB,,, | Performed by: OBSTETRICS & GYNECOLOGY

## 2021-06-01 PROCEDURE — 1126F PR PAIN SEVERITY QUANTIFIED, NO PAIN PRESENT: ICD-10-PCS | Mod: S$GLB,,, | Performed by: OBSTETRICS & GYNECOLOGY

## 2021-06-01 PROCEDURE — 1126F AMNT PAIN NOTED NONE PRSNT: CPT | Mod: S$GLB,,, | Performed by: OBSTETRICS & GYNECOLOGY

## 2021-06-01 RX ORDER — ONDANSETRON 4 MG/1
TABLET, FILM COATED ORAL
Qty: 30 TABLET | Refills: 1 | Status: SHIPPED | OUTPATIENT
Start: 2021-06-01 | End: 2021-12-29

## 2021-09-10 ENCOUNTER — PATIENT MESSAGE (OUTPATIENT)
Dept: OPTOMETRY | Facility: CLINIC | Age: 36
End: 2021-09-10

## 2021-10-12 ENCOUNTER — OFFICE VISIT (OUTPATIENT)
Dept: OPTOMETRY | Facility: CLINIC | Age: 36
End: 2021-10-12
Payer: COMMERCIAL

## 2021-10-12 DIAGNOSIS — D18.02 BRAIN VENOUS ANGIOMA: ICD-10-CM

## 2021-10-12 DIAGNOSIS — Z86.69 HISTORY OF MIGRAINE HEADACHES: ICD-10-CM

## 2021-10-12 DIAGNOSIS — H49.9 EXTRAOCULAR MUSCLE PALSY OF LEFT EYE: ICD-10-CM

## 2021-10-12 DIAGNOSIS — H21.301: ICD-10-CM

## 2021-10-12 DIAGNOSIS — Z86.69 HISTORY OF SEIZURES AS A CHILD: ICD-10-CM

## 2021-10-12 DIAGNOSIS — H52.13 MYOPIA, BILATERAL: Primary | ICD-10-CM

## 2021-10-12 PROCEDURE — 99999 PR PBB SHADOW E&M-EST. PATIENT-LVL II: ICD-10-PCS | Mod: PBBFAC,,, | Performed by: OPTOMETRIST

## 2021-10-12 PROCEDURE — 92014 PR EYE EXAM, EST PATIENT,COMPREHESV: ICD-10-PCS | Mod: S$GLB,,, | Performed by: OPTOMETRIST

## 2021-10-12 PROCEDURE — 92015 PR REFRACTION: ICD-10-PCS | Mod: S$GLB,,, | Performed by: OPTOMETRIST

## 2021-10-12 PROCEDURE — 99999 PR PBB SHADOW E&M-EST. PATIENT-LVL II: CPT | Mod: PBBFAC,,, | Performed by: OPTOMETRIST

## 2021-10-12 PROCEDURE — 92014 COMPRE OPH EXAM EST PT 1/>: CPT | Mod: S$GLB,,, | Performed by: OPTOMETRIST

## 2021-10-12 PROCEDURE — 92015 DETERMINE REFRACTIVE STATE: CPT | Mod: S$GLB,,, | Performed by: OPTOMETRIST

## 2021-10-27 ENCOUNTER — LAB VISIT (OUTPATIENT)
Dept: LAB | Facility: HOSPITAL | Age: 36
End: 2021-10-27
Attending: FAMILY MEDICINE
Payer: COMMERCIAL

## 2021-10-27 ENCOUNTER — OFFICE VISIT (OUTPATIENT)
Dept: FAMILY MEDICINE | Facility: CLINIC | Age: 36
End: 2021-10-27
Payer: COMMERCIAL

## 2021-10-27 VITALS
HEIGHT: 60 IN | BODY MASS INDEX: 33.24 KG/M2 | DIASTOLIC BLOOD PRESSURE: 94 MMHG | HEART RATE: 87 BPM | WEIGHT: 169.31 LBS | SYSTOLIC BLOOD PRESSURE: 128 MMHG | OXYGEN SATURATION: 99 % | RESPIRATION RATE: 16 BRPM

## 2021-10-27 DIAGNOSIS — R00.2 PALPITATIONS: Primary | ICD-10-CM

## 2021-10-27 DIAGNOSIS — R00.2 PALPITATIONS: ICD-10-CM

## 2021-10-27 LAB
ALBUMIN SERPL BCP-MCNC: 4.3 G/DL (ref 3.5–5.2)
ALP SERPL-CCNC: 56 U/L (ref 55–135)
ALT SERPL W/O P-5'-P-CCNC: 23 U/L (ref 10–44)
ANION GAP SERPL CALC-SCNC: 9 MMOL/L (ref 8–16)
AST SERPL-CCNC: 22 U/L (ref 10–40)
BASOPHILS # BLD AUTO: 0.07 K/UL (ref 0–0.2)
BASOPHILS NFR BLD: 1.2 % (ref 0–1.9)
BILIRUB SERPL-MCNC: 0.9 MG/DL (ref 0.1–1)
BUN SERPL-MCNC: 16 MG/DL (ref 6–20)
CALCIUM SERPL-MCNC: 10 MG/DL (ref 8.7–10.5)
CHLORIDE SERPL-SCNC: 106 MMOL/L (ref 95–110)
CO2 SERPL-SCNC: 26 MMOL/L (ref 23–29)
CREAT SERPL-MCNC: 0.8 MG/DL (ref 0.5–1.4)
DIFFERENTIAL METHOD: NORMAL
EOSINOPHIL # BLD AUTO: 0.4 K/UL (ref 0–0.5)
EOSINOPHIL NFR BLD: 6.5 % (ref 0–8)
ERYTHROCYTE [DISTWIDTH] IN BLOOD BY AUTOMATED COUNT: 12.8 % (ref 11.5–14.5)
EST. GFR  (AFRICAN AMERICAN): >60 ML/MIN/1.73 M^2
EST. GFR  (NON AFRICAN AMERICAN): >60 ML/MIN/1.73 M^2
GLUCOSE SERPL-MCNC: 76 MG/DL (ref 70–110)
HCT VFR BLD AUTO: 41.9 % (ref 37–48.5)
HGB BLD-MCNC: 13.4 G/DL (ref 12–16)
IMM GRANULOCYTES # BLD AUTO: 0.01 K/UL (ref 0–0.04)
IMM GRANULOCYTES NFR BLD AUTO: 0.2 % (ref 0–0.5)
LYMPHOCYTES # BLD AUTO: 2.2 K/UL (ref 1–4.8)
LYMPHOCYTES NFR BLD: 36.6 % (ref 18–48)
MCH RBC QN AUTO: 30.3 PG (ref 27–31)
MCHC RBC AUTO-ENTMCNC: 32 G/DL (ref 32–36)
MCV RBC AUTO: 95 FL (ref 82–98)
MONOCYTES # BLD AUTO: 0.7 K/UL (ref 0.3–1)
MONOCYTES NFR BLD: 11.6 % (ref 4–15)
NEUTROPHILS # BLD AUTO: 2.6 K/UL (ref 1.8–7.7)
NEUTROPHILS NFR BLD: 43.9 % (ref 38–73)
NRBC BLD-RTO: 0 /100 WBC
PLATELET # BLD AUTO: 271 K/UL (ref 150–450)
PMV BLD AUTO: 10.8 FL (ref 9.2–12.9)
POTASSIUM SERPL-SCNC: 4.1 MMOL/L (ref 3.5–5.1)
PROT SERPL-MCNC: 7.9 G/DL (ref 6–8.4)
RBC # BLD AUTO: 4.42 M/UL (ref 4–5.4)
SODIUM SERPL-SCNC: 141 MMOL/L (ref 136–145)
TSH SERPL DL<=0.005 MIU/L-ACNC: 1.72 UIU/ML (ref 0.4–4)
WBC # BLD AUTO: 5.88 K/UL (ref 3.9–12.7)

## 2021-10-27 PROCEDURE — 93010 ELECTROCARDIOGRAM REPORT: CPT | Mod: S$GLB,,, | Performed by: INTERNAL MEDICINE

## 2021-10-27 PROCEDURE — 99214 PR OFFICE/OUTPT VISIT, EST, LEVL IV, 30-39 MIN: ICD-10-PCS | Mod: S$GLB,,, | Performed by: FAMILY MEDICINE

## 2021-10-27 PROCEDURE — 80053 COMPREHEN METABOLIC PANEL: CPT | Performed by: FAMILY MEDICINE

## 2021-10-27 PROCEDURE — 93005 ELECTROCARDIOGRAM TRACING: CPT | Mod: S$GLB,,, | Performed by: FAMILY MEDICINE

## 2021-10-27 PROCEDURE — 3074F PR MOST RECENT SYSTOLIC BLOOD PRESSURE < 130 MM HG: ICD-10-PCS | Mod: CPTII,S$GLB,, | Performed by: FAMILY MEDICINE

## 2021-10-27 PROCEDURE — 99999 PR PBB SHADOW E&M-EST. PATIENT-LVL III: CPT | Mod: PBBFAC,,, | Performed by: FAMILY MEDICINE

## 2021-10-27 PROCEDURE — 1160F PR REVIEW ALL MEDS BY PRESCRIBER/CLIN PHARMACIST DOCUMENTED: ICD-10-PCS | Mod: CPTII,S$GLB,, | Performed by: FAMILY MEDICINE

## 2021-10-27 PROCEDURE — 93005 EKG 12-LEAD: ICD-10-PCS | Mod: S$GLB,,, | Performed by: FAMILY MEDICINE

## 2021-10-27 PROCEDURE — 3080F DIAST BP >= 90 MM HG: CPT | Mod: CPTII,S$GLB,, | Performed by: FAMILY MEDICINE

## 2021-10-27 PROCEDURE — 84443 ASSAY THYROID STIM HORMONE: CPT | Performed by: FAMILY MEDICINE

## 2021-10-27 PROCEDURE — 99999 PR PBB SHADOW E&M-EST. PATIENT-LVL III: ICD-10-PCS | Mod: PBBFAC,,, | Performed by: FAMILY MEDICINE

## 2021-10-27 PROCEDURE — 3008F PR BODY MASS INDEX (BMI) DOCUMENTED: ICD-10-PCS | Mod: CPTII,S$GLB,, | Performed by: FAMILY MEDICINE

## 2021-10-27 PROCEDURE — 1159F PR MEDICATION LIST DOCUMENTED IN MEDICAL RECORD: ICD-10-PCS | Mod: CPTII,S$GLB,, | Performed by: FAMILY MEDICINE

## 2021-10-27 PROCEDURE — 1159F MED LIST DOCD IN RCRD: CPT | Mod: CPTII,S$GLB,, | Performed by: FAMILY MEDICINE

## 2021-10-27 PROCEDURE — 99214 OFFICE O/P EST MOD 30 MIN: CPT | Mod: S$GLB,,, | Performed by: FAMILY MEDICINE

## 2021-10-27 PROCEDURE — 85025 COMPLETE CBC W/AUTO DIFF WBC: CPT | Performed by: FAMILY MEDICINE

## 2021-10-27 PROCEDURE — 93010 EKG 12-LEAD: ICD-10-PCS | Mod: S$GLB,,, | Performed by: INTERNAL MEDICINE

## 2021-10-27 PROCEDURE — 3074F SYST BP LT 130 MM HG: CPT | Mod: CPTII,S$GLB,, | Performed by: FAMILY MEDICINE

## 2021-10-27 PROCEDURE — 36415 COLL VENOUS BLD VENIPUNCTURE: CPT | Mod: PN | Performed by: FAMILY MEDICINE

## 2021-10-27 PROCEDURE — 3008F BODY MASS INDEX DOCD: CPT | Mod: CPTII,S$GLB,, | Performed by: FAMILY MEDICINE

## 2021-10-27 PROCEDURE — 3080F PR MOST RECENT DIASTOLIC BLOOD PRESSURE >= 90 MM HG: ICD-10-PCS | Mod: CPTII,S$GLB,, | Performed by: FAMILY MEDICINE

## 2021-10-27 PROCEDURE — 1160F RVW MEDS BY RX/DR IN RCRD: CPT | Mod: CPTII,S$GLB,, | Performed by: FAMILY MEDICINE

## 2021-10-28 ENCOUNTER — PATIENT MESSAGE (OUTPATIENT)
Dept: FAMILY MEDICINE | Facility: CLINIC | Age: 36
End: 2021-10-28
Payer: COMMERCIAL

## 2021-11-03 ENCOUNTER — OFFICE VISIT (OUTPATIENT)
Dept: CARDIOLOGY | Facility: CLINIC | Age: 36
End: 2021-11-03
Payer: COMMERCIAL

## 2021-11-03 VITALS
DIASTOLIC BLOOD PRESSURE: 88 MMHG | SYSTOLIC BLOOD PRESSURE: 126 MMHG | HEART RATE: 66 BPM | OXYGEN SATURATION: 98 % | WEIGHT: 169 LBS | BODY MASS INDEX: 33.18 KG/M2 | HEIGHT: 60 IN

## 2021-11-03 DIAGNOSIS — I49.3 PVC (PREMATURE VENTRICULAR CONTRACTION): Primary | ICD-10-CM

## 2021-11-03 DIAGNOSIS — R00.2 HEART PALPITATIONS: ICD-10-CM

## 2021-11-03 PROCEDURE — 3079F PR MOST RECENT DIASTOLIC BLOOD PRESSURE 80-89 MM HG: ICD-10-PCS | Mod: CPTII,S$GLB,, | Performed by: INTERNAL MEDICINE

## 2021-11-03 PROCEDURE — 99204 PR OFFICE/OUTPT VISIT, NEW, LEVL IV, 45-59 MIN: ICD-10-PCS | Mod: S$GLB,,, | Performed by: INTERNAL MEDICINE

## 2021-11-03 PROCEDURE — 1159F MED LIST DOCD IN RCRD: CPT | Mod: CPTII,S$GLB,, | Performed by: INTERNAL MEDICINE

## 2021-11-03 PROCEDURE — 99204 OFFICE O/P NEW MOD 45 MIN: CPT | Mod: S$GLB,,, | Performed by: INTERNAL MEDICINE

## 2021-11-03 PROCEDURE — 3008F BODY MASS INDEX DOCD: CPT | Mod: CPTII,S$GLB,, | Performed by: INTERNAL MEDICINE

## 2021-11-03 PROCEDURE — 1160F RVW MEDS BY RX/DR IN RCRD: CPT | Mod: CPTII,S$GLB,, | Performed by: INTERNAL MEDICINE

## 2021-11-03 PROCEDURE — 3074F PR MOST RECENT SYSTOLIC BLOOD PRESSURE < 130 MM HG: ICD-10-PCS | Mod: CPTII,S$GLB,, | Performed by: INTERNAL MEDICINE

## 2021-11-03 PROCEDURE — 3079F DIAST BP 80-89 MM HG: CPT | Mod: CPTII,S$GLB,, | Performed by: INTERNAL MEDICINE

## 2021-11-03 PROCEDURE — 1159F PR MEDICATION LIST DOCUMENTED IN MEDICAL RECORD: ICD-10-PCS | Mod: CPTII,S$GLB,, | Performed by: INTERNAL MEDICINE

## 2021-11-03 PROCEDURE — 1160F PR REVIEW ALL MEDS BY PRESCRIBER/CLIN PHARMACIST DOCUMENTED: ICD-10-PCS | Mod: CPTII,S$GLB,, | Performed by: INTERNAL MEDICINE

## 2021-11-03 PROCEDURE — 99999 PR PBB SHADOW E&M-EST. PATIENT-LVL III: ICD-10-PCS | Mod: PBBFAC,,, | Performed by: INTERNAL MEDICINE

## 2021-11-03 PROCEDURE — 99999 PR PBB SHADOW E&M-EST. PATIENT-LVL III: CPT | Mod: PBBFAC,,, | Performed by: INTERNAL MEDICINE

## 2021-11-03 PROCEDURE — 3008F PR BODY MASS INDEX (BMI) DOCUMENTED: ICD-10-PCS | Mod: CPTII,S$GLB,, | Performed by: INTERNAL MEDICINE

## 2021-11-03 PROCEDURE — 3074F SYST BP LT 130 MM HG: CPT | Mod: CPTII,S$GLB,, | Performed by: INTERNAL MEDICINE

## 2021-11-17 ENCOUNTER — HOSPITAL ENCOUNTER (OUTPATIENT)
Dept: CARDIOLOGY | Facility: HOSPITAL | Age: 36
Discharge: HOME | End: 2021-11-17
Attending: INTERNAL MEDICINE
Payer: COMMERCIAL

## 2021-11-17 VITALS
HEIGHT: 60 IN | BODY MASS INDEX: 33.18 KG/M2 | DIASTOLIC BLOOD PRESSURE: 88 MMHG | SYSTOLIC BLOOD PRESSURE: 126 MMHG | WEIGHT: 169 LBS

## 2021-11-17 DIAGNOSIS — I49.3 PVC (PREMATURE VENTRICULAR CONTRACTION): ICD-10-CM

## 2021-11-17 LAB
AORTIC ROOT ANNULUS: 2.51 CM
AORTIC VALVE CUSP SEPERATION: 1.77 CM
AV INDEX (PROSTH): 0.64
AV MEAN GRADIENT: 5 MMHG
AV PEAK GRADIENT: 9 MMHG
AV VALVE AREA: 2.36 CM2
AV VELOCITY RATIO: 0.8
BSA FOR ECHO PROCEDURE: 1.8 M2
CV ECHO LV RWT: 0.41 CM
DOP CALC AO PEAK VEL: 1.52 M/S
DOP CALC AO VTI: 35.44 CM
DOP CALC LVOT AREA: 3.7 CM2
DOP CALC LVOT DIAMETER: 2.16 CM
DOP CALC LVOT PEAK VEL: 1.21 M/S
DOP CALC LVOT STROKE VOLUME: 83.54 CM3
DOP CALCLVOT PEAK VEL VTI: 22.81 CM
E WAVE DECELERATION TIME: 202.26 MSEC
E/A RATIO: 2
E/E' RATIO: 8.87 M/S
ECHO LV POSTERIOR WALL: 0.92 CM (ref 0.6–1.1)
EJECTION FRACTION: 55 %
FRACTIONAL SHORTENING: 39 % (ref 28–44)
INTERVENTRICULAR SEPTUM: 0.96 CM (ref 0.6–1.1)
IVRT: 64.59 MSEC
LA MAJOR: 5.24 CM
LA MINOR: 5.15 CM
LA WIDTH: 3.96 CM
LEFT ATRIUM SIZE: 3.43 CM
LEFT ATRIUM VOLUME INDEX: 34.5 ML/M2
LEFT ATRIUM VOLUME: 59.97 CM3
LEFT INTERNAL DIMENSION IN SYSTOLE: 2.75 CM (ref 2.1–4)
LEFT VENTRICLE DIASTOLIC VOLUME INDEX: 53.75 ML/M2
LEFT VENTRICLE DIASTOLIC VOLUME: 93.53 ML
LEFT VENTRICLE MASS INDEX: 82 G/M2
LEFT VENTRICLE SYSTOLIC VOLUME INDEX: 16.2 ML/M2
LEFT VENTRICLE SYSTOLIC VOLUME: 28.27 ML
LEFT VENTRICULAR INTERNAL DIMENSION IN DIASTOLE: 4.52 CM (ref 3.5–6)
LEFT VENTRICULAR MASS: 141.87 G
LV LATERAL E/E' RATIO: 7.29 M/S
LV SEPTAL E/E' RATIO: 11.33 M/S
MV PEAK A VEL: 0.51 M/S
MV PEAK E VEL: 1.02 M/S
MV STENOSIS PRESSURE HALF TIME: 77.54 MS
MV VALVE AREA P 1/2 METHOD: 2.84 CM2
PISA TR MAX VEL: 2.48 M/S
PULM VEIN S/D RATIO: 1.2
PV PEAK D VEL: 0.44 M/S
PV PEAK S VEL: 0.53 M/S
PV PEAK VELOCITY: 0.96 CM/S
RA MAJOR: 3.96 CM
RA PRESSURE: 3 MMHG
RA WIDTH: 3.01 CM
RIGHT VENTRICULAR END-DIASTOLIC DIMENSION: 2.87 CM
RV TISSUE DOPPLER FREE WALL SYSTOLIC VELOCITY 1 (APICAL 4 CHAMBER VIEW): 10.11 CM/S
SINUS: 3.08 CM
STJ: 2.59 CM
TDI LATERAL: 0.14 M/S
TDI SEPTAL: 0.09 M/S
TDI: 0.12 M/S
TR MAX PG: 25 MMHG
TRICUSPID ANNULAR PLANE SYSTOLIC EXCURSION: 2.71 CM
TV REST PULMONARY ARTERY PRESSURE: 28 MMHG

## 2021-11-17 PROCEDURE — 93226 XTRNL ECG REC<48 HR SCAN A/R: CPT

## 2021-11-17 PROCEDURE — 93227 HOLTER MONITOR - 48 HOUR (CUPID ONLY): ICD-10-PCS | Mod: ,,, | Performed by: INTERNAL MEDICINE

## 2021-11-17 PROCEDURE — 93306 TTE W/DOPPLER COMPLETE: CPT | Mod: 26,,, | Performed by: INTERNAL MEDICINE

## 2021-11-17 PROCEDURE — 93227 XTRNL ECG REC<48 HR R&I: CPT | Mod: ,,, | Performed by: INTERNAL MEDICINE

## 2021-11-17 PROCEDURE — 93306 TTE W/DOPPLER COMPLETE: CPT

## 2021-11-17 PROCEDURE — 93306 ECHO (CUPID ONLY): ICD-10-PCS | Mod: 26,,, | Performed by: INTERNAL MEDICINE

## 2021-11-22 LAB
OHS CV EVENT MONITOR DAY: 0
OHS CV HOLTER LENGTH DECIMAL HOURS: 47.98
OHS CV HOLTER LENGTH HOURS: 47
OHS CV HOLTER LENGTH MINUTES: 59
OHS CV HOLTER SINUS AVERAGE HR: 5
OHS CV HOLTER SINUS MAX HR: 130
OHS CV HOLTER SINUS MIN HR: 45

## 2021-11-24 ENCOUNTER — OFFICE VISIT (OUTPATIENT)
Dept: CARDIOLOGY | Facility: CLINIC | Age: 36
End: 2021-11-24
Payer: COMMERCIAL

## 2021-11-24 VITALS
WEIGHT: 161.63 LBS | DIASTOLIC BLOOD PRESSURE: 84 MMHG | BODY MASS INDEX: 31.73 KG/M2 | SYSTOLIC BLOOD PRESSURE: 136 MMHG | HEIGHT: 60 IN | HEART RATE: 64 BPM | OXYGEN SATURATION: 100 %

## 2021-11-24 DIAGNOSIS — R00.2 HEART PALPITATIONS: Primary | ICD-10-CM

## 2021-11-24 PROCEDURE — 99999 PR PBB SHADOW E&M-EST. PATIENT-LVL III: CPT | Mod: PBBFAC,,, | Performed by: INTERNAL MEDICINE

## 2021-11-24 PROCEDURE — 99214 OFFICE O/P EST MOD 30 MIN: CPT | Mod: S$GLB,,, | Performed by: INTERNAL MEDICINE

## 2021-11-24 PROCEDURE — 99214 PR OFFICE/OUTPT VISIT, EST, LEVL IV, 30-39 MIN: ICD-10-PCS | Mod: S$GLB,,, | Performed by: INTERNAL MEDICINE

## 2021-11-24 PROCEDURE — 99999 PR PBB SHADOW E&M-EST. PATIENT-LVL III: ICD-10-PCS | Mod: PBBFAC,,, | Performed by: INTERNAL MEDICINE

## 2021-11-24 RX ORDER — METOPROLOL SUCCINATE 25 MG/1
25 TABLET, EXTENDED RELEASE ORAL DAILY
Qty: 90 TABLET | Refills: 3 | Status: SHIPPED | OUTPATIENT
Start: 2021-11-24 | End: 2022-12-14

## 2021-12-01 ENCOUNTER — PATIENT MESSAGE (OUTPATIENT)
Dept: OBSTETRICS AND GYNECOLOGY | Facility: CLINIC | Age: 36
End: 2021-12-01
Payer: COMMERCIAL

## 2021-12-01 DIAGNOSIS — J98.01 BRONCHOSPASM: ICD-10-CM

## 2021-12-01 RX ORDER — ALBUTEROL SULFATE 0.83 MG/ML
2.5 SOLUTION RESPIRATORY (INHALATION) EVERY 4 HOURS PRN
Qty: 1 EACH | Refills: 0 | Status: SHIPPED | OUTPATIENT
Start: 2021-12-01 | End: 2021-12-06

## 2021-12-03 ENCOUNTER — PATIENT MESSAGE (OUTPATIENT)
Dept: OBSTETRICS AND GYNECOLOGY | Facility: CLINIC | Age: 36
End: 2021-12-03
Payer: COMMERCIAL

## 2021-12-06 ENCOUNTER — PATIENT MESSAGE (OUTPATIENT)
Dept: OBSTETRICS AND GYNECOLOGY | Facility: CLINIC | Age: 36
End: 2021-12-06
Payer: COMMERCIAL

## 2021-12-06 RX ORDER — ALBUTEROL SULFATE 1.25 MG/3ML
1.25 SOLUTION RESPIRATORY (INHALATION) EVERY 6 HOURS PRN
Qty: 75 ML | Refills: 0 | Status: SHIPPED | OUTPATIENT
Start: 2021-12-06 | End: 2021-12-09

## 2021-12-09 ENCOUNTER — PATIENT MESSAGE (OUTPATIENT)
Dept: OBSTETRICS AND GYNECOLOGY | Facility: CLINIC | Age: 36
End: 2021-12-09
Payer: COMMERCIAL

## 2021-12-09 RX ORDER — ALBUTEROL SULFATE 90 UG/1
2 AEROSOL, METERED RESPIRATORY (INHALATION) EVERY 6 HOURS PRN
Qty: 18 G | Refills: 0 | Status: SHIPPED | OUTPATIENT
Start: 2021-12-09 | End: 2023-11-06 | Stop reason: SDUPTHER

## 2021-12-23 ENCOUNTER — IMMUNIZATION (OUTPATIENT)
Dept: INTERNAL MEDICINE | Facility: CLINIC | Age: 36
End: 2021-12-23
Payer: COMMERCIAL

## 2021-12-23 DIAGNOSIS — Z23 NEED FOR VACCINATION: Primary | ICD-10-CM

## 2021-12-23 PROCEDURE — 0004A COVID-19, MRNA, LNP-S, PF, 30 MCG/0.3 ML DOSE VACCINE: CPT | Mod: PBBFAC | Performed by: INTERNAL MEDICINE

## 2022-01-10 ENCOUNTER — DOCUMENTATION ONLY (OUTPATIENT)
Dept: ADMINISTRATIVE | Facility: HOSPITAL | Age: 37
End: 2022-01-10
Payer: COMMERCIAL

## 2022-01-10 LAB
CTP QC/QA: YES
SARS-COV-2 AG RESP QL IA.RAPID: POSITIVE

## 2022-01-11 ENCOUNTER — PATIENT MESSAGE (OUTPATIENT)
Dept: PRIMARY CARE CLINIC | Facility: CLINIC | Age: 37
End: 2022-01-11
Payer: COMMERCIAL

## 2022-02-23 ENCOUNTER — OFFICE VISIT (OUTPATIENT)
Dept: CARDIOLOGY | Facility: CLINIC | Age: 37
End: 2022-02-23
Payer: COMMERCIAL

## 2022-02-23 VITALS
SYSTOLIC BLOOD PRESSURE: 112 MMHG | WEIGHT: 161 LBS | DIASTOLIC BLOOD PRESSURE: 64 MMHG | BODY MASS INDEX: 31.61 KG/M2 | HEIGHT: 60 IN

## 2022-02-23 DIAGNOSIS — R00.2 HEART PALPITATIONS: ICD-10-CM

## 2022-02-23 DIAGNOSIS — I49.3 PVC (PREMATURE VENTRICULAR CONTRACTION): Primary | ICD-10-CM

## 2022-02-23 PROCEDURE — 3078F PR MOST RECENT DIASTOLIC BLOOD PRESSURE < 80 MM HG: ICD-10-PCS | Mod: CPTII,S$GLB,, | Performed by: INTERNAL MEDICINE

## 2022-02-23 PROCEDURE — 3008F BODY MASS INDEX DOCD: CPT | Mod: CPTII,S$GLB,, | Performed by: INTERNAL MEDICINE

## 2022-02-23 PROCEDURE — 99214 OFFICE O/P EST MOD 30 MIN: CPT | Mod: S$GLB,,, | Performed by: INTERNAL MEDICINE

## 2022-02-23 PROCEDURE — 3074F PR MOST RECENT SYSTOLIC BLOOD PRESSURE < 130 MM HG: ICD-10-PCS | Mod: CPTII,S$GLB,, | Performed by: INTERNAL MEDICINE

## 2022-02-23 PROCEDURE — 93000 ELECTROCARDIOGRAM COMPLETE: CPT | Mod: S$GLB,,, | Performed by: INTERNAL MEDICINE

## 2022-02-23 PROCEDURE — 99999 PR PBB SHADOW E&M-EST. PATIENT-LVL III: CPT | Mod: PBBFAC,,, | Performed by: INTERNAL MEDICINE

## 2022-02-23 PROCEDURE — 1160F PR REVIEW ALL MEDS BY PRESCRIBER/CLIN PHARMACIST DOCUMENTED: ICD-10-PCS | Mod: CPTII,S$GLB,, | Performed by: INTERNAL MEDICINE

## 2022-02-23 PROCEDURE — 99999 PR PBB SHADOW E&M-EST. PATIENT-LVL III: ICD-10-PCS | Mod: PBBFAC,,, | Performed by: INTERNAL MEDICINE

## 2022-02-23 PROCEDURE — 1159F PR MEDICATION LIST DOCUMENTED IN MEDICAL RECORD: ICD-10-PCS | Mod: CPTII,S$GLB,, | Performed by: INTERNAL MEDICINE

## 2022-02-23 PROCEDURE — 1160F RVW MEDS BY RX/DR IN RCRD: CPT | Mod: CPTII,S$GLB,, | Performed by: INTERNAL MEDICINE

## 2022-02-23 PROCEDURE — 3008F PR BODY MASS INDEX (BMI) DOCUMENTED: ICD-10-PCS | Mod: CPTII,S$GLB,, | Performed by: INTERNAL MEDICINE

## 2022-02-23 PROCEDURE — 93000 EKG 12-LEAD: ICD-10-PCS | Mod: S$GLB,,, | Performed by: INTERNAL MEDICINE

## 2022-02-23 PROCEDURE — 99214 PR OFFICE/OUTPT VISIT, EST, LEVL IV, 30-39 MIN: ICD-10-PCS | Mod: S$GLB,,, | Performed by: INTERNAL MEDICINE

## 2022-02-23 PROCEDURE — 3074F SYST BP LT 130 MM HG: CPT | Mod: CPTII,S$GLB,, | Performed by: INTERNAL MEDICINE

## 2022-02-23 PROCEDURE — 3078F DIAST BP <80 MM HG: CPT | Mod: CPTII,S$GLB,, | Performed by: INTERNAL MEDICINE

## 2022-02-23 PROCEDURE — 1159F MED LIST DOCD IN RCRD: CPT | Mod: CPTII,S$GLB,, | Performed by: INTERNAL MEDICINE

## 2022-02-23 NOTE — PROGRESS NOTES
CARDIOVASCULAR CONSULTATION    REASON FOR CONSULT:   Johanna Ocasio is a 36 y.o. female who presents for follow-up.      HISTORY OF PRESENT ILLNESS:     Patient is a pleasant 36-year-old lady.  Main complaint is palpitations.  Had echo and Holter done because of that.  Echo and Holter showed normal left ventricle systolic function.  Holter showed rare PACs and PVCs.  Denies orthopnea, PND, swelling of feet.    Notes from February 2022:  Patient here for follow-up.  States her palpitations have gone away and decreased significantly since starting the metoprolol.  Now she hardly feels them.        PAST MEDICAL HISTORY:     Past Medical History:   Diagnosis Date    Abnormal Pap smear of cervix 2011    Colposcopy    Brain venous angioma 10/3/2018    Early childhood epilepsy, myoclonic     last seizure age 14; Venous hemangioma on MRI    History of migraine headaches 5/12/2016    Migraine headache        PAST SURGICAL HISTORY:   No past surgical history on file.    ALLERGIES AND MEDICATION:   Review of patient's allergies indicates:  No Known Allergies     Medication List          Accurate as of February 23, 2022  2:26 PM. If you have any questions, ask your nurse or doctor.            CONTINUE taking these medications    albuterol 90 mcg/actuation inhaler  Commonly known as: PROVENTIL/VENTOLIN HFA  Inhale 2 puffs into the lungs every 6 (six) hours as needed for Wheezing. Rescue     metoprolol succinate 25 MG 24 hr tablet  Commonly known as: TOPROL-XL  Take 1 tablet (25 mg total) by mouth once daily.     ondansetron 4 MG tablet  Commonly known as: ZOFRAN  TAKE 1 TABLET BY MOUTH EVERY 8 HOURS AS NEEDED FOR NAUSEA     VIOS AEROSOL DELIVERY SYSTEM Martha  Generic drug: nebulizer and compressor            SOCIAL HISTORY:     Social History     Socioeconomic History    Marital status:    Tobacco Use    Smoking status: Never Smoker    Smokeless tobacco: Never Used   Substance and Sexual Activity    Alcohol use:  No     Comment: Socially/ pre pregnancy     Drug use: No    Sexual activity: Yes     Partners: Male     Birth control/protection: None       FAMILY HISTORY:     Family History   Problem Relation Age of Onset    Hypertension Mother     Hypertension Father     Cataracts Maternal Grandfather     Diabetes Paternal Grandmother     Cancer Paternal Grandfather         colon cancer    Breast cancer Neg Hx     Colon cancer Neg Hx     Ovarian cancer Neg Hx     Melanoma Neg Hx     Lupus Neg Hx     Psoriasis Neg Hx        REVIEW OF SYSTEMS:   Review of Systems   Constitutional: Negative.   HENT: Negative.    Eyes: Negative.    Cardiovascular: Positive for palpitations.   Respiratory: Negative.    Endocrine: Negative.    Hematologic/Lymphatic: Negative.    Skin: Negative.    Musculoskeletal: Negative.    Gastrointestinal: Negative.    Genitourinary: Negative.    Neurological: Negative.    Psychiatric/Behavioral: Negative.    Allergic/Immunologic: Negative.        A 10 point review of systems was performed and all the pertinent positives have been mentioned. Rest of review of systems was negative.        PHYSICAL EXAM:     Vitals:    02/23/22 1345   BP: 112/64    Body mass index is 31.44 kg/m².  Weight: 73 kg (161 lb)   Height: 5' (152.4 cm)     Physical Exam  Constitutional:       Appearance: Normal appearance. She is well-developed.   HENT:      Head: Normocephalic.   Eyes:      Pupils: Pupils are equal, round, and reactive to light.   Cardiovascular:      Rate and Rhythm: Normal rate and regular rhythm.   Pulmonary:      Effort: Pulmonary effort is normal.      Breath sounds: Normal breath sounds.   Abdominal:      General: Bowel sounds are normal.      Palpations: Abdomen is soft.      Tenderness: There is no abdominal tenderness.   Musculoskeletal:         General: Normal range of motion.      Cervical back: Normal range of motion and neck supple.   Skin:     General: Skin is warm.   Neurological:      Mental  Status: She is alert and oriented to person, place, and time.           DATA:     Laboratory:  CBC:  Recent Labs   Lab 03/27/19  0440 03/27/19  1840 10/27/21  1432   WBC 10.45 16.94 H 5.88   Hemoglobin 12.0 11.2 L 13.4   Hematocrit 36.0 L 33.6 L 41.9   Platelets 280 285 271       CHEMISTRIES:  Recent Labs   Lab 10/27/21  1432   Glucose 76   Sodium 141   Potassium 4.1   BUN 16   Creatinine 0.8   eGFR if  >60   eGFR if non African American >60   Calcium 10.0       CARDIAC BIOMARKERS:        COAGS:        LIPIDS/LFTS:  Recent Labs   Lab 10/27/21  1432   AST 22   ALT 23       Hemoglobin A1C   Date Value Ref Range Status   04/28/2016 5.2 4.5 - 6.2 % Final       TSH  Recent Labs   Lab 10/27/21  1432   TSH 1.722       The ASCVD Risk score ([place] BAZZI Jr., et al., 2013) failed to calculate for the following reasons:    The 2013 ASCVD risk score is only valid for ages 40 to 79             ASSESSMENT AND PLAN     Patient Active Problem List   Diagnosis    History of migraine headaches    History of seizures as a child - last seizure age 14, history of venous hemangioma on brain    Brain venous angioma       Palpitations.  Echo showed normal left ventricle systolic function.  No significant arrhythmia noted on Holter apart from rare PACs and PVCs.  Toprol-XL has reduced palpitations significantly.  Follow-up in 6 months.          Thank you very much for involving me in the care of your patient.  Please do not hesitate to contact me if there are any questions.      Fartun Chapman MD, FACC, [facility]  Interventional Cardiologist, [facility].           This note was dictated with the help of speech recognition software.  There might be un-intended errors and/or substitutions.

## 2022-06-07 ENCOUNTER — OFFICE VISIT (OUTPATIENT)
Dept: OBSTETRICS AND GYNECOLOGY | Facility: CLINIC | Age: 37
End: 2022-06-07
Payer: COMMERCIAL

## 2022-06-07 VITALS
DIASTOLIC BLOOD PRESSURE: 68 MMHG | BODY MASS INDEX: 33.28 KG/M2 | WEIGHT: 170.44 LBS | SYSTOLIC BLOOD PRESSURE: 112 MMHG

## 2022-06-07 DIAGNOSIS — Z01.419 VISIT FOR GYNECOLOGIC EXAMINATION: Primary | ICD-10-CM

## 2022-06-07 PROCEDURE — 3074F PR MOST RECENT SYSTOLIC BLOOD PRESSURE < 130 MM HG: ICD-10-PCS | Mod: CPTII,S$GLB,, | Performed by: OBSTETRICS & GYNECOLOGY

## 2022-06-07 PROCEDURE — 1159F MED LIST DOCD IN RCRD: CPT | Mod: CPTII,S$GLB,, | Performed by: OBSTETRICS & GYNECOLOGY

## 2022-06-07 PROCEDURE — 3008F BODY MASS INDEX DOCD: CPT | Mod: CPTII,S$GLB,, | Performed by: OBSTETRICS & GYNECOLOGY

## 2022-06-07 PROCEDURE — 99999 PR PBB SHADOW E&M-EST. PATIENT-LVL III: CPT | Mod: PBBFAC,,, | Performed by: OBSTETRICS & GYNECOLOGY

## 2022-06-07 PROCEDURE — 99999 PR PBB SHADOW E&M-EST. PATIENT-LVL III: ICD-10-PCS | Mod: PBBFAC,,, | Performed by: OBSTETRICS & GYNECOLOGY

## 2022-06-07 PROCEDURE — 99395 PR PREVENTIVE VISIT,EST,18-39: ICD-10-PCS | Mod: S$GLB,,, | Performed by: OBSTETRICS & GYNECOLOGY

## 2022-06-07 PROCEDURE — 88175 CYTOPATH C/V AUTO FLUID REDO: CPT | Performed by: OBSTETRICS & GYNECOLOGY

## 2022-06-07 PROCEDURE — 1159F PR MEDICATION LIST DOCUMENTED IN MEDICAL RECORD: ICD-10-PCS | Mod: CPTII,S$GLB,, | Performed by: OBSTETRICS & GYNECOLOGY

## 2022-06-07 PROCEDURE — 3074F SYST BP LT 130 MM HG: CPT | Mod: CPTII,S$GLB,, | Performed by: OBSTETRICS & GYNECOLOGY

## 2022-06-07 PROCEDURE — 3078F PR MOST RECENT DIASTOLIC BLOOD PRESSURE < 80 MM HG: ICD-10-PCS | Mod: CPTII,S$GLB,, | Performed by: OBSTETRICS & GYNECOLOGY

## 2022-06-07 PROCEDURE — 99395 PREV VISIT EST AGE 18-39: CPT | Mod: S$GLB,,, | Performed by: OBSTETRICS & GYNECOLOGY

## 2022-06-07 PROCEDURE — 87624 HPV HI-RISK TYP POOLED RSLT: CPT | Performed by: OBSTETRICS & GYNECOLOGY

## 2022-06-07 PROCEDURE — 3078F DIAST BP <80 MM HG: CPT | Mod: CPTII,S$GLB,, | Performed by: OBSTETRICS & GYNECOLOGY

## 2022-06-07 PROCEDURE — 3008F PR BODY MASS INDEX (BMI) DOCUMENTED: ICD-10-PCS | Mod: CPTII,S$GLB,, | Performed by: OBSTETRICS & GYNECOLOGY

## 2022-06-07 NOTE — PROGRESS NOTES
"HISTORY OF PRESENT ILLNESS:    Johanna Ocasio is a 37 y.o. female, , Patient's last menstrual period was 2022.,  presents for a routine exam and has no complaints. COTEST SUBMITTED.  CONDOMS FOR CONTRACEPTION.  MILD ANTONINO - DISCUSSED KEGELS, TIMED VOIDING, BLADDER IRRITANTS.  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  DAUGHTER WILL BE 3 NEXT YEAR AND WILL START EARLY SCHOOL AT HOME    :  routine exam and has no complaints. COTEST PLANNED .  NO GYN C/O AND CONDOMS WITH INTERCOURSE.  HAS BEEN A STRESSFUL YEAR FOR PATIENT AND .  BABY WAS DIAGNOSED WITH MIRIAM GRIPP SYNDROME, SPON MUTATION BUT HAS MULT HEALTH ISSUES AND EAN WORKING PART TIME [employer] TO BE ABLE TO BE HOME FOR HER.  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 .   2019:  status post  6 weeks ago.  Her hospitalization was complicated.  She is breastfeeding.  She desires condoms for contraception, PLANNING VASECT.  She denies postpartum depression, BUT AOMEWHAT TEARFUL INFANT STILL IN NICU WORKING THRU FEEDING ISSUES.  3/27/2019 BOBBI FOR EK 33W PRECIPITOUD, 2ND DEGREE REPAIR  COTESTING SUBMITTED  "[facility]-NO RN IN [facility].  HX  FEMALE 'VERA' VE FOR EK.  A POS  CHILDHOOD SX, CAVERNOUS ANGIOMA BRAIN, BRAIN MRI 10/2018, NO ANTICIPATED PROBS DELIVERY.  ITS A GIRL, PTD AT 33 WEEKS THIS PREGNANCY"    Past Medical History:   Diagnosis Date    Abnormal Pap smear of cervix     Colposcopy    Brain venous angioma 10/3/2018    Early childhood epilepsy, myoclonic     last seizure age 14; Venous hemangioma on MRI    History of migraine headaches 2016    Migraine headache        History reviewed. No pertinent surgical history.    MEDICATIONS AND ALLERGIES:      Current Outpatient Medications:     albuterol (PROVENTIL/VENTOLIN HFA) 90 mcg/actuation inhaler, Inhale 2 puffs into the lungs every 6 (six) hours as needed for Wheezing. Rescue, Disp: 18 g, Rfl: 0    metoprolol succinate (TOPROL-XL) 25 MG 24 hr " tablet, Take 1 tablet (25 mg total) by mouth once daily., Disp: 90 tablet, Rfl: 3    ondansetron (ZOFRAN) 4 MG tablet, TAKE 1 TABLET BY MOUTH EVERY 8 HOURS AS NEEDED FOR NAUSEA, Disp: 30 tablet, Rfl: 0    VIOS AEROSOL DELIVERY SYSTEM Martha, USE AS DIRECTED, Disp: , Rfl: 0    Review of patient's allergies indicates:  No Known Allergies    Family History   Problem Relation Age of Onset    Hypertension Mother     Hypertension Father     Cataracts Maternal Grandfather     Diabetes Paternal Grandmother     Cancer Paternal Grandfather         colon cancer    Breast cancer Neg Hx     Colon cancer Neg Hx     Ovarian cancer Neg Hx     Melanoma Neg Hx     Lupus Neg Hx     Psoriasis Neg Hx        Social History     Socioeconomic History    Marital status:    Tobacco Use    Smoking status: Never Smoker    Smokeless tobacco: Never Used   Substance and Sexual Activity    Alcohol use: No     Comment: Socially/ pre pregnancy     Drug use: No    Sexual activity: Yes     Partners: Male     Birth control/protection: None       COMPREHENSIVE GYN HISTORY:  PAP History: Denies abnormal Paps.  Infection History: Denies STDs. Denies PID.  Benign History: Denies uterine fibroids. Denies ovarian cysts. Denies endometriosis. Denies other conditions.  Cancer History: Denies cervical cancer. Denies uterine cancer or hyperplasia. Denies ovarian cancer. Denies vulvar cancer or pre-cancer. Denies vaginal cancer or pre-cancer. Denies breast cancer. Denies colon cancer.  Sexual Activity History: Reports currently being sexually active  Menstrual History: Monthly, mild-moderate.  Contraception:condoms    ROS:  GENERAL: No weight changes. No swelling. No fatigue. No fever.  CARDIOVASCULAR: No chest pain. No shortness of breath. No leg cramps.   NEUROLOGICAL: No headaches. No vision changes.  BREASTS: No pain. No lumps. No discharge.  ABDOMEN: No pain. No nausea. No vomiting. No diarrhea. No constipation.  REPRODUCTIVE: No  abnormal bleeding.   VULVA: No pain. No lesions. No itching.  VAGINA: No relaxation. No itching. No odor. No discharge. No lesions.  URINARY: No incontinence. No nocturia. No frequency. No dysuria.    /68   Wt 77.3 kg (170 lb 6.7 oz)   LMP 05/24/2022   Breastfeeding No   BMI 33.28 kg/m²     PE:  APPEARANCE: Well nourished, well developed, in no acute distress.  AFFECT: WNL, alert and oriented x 3.  SKIN: No acne or hirsutism.  NECK: Neck symmetric, without masses or thyromegaly.  NODES: No inguinal, cervical, axillary or femoral lymph node enlargement.  CHEST: Good respiratory effort.   ABDOMEN: Soft. No tenderness or masses. No hepatosplenomegaly. No hernias.  BREASTS: Symmetrical, no skin changes, visible lesions, palpable masses or nipple discharge bilaterally.  PELVIC: External female genitalia without lesions.  Female hair distribution. Adequate perineal body, Normal urethral meatus. Vagina moist and well rugated without lesions or discharge.  No significant cystocele or rectocele present. Cervix pink without lesions, discharge or tenderness. Uterus is normal size, regular, mobile and nontender. Adnexa without masses or tenderness.  EXTREMITIES: No edema    PROCEDURES:  Pap    DIAGNOSIS:  1. Visit for gynecologic examination  Liquid-Based Pap Smear, Screening    HPV High Risk Genotypes, PCR       LABS AND TESTS ORDERED:    MEDICATIONS PRESCRIBED:    COUNSELING:   The patient was counseled today on ACS PAP guidelines, with recommendations for yearly pelvic exams unless their uterus, cervix, and ovaries were removed for benign reasons; in that case, examinations every 3-5 years are recommended.  The patient was also counseled regarding monthly breast self-examination, routine STD screening for at-risk populations, prophylactic immunizations for transmitted infections such as  HPV, Pertussis, or Influenza as appropriate, and yearly mammograms when indicated by ACS guidelines.  She was advised to see her  primary care physician for all other health maintenance.    FOLLOW-UP with me annually.

## 2022-06-13 LAB
FINAL PATHOLOGIC DIAGNOSIS: NORMAL
HPV HR 12 DNA SPEC QL NAA+PROBE: NEGATIVE
HPV16 AG SPEC QL: NEGATIVE
HPV18 DNA SPEC QL NAA+PROBE: NEGATIVE
Lab: NORMAL

## 2022-06-14 ENCOUNTER — CLINICAL SUPPORT (OUTPATIENT)
Dept: OTHER | Facility: CLINIC | Age: 37
End: 2022-06-14
Payer: COMMERCIAL

## 2022-06-14 DIAGNOSIS — Z00.8 ENCOUNTER FOR OTHER GENERAL EXAMINATION: ICD-10-CM

## 2022-06-15 VITALS
WEIGHT: 168 LBS | SYSTOLIC BLOOD PRESSURE: 111 MMHG | DIASTOLIC BLOOD PRESSURE: 76 MMHG | BODY MASS INDEX: 32.98 KG/M2 | HEIGHT: 60 IN

## 2022-06-15 LAB
GLUCOSE SERPL-MCNC: 91 MG/DL (ref 60–140)
HDLC SERPL-MCNC: 55 MG/DL
POC CHOLESTEROL, LDL (DOCK): 97.46 MG/DL
POC CHOLESTEROL, TOTAL: 176 MG/DL
TRIGL SERPL-MCNC: 134 MG/DL

## 2022-06-19 ENCOUNTER — PATIENT MESSAGE (OUTPATIENT)
Dept: OBSTETRICS AND GYNECOLOGY | Facility: CLINIC | Age: 37
End: 2022-06-19
Payer: COMMERCIAL

## 2023-01-27 ENCOUNTER — PATIENT OUTREACH (OUTPATIENT)
Dept: ADMINISTRATIVE | Facility: HOSPITAL | Age: 38
End: 2023-01-27
Payer: COMMERCIAL

## 2023-01-27 RX ORDER — AMOXICILLIN 875 MG/1
875 TABLET, FILM COATED ORAL 2 TIMES DAILY
COMMUNITY
Start: 2022-12-17 | End: 2023-04-21

## 2023-01-27 NOTE — PROGRESS NOTES
[facility] 1 Panel Continuity 01.17.2023 - an appointment was scheduled w/ Dr. Nails on 06/21/23.

## 2023-02-14 ENCOUNTER — OFFICE VISIT (OUTPATIENT)
Dept: DERMATOLOGY | Facility: CLINIC | Age: 38
End: 2023-02-14
Payer: COMMERCIAL

## 2023-02-14 DIAGNOSIS — L60.3 ONYCHODYSTROPHY: Primary | ICD-10-CM

## 2023-02-14 DIAGNOSIS — Z76.89 ENCOUNTER FOR SKIN CARE: ICD-10-CM

## 2023-02-14 DIAGNOSIS — F98.8 NAIL BITING: ICD-10-CM

## 2023-02-14 PROCEDURE — 1160F PR REVIEW ALL MEDS BY PRESCRIBER/CLIN PHARMACIST DOCUMENTED: ICD-10-PCS | Mod: CPTII,S$GLB,, | Performed by: DERMATOLOGY

## 2023-02-14 PROCEDURE — 99999 PR PBB SHADOW E&M-EST. PATIENT-LVL III: CPT | Mod: PBBFAC,,, | Performed by: DERMATOLOGY

## 2023-02-14 PROCEDURE — 99204 OFFICE O/P NEW MOD 45 MIN: CPT | Mod: S$GLB,,, | Performed by: DERMATOLOGY

## 2023-02-14 PROCEDURE — 99999 PR PBB SHADOW E&M-EST. PATIENT-LVL III: ICD-10-PCS | Mod: PBBFAC,,, | Performed by: DERMATOLOGY

## 2023-02-14 PROCEDURE — 1159F MED LIST DOCD IN RCRD: CPT | Mod: CPTII,S$GLB,, | Performed by: DERMATOLOGY

## 2023-02-14 PROCEDURE — 99204 PR OFFICE/OUTPT VISIT, NEW, LEVL IV, 45-59 MIN: ICD-10-PCS | Mod: S$GLB,,, | Performed by: DERMATOLOGY

## 2023-02-14 PROCEDURE — 1159F PR MEDICATION LIST DOCUMENTED IN MEDICAL RECORD: ICD-10-PCS | Mod: CPTII,S$GLB,, | Performed by: DERMATOLOGY

## 2023-02-14 PROCEDURE — 1160F RVW MEDS BY RX/DR IN RCRD: CPT | Mod: CPTII,S$GLB,, | Performed by: DERMATOLOGY

## 2023-02-14 RX ORDER — AMMONIUM LACTATE 12 G/100G
CREAM TOPICAL
Qty: 140 G | Refills: 2 | Status: SHIPPED | OUTPATIENT
Start: 2023-02-14 | End: 2023-11-06

## 2023-02-14 NOTE — PATIENT INSTRUCTIONS
No hot water     Cold water mainly when wash hands with soap after using the bathroom     Vaseline to the nail

## 2023-02-14 NOTE — PROGRESS NOTES
Subjective:       Patient ID:  Johanna Ocasio is a 37 y.o. female who presents for   Chief Complaint   Patient presents with    Nail Problem     Left index finger      Nail Problem - Initial  Affected locations: left fingers  Duration: 6 weeks  Signs / symptoms: asymptomatic  Severity: mild  Timing: constant  Aggravated by: nothing  Relieving factors/Treatments tried: nothing  Improvement on treatment: no relief    Review of Systems   Constitutional:  Negative for fever, chills and fatigue.   Skin:  Negative for daily sunscreen use, recent sunburn and wears hat.   Hematologic/Lymphatic: Does not bruise/bleed easily.      Objective:    Physical Exam   Constitutional: She appears well-developed and well-nourished. No distress.   Neurological: She is alert and oriented to person, place, and time. She is not disoriented.   Psychiatric: She has a normal mood and affect.   Skin:   Areas Examined (abnormalities noted in diagram):   Nails and Digits Inspection Performed       Diagram Legend     Erythematous scaling macule/papule c/w actinic keratosis       Vascular papule c/w angioma      Pigmented verrucoid papule/plaque c/w seborrheic keratosis      Yellow umbilicated papule c/w sebaceous hyperplasia      Irregularly shaped tan macule c/w lentigo     1-2 mm smooth white papules consistent with Milia      Movable subcutaneous cyst with punctum c/w epidermal inclusion cyst      Subcutaneous movable cyst c/w pilar cyst      Firm pink to brown papule c/w dermatofibroma      Pedunculated fleshy papule(s) c/w skin tag(s)      Evenly pigmented macule c/w junctional nevus     Mildly variegated pigmented, slightly irregular-bordered macule c/w mildly atypical nevus      Flesh colored to evenly pigmented papule c/w intradermal nevus       Pink pearly papule/plaque c/w basal cell carcinoma      Erythematous hyperkeratotic cursted plaque c/w SCC      Surgical scar with no sign of skin cancer recurrence      Open and closed  comedones      Inflammatory papules and pustules      Verrucoid papule consistent consistent with wart     Erythematous eczematous patches and plaques     Dystrophic onycholytic nail with subungual debris c/w onychomycosis     Umbilicated papule    Erythematous-base heme-crusted tan verrucoid plaque consistent with inflamed seborrheic keratosis     Erythematous Silvery Scaling Plaque c/w Psoriasis     See annotation                    Assessment / Plan:        Onychodystrophy  -     ammonium lactate 12 % Crea; Qhs nail  Dispense: 140 g; Refill: 2  Discussed with patient the etiology and pathogenesis of the disease or skin lesion(s) and possible treatments and aggravators.    Chronic nature of this condition discussed with patient.  Reviewed with patient different treatment options and associated risks.  Proper application of medications and or care for affected area(s) and condition(s) reviewed.  Patient instructed to start Amlactin cream or lotion nightly to AK prone areas or other specified affected areas.  Warned of skin irritation and to decrease frequency of usage if this occurs.  Instructed patient to use petroleum jelly at least daily on affected areas.  Patient instructed to start Amlactin cream or lotion nightly to AK prone areas or other specified affected areas.  Warned of skin irritation and to decrease frequency of usage if this occurs.  Watch for paronychia.  Keep nail dry as poss.  Instructed patient to avoid hot water on the fingernails and toenails and to cut them short.  Can try Amlactin or Urea cream nightly and to watch for skin irritation.  If this occurs, use less often.  Discussed that toenails changes are common after the age of thirty with decreased blood flow and venous back pressure.  Fingernail changes can be spontaneous also with changes later in life.  This may be a chronic condition without much improvement with limited treatments.  Can also use petroleum jelly regularly.  No more  artifical nails or press ons and no more polish.  Also reviewed that fungal infection is unlikely to be the primary issue.  Offered oral antifungal therapy with risks of liver irritation and risks of recurrence of nail changes, especially as primary architectural changes are suspected.    Encounter for skin care  No hot water bathing reviewed.    Nail biting  Pt stopped doing this 2 mths ago.  Previous [facility] labs and or records and notes reviewed and considered for their impact on our clinical decision making today.             Follow up in about 2 months (around 4/14/2023).

## 2023-03-02 ENCOUNTER — CLINICAL SUPPORT (OUTPATIENT)
Dept: OTHER | Facility: CLINIC | Age: 38
End: 2023-03-02

## 2023-03-02 DIAGNOSIS — Z00.8 ENCOUNTER FOR OTHER GENERAL EXAMINATION: ICD-10-CM

## 2023-03-03 VITALS
HEIGHT: 60 IN | BODY MASS INDEX: 31.02 KG/M2 | DIASTOLIC BLOOD PRESSURE: 82 MMHG | SYSTOLIC BLOOD PRESSURE: 130 MMHG | WEIGHT: 158 LBS

## 2023-03-03 LAB
GLUCOSE SERPL-MCNC: 80 MG/DL (ref 60–140)
HDLC SERPL-MCNC: 56 MG/DL
POC CHOLESTEROL, LDL (DOCK): 89 MG/DL
POC CHOLESTEROL, TOTAL: 161 MG/DL
TRIGL SERPL-MCNC: 84 MG/DL

## 2023-03-31 DIAGNOSIS — I10 ESSENTIAL HYPERTENSION, BENIGN: ICD-10-CM

## 2023-04-03 RX ORDER — METOPROLOL SUCCINATE 25 MG/1
TABLET, EXTENDED RELEASE ORAL
Qty: 90 TABLET | Refills: 0 | Status: SHIPPED | OUTPATIENT
Start: 2023-04-03 | End: 2023-07-24 | Stop reason: SDUPTHER

## 2023-04-19 ENCOUNTER — PATIENT MESSAGE (OUTPATIENT)
Dept: OPTOMETRY | Facility: CLINIC | Age: 38
End: 2023-04-19

## 2023-04-19 ENCOUNTER — OFFICE VISIT (OUTPATIENT)
Dept: OPTOMETRY | Facility: CLINIC | Age: 38
End: 2023-04-19
Payer: COMMERCIAL

## 2023-04-19 DIAGNOSIS — H10.013 ACUTE FOLLICULAR CONJUNCTIVITIS OF BOTH EYES: Primary | ICD-10-CM

## 2023-04-19 PROCEDURE — 99999 PR PBB SHADOW E&M-EST. PATIENT-LVL II: CPT | Mod: PBBFAC,,, | Performed by: OPTOMETRIST

## 2023-04-19 PROCEDURE — 99212 OFFICE O/P EST SF 10 MIN: CPT | Mod: S$GLB,,, | Performed by: OPTOMETRIST

## 2023-04-19 PROCEDURE — 1159F MED LIST DOCD IN RCRD: CPT | Mod: CPTII,S$GLB,, | Performed by: OPTOMETRIST

## 2023-04-19 PROCEDURE — 99212 PR OFFICE/OUTPT VISIT, EST, LEVL II, 10-19 MIN: ICD-10-PCS | Mod: S$GLB,,, | Performed by: OPTOMETRIST

## 2023-04-19 PROCEDURE — 1159F PR MEDICATION LIST DOCUMENTED IN MEDICAL RECORD: ICD-10-PCS | Mod: CPTII,S$GLB,, | Performed by: OPTOMETRIST

## 2023-04-19 PROCEDURE — 99999 PR PBB SHADOW E&M-EST. PATIENT-LVL II: ICD-10-PCS | Mod: PBBFAC,,, | Performed by: OPTOMETRIST

## 2023-04-19 RX ORDER — LOTEPREDNOL ETABONATE 5 MG/ML
1 SUSPENSION/ DROPS OPHTHALMIC 4 TIMES DAILY
Qty: 5 ML | Refills: 1 | Status: SHIPPED | OUTPATIENT
Start: 2023-04-19 | End: 2023-05-08

## 2023-04-19 RX ORDER — LOTEPREDNOL ETABONATE 5 MG/ML
1 SUSPENSION/ DROPS OPHTHALMIC 4 TIMES DAILY
Qty: 5 ML | Refills: 1 | Status: SHIPPED | OUTPATIENT
Start: 2023-04-19 | End: 2023-04-19

## 2023-04-19 NOTE — PROGRESS NOTES
HPI    Pt sts OU is pink red mucus little pain. Tobramycin BID ou   Last edited by Danitza George on 4/19/2023  3:51 PM.            Assessment /Plan     For exam results, see Encounter Report.    Acute follicular conjunctivitis of both eyes  D/c tobramycin   Start ivizia QID  -     loteprednol (LOTEMAX) 0.5 % ophthalmic suspension; Place 1 drop into both eyes 4 (four) times daily. for 10 days  Dispense: 5 mL; Refill: 1      Return if condition worsens or does not improve

## 2023-04-21 ENCOUNTER — OFFICE VISIT (OUTPATIENT)
Dept: INTERNAL MEDICINE | Facility: CLINIC | Age: 38
End: 2023-04-21
Payer: COMMERCIAL

## 2023-04-21 VITALS
DIASTOLIC BLOOD PRESSURE: 78 MMHG | WEIGHT: 166 LBS | HEART RATE: 64 BPM | OXYGEN SATURATION: 98 % | HEIGHT: 60 IN | SYSTOLIC BLOOD PRESSURE: 126 MMHG | BODY MASS INDEX: 32.59 KG/M2

## 2023-04-21 DIAGNOSIS — J02.9 SORE THROAT: Primary | ICD-10-CM

## 2023-04-21 LAB
CTP QC/QA: YES
CTP QC/QA: YES
MOLECULAR STREP A: NEGATIVE
SARS-COV-2 RDRP RESP QL NAA+PROBE: NEGATIVE

## 2023-04-21 PROCEDURE — 3078F PR MOST RECENT DIASTOLIC BLOOD PRESSURE < 80 MM HG: ICD-10-PCS | Mod: CPTII,S$GLB,, | Performed by: INTERNAL MEDICINE

## 2023-04-21 PROCEDURE — 87651 STREP A DNA AMP PROBE: CPT | Mod: QW,S$GLB,, | Performed by: INTERNAL MEDICINE

## 2023-04-21 PROCEDURE — 87635: ICD-10-PCS | Mod: QW,S$GLB,, | Performed by: INTERNAL MEDICINE

## 2023-04-21 PROCEDURE — 87635 SARS-COV-2 COVID-19 AMP PRB: CPT | Mod: QW,S$GLB,, | Performed by: INTERNAL MEDICINE

## 2023-04-21 PROCEDURE — 3008F PR BODY MASS INDEX (BMI) DOCUMENTED: ICD-10-PCS | Mod: CPTII,S$GLB,, | Performed by: INTERNAL MEDICINE

## 2023-04-21 PROCEDURE — 3078F DIAST BP <80 MM HG: CPT | Mod: CPTII,S$GLB,, | Performed by: INTERNAL MEDICINE

## 2023-04-21 PROCEDURE — 99999 PR PBB SHADOW E&M-EST. PATIENT-LVL III: CPT | Mod: PBBFAC,,, | Performed by: INTERNAL MEDICINE

## 2023-04-21 PROCEDURE — 99999 PR PBB SHADOW E&M-EST. PATIENT-LVL III: ICD-10-PCS | Mod: PBBFAC,,, | Performed by: INTERNAL MEDICINE

## 2023-04-21 PROCEDURE — 3008F BODY MASS INDEX DOCD: CPT | Mod: CPTII,S$GLB,, | Performed by: INTERNAL MEDICINE

## 2023-04-21 PROCEDURE — 99214 OFFICE O/P EST MOD 30 MIN: CPT | Mod: S$GLB,,, | Performed by: INTERNAL MEDICINE

## 2023-04-21 PROCEDURE — 1159F MED LIST DOCD IN RCRD: CPT | Mod: CPTII,S$GLB,, | Performed by: INTERNAL MEDICINE

## 2023-04-21 PROCEDURE — 87651 POCT STREP A MOLECULAR: ICD-10-PCS | Mod: QW,S$GLB,, | Performed by: INTERNAL MEDICINE

## 2023-04-21 PROCEDURE — 99214 PR OFFICE/OUTPT VISIT, EST, LEVL IV, 30-39 MIN: ICD-10-PCS | Mod: S$GLB,,, | Performed by: INTERNAL MEDICINE

## 2023-04-21 PROCEDURE — 1159F PR MEDICATION LIST DOCUMENTED IN MEDICAL RECORD: ICD-10-PCS | Mod: CPTII,S$GLB,, | Performed by: INTERNAL MEDICINE

## 2023-04-21 PROCEDURE — 3074F PR MOST RECENT SYSTOLIC BLOOD PRESSURE < 130 MM HG: ICD-10-PCS | Mod: CPTII,S$GLB,, | Performed by: INTERNAL MEDICINE

## 2023-04-21 PROCEDURE — 3074F SYST BP LT 130 MM HG: CPT | Mod: CPTII,S$GLB,, | Performed by: INTERNAL MEDICINE

## 2023-04-21 RX ORDER — METHYLPREDNISOLONE 4 MG/1
TABLET ORAL
Qty: 21 EACH | Refills: 0 | Status: SHIPPED | OUTPATIENT
Start: 2023-04-21 | End: 2023-05-12

## 2023-04-21 RX ORDER — MONTELUKAST SODIUM 5 MG/1
5 TABLET, CHEWABLE ORAL NIGHTLY
Qty: 30 TABLET | Refills: 0 | Status: SHIPPED | OUTPATIENT
Start: 2023-04-21 | End: 2023-05-21

## 2023-04-21 NOTE — PROGRESS NOTES
INTERNAL MEDICINE CLINIC - SAME DAY APPOINTMENT  Progress Note    PRESENTING HISTORY     PCP: Austin Nails MD    Chief Complaint/Reason for Visit:     Chief Complaint   Patient presents with    Nasal Congestion       History of Present Illness & ROS : Ms. Johanna Ocasio is a 38 y.o. female.      One week history of sore throat. Has nasal congestion.  Used Flonase with some improvement. Use Advil.  Still sore with swallowing.  No fever.  No chest pain or SOB.  On allegra now.    PAST HISTORY:     Past Medical History:   Diagnosis Date    Abnormal Pap smear of cervix 2011    Colposcopy    Brain venous angioma 10/3/2018    Early childhood epilepsy, myoclonic     last seizure age 14; Venous hemangioma on MRI    History of migraine headaches 5/12/2016    Migraine headache        No past surgical history on file.    Family History   Problem Relation Age of Onset    Hypertension Mother     Hypertension Father     Cataracts Maternal Grandfather     Diabetes Paternal Grandmother     Cancer Paternal Grandfather         colon cancer    Breast cancer Neg Hx     Colon cancer Neg Hx     Ovarian cancer Neg Hx     Melanoma Neg Hx     Lupus Neg Hx     Psoriasis Neg Hx        Social History     Socioeconomic History    Marital status:    Tobacco Use    Smoking status: Never    Smokeless tobacco: Never   Substance and Sexual Activity    Alcohol use: No     Comment: Socially/ pre pregnancy     Drug use: No    Sexual activity: Yes     Partners: Male     Birth control/protection: None       MEDICATIONS & ALLERGIES:     Current Outpatient Medications on File Prior to Visit   Medication Sig Dispense Refill    ammonium lactate 12 % Crea Qhs nail 140 g 2    loteprednol (LOTEMAX) 0.5 % ophthalmic suspension Place 1 drop into both eyes 4 (four) times daily. for 10 days 5 mL 1    metoprolol succinate (TOPROL-XL) 25 MG 24 hr tablet Take 1 tablet by mouth once daily 90 tablet 0    ondansetron (ZOFRAN) 4 MG tablet TAKE 1 TABLET BY  MOUTH EVERY 8 HOURS AS NEEDED 30 tablet 0    VIOS AEROSOL DELIVERY SYSTEM Martha USE AS DIRECTED  0            albuterol (PROVENTIL/VENTOLIN HFA) 90 mcg/actuation inhaler Inhale 2 puffs into the lungs every 6 (six) hours as needed for Wheezing. Rescue 18 g 0     No current facility-administered medications on file prior to visit.        Review of patient's allergies indicates:  No Known Allergies    Medications Reconciliation:   I have reconciled the patient's home medications with the patient/family. I have updated all changes.  Refer to After-Visit Medication List.    OBJECTIVE:     Vital Signs:  Vitals:    04/21/23 1136   BP: 126/78   Pulse: 64     Wt Readings from Last 3 Encounters:   04/21/23 1136 75.3 kg (166 lb 0.1 oz)   03/02/23 1049 71.7 kg (158 lb)   06/14/22 1650 76.2 kg (168 lb)     Body mass index is 32.42 kg/m².     Physical Exam:  General: Well developed, well nourished. No distress.  HEENT: Head is normocephalic, atraumatic; ears are normal.    Pharynx without exudate.  Eyes: Clear conjunctiva.  Neck: Supple, symmetrical neck; trachea midline.  Lungs: Clear to auscultation bilaterally and normal respiratory effort.  Cardiovascular: Heart with regular rate and rhythm.    Extremities: No LE edema.   Abdomen: Abdomen is soft, non-tender   Skin: Skin color, texture, turgor normal. No rashes.  Musculoskeletal: Normal gait.   Lymph Nodes: No cervical or supraclavicular adenopathy.  Psychiatric: Normal affect. Alert.      Laboratory  Lab Results   Component Value Date    WBC 5.88 10/27/2021    HGB 13.4 10/27/2021    HCT 41.9 10/27/2021     10/27/2021    CHOL 196 09/21/2010    TRIG 47 09/21/2010    HDL 86 (H) 09/21/2010    ALT 23 10/27/2021    AST 22 10/27/2021     10/27/2021    K 4.1 10/27/2021     10/27/2021    CREATININE 0.8 10/27/2021    BUN 16 10/27/2021    CO2 26 10/27/2021    TSH 1.722 10/27/2021    HGBA1C 5.2 04/28/2016     ASSESSMENT & PLAN:     Sore throat  -     POCT Strep A,  Molecular: negative  -     POCT COVID-19 Rapid Screening: negative    - Most likely seasonal allergy with post nasal drip leading to sore throat.    Plan:  -     montelukast (SINGULAIR) 5 MG chewable tablet; Take 1 tablet (5 mg total) by mouth every evening.  Dispense: 30 tablet; Refill: 0  -     methylPREDNISolone (MEDROL DOSEPACK) 4 mg tablet; use as directed  Dispense: 21 each; Refill: 0    Scheduled Follow-up :  Future Appointments   Date Time Provider Department Center   6/21/2023  9:20 AM Austin Nails MD Located [facility] MED [place]   7/6/2023  9:00 AM Yennifer Garcia MD [facility]       After Visit Medication List :     Medication List            Accurate as of April 21, 2023 12:04 PM. If you have any questions, ask your nurse or doctor.                START taking these medications      methylPREDNISolone 4 mg tablet  Commonly known as: MEDROL DOSEPACK  use as directed  Started by: David Flores MD     montelukast 5 MG chewable tablet  Commonly known as: SINGULAIR  Take 1 tablet (5 mg total) by mouth every evening.  Started by: David Flores MD            CONTINUE taking these medications      albuterol 90 mcg/actuation inhaler  Commonly known as: PROVENTIL/VENTOLIN HFA  Inhale 2 puffs into the lungs every 6 (six) hours as needed for Wheezing. Rescue     ammonium lactate 12 % Crea  Qhs nail     loteprednol 0.5 % ophthalmic suspension  Commonly known as: LOTEMAX  Place 1 drop into both eyes 4 (four) times daily. for 10 days     metoprolol succinate 25 MG 24 hr tablet  Commonly known as: TOPROL-XL  Take 1 tablet by mouth once daily     ondansetron 4 MG tablet  Commonly known as: ZOFRAN  TAKE 1 TABLET BY MOUTH EVERY 8 HOURS AS NEEDED     VIOS AEROSOL DELIVERY SYSTEM Martha  Generic drug: nebulizer and compressor            STOP taking these medications      amoxicillin 875 MG tablet  Commonly known as: AMOXIL  Stopped by: David Flores MD               Where to Get Your Medications        These  medications were sent to [facility] - [place], [place] - [address]  [address] [place] [address]      Phone: [phone]   methylPREDNISolone 4 mg tablet  montelukast 5 MG chewable tablet         Signing Physician:  David Flores MD

## 2023-05-17 ENCOUNTER — OFFICE VISIT (OUTPATIENT)
Dept: INTERNAL MEDICINE | Facility: CLINIC | Age: 38
End: 2023-05-17
Payer: COMMERCIAL

## 2023-05-17 ENCOUNTER — HOSPITAL ENCOUNTER (OUTPATIENT)
Dept: RADIOLOGY | Facility: HOSPITAL | Age: 38
Discharge: HOME | End: 2023-05-17
Attending: INTERNAL MEDICINE
Payer: COMMERCIAL

## 2023-05-17 VITALS
DIASTOLIC BLOOD PRESSURE: 80 MMHG | WEIGHT: 165.38 LBS | HEART RATE: 82 BPM | SYSTOLIC BLOOD PRESSURE: 130 MMHG | BODY MASS INDEX: 32.47 KG/M2 | OXYGEN SATURATION: 98 % | HEIGHT: 60 IN

## 2023-05-17 DIAGNOSIS — M25.561 CHRONIC PAIN OF BOTH KNEES: ICD-10-CM

## 2023-05-17 DIAGNOSIS — G89.29 CHRONIC PAIN OF BOTH KNEES: ICD-10-CM

## 2023-05-17 DIAGNOSIS — M25.561 CHRONIC PAIN OF BOTH KNEES: Primary | ICD-10-CM

## 2023-05-17 DIAGNOSIS — M25.562 CHRONIC PAIN OF BOTH KNEES: ICD-10-CM

## 2023-05-17 DIAGNOSIS — G89.29 CHRONIC PAIN OF BOTH KNEES: Primary | ICD-10-CM

## 2023-05-17 DIAGNOSIS — M25.562 CHRONIC PAIN OF BOTH KNEES: Primary | ICD-10-CM

## 2023-05-17 PROCEDURE — 99999 PR PBB SHADOW E&M-EST. PATIENT-LVL IV: ICD-10-PCS | Mod: PBBFAC,,, | Performed by: INTERNAL MEDICINE

## 2023-05-17 PROCEDURE — 3075F PR MOST RECENT SYSTOLIC BLOOD PRESS GE 130-139MM HG: ICD-10-PCS | Mod: CPTII,S$GLB,, | Performed by: INTERNAL MEDICINE

## 2023-05-17 PROCEDURE — 73562 X-RAY EXAM OF KNEE 3: CPT | Mod: 26,50,, | Performed by: RADIOLOGY

## 2023-05-17 PROCEDURE — 99214 OFFICE O/P EST MOD 30 MIN: CPT | Mod: S$GLB,,, | Performed by: INTERNAL MEDICINE

## 2023-05-17 PROCEDURE — 99999 PR PBB SHADOW E&M-EST. PATIENT-LVL IV: CPT | Mod: PBBFAC,,, | Performed by: INTERNAL MEDICINE

## 2023-05-17 PROCEDURE — 3079F DIAST BP 80-89 MM HG: CPT | Mod: CPTII,S$GLB,, | Performed by: INTERNAL MEDICINE

## 2023-05-17 PROCEDURE — 73562 X-RAY EXAM OF KNEE 3: CPT | Mod: TC,50

## 2023-05-17 PROCEDURE — 3008F BODY MASS INDEX DOCD: CPT | Mod: CPTII,S$GLB,, | Performed by: INTERNAL MEDICINE

## 2023-05-17 PROCEDURE — 3075F SYST BP GE 130 - 139MM HG: CPT | Mod: CPTII,S$GLB,, | Performed by: INTERNAL MEDICINE

## 2023-05-17 PROCEDURE — 99214 PR OFFICE/OUTPT VISIT, EST, LEVL IV, 30-39 MIN: ICD-10-PCS | Mod: S$GLB,,, | Performed by: INTERNAL MEDICINE

## 2023-05-17 PROCEDURE — 73562 XR KNEE 3 VIEW BILATERAL: ICD-10-PCS | Mod: 26,50,, | Performed by: RADIOLOGY

## 2023-05-17 PROCEDURE — 3079F PR MOST RECENT DIASTOLIC BLOOD PRESSURE 80-89 MM HG: ICD-10-PCS | Mod: CPTII,S$GLB,, | Performed by: INTERNAL MEDICINE

## 2023-05-17 PROCEDURE — 3008F PR BODY MASS INDEX (BMI) DOCUMENTED: ICD-10-PCS | Mod: CPTII,S$GLB,, | Performed by: INTERNAL MEDICINE

## 2023-05-17 NOTE — PROGRESS NOTES
Subjective:       Patient ID: Johanna Ocasio is a 38 y.o. female.    Chief Complaint: knee pains    HPI    Manuel presents for evaluation of knee popping/cracking.     She states she was doing squats 3 weeks ago and noticed a cracking/popping in right knee. Has now started on left side as well. She denies any pain. She has not been working out as much lately but was doing crossfit for a long time. Denies knees giving out on her.     Review of Systems   Constitutional:  Negative for activity change, appetite change and chills.   HENT:  Negative for ear pain, sinus pressure/congestion and sneezing.    Respiratory:  Negative for cough and shortness of breath.    Cardiovascular:  Negative for chest pain, palpitations and leg swelling.   Gastrointestinal:  Negative for abdominal distention, abdominal pain, constipation, diarrhea, nausea and vomiting.   Genitourinary:  Negative for dysuria and hematuria.   Musculoskeletal:  Positive for arthralgias. Negative for back pain and myalgias.   Neurological:  Negative for dizziness and headaches.   Psychiatric/Behavioral:  Negative for agitation. The patient is not nervous/anxious.          Past Medical History:   Diagnosis Date    Abnormal Pap smear of cervix 2011    Colposcopy    Brain venous angioma 10/3/2018    Early childhood epilepsy, myoclonic     last seizure age 14; Venous hemangioma on MRI    History of migraine headaches 5/12/2016    Migraine headache      No past surgical history on file.   Patient Active Problem List   Diagnosis    History of migraine headaches    History of seizures as a child - last seizure age 14, history of venous hemangioma on brain    Brain venous angioma        Objective:      Physical Exam  Constitutional:       Appearance: Normal appearance.   HENT:      Head: Normocephalic.      Right Ear: Tympanic membrane normal.      Left Ear: Tympanic membrane normal.      Nose: Nose normal.   Cardiovascular:      Rate and Rhythm: Normal rate and  regular rhythm.      Pulses: Normal pulses.      Heart sounds: Normal heart sounds.   Pulmonary:      Effort: Pulmonary effort is normal.      Breath sounds: Normal breath sounds.   Abdominal:      General: Abdomen is flat. Bowel sounds are normal.      Palpations: Abdomen is soft.   Musculoskeletal:         General: Normal range of motion.      Cervical back: Normal range of motion and neck supple.      Right knee: Crepitus present.      Left knee: Crepitus present.   Skin:     General: Skin is warm and dry.   Neurological:      General: No focal deficit present.      Mental Status: She is alert and oriented to person, place, and time.   Psychiatric:         Mood and Affect: Mood normal.       Assessment:       Problem List Items Addressed This Visit    None  Visit Diagnoses       Chronic pain of both knees    -  Primary    Relevant Orders    X-Ray Knee 3 View Bilateral    Ambulatory referral/consult to Physical/Occupational Therapy            Plan:         Johanna was seen today for knee pains.    Diagnoses and all orders for this visit:    Chronic pain of both knees  -     X-Ray Knee 3 View Bilateral; Future  -     Ambulatory referral/consult to Physical/Occupational Therapy; Future  Check XRAYS and start PT.   Start NSAIDS as needed.                Ivette Azul MD   Internal Medicine   Primary Care

## 2023-05-26 ENCOUNTER — PATIENT MESSAGE (OUTPATIENT)
Dept: INTERNAL MEDICINE | Facility: CLINIC | Age: 38
End: 2023-05-26
Payer: COMMERCIAL

## 2023-06-09 ENCOUNTER — CLINICAL SUPPORT (OUTPATIENT)
Dept: REHABILITATION | Facility: HOSPITAL | Age: 38
End: 2023-06-09
Payer: COMMERCIAL

## 2023-06-09 DIAGNOSIS — R27.8 IMPAIRED GROSS MOTOR COORDINATION: ICD-10-CM

## 2023-06-09 DIAGNOSIS — G89.29 CHRONIC PAIN OF BOTH KNEES: ICD-10-CM

## 2023-06-09 DIAGNOSIS — M25.60 DECREASED MOBILITY OF JOINT: ICD-10-CM

## 2023-06-09 DIAGNOSIS — M62.81 MUSCLE WEAKNESS OF PROXIMAL EXTREMITY: Primary | ICD-10-CM

## 2023-06-09 DIAGNOSIS — M25.561 CHRONIC PAIN OF BOTH KNEES: ICD-10-CM

## 2023-06-09 DIAGNOSIS — M25.562 CHRONIC PAIN OF BOTH KNEES: ICD-10-CM

## 2023-06-09 PROCEDURE — 97161 PT EVAL LOW COMPLEX 20 MIN: CPT | Mod: PO

## 2023-06-09 PROCEDURE — 97110 THERAPEUTIC EXERCISES: CPT | Mod: PO

## 2023-06-09 NOTE — PROGRESS NOTES
"[facility]   Physical Therapy Initial Evaluation      Name: Johanna Ocasio  Clinic Number: [id number]    Therapy Diagnosis:   Encounter Diagnoses   Name Primary?    Chronic pain of both knees     Muscle weakness of proximal extremity Yes    Impaired gross motor coordination     Decreased mobility of joint         Physician: Ivette Azul MD    Physician Orders: PT Eval and Treat   Medical Diagnosis from Referral: M25.561,M25.562,G89.29 (ICD-10-CM) - Chronic pain of both knees  Evaluation Date: 6/9/2023  Authorization Period Expiration: 5/16/2024  Plan of Care Expiration: 8/1/2023  Progress Note Due: 7/8/2023  Visit # / Visits authorized: 1/ 1   FOTO: 1/3  Precautions: Standard and History of Seizures    Time In: 9:00 am  Time Out: 10:00 am  Total Appointment Time (timed & untimed codes): 60 minutes  Subjective   Date of onset: ~4 weeks ago  History of current condition - Johanna reports: attempting to squat while holding her young child (~4 years old) in her arms, and noticed non-painful grinding and "crepitus" in both knees while squatting, and continues to notice this when squatting during exercise. She performed Cross-Fit prior to pregnancy, and didn't notice at this time but states "maybe I just didn't hear it." She denies history of knee injury or surgeries.  Falls: None  Imaging: Radiographs: Evidence of chondromalacia change posterior central patella articular surface.  No fracture, dislocation joint effusion or other abnormality.  Prior Therapy: None  Social History:  Lives with their family  Occupation: Nurse in Interventional Radiology  Prior Level of Function: Independent  Current Level of Function: Independent (crepitus with squatting)  Pain:  Current 0/10, worst 0/10, best 0/10   Location: bilateral knee    Patients goals: To avoid pain that may come with grinding of the knee    Medical History:   Past Medical History:   Diagnosis Date    Abnormal Pap smear of cervix " 2011    Colposcopy    Brain venous angioma 10/3/2018    Early childhood epilepsy, myoclonic     last seizure age 14; Venous hemangioma on MRI    History of migraine headaches 5/12/2016    Migraine headache        Surgical History:   Johanna Ocasio  has no past surgical history on file.    Medications:   Johanna has a current medication list which includes the following prescription(s): albuterol, ammonium lactate, metoprolol succinate, ondansetron, and vios aerosol delivery system.    Allergies:   Review of patient's allergies indicates:  No Known Allergies     Objective    Double Leg Squat: Knee-dominant squatting position with significant anterior tibial translation, no marked femoral internal rotation or adduction.    Single Leg Squat: Knee-dominant squatting position with significant anterior tibial translation, femoral internal rotation or adduction is noted greater on the left than on the right with crepitus present. Crepitus decreases with re-positioning of the knee.    Knee Active Range of Motion   Right Left   Flexion 130 degrees 130 degrees   Extension 0 degrees 0 degrees     Knee Passive Range of Motion   Right  Left End-Feel   Flexion 135 degrees 135 degrees Normal   Extension 0 degrees 0 degrees Normal     Joint Mobility  Tibiofemoral A/P Glide Normal joint mobility   Tibiofemoral P/A [place] Normal joint mobility; decreased left tibiofemoral internal rotation    Patellofemoral Joint Mobility Normal motion in all planes     Manual Muscle Testing   Right Left   Knee Flexion (uniplanar) 5/5 5/5   Knee Extension  5/5 5/5   Ankle Dorsiflexion 5/5 5/5   Hip External Rotation 4/5 4/5   Hip Abduction Unable to test 2/2 weakness Unable to test 2/2 weakness   Hip Extension (prone) Hamstring dominant; 3-/5 Hamstring dominant; 3-/5     Special Testing   Right Left   Lachmann's Test (SN:, SP) - -   Anterior Drawer Test - -   Posterior Drawer Test - -   Posterior Sag Sign - -   Merissa's Test - -   MCL (0 degrees) -  "-   MCL (30 degrees) - -   LCL (0 degrees) - -   LCL (30 degrees) - -   Myers's Test - -       Limitation/Restriction for FOTO Knee Survey    Therapist reviewed FOTO scores for Johanna Ocasio on 6/9/2023.   FOTO documents entered into [employer] - see Media section.    Limitation Score: TBD%       Treatment     Total Treatment time (time-based codes) separate from Evaluation: 23 minutes     Johanna received the treatments listed below:      therapeutic exercises to develop strength, endurance, ROM, flexibility, posture, and core stabilization for 23 minutes including:  Clamshells, red-band, 5" hold, 2x12  Ankle Self-Dorsiflexion Mobilizations  Hip-Hinging for Hip Strategy  Education on gradual progression into exercises    Patient Education and Home Exercises     Education provided:   - -Findings of evaluation and examination, and affect of these on plan for treatment  -Prognosis and expectations  -Role of PT and team-centered care for patient  -Home exercise program and expectations of therapy  -Te[facility] System [facility] and PT/PTA treatment    Written Home Exercises Provided: yes. Exercises were reviewed and Johanna was able to demonstrate them prior to the end of the session.  Johanna demonstrated good  understanding of the education provided. See EMR under Patient Instructions for exercises provided during therapy sessions.    Assessment     Johanna is a 38 y.o. female referred to outpatient Physical Therapy with a medical diagnosis of M25.561,M25.562,G89.29 (ICD-10-CM) - Chronic pain of both knees. Patient presents with no pain in the knees, but does demonstrates some motor coordination and movement pattern impairments in double and single leg squatting due to decreased strength of the proximal gluteal external rotators, abductors, decreased tibiofemoral internal rotation glide, and movement impairments in squatting, bending and lifting that increase pressure on patellofemoral joint.    Patient prognosis is " Excellent.   Patient will benefit from skilled outpatient Physical Therapy to address the deficits stated above and in the chart below, provide patient /family education, and to maximize patientt's level of independence.     Plan of care discussed with patient: Yes  Patient's spiritual, cultural and educational needs considered and patient is agreeable to the plan of care and goals as stated below:     Anticipated Barriers for therapy: Scheduling    Medical Necessity is demonstrated by the following  History  Co-morbidities and personal factors that may impact the plan of care [] LOW: no personal factors / co-morbidities  [x] MODERATE: 1-2 personal factors / co-morbidities  [] HIGH: 3+ personal factors / co-morbidities    Moderate / High Support Documentation:   Co-morbidities affecting plan of care: History of Seizures    Personal Factors:   lifestyle (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 two children, one with neurodiversity)     Examination  Body Structures and Functions, activity limitations and participation restrictions that may impact the plan of care [x] LOW: addressing 1-2 elements  [] MODERATE: 3+ elements  [] HIGH: 4+ elements (please support below)    Moderate / High Support Documentation: None     Clinical Presentation [x] LOW: stable  [] MODERATE: Evolving  [] HIGH: Unstable     Decision Making/ Complexity Score: low       Goals:  Short Term Goals: 2 weeks   1.) Patient will demonstrate independence in compliance and technique of home exercise program provided as per teach-back method of assessment.  2.) Patient will demonstrate a 10% improvement as per FOTO score to demonstrate improvements in perceived functional ability.  3.) Johanna will demonstrate improved hip-hinging pattern with increased hip strategy to offload forces on the knee to avoid crepitus and increased stress on joint with minimal cueing from external sources.    Long Term Goals: 4 weeks   1.) Patient will demonstrate  independence in compliance and technique of home exercise program provided as per teach-back method of assessment.  2.) Patient will demonstrate a 10% improvement as per FOTO score to demonstrate improvements in perceived functional ability.  3.) Johanna will demonstrate improved hip-hinging pattern with increased hip strategy to offload forces on the knee to avoid crepitus and increased stress on joint without cueing from external sources.  4.) Johanna will return to exercises appropriate for fitness and activity level without pain or abberant movement.  Plan     Plan of care Certification: 6/9/2023 to 8/1/2023.    Outpatient Physical Therapy 1 times weekly for 4 weeks to include the following interventions: Gait Training, Manual Therapy, Moist Heat/ Ice, Neuromuscular Re-ed, Patient Education, Self Care, Therapeutic Activities, and Therapeutic Exercise.     Geneva Arthur PT, DPT  Board Certified in Orthopedic Physical Therapy

## 2023-06-19 NOTE — PLAN OF CARE
"[facility]   Physical Therapy Initial Evaluation      Name: Johanna Ocasio  Clinic Number: [id number]    Therapy Diagnosis:   Encounter Diagnoses   Name Primary?    Chronic pain of both knees     Muscle weakness of proximal extremity Yes    Impaired gross motor coordination     Decreased mobility of joint         Physician: Ivette Azul MD    Physician Orders: PT Eval and Treat   Medical Diagnosis from Referral: M25.561,M25.562,G89.29 (ICD-10-CM) - Chronic pain of both knees  Evaluation Date: 6/9/2023  Authorization Period Expiration: 5/16/2024  Plan of Care Expiration: 8/1/2023  Progress Note Due: 7/8/2023  Visit # / Visits authorized: 1/ 1   FOTO: 1/3  Precautions: Standard and History of Seizures   Time In: 9:00 am  Time Out: 10:00 am  Total Appointment Time (timed & untimed codes): 60 minutes  Subjective   Date of onset: ~4 weeks ago  History of current condition - Johanna reports: attempting to squat while holding her young child (~4 years old) in her arms, and noticed non-painful grinding and "crepitus" in both knees while squatting, and continues to notice this when squatting during exercise. She performed Cross-Fit prior to pregnancy, and didn't notice at this time but states "maybe I just didn't hear it." She denies history of knee injury or surgeries.  Falls: None  Imaging: Radiographs: Evidence of chondromalacia change posterior central patella articular surface.  No fracture, dislocation joint effusion or other abnormality.  Prior Therapy: None  Social History:  Lives with their family  Occupation: Nurse in Interventional Radiology  Prior Level of Function: Independent  Current Level of Function: Independent (crepitus with squatting)  Pain:  Current 0/10, worst 0/10, best 0/10   Location: bilateral knee    Patients goals: To avoid pain that may come with grinding of the knee    Medical History:   Past Medical History:   Diagnosis Date    Abnormal Pap smear of cervix " 2011    Colposcopy    Brain venous angioma 10/3/2018    Early childhood epilepsy, myoclonic     last seizure age 14; Venous hemangioma on MRI    History of migraine headaches 5/12/2016    Migraine headache        Surgical History:   Johanna Ocasio  has no past surgical history on file.    Medications:   Johanna has a current medication list which includes the following prescription(s): albuterol, ammonium lactate, metoprolol succinate, ondansetron, and vios aerosol delivery system.    Allergies:   Review of patient's allergies indicates:  No Known Allergies     Objective    Double Leg Squat: Knee-dominant squatting position with significant anterior tibial translation, no marked femoral internal rotation or adduction.    Single Leg Squat: Knee-dominant squatting position with significant anterior tibial translation, femoral internal rotation or adduction is noted greater on the left than on the right with crepitus present. Crepitus decreases with re-positioning of the knee.    Knee Active Range of Motion   Right Left   Flexion 130 degrees 130 degrees   Extension 0 degrees 0 degrees     Knee Passive Range of Motion   Right  Left End-Feel   Flexion 135 degrees 135 degrees Normal   Extension 0 degrees 0 degrees Normal     Joint Mobility  Tibiofemoral A/P Glide Normal joint mobility   Tibiofemoral P/A Winton Normal joint mobility; decreased left tibiofemoral internal rotation    Patellofemoral Joint Mobility Normal motion in all planes     Manual Muscle Testing   Right Left   Knee Flexion (uniplanar) 5/5 5/5   Knee Extension  5/5 5/5   Ankle Dorsiflexion 5/5 5/5   Hip External Rotation 4/5 4/5   Hip Abduction Unable to test 2/2 weakness Unable to test 2/2 weakness   Hip Extension (prone) Hamstring dominant; 3-/5 Hamstring dominant; 3-/5     Special Testing   Right Left   Lachmann's Test (SN:, SP) - -   Anterior Drawer Test - -   Posterior Drawer Test - -   Posterior Sag Sign - -   Merissa's Test - -   MCL (0 degrees) -  "-   MCL (30 degrees) - -   LCL (0 degrees) - -   LCL (30 degrees) - -   Myers's Test - -       Limitation/Restriction for FOTO Knee Survey    Therapist reviewed FOTO scores for Johanna Ocasio on 6/9/2023.   FOTO documents entered into [employer] - see Media section.    Limitation Score: TBD%       Treatment     Total Treatment time (time-based codes) separate from Evaluation: 23 minutes     Johanna received the treatments listed below:      therapeutic exercises to develop strength, endurance, ROM, flexibility, posture, and core stabilization for 23 minutes including:  Clamshells, red-band, 5" hold, 2x12  Ankle Self-Dorsiflexion Mobilizations  Hip-Hinging for Hip Strategy  Education on gradual progression into exercises    Patient Education and Home Exercises     Education provided:   - -Findings of evaluation and examination, and affect of these on plan for treatment  -Prognosis and expectations  -Role of PT and team-centered care for patient  -Home exercise program and expectations of therapy  -Teams System [facility] and PT/PTA treatment    Written Home Exercises Provided: yes. Exercises were reviewed and Johanna was able to demonstrate them prior to the end of the session.  Johanna demonstrated good  understanding of the education provided. See EMR under Patient Instructions for exercises provided during therapy sessions.    Assessment     Johanna is a 38 y.o. female referred to outpatient Physical Therapy with a medical diagnosis of M25.561,M25.562,G89.29 (ICD-10-CM) - Chronic pain of both knees. Patient presents with no pain in the knees, but does demonstrates some motor coordination and movement pattern impairments in double and single leg squatting due to decreased strength of the proximal gluteal external rotators, abductors, decreased tibiofemoral internal rotation glide, and movement impairments in squatting, bending and lifting that increase pressure on patellofemoral joint.    Patient prognosis is " Excellent.   Patient will benefit from skilled outpatient Physical Therapy to address the deficits stated above and in the chart below, provide patient /family education, and to maximize patientt's level of independence.     Plan of care discussed with patient: Yes  Patient's spiritual, cultural and educational needs considered and patient is agreeable to the plan of care and goals as stated below:     Anticipated Barriers for therapy: Scheduling    Medical Necessity is demonstrated by the following  History  Co-morbidities and personal factors that may impact the plan of care [] LOW: no personal factors / co-morbidities  [x] MODERATE: 1-2 personal factors / co-morbidities  [] HIGH: 3+ personal factors / co-morbidities    Moderate / High Support Documentation:   Co-morbidities affecting plan of care: History of Seizures    Personal Factors:   lifestyle (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 two children, one with neurodiversity)     Examination  Body Structures and Functions, activity limitations and participation restrictions that may impact the plan of care [x] LOW: addressing 1-2 elements  [] MODERATE: 3+ elements  [] HIGH: 4+ elements (please support below)    Moderate / High Support Documentation: None     Clinical Presentation [x] LOW: stable  [] MODERATE: Evolving  [] HIGH: Unstable     Decision Making/ Complexity Score: low       Goals:  Short Term Goals: 2 weeks   1.) Patient will demonstrate independence in compliance and technique of home exercise program provided as per teach-back method of assessment.  2.) Patient will demonstrate a 10% improvement as per FOTO score to demonstrate improvements in perceived functional ability.  3.) Johanna will demonstrate improved hip-hinging pattern with increased hip strategy to offload forces on the knee to avoid crepitus and increased stress on joint with minimal cueing from external sources.    Long Term Goals: 4 weeks   1.) Patient will demonstrate  independence in compliance and technique of home exercise program provided as per teach-back method of assessment.  2.) Patient will demonstrate a 10% improvement as per FOTO score to demonstrate improvements in perceived functional ability.  3.) Johanna will demonstrate improved hip-hinging pattern with increased hip strategy to offload forces on the knee to avoid crepitus and increased stress on joint without cueing from external sources.  4.) Johanna will return to exercises appropriate for fitness and activity level without pain or abberant movement.  Plan     Plan of care Certification: 6/9/2023 to 8/1/2023.    Outpatient Physical Therapy 1 times weekly for 4 weeks to include the following interventions: Gait Training, Manual Therapy, Moist Heat/ Ice, Neuromuscular Re-ed, Patient Education, Self Care, Therapeutic Activities, and Therapeutic Exercise.     Geneva Arthur PT, DPT  Board Certified in Orthopedic Physical Therapy

## 2023-06-19 NOTE — TELEPHONE ENCOUNTER
Refill Routing Note   Medication(s) are not appropriate for processing by [facility] for the following reason(s):      Medication outside of protocol    [facility] action(s):  Route Care Due:  None identified          Appointments  past 12m or future 3m with PCP    Date Provider   Last Visit   5/17/2023 Ivette Azul MD   Next Visit   7/24/2023 Ivette Azul MD   ED visits in past 90 days: 0        Note composed:7:15 AM 06/19/2023

## 2023-06-21 RX ORDER — ONDANSETRON 4 MG/1
TABLET, FILM COATED ORAL
Qty: 30 TABLET | Refills: 0 | Status: SHIPPED | OUTPATIENT
Start: 2023-06-21 | End: 2023-07-06 | Stop reason: SDUPTHER

## 2023-06-23 ENCOUNTER — CLINICAL SUPPORT (OUTPATIENT)
Dept: REHABILITATION | Facility: HOSPITAL | Age: 38
End: 2023-06-23
Payer: COMMERCIAL

## 2023-06-23 DIAGNOSIS — G89.29 CHRONIC PAIN OF BOTH KNEES: Primary | ICD-10-CM

## 2023-06-23 DIAGNOSIS — M62.81 MUSCLE WEAKNESS OF PROXIMAL EXTREMITY: ICD-10-CM

## 2023-06-23 DIAGNOSIS — M25.562 CHRONIC PAIN OF BOTH KNEES: Primary | ICD-10-CM

## 2023-06-23 DIAGNOSIS — M25.561 CHRONIC PAIN OF BOTH KNEES: Primary | ICD-10-CM

## 2023-06-23 DIAGNOSIS — R27.8 IMPAIRED GROSS MOTOR COORDINATION: ICD-10-CM

## 2023-06-23 DIAGNOSIS — M25.60 DECREASED MOBILITY OF JOINT: ICD-10-CM

## 2023-06-23 PROCEDURE — 97110 THERAPEUTIC EXERCISES: CPT | Mod: PO

## 2023-06-23 PROCEDURE — 97140 MANUAL THERAPY 1/> REGIONS: CPT | Mod: PO

## 2023-06-23 PROCEDURE — 97112 NEUROMUSCULAR REEDUCATION: CPT | Mod: PO

## 2023-06-23 NOTE — PROGRESS NOTES
"  Physical Therapy Daily Treatment Note     Name: Johanna Ocasio  Clinic Number: [id number]    Therapy Diagnosis:   Encounter Diagnoses   Name Primary?    Chronic pain of both knees Yes    Muscle weakness of proximal extremity     Impaired gross motor coordination     Decreased mobility of joint      Physician: Ivette Azul MD    Visit Date: 6/23/2023    Physician Orders: PT Eval and Treat   Medical Diagnosis from Referral: M25.561,M25.562,G89.29 (ICD-10-CM) - Chronic pain of both knees  Evaluation Date: 6/9/2023  Authorization Period Expiration: 5/16/2024  Plan of Care Expiration: 8/1/2023  Progress Note Due: 7/8/2023  Visit # / Visits authorized: 1/ 20   FOTO: 1/3  Precautions: Standard and History of Seizures     Time In: 8:00 am  Time Out: 8:55 am  Total Billable Time: 55 minutes  Subjective   Pt reports: the knee has been feeling good, but was not in much pain prior.  She was somewhat compliant with home exercise program.  Response to previous treatment: Initial evaluation  Functional change: Ongoing  Pain: 0/10  Location: bilateral knee    Objective   Double-Leg Squatting: Good hip initiation with continued knee (ligament) dominant position, that upon cueing for weight shift onto heels improves this position  AGMR Test: (+) on left    Johanna received therapeutic exercises to develop strength, endurance, ROM, flexibility, posture, and core stabilization for 00 minutes including:    Johanna received the following manual therapy techniques: Joint mobilizations were applied for 12 minutes, including:  (B) Talocrural Joint Mobilization, Grade V  (L) Tibiofemoral Posterior and Internal Rotation Glides, Grade IV    Johanna participated in neuromuscular re-education activities to improve: Balance, Sense, Proprioception, and Posture for 35 minutes. The following activities were included:  Clamshells with Modified Side Plank, 12x3" hold each side, 2 sets  Single Leg Bridge (modified to Double Leg Bridge with " "Marching), 2x8, red band  Posterior Gluteus Medius on Shuttle (Single Leg)  Step-Over 4-6" Hurdles, 10# KB in right hand, 4x trials, mirror used as visual feedback  Hip Hinging with Dowel from 18" chair, 20x, cueing for weight placement through heels    Johnana participated in dynamic functional therapeutic activities to improve functional performance for 8  minutes, including:  Single Leg Valslide Lunge with (B) Trekking Poles, 12x each side    Home Exercises Provided and Patient Education Provided     Education provided:   - Home exercise program     Written Home Exercises Provided: Patient instructed to cont prior HEP.  Exercises were reviewed and Johanna was able to demonstrate them prior to the end of the session.  Johanna demonstrated good  understanding of the education provided.     See EMR under Patient Instructions for exercises provided prior visit.    Assessment   Johanna demonstrates great hip initiation, albeit continues to exhibit quadriceps dominant posture that improves when cueing to place weight through her heels and avoid squatting as deeply. She notes no crepitus with this position. Bilateral ankle dorsiflexion mobility continues to be limited and manual therapy performed to improve talocrural and tibiofemoral joint mobility. She is challenged, but performs well and gives great effort.    Johanna Is progressing well towards her goals.   Pt prognosis is Excellent.     Pt will continue to benefit from skilled outpatient physical therapy to address the deficits listed in the problem list box on initial evaluation, provide pt/family education and to maximize pt's level of independence in the home and community environment.     Pt's spiritual, cultural and educational needs considered and pt agreeable to plan of care and goals.     Anticipated barriers to physical therapy: Scheduling    Goals: Short Term Goals: 2 weeks   1.) Patient will demonstrate independence in compliance and technique of home exercise " program provided as per teach-back method of assessment.  2.) Patient will demonstrate a 10% improvement as per FOTO score to demonstrate improvements in perceived functional ability.  3.) Johanna will demonstrate improved hip-hinging pattern with increased hip strategy to offload forces on the knee to avoid crepitus and increased stress on joint with minimal cueing from external sources.     Long Term Goals: 4 weeks   1.) Patient will demonstrate independence in compliance and technique of home exercise program provided as per teach-back method of assessment.  2.) Patient will demonstrate a 10% improvement as per FOTO score to demonstrate improvements in perceived functional ability.  3.) Johanna will demonstrate improved hip-hinging pattern with increased hip strategy to offload forces on the knee to avoid crepitus and increased stress on joint without cueing from external sources.  4.) Johanna will return to exercises appropriate for fitness and activity level without pain or abberant movement.    Plan     Continue to progress as per plan of care. Johanna to follow up in 3 weeks.    Geneva Arthur PT, DPT  Board Certified in Orthopedic Physical Therapy

## 2023-06-26 ENCOUNTER — PATIENT MESSAGE (OUTPATIENT)
Dept: CARDIOLOGY | Facility: CLINIC | Age: 38
End: 2023-06-26
Payer: COMMERCIAL

## 2023-07-06 ENCOUNTER — OFFICE VISIT (OUTPATIENT)
Dept: OBSTETRICS AND GYNECOLOGY | Facility: CLINIC | Age: 38
End: 2023-07-06
Payer: COMMERCIAL

## 2023-07-06 VITALS
HEIGHT: 60 IN | BODY MASS INDEX: 32.51 KG/M2 | DIASTOLIC BLOOD PRESSURE: 83 MMHG | WEIGHT: 165.56 LBS | SYSTOLIC BLOOD PRESSURE: 128 MMHG

## 2023-07-06 DIAGNOSIS — Z01.419 WELL WOMAN EXAM WITH ROUTINE GYNECOLOGICAL EXAM: Primary | ICD-10-CM

## 2023-07-06 PROCEDURE — 3079F DIAST BP 80-89 MM HG: CPT | Mod: CPTII,S$GLB,, | Performed by: OBSTETRICS & GYNECOLOGY

## 2023-07-06 PROCEDURE — 99999 PR PBB SHADOW E&M-EST. PATIENT-LVL III: CPT | Mod: PBBFAC,,, | Performed by: OBSTETRICS & GYNECOLOGY

## 2023-07-06 PROCEDURE — 99999 PR PBB SHADOW E&M-EST. PATIENT-LVL III: ICD-10-PCS | Mod: PBBFAC,,, | Performed by: OBSTETRICS & GYNECOLOGY

## 2023-07-06 PROCEDURE — 1159F PR MEDICATION LIST DOCUMENTED IN MEDICAL RECORD: ICD-10-PCS | Mod: CPTII,S$GLB,, | Performed by: OBSTETRICS & GYNECOLOGY

## 2023-07-06 PROCEDURE — 3008F PR BODY MASS INDEX (BMI) DOCUMENTED: ICD-10-PCS | Mod: CPTII,S$GLB,, | Performed by: OBSTETRICS & GYNECOLOGY

## 2023-07-06 PROCEDURE — 3008F BODY MASS INDEX DOCD: CPT | Mod: CPTII,S$GLB,, | Performed by: OBSTETRICS & GYNECOLOGY

## 2023-07-06 PROCEDURE — 1159F MED LIST DOCD IN RCRD: CPT | Mod: CPTII,S$GLB,, | Performed by: OBSTETRICS & GYNECOLOGY

## 2023-07-06 PROCEDURE — 3074F PR MOST RECENT SYSTOLIC BLOOD PRESSURE < 130 MM HG: ICD-10-PCS | Mod: CPTII,S$GLB,, | Performed by: OBSTETRICS & GYNECOLOGY

## 2023-07-06 PROCEDURE — 3074F SYST BP LT 130 MM HG: CPT | Mod: CPTII,S$GLB,, | Performed by: OBSTETRICS & GYNECOLOGY

## 2023-07-06 PROCEDURE — 99395 PR PREVENTIVE VISIT,EST,18-39: ICD-10-PCS | Mod: S$GLB,,, | Performed by: OBSTETRICS & GYNECOLOGY

## 2023-07-06 PROCEDURE — 3079F PR MOST RECENT DIASTOLIC BLOOD PRESSURE 80-89 MM HG: ICD-10-PCS | Mod: CPTII,S$GLB,, | Performed by: OBSTETRICS & GYNECOLOGY

## 2023-07-06 PROCEDURE — 99395 PREV VISIT EST AGE 18-39: CPT | Mod: S$GLB,,, | Performed by: OBSTETRICS & GYNECOLOGY

## 2023-07-06 RX ORDER — ONDANSETRON 4 MG/1
TABLET, FILM COATED ORAL
Qty: 30 TABLET | Refills: 0 | Status: SHIPPED | OUTPATIENT
Start: 2023-07-06 | End: 2023-10-12

## 2023-07-06 NOTE — PROGRESS NOTES
History & Physical  Gynecology      SUBJECTIVE:     Chief Complaint: Well Woman       History of Present Illness:  Annual Exam-Premenopausal  Patient presents for annual exam. She has  complaints today of some change in the regularity of her period, but she is still monthly more or less. Menstrual cycles are mostly monthly.  She is sexually active. GYN screening history: last pap: approximate date 2022 paphpv and was normal. She participates in regular exercise: yes.  Smoking status:  no    Contraception: condoms    FH:  Breast cancer: neg  Colon cancer: neg  Ovarian cancer: neg    Review of patient's allergies indicates:  No Known Allergies    Past Medical History:   Diagnosis Date    Abnormal Pap smear of cervix     Colposcopy    Brain venous angioma 10/3/2018    Early childhood epilepsy, myoclonic     last seizure age 14; Venous hemangioma on MRI    History of migraine headaches 2016    Migraine headache      History reviewed. No pertinent surgical history.  OB History          2    Para   2    Term   1       1    AB        Living   2         SAB        IAB        Ectopic        Multiple   0    Live Births   2               Family History   Problem Relation Age of Onset    Hypertension Mother     Hypertension Father     Cataracts Maternal Grandfather     Diabetes Paternal Grandmother     Cancer Paternal Grandfather         colon cancer    Breast cancer Neg Hx     Colon cancer Neg Hx     Ovarian cancer Neg Hx     Melanoma Neg Hx     Lupus Neg Hx     Psoriasis Neg Hx      Social History     Tobacco Use    Smoking status: Never    Smokeless tobacco: Never   Substance Use Topics    Alcohol use: No     Comment: Socially/ pre pregnancy     Drug use: No       Current Outpatient Medications   Medication Sig    ammonium lactate 12 % Crea Qhs nail    metoprolol succinate (TOPROL-XL) 25 MG 24 hr tablet Take 1 tablet by mouth once daily    [employer] AEROSOL DELIVERY SYSTEM Martha USE AS DIRECTED     albuterol (PROVENTIL/VENTOLIN HFA) 90 mcg/actuation inhaler Inhale 2 puffs into the lungs every 6 (six) hours as needed for Wheezing. Rescue    ondansetron (ZOFRAN) 4 MG tablet TAKE 1 TABLET BY MOUTH EVERY 8 HOURS AS NEEDED     No current facility-administered medications for this visit.         Review of Systems:  Review of Systems   Constitutional:  Negative for appetite change, fever and unexpected weight change.   Respiratory:  Negative for shortness of breath.    Cardiovascular:  Negative for chest pain.   Gastrointestinal:  Negative for nausea and vomiting.   Genitourinary:  Positive for bladder incontinence (occasional ANTONINO). Negative for menorrhagia, menstrual problem, pelvic pain, vaginal bleeding, vaginal discharge and vaginal pain.   Integumentary:  Negative for breast mass.   Breast: Negative for lump and mass     OBJECTIVE:     Physical Exam:  Physical Exam  Vitals and nursing note reviewed.   Constitutional:       Appearance: She is well-developed.   Cardiovascular:      Rate and Rhythm: Normal rate and regular rhythm.      Heart sounds: Normal heart sounds.   Pulmonary:      Effort: Pulmonary effort is normal.      Breath sounds: Normal breath sounds.   Chest:   Breasts:     Breasts are symmetrical.      Right: No inverted nipple, mass, nipple discharge, skin change or tenderness.      Left: No inverted nipple, mass, nipple discharge, skin change or tenderness.   Abdominal:      Palpations: Abdomen is soft.   Genitourinary:     General: Normal vulva.      Labia:         Right: No rash, tenderness, lesion or injury.         Left: No rash, tenderness, lesion or injury.       Urethra: No prolapse, urethral pain, urethral swelling or urethral lesion.      Vagina: Normal. No signs of injury and foreign body. No vaginal discharge, erythema, tenderness or bleeding.      Cervix: No cervical motion tenderness, discharge or friability.      Uterus: Not deviated, not enlarged, not fixed and not tender.        Adnexa:         Right: No mass, tenderness or fullness.          Left: No mass, tenderness or fullness.        Rectum: No anal fissure or external hemorrhoid.      Comments: Urethral meatus: normal size, anterior vaginal wall with no prolapse, no lesions  Bladder: no fullness, masses or tenderness  Musculoskeletal:      Cervical back: Normal range of motion.   Neurological:      Mental Status: She is alert and oriented to person, place, and time.   Psychiatric:         Behavior: Behavior normal.         Thought Content: Thought content normal.         Judgment: Judgment normal.       Chaperoned by: Asiya    ASSESSMENT:       ICD-10-CM ICD-9-CM    1. Well woman exam with routine gynecological exam  Z01.419 V72.31              Plan:      Johanna was seen today for well woman.    Diagnoses and all orders for this visit:    Well woman exam with routine gynecological exam    Other orders  -     ondansetron (ZOFRAN) 4 MG tablet; TAKE 1 TABLET BY MOUTH EVERY 8 HOURS AS NEEDED        No orders of the defined types were placed in this encounter.      Well Woman:  - Pap smear up to date  - Birth control: condoms  - GC/CT:n/a  - Mammogram: due age 41  - Smoking cessation: n/a  - Labs: none required   - Vaccines: none required  - Exercise counseling      Follow up in  one year for annual or prn.    Yennifer Garcia

## 2023-07-24 ENCOUNTER — OFFICE VISIT (OUTPATIENT)
Dept: INTERNAL MEDICINE | Facility: CLINIC | Age: 38
End: 2023-07-24
Payer: COMMERCIAL

## 2023-07-24 VITALS
DIASTOLIC BLOOD PRESSURE: 82 MMHG | BODY MASS INDEX: 32.55 KG/M2 | HEART RATE: 68 BPM | HEIGHT: 60 IN | OXYGEN SATURATION: 98 % | WEIGHT: 165.81 LBS | SYSTOLIC BLOOD PRESSURE: 110 MMHG

## 2023-07-24 DIAGNOSIS — G43.009 MIGRAINE WITHOUT AURA AND WITHOUT STATUS MIGRAINOSUS, NOT INTRACTABLE: ICD-10-CM

## 2023-07-24 DIAGNOSIS — Z13.220 SCREENING FOR LIPID DISORDERS: ICD-10-CM

## 2023-07-24 DIAGNOSIS — I49.3 PVC (PREMATURE VENTRICULAR CONTRACTION): ICD-10-CM

## 2023-07-24 DIAGNOSIS — Z13.1 SCREENING FOR DIABETES MELLITUS: ICD-10-CM

## 2023-07-24 DIAGNOSIS — D18.02 BRAIN VENOUS ANGIOMA: ICD-10-CM

## 2023-07-24 DIAGNOSIS — Z00.00 ENCOUNTER FOR ANNUAL PHYSICAL EXAM: Primary | ICD-10-CM

## 2023-07-24 DIAGNOSIS — I10 ESSENTIAL HYPERTENSION, BENIGN: ICD-10-CM

## 2023-07-24 DIAGNOSIS — Z86.69 HISTORY OF MIGRAINE HEADACHES: ICD-10-CM

## 2023-07-24 PROCEDURE — 3008F BODY MASS INDEX DOCD: CPT | Mod: CPTII,S$GLB,, | Performed by: INTERNAL MEDICINE

## 2023-07-24 PROCEDURE — 99999 PR PBB SHADOW E&M-EST. PATIENT-LVL IV: ICD-10-PCS | Mod: PBBFAC,,, | Performed by: INTERNAL MEDICINE

## 2023-07-24 PROCEDURE — 99999 PR PBB SHADOW E&M-EST. PATIENT-LVL IV: CPT | Mod: PBBFAC,,, | Performed by: INTERNAL MEDICINE

## 2023-07-24 PROCEDURE — 3074F PR MOST RECENT SYSTOLIC BLOOD PRESSURE < 130 MM HG: ICD-10-PCS | Mod: CPTII,S$GLB,, | Performed by: INTERNAL MEDICINE

## 2023-07-24 PROCEDURE — 99395 PR PREVENTIVE VISIT,EST,18-39: ICD-10-PCS | Mod: S$GLB,,, | Performed by: INTERNAL MEDICINE

## 2023-07-24 PROCEDURE — 1159F MED LIST DOCD IN RCRD: CPT | Mod: CPTII,S$GLB,, | Performed by: INTERNAL MEDICINE

## 2023-07-24 PROCEDURE — 3079F DIAST BP 80-89 MM HG: CPT | Mod: CPTII,S$GLB,, | Performed by: INTERNAL MEDICINE

## 2023-07-24 PROCEDURE — 3074F SYST BP LT 130 MM HG: CPT | Mod: CPTII,S$GLB,, | Performed by: INTERNAL MEDICINE

## 2023-07-24 PROCEDURE — 3079F PR MOST RECENT DIASTOLIC BLOOD PRESSURE 80-89 MM HG: ICD-10-PCS | Mod: CPTII,S$GLB,, | Performed by: INTERNAL MEDICINE

## 2023-07-24 PROCEDURE — 1159F PR MEDICATION LIST DOCUMENTED IN MEDICAL RECORD: ICD-10-PCS | Mod: CPTII,S$GLB,, | Performed by: INTERNAL MEDICINE

## 2023-07-24 PROCEDURE — 99395 PREV VISIT EST AGE 18-39: CPT | Mod: S$GLB,,, | Performed by: INTERNAL MEDICINE

## 2023-07-24 PROCEDURE — 3008F PR BODY MASS INDEX (BMI) DOCUMENTED: ICD-10-PCS | Mod: CPTII,S$GLB,, | Performed by: INTERNAL MEDICINE

## 2023-07-24 RX ORDER — METOPROLOL SUCCINATE 25 MG/1
25 TABLET, EXTENDED RELEASE ORAL DAILY
Qty: 90 TABLET | Refills: 0 | Status: SHIPPED | OUTPATIENT
Start: 2023-07-24 | End: 2023-09-14 | Stop reason: SDUPTHER

## 2023-07-24 RX ORDER — TOPIRAMATE 25 MG/1
25 TABLET ORAL NIGHTLY
Qty: 30 TABLET | Refills: 11 | Status: SHIPPED | OUTPATIENT
Start: 2023-07-24 | End: 2023-08-16

## 2023-07-24 NOTE — PROGRESS NOTES
Subjective:       Patient ID: Johanna Ocasio is a 38 y.o. female.    Chief Complaint: Annual Exam and Establish Care    HPI      Presents to establish care.     Has been having headaches daily for years. Had MRI brain in 2018 that showed brain venous angioma from birth but is otherwise normal. Takes naproxen daily. Tried verapamil in the past but made her feel dizzy.     PVCS: was having palpitations and saw cardiology. Work up revealed PVCs and she is on metoprolol.     Health Maintenance:  HIV: negative 2019   Hep C: negative 2017   Lipids: normal lipids in 2022   Pap Smear: negative PAP and HPV in 2022   Vaccines:  up to date       Review of Systems   Constitutional:  Negative for activity change, appetite change and chills.   HENT:  Negative for ear pain, sinus pressure/congestion and sneezing.    Respiratory:  Negative for cough and shortness of breath.    Cardiovascular:  Negative for chest pain, palpitations and leg swelling.   Gastrointestinal:  Negative for abdominal distention, abdominal pain, constipation, diarrhea, nausea and vomiting.   Genitourinary:  Negative for dysuria and hematuria.   Musculoskeletal:  Negative for arthralgias, back pain and myalgias.   Neurological:  Negative for dizziness and headaches.   Psychiatric/Behavioral:  Negative for agitation. The patient is not nervous/anxious.          Past Medical History:   Diagnosis Date    Abnormal Pap smear of cervix 2011    Colposcopy    Brain venous angioma 10/3/2018    Early childhood epilepsy, myoclonic     last seizure age 14; Venous hemangioma on MRI    History of migraine headaches 5/12/2016    Migraine headache      No past surgical history on file.   Patient Active Problem List   Diagnosis    History of migraine headaches    History of seizures as a child - last seizure age 14, history of venous hemangioma on brain    Brain venous angioma        Objective:      Physical Exam  Constitutional:       Appearance: Normal appearance.   HENT:       Head: Normocephalic.      Right Ear: Tympanic membrane normal.      Left Ear: Tympanic membrane normal.      Nose: Nose normal.   Cardiovascular:      Rate and Rhythm: Normal rate and regular rhythm.      Pulses: Normal pulses.      Heart sounds: Normal heart sounds.   Pulmonary:      Effort: Pulmonary effort is normal.      Breath sounds: Normal breath sounds.   Abdominal:      General: Abdomen is flat. Bowel sounds are normal.      Palpations: Abdomen is soft.   Musculoskeletal:         General: Normal range of motion.      Cervical back: Normal range of motion and neck supple.      Right knee: Crepitus present.      Left knee: Crepitus present.   Skin:     General: Skin is warm and dry.   Neurological:      General: No focal deficit present.      Mental Status: She is alert and oriented to person, place, and time.   Psychiatric:         Mood and Affect: Mood normal.       Assessment:       Problem List Items Addressed This Visit          Neuro    History of migraine headaches    Relevant Orders    Comprehensive Metabolic Panel    CBC Auto Differential    TSH    Magnesium       Oncology    Brain venous angioma     Other Visit Diagnoses       Encounter for annual physical exam    -  Primary    Migraine without aura and without status migrainosus, not intractable        Relevant Orders    Ambulatory referral/consult to Neurology    Essential hypertension, benign        Relevant Medications    metoprolol succinate (TOPROL-XL) 25 MG 24 hr tablet    PVC (premature ventricular contraction)        Screening for lipid disorders        Relevant Orders    Lipid Panel    Screening for diabetes mellitus        Relevant Orders    Hemoglobin A1C            Plan:         Johanna was seen today for annual exam and establish care.    Diagnoses and all orders for this visit:    Encounter for annual physical exam  HIV: negative 2019   Hep C: negative 2017   Lipids: normal lipids in 2022   Pap Smear: negative PAP and HPV in 2022    Vaccines:  up to date   Annual wellness exam completed.     All medications, histories, and concerns reviewed, reconciled, and addressed.     Appropriate Screenings per pt's sex and age have been reviewed and discussed with pt.    Migraine without aura and without status migrainosus, not intractable  History of migraine headaches  -     Comprehensive Metabolic Panel; Future  -     CBC Auto Differential; Future  -     TSH; Future  -     Magnesium; Future  -     Ambulatory referral/consult to Neurology; Future  -     topiramate (TOPAMAX) 25 MG tablet; Take 1 tablet (25 mg total) by mouth every evening.  Trial topamax.   Referral to neurology for further eval.  Check labs     Essential hypertension, benign  PVC (premature ventricular contraction)  -     metoprolol succinate (TOPROL-XL) 25 MG 24 hr tablet; Take 1 tablet (25 mg total) by mouth once daily.  Refill metoprolol today.     Screening for lipid disorders  -     Lipid Panel; Future    Screening for diabetes mellitus  -     Hemoglobin A1C; Future    Brain venous angioma  Stable on MRI from 2018.   Referral to neurology           Ivette Azul MD   Internal Medicine   Primary Care

## 2023-07-26 ENCOUNTER — LAB VISIT (OUTPATIENT)
Dept: LAB | Facility: HOSPITAL | Age: 38
End: 2023-07-26
Payer: COMMERCIAL

## 2023-07-26 ENCOUNTER — PATIENT MESSAGE (OUTPATIENT)
Dept: INTERNAL MEDICINE | Facility: CLINIC | Age: 38
End: 2023-07-26
Payer: COMMERCIAL

## 2023-07-26 DIAGNOSIS — Z13.220 SCREENING FOR LIPID DISORDERS: ICD-10-CM

## 2023-07-26 DIAGNOSIS — Z86.69 HISTORY OF MIGRAINE HEADACHES: ICD-10-CM

## 2023-07-26 DIAGNOSIS — Z13.1 SCREENING FOR DIABETES MELLITUS: ICD-10-CM

## 2023-07-26 LAB
ALBUMIN SERPL BCP-MCNC: 4.2 G/DL (ref 3.5–5.2)
ALP SERPL-CCNC: 46 U/L (ref 55–135)
ALT SERPL W/O P-5'-P-CCNC: 18 U/L (ref 10–44)
ANION GAP SERPL CALC-SCNC: 9 MMOL/L (ref 8–16)
AST SERPL-CCNC: 17 U/L (ref 10–40)
BASOPHILS # BLD AUTO: 0.06 K/UL (ref 0–0.2)
BASOPHILS NFR BLD: 1.3 % (ref 0–1.9)
BILIRUB SERPL-MCNC: 0.7 MG/DL (ref 0.1–1)
BUN SERPL-MCNC: 17 MG/DL (ref 6–20)
CALCIUM SERPL-MCNC: 9.5 MG/DL (ref 8.7–10.5)
CHLORIDE SERPL-SCNC: 105 MMOL/L (ref 95–110)
CHOLEST SERPL-MCNC: 190 MG/DL (ref 120–199)
CHOLEST/HDLC SERPL: 3.3 {RATIO} (ref 2–5)
CO2 SERPL-SCNC: 26 MMOL/L (ref 23–29)
CREAT SERPL-MCNC: 0.8 MG/DL (ref 0.5–1.4)
DIFFERENTIAL METHOD: ABNORMAL
EOSINOPHIL # BLD AUTO: 0.4 K/UL (ref 0–0.5)
EOSINOPHIL NFR BLD: 9.4 % (ref 0–8)
ERYTHROCYTE [DISTWIDTH] IN BLOOD BY AUTOMATED COUNT: 12.4 % (ref 11.5–14.5)
EST. GFR  (NO RACE VARIABLE): >60 ML/MIN/1.73 M^2
ESTIMATED AVG GLUCOSE: 97 MG/DL (ref 68–131)
GLUCOSE SERPL-MCNC: 89 MG/DL (ref 70–110)
HBA1C MFR BLD: 5 % (ref 4–5.6)
HCT VFR BLD AUTO: 40.2 % (ref 37–48.5)
HDLC SERPL-MCNC: 58 MG/DL (ref 40–75)
HDLC SERPL: 30.5 % (ref 20–50)
HGB BLD-MCNC: 12.7 G/DL (ref 12–16)
IMM GRANULOCYTES # BLD AUTO: 0 K/UL (ref 0–0.04)
IMM GRANULOCYTES NFR BLD AUTO: 0 % (ref 0–0.5)
LDLC SERPL CALC-MCNC: 111.4 MG/DL (ref 63–159)
LYMPHOCYTES # BLD AUTO: 1.8 K/UL (ref 1–4.8)
LYMPHOCYTES NFR BLD: 39.8 % (ref 18–48)
MAGNESIUM SERPL-MCNC: 1.9 MG/DL (ref 1.6–2.6)
MCH RBC QN AUTO: 29.5 PG (ref 27–31)
MCHC RBC AUTO-ENTMCNC: 31.6 G/DL (ref 32–36)
MCV RBC AUTO: 94 FL (ref 82–98)
MONOCYTES # BLD AUTO: 0.5 K/UL (ref 0.3–1)
MONOCYTES NFR BLD: 10.6 % (ref 4–15)
NEUTROPHILS # BLD AUTO: 1.7 K/UL (ref 1.8–7.7)
NEUTROPHILS NFR BLD: 38.9 % (ref 38–73)
NONHDLC SERPL-MCNC: 132 MG/DL
NRBC BLD-RTO: 0 /100 WBC
PLATELET # BLD AUTO: 262 K/UL (ref 150–450)
PMV BLD AUTO: 10.2 FL (ref 9.2–12.9)
POTASSIUM SERPL-SCNC: 4.2 MMOL/L (ref 3.5–5.1)
PROT SERPL-MCNC: 7.6 G/DL (ref 6–8.4)
RBC # BLD AUTO: 4.3 M/UL (ref 4–5.4)
SODIUM SERPL-SCNC: 140 MMOL/L (ref 136–145)
TRIGL SERPL-MCNC: 103 MG/DL (ref 30–150)
TSH SERPL DL<=0.005 MIU/L-ACNC: 2.48 UIU/ML (ref 0.4–4)
WBC # BLD AUTO: 4.45 K/UL (ref 3.9–12.7)

## 2023-07-26 PROCEDURE — 83735 ASSAY OF MAGNESIUM: CPT | Performed by: INTERNAL MEDICINE

## 2023-07-26 PROCEDURE — 36415 COLL VENOUS BLD VENIPUNCTURE: CPT | Performed by: INTERNAL MEDICINE

## 2023-07-26 PROCEDURE — 83036 HEMOGLOBIN GLYCOSYLATED A1C: CPT | Performed by: INTERNAL MEDICINE

## 2023-07-26 PROCEDURE — 84443 ASSAY THYROID STIM HORMONE: CPT | Performed by: INTERNAL MEDICINE

## 2023-07-26 PROCEDURE — 85025 COMPLETE CBC W/AUTO DIFF WBC: CPT | Performed by: INTERNAL MEDICINE

## 2023-07-26 PROCEDURE — 80061 LIPID PANEL: CPT | Performed by: INTERNAL MEDICINE

## 2023-07-26 PROCEDURE — 80053 COMPREHEN METABOLIC PANEL: CPT | Performed by: INTERNAL MEDICINE

## 2023-08-16 ENCOUNTER — OFFICE VISIT (OUTPATIENT)
Dept: NEUROLOGY | Facility: CLINIC | Age: 38
End: 2023-08-16
Payer: COMMERCIAL

## 2023-08-16 VITALS
HEART RATE: 60 BPM | HEIGHT: 60 IN | WEIGHT: 163.13 LBS | BODY MASS INDEX: 32.02 KG/M2 | SYSTOLIC BLOOD PRESSURE: 121 MMHG | DIASTOLIC BLOOD PRESSURE: 73 MMHG

## 2023-08-16 DIAGNOSIS — G47.9 TROUBLE IN SLEEPING: ICD-10-CM

## 2023-08-16 DIAGNOSIS — G44.40 MEDICATION OVERUSE HEADACHE: ICD-10-CM

## 2023-08-16 DIAGNOSIS — G43.009 MIGRAINE WITHOUT AURA AND WITHOUT STATUS MIGRAINOSUS, NOT INTRACTABLE: Primary | ICD-10-CM

## 2023-08-16 DIAGNOSIS — Z78.9 CAFFEINE USE: ICD-10-CM

## 2023-08-16 PROCEDURE — 3008F BODY MASS INDEX DOCD: CPT | Mod: CPTII,S$GLB,, | Performed by: PHYSICIAN ASSISTANT

## 2023-08-16 PROCEDURE — 3044F HG A1C LEVEL LT 7.0%: CPT | Mod: CPTII,S$GLB,, | Performed by: PHYSICIAN ASSISTANT

## 2023-08-16 PROCEDURE — 99999 PR PBB SHADOW E&M-EST. PATIENT-LVL IV: CPT | Mod: PBBFAC,,, | Performed by: PHYSICIAN ASSISTANT

## 2023-08-16 PROCEDURE — 1159F PR MEDICATION LIST DOCUMENTED IN MEDICAL RECORD: ICD-10-PCS | Mod: CPTII,S$GLB,, | Performed by: PHYSICIAN ASSISTANT

## 2023-08-16 PROCEDURE — 99999 PR PBB SHADOW E&M-EST. PATIENT-LVL IV: ICD-10-PCS | Mod: PBBFAC,,, | Performed by: PHYSICIAN ASSISTANT

## 2023-08-16 PROCEDURE — 99204 OFFICE O/P NEW MOD 45 MIN: CPT | Mod: S$GLB,,, | Performed by: PHYSICIAN ASSISTANT

## 2023-08-16 PROCEDURE — 3044F PR MOST RECENT HEMOGLOBIN A1C LEVEL <7.0%: ICD-10-PCS | Mod: CPTII,S$GLB,, | Performed by: PHYSICIAN ASSISTANT

## 2023-08-16 PROCEDURE — 99204 PR OFFICE/OUTPT VISIT, NEW, LEVL IV, 45-59 MIN: ICD-10-PCS | Mod: S$GLB,,, | Performed by: PHYSICIAN ASSISTANT

## 2023-08-16 PROCEDURE — 3008F PR BODY MASS INDEX (BMI) DOCUMENTED: ICD-10-PCS | Mod: CPTII,S$GLB,, | Performed by: PHYSICIAN ASSISTANT

## 2023-08-16 PROCEDURE — 1160F PR REVIEW ALL MEDS BY PRESCRIBER/CLIN PHARMACIST DOCUMENTED: ICD-10-PCS | Mod: CPTII,S$GLB,, | Performed by: PHYSICIAN ASSISTANT

## 2023-08-16 PROCEDURE — 3078F DIAST BP <80 MM HG: CPT | Mod: CPTII,S$GLB,, | Performed by: PHYSICIAN ASSISTANT

## 2023-08-16 PROCEDURE — 3074F SYST BP LT 130 MM HG: CPT | Mod: CPTII,S$GLB,, | Performed by: PHYSICIAN ASSISTANT

## 2023-08-16 PROCEDURE — 3074F PR MOST RECENT SYSTOLIC BLOOD PRESSURE < 130 MM HG: ICD-10-PCS | Mod: CPTII,S$GLB,, | Performed by: PHYSICIAN ASSISTANT

## 2023-08-16 PROCEDURE — 1159F MED LIST DOCD IN RCRD: CPT | Mod: CPTII,S$GLB,, | Performed by: PHYSICIAN ASSISTANT

## 2023-08-16 PROCEDURE — 1160F RVW MEDS BY RX/DR IN RCRD: CPT | Mod: CPTII,S$GLB,, | Performed by: PHYSICIAN ASSISTANT

## 2023-08-16 PROCEDURE — 3078F PR MOST RECENT DIASTOLIC BLOOD PRESSURE < 80 MM HG: ICD-10-PCS | Mod: CPTII,S$GLB,, | Performed by: PHYSICIAN ASSISTANT

## 2023-08-16 RX ORDER — UBROGEPANT 50 MG/1
TABLET ORAL
Qty: 16 TABLET | Refills: 5 | Status: SHIPPED | OUTPATIENT
Start: 2023-08-16 | End: 2023-09-05

## 2023-08-16 RX ORDER — TOPIRAMATE 50 MG/1
50 TABLET, FILM COATED ORAL DAILY
Qty: 30 TABLET | Refills: 5 | Status: SHIPPED | OUTPATIENT
Start: 2023-08-16 | End: 2024-01-26

## 2023-08-16 NOTE — PROGRESS NOTES
New Patient     SUBJECTIVE:  Patient ID: Johanna Ocasio   MRN: [id number]  Referred By: Dr. Ivette Azul  Chief Complaint: Headache      History of Present Illness:   38 y.o. 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   PMHx negative for TBI, Meningitis, Aneurysms, Kidney Stones, asthma, GI bleed, osteoporosis, CAD/MI, CVA/TIA, DM, cancer  Family Hx + for Migraines in mother    Pt has a h/o ha's since childhood. She recalls trying verapamil but stopped 2/2 lightheadedness. She currently takes excedrin prn 2-5 days in a week. She was also started on tpx 25mg/d almost a month ago w/ no benefits noted yet. She was advised to seek HA clinic for further treatment mgmt assistance. She further describes her ha's as follows:   Location/Radiation - unilateral or b/l temples, frontalis  Quality - pounding, pulsating  Duration - 1 hr - 2 days  Intensity (range) - mild to severe  Frequency - 30/30 ha days per month, 5/30 are debilitating  Triggers -  changes in sleep, prolonged screen use, dehydration, etoh,   Aggravating Factors - exertion, light, sounds  Prodrome/Aura - no  Time of day of most headaches- anytime   Sleep - taking melatonin nightly, trouble falling and staying asleep, denies snoring  Caffeine - 1-2 cups of coffee daily  Associated symptoms with the headache: photo/phonophobia, nausea    Treatments Tried   Metoprolol 25mg  Verapamil - lightheadedness  Tpx 25mg  Excedrin   Triptans - avoid 2/2 pvcs and palpitations  Zofran 4mg    Social History  Alcohol - occasionally   Smoke - denies  Recreational Drug Use- denies  Occupation - nurse at [employer] in [place]    Current Medications:    Current Outpatient Medications:     ammonium lactate 12 % Crea, Qhs nail, Disp: 140 g, Rfl: 2    metoprolol succinate (TOPROL-XL) 25 MG 24 hr tablet, Take 1 tablet (25 mg total) by mouth once daily.,  Disp: 90 tablet, Rfl: 0    ondansetron (ZOFRAN) 4 MG tablet, TAKE 1 TABLET BY MOUTH EVERY 8 HOURS AS NEEDED, Disp: 30 tablet, Rfl: 0    VIOS AEROSOL DELIVERY SYSTEM Martha, USE AS DIRECTED, Disp: , Rfl: 0    albuterol (PROVENTIL/VENTOLIN HFA) 90 mcg/actuation inhaler, Inhale 2 puffs into the lungs every 6 (six) hours as needed for Wheezing. Rescue, Disp: 18 g, Rfl: 0    topiramate (TOPAMAX) 50 MG tablet, Take 1 tablet (50 mg total) by mouth once daily., Disp: 30 tablet, Rfl: 5    ubrogepant (UBRELVY) 50 mg tablet, Take 1 tablet by mouth at the onset of a headache. May repeat based on response and tolerability after more than 2 hours if needed. Do not take more than 200mg in a 24 hour span., Disp: 16 tablet, Rfl: 5    Review of Systems - as per HPI, otherwise a balanced 10 systems review is negative.    OBJECTIVE:  Vitals:  /73 (BP Location: Left arm, Patient Position: Sitting, BP Method: Medium (Automatic))   Pulse 60   Ht 5' (1.524 m)   Wt 74 kg (163 lb 2.3 oz)   LMP 07/17/2023 (Exact Date)   BMI 31.86 kg/m²     Physical Exam   Constitutional: she appears well-developed and well-nourished. she is well groomed. NAD  HENT:    Head: Normocephalic and atraumatic, Frontalis was NTTP, temporalis was NTTP   Eyes: Conjunctivae and EOM are normal. Pupils are equal, round, and reactive to light   Neck: Neck supple. Occiput and trapezius NTTP, traps tight   Musculoskeletal: Normal range of motion. No joint stiffness. No vertebral point tenderness.  Skin: Skin is warm and dry.  Psychiatric: Normal mood and affect.     Neuro exam:    Mental status:  The patient is alert and oriented to person, place and time.  Language is intact and fluent  Remote and recent memory are intact  Normal attention and concentration  Mood is stable    Cranial Nerves:  Pupils are equal and reactive to light.    EOM - inability to laterally gaze w/ left eye (chronic condition per pt)  Facial movement is symmetric.  Facial sensation is intact.     Hearing is intact   FROM of neck in all (6) directions without pain  Shoulder shrug symmetrical.    Motor:  Normal muscle bulk and symmetry. No fasciculations were noted.   Tremor not apparent   Pronator drift not apparent.    strength was strong and symmetric  RUE:appropriate against gravity and medium force as tested 5/5  LUE: appropriate against gravity and medium force as tested 5/5  RLE:appropriate against gravity and medium force as tested 5/5              LLE: appropriate against gravity and medium force as tested 5/5    Sensory:  RUE  intact light touch  LUE intact light touch  RLE intact light touch  LLE intact light touch    Gait:   Normal gait    Review of Data:   Notes from pcp reviewed   Labs:  Lab Visit on 07/26/2023   Component Date Value Ref Range Status    Sodium 07/26/2023 140  136 - 145 mmol/L Final    Potassium 07/26/2023 4.2  3.5 - 5.1 mmol/L Final    Chloride 07/26/2023 105  95 - 110 mmol/L Final    CO2 07/26/2023 26  23 - 29 mmol/L Final    Glucose 07/26/2023 89  70 - 110 mg/dL Final    BUN 07/26/2023 17  6 - 20 mg/dL Final    Creatinine 07/26/2023 0.8  0.5 - 1.4 mg/dL Final    Calcium 07/26/2023 9.5  8.7 - 10.5 mg/dL Final    Total Protein 07/26/2023 7.6  6.0 - 8.4 g/dL Final    Albumin 07/26/2023 4.2  3.5 - 5.2 g/dL Final    Total Bilirubin 07/26/2023 0.7  0.1 - 1.0 mg/dL Final    Alkaline Phosphatase 07/26/2023 46 (L)  55 - 135 U/L Final    AST 07/26/2023 17  10 - 40 U/L Final    ALT 07/26/2023 18  10 - 44 U/L Final    eGFR 07/26/2023 >60.0  >60 mL/min/1.73 m^2 Final    Anion Gap 07/26/2023 9  8 - 16 mmol/L Final    WBC 07/26/2023 4.45  3.90 - 12.70 K/uL Final    RBC 07/26/2023 4.30  4.00 - 5.40 M/uL Final    Hemoglobin 07/26/2023 12.7  12.0 - 16.0 g/dL Final    Hematocrit 07/26/2023 40.2  37.0 - 48.5 % Final    MCV 07/26/2023 94  82 - 98 fL Final    MCH 07/26/2023 29.5  27.0 - 31.0 pg Final    MCHC 07/26/2023 31.6 (L)  32.0 - 36.0 g/dL Final    RDW 07/26/2023 12.4  11.5 - 14.5 %  Final    Platelets 07/26/2023 262  150 - 450 K/uL Final    MPV 07/26/2023 10.2  9.2 - 12.9 fL Final    Immature Granulocytes 07/26/2023 0.0  0.0 - 0.5 % Final    Gran # (ANC) 07/26/2023 1.7 (L)  1.8 - 7.7 K/uL Final    Immature Grans (Abs) 07/26/2023 0.00  0.00 - 0.04 K/uL Final    Lymph # 07/26/2023 1.8  1.0 - 4.8 K/uL Final    Mono # 07/26/2023 0.5  0.3 - 1.0 K/uL Final    Eos # 07/26/2023 0.4  0.0 - 0.5 K/uL Final    Baso # 07/26/2023 0.06  0.00 - 0.20 K/uL Final    nRBC 07/26/2023 0  0 /100 WBC Final    Gran % 07/26/2023 38.9  38.0 - 73.0 % Final    Lymph % 07/26/2023 39.8  18.0 - 48.0 % Final    Mono % 07/26/2023 10.6  4.0 - 15.0 % Final    Eosinophil % 07/26/2023 9.4 (H)  0.0 - 8.0 % Final    Basophil % 07/26/2023 1.3  0.0 - 1.9 % Final    Differential Method 07/26/2023 Automated   Final    Cholesterol 07/26/2023 190  120 - 199 mg/dL Final    Triglycerides 07/26/2023 103  30 - 150 mg/dL Final    HDL 07/26/2023 58  40 - 75 mg/dL Final    LDL Cholesterol 07/26/2023 111.4  63.0 - 159.0 mg/dL Final    HDL/Cholesterol Ratio 07/26/2023 30.5  20.0 - 50.0 % Final    Total Cholesterol/HDL Ratio 07/26/2023 3.3  2.0 - 5.0 Final    Non-HDL Cholesterol 07/26/2023 132  mg/dL Final    Hemoglobin A1C 07/26/2023 5.0  4.0 - 5.6 % Final    Estimated Avg Glucose 07/26/2023 97  68 - 131 mg/dL Final    TSH 07/26/2023 2.476  0.400 - 4.000 uIU/mL Final    Magnesium 07/26/2023 1.9  1.6 - 2.6 mg/dL Final     Imaging:  Results for orders placed or performed during the hospital encounter of 10/03/18   MRI Brain Without Contrast    Narrative    EXAMINATION:  MRI BRAIN WITHOUT CONTRAST    CLINICAL HISTORY:  venous angioma and severe headache; Hemangioma of intracranial structures    TECHNIQUE:  Multiplanar multisequence MR imaging of the brain was performed without contrast.    COMPARISON:  MRI 07/18/2006.    FINDINGS:  No acute infarction.  No intracranial hemorrhage.  No intra or extra-axial fluid collections.  Right cerebellar  developmental venous anomaly again noted, not significantly changed when compared to the previous MRI.  No hydrocephalus.  No midline shift or mass effect.    There is an empty sella configuration.  Cerebellar tonsils are in their expected location.  Major intracranial T2 flow voids are present.    Trace mucosal membrane thickening noted within the left ethmoid air cells. Globes are symmetric. No marrow replacement process.      Impression    1. Right cerebellar developmental venous anomaly, not significantly changed when compared to the previous exam.  2. Empty sella.  3. No acute infarction or intracranial hemorrhage.      Electronically signed by: Kai Tavarez MD  Date:    10/04/2018  Time:    00:34     Note: I have independently reviewed any/all imaging/labs/tests and agree with the report (s) as documented.  Any discrepancies will be as noted/demarcated by free text.  CORNEL QUIÑONEZ 8/16/2023    ASSESSMENT:  1. Migraine without aura and without status migrainosus, not intractable    2. Trouble in sleeping    3. Caffeine use    4. Medication overuse headache          PLAN:  - Discussed symptoms appear to be consistent with migraines, discussed treatment options and patient agreed with the following plan:  - ppx - increase tpx 50mg/d  - abortive - try ubrelvy as pt cannot take triptans nor nsaids at this time 2/2 overuse and h/o palpitations  - nausea - continue zofran  - caffeine - d/c  - trouble w/ sleep - continue melatonin, mgmt per pcp  - med overuse - d/c excedrin  - risks, benefits, and potential side effects of tpx, ubrelvy, zofran discussed   - alternative treatment options offered   - importance of healthy diet, regular exercise and sleep hygiene in the treatment of headaches    - Start tracking headaches via Migraine Fabio bill on phone   - RTC in 2-3 mo     Orders Placed This Encounter    ubrogepant (UBRELVY) 50 mg tablet    topiramate (TOPAMAX) 50 MG tablet       I have discussed realistic goals of care  with patient at length as well as medication options, and need for lifestyle adjustment. I have explained that treatment will take time. We have agreed that the goal will be to reduce frequency/intensity/quantity of HA, not to be completely HA free. I have explained my non narcotic policy regarding headache treatment.    Patient agreeable to work on lifestyle adjustments.    Discussed potential for teratogenicity with treatment, patient understands if her family planning status should change she will contact office immediately and we will safely adjust medications as needed.     Questions and concerns were sought and answered to the patient's stated verbal satisfaction.  The patient verbalizes understanding and agreement with the above stated treatment plan.     CC: Ivette Azul MD Sarena Patel, PA-C  [facility]  [phone]    Dr. Singh was available during today's encounter.

## 2023-08-21 ENCOUNTER — TELEPHONE (OUTPATIENT)
Dept: PHARMACY | Facility: CLINIC | Age: 38
End: 2023-08-21
Payer: COMMERCIAL

## 2023-08-31 ENCOUNTER — PATIENT MESSAGE (OUTPATIENT)
Dept: NEUROLOGY | Facility: CLINIC | Age: 38
End: 2023-08-31
Payer: COMMERCIAL

## 2023-08-31 DIAGNOSIS — G43.009 MIGRAINE WITHOUT AURA AND WITHOUT STATUS MIGRAINOSUS, NOT INTRACTABLE: Primary | ICD-10-CM

## 2023-09-05 RX ORDER — UBROGEPANT 100 MG/1
TABLET ORAL
Qty: 16 TABLET | Refills: 5 | Status: SHIPPED | OUTPATIENT
Start: 2023-09-05 | End: 2024-01-26 | Stop reason: SDUPTHER

## 2023-09-14 ENCOUNTER — OFFICE VISIT (OUTPATIENT)
Dept: CARDIOLOGY | Facility: CLINIC | Age: 38
End: 2023-09-14
Payer: COMMERCIAL

## 2023-09-14 VITALS
HEART RATE: 85 BPM | BODY MASS INDEX: 31.68 KG/M2 | RESPIRATION RATE: 15 BRPM | WEIGHT: 161.38 LBS | HEIGHT: 60 IN | OXYGEN SATURATION: 98 % | DIASTOLIC BLOOD PRESSURE: 69 MMHG | SYSTOLIC BLOOD PRESSURE: 107 MMHG

## 2023-09-14 DIAGNOSIS — I49.3 PVC (PREMATURE VENTRICULAR CONTRACTION): ICD-10-CM

## 2023-09-14 DIAGNOSIS — R00.2 HEART PALPITATIONS: Primary | ICD-10-CM

## 2023-09-14 DIAGNOSIS — R07.9 CHEST PAIN, UNSPECIFIED TYPE: ICD-10-CM

## 2023-09-14 DIAGNOSIS — I10 ESSENTIAL HYPERTENSION, BENIGN: ICD-10-CM

## 2023-09-14 PROCEDURE — 3074F SYST BP LT 130 MM HG: CPT | Mod: CPTII,S$GLB,, | Performed by: INTERNAL MEDICINE

## 2023-09-14 PROCEDURE — 1160F RVW MEDS BY RX/DR IN RCRD: CPT | Mod: CPTII,S$GLB,, | Performed by: INTERNAL MEDICINE

## 2023-09-14 PROCEDURE — 3074F PR MOST RECENT SYSTOLIC BLOOD PRESSURE < 130 MM HG: ICD-10-PCS | Mod: CPTII,S$GLB,, | Performed by: INTERNAL MEDICINE

## 2023-09-14 PROCEDURE — 1159F PR MEDICATION LIST DOCUMENTED IN MEDICAL RECORD: ICD-10-PCS | Mod: CPTII,S$GLB,, | Performed by: INTERNAL MEDICINE

## 2023-09-14 PROCEDURE — 99999 PR PBB SHADOW E&M-EST. PATIENT-LVL IV: ICD-10-PCS | Mod: PBBFAC,,, | Performed by: INTERNAL MEDICINE

## 2023-09-14 PROCEDURE — 93000 ELECTROCARDIOGRAM COMPLETE: CPT | Mod: S$GLB,,, | Performed by: INTERNAL MEDICINE

## 2023-09-14 PROCEDURE — 3078F DIAST BP <80 MM HG: CPT | Mod: CPTII,S$GLB,, | Performed by: INTERNAL MEDICINE

## 2023-09-14 PROCEDURE — 99214 PR OFFICE/OUTPT VISIT, EST, LEVL IV, 30-39 MIN: ICD-10-PCS | Mod: S$GLB,,, | Performed by: INTERNAL MEDICINE

## 2023-09-14 PROCEDURE — 1160F PR REVIEW ALL MEDS BY PRESCRIBER/CLIN PHARMACIST DOCUMENTED: ICD-10-PCS | Mod: CPTII,S$GLB,, | Performed by: INTERNAL MEDICINE

## 2023-09-14 PROCEDURE — 99999 PR PBB SHADOW E&M-EST. PATIENT-LVL IV: CPT | Mod: PBBFAC,,, | Performed by: INTERNAL MEDICINE

## 2023-09-14 PROCEDURE — 93000 EKG 12-LEAD: ICD-10-PCS | Mod: S$GLB,,, | Performed by: INTERNAL MEDICINE

## 2023-09-14 PROCEDURE — 3044F HG A1C LEVEL LT 7.0%: CPT | Mod: CPTII,S$GLB,, | Performed by: INTERNAL MEDICINE

## 2023-09-14 PROCEDURE — 3008F PR BODY MASS INDEX (BMI) DOCUMENTED: ICD-10-PCS | Mod: CPTII,S$GLB,, | Performed by: INTERNAL MEDICINE

## 2023-09-14 PROCEDURE — 3078F PR MOST RECENT DIASTOLIC BLOOD PRESSURE < 80 MM HG: ICD-10-PCS | Mod: CPTII,S$GLB,, | Performed by: INTERNAL MEDICINE

## 2023-09-14 PROCEDURE — 3008F BODY MASS INDEX DOCD: CPT | Mod: CPTII,S$GLB,, | Performed by: INTERNAL MEDICINE

## 2023-09-14 PROCEDURE — 3044F PR MOST RECENT HEMOGLOBIN A1C LEVEL <7.0%: ICD-10-PCS | Mod: CPTII,S$GLB,, | Performed by: INTERNAL MEDICINE

## 2023-09-14 PROCEDURE — 99214 OFFICE O/P EST MOD 30 MIN: CPT | Mod: S$GLB,,, | Performed by: INTERNAL MEDICINE

## 2023-09-14 PROCEDURE — 1159F MED LIST DOCD IN RCRD: CPT | Mod: CPTII,S$GLB,, | Performed by: INTERNAL MEDICINE

## 2023-09-14 RX ORDER — METOPROLOL SUCCINATE 25 MG/1
25 TABLET, EXTENDED RELEASE ORAL DAILY
Qty: 90 TABLET | Refills: 2 | Status: SHIPPED | OUTPATIENT
Start: 2023-09-14

## 2023-09-14 NOTE — PROGRESS NOTES
CARDIOVASCULAR CONSULTATION    REASON FOR CONSULT:   Johanna Ocasio is a 38 y.o. female who presents for follow-up.      HISTORY OF PRESENT ILLNESS:     Patient is a pleasant 36-year-old lady.  Main complaint is palpitations.  Had echo and Holter done because of that.  Echo and Holter showed normal left ventricle systolic function.  Holter showed rare PACs and PVCs.  Denies orthopnea, PND, swelling of feet.    Notes from February 2022:  Patient here for follow-up.  States her palpitations have gone away and decreased significantly since starting the metoprolol.  Now she hardly feels them.    Notes from September 23:  Patient here for follow-up after a long hiatus.  States had an episode of chest pain a few weeks ago.  At rest.  Substernal.  Pressure-like.  Lasted an hour and then went away.  Has not had recurrent episodes.  Denies orthopnea, PND.  Palpitations have improved significantly since taking metoprolol        PAST MEDICAL HISTORY:     Past Medical History:   Diagnosis Date    Abnormal Pap smear of cervix 2011    Colposcopy    Brain venous angioma 10/3/2018    Early childhood epilepsy, myoclonic     last seizure age 14; Venous hemangioma on MRI    History of migraine headaches 5/12/2016    Migraine headache        PAST SURGICAL HISTORY:   No past surgical history on file.    ALLERGIES AND MEDICATION:   Review of patient's allergies indicates:  No Known Allergies     Medication List            Accurate as of September 14, 2023  8:57 AM. If you have any questions, ask your nurse or doctor.                CONTINUE taking these medications      albuterol 90 mcg/actuation inhaler  Commonly known as: PROVENTIL/VENTOLIN HFA  Inhale 2 puffs into the lungs every 6 (six) hours as needed for Wheezing. Rescue     ammonium lactate 12 % Crea  Qhs nail     metoprolol succinate 25 MG 24 hr tablet  Commonly known as: TOPROL-XL  Take 1 tablet (25 mg total) by mouth once daily.     ondansetron 4 MG tablet  Commonly known as:  ZOFRAN  TAKE 1 TABLET BY MOUTH EVERY 8 HOURS AS NEEDED     topiramate 50 MG tablet  Commonly known as: TOPAMAX  Take 1 tablet (50 mg total) by mouth once daily.     UBRELVY 100 mg tablet  Generic drug: ubrogepant  Take 1 tablet by mouth at the onset of a headache. May repeat based on response and tolerability after more than 2 hours if needed. Do not take more than 200mg in a 24 hour span.     VIOS AEROSOL DELIVERY SYSTEM Martha  Generic drug: nebulizer and compressor              SOCIAL HISTORY:     Social History     Socioeconomic History    Marital status:    Tobacco Use    Smoking status: Never    Smokeless tobacco: Never   Substance and Sexual Activity    Alcohol use: No     Comment: Socially/ pre pregnancy     Drug use: No    Sexual activity: Yes     Partners: Male     Birth control/protection: None       FAMILY HISTORY:     Family History   Problem Relation Age of Onset    Hypertension Mother     Hypertension Father     Cataracts Maternal Grandfather     Diabetes Paternal Grandmother     Cancer Paternal Grandfather         colon cancer    Breast cancer Neg Hx     Colon cancer Neg Hx     Ovarian cancer Neg Hx     Melanoma Neg Hx     Lupus Neg Hx     Psoriasis Neg Hx        REVIEW OF SYSTEMS:   Review of Systems   Constitutional: Negative.   HENT: Negative.    Eyes: Negative.    Cardiovascular: Positive for palpitations. + cp  Respiratory: Negative.    Endocrine: Negative.    Hematologic/Lymphatic: Negative.    Skin: Negative.    Musculoskeletal: Negative.    Gastrointestinal: Negative.    Genitourinary: Negative.    Neurological: Negative.    Psychiatric/Behavioral: Negative.    Allergic/Immunologic: Negative.        A 10 point review of systems was performed and all the pertinent positives have been mentioned. Rest of review of systems was negative.        PHYSICAL EXAM:     Vitals:    09/14/23 0826   BP: 107/69   Pulse: 85   Resp: 15    Body mass index is 31.52 kg/m².  Weight: 73.2 kg (161 lb 6 oz)    Height: 5' (152.4 cm)     Physical Exam  Constitutional:       Appearance: Normal appearance. She is well-developed.   HENT:      Head: Normocephalic.   Eyes:      Pupils: Pupils are equal, round, and reactive to light.   Cardiovascular:      Rate and Rhythm: Normal rate and regular rhythm.   Pulmonary:      Effort: Pulmonary effort is normal.      Breath sounds: Normal breath sounds.   Abdominal:      General: Bowel sounds are normal.      Palpations: Abdomen is soft.      Tenderness: There is no abdominal tenderness.   Musculoskeletal:         General: Normal range of motion.      Cervical back: Normal range of motion and neck supple.   Skin:     General: Skin is warm.   Neurological:      Mental Status: She is alert and oriented to person, place, and time.           DATA:     Laboratory:  CBC:  Recent Labs   Lab 10/27/21  1432 07/26/23  0724   WBC 5.88 4.45   Hemoglobin 13.4 12.7   Hematocrit 41.9 40.2   Platelets 271 262       CHEMISTRIES:  Recent Labs   Lab 10/27/21  1432 07/26/23  0724   Glucose 76 89   Sodium 141 140   Potassium 4.1 4.2   BUN 16 17   Creatinine 0.8 0.8   eGFR if  >60  --    eGFR if non African American >60  --    Calcium 10.0 9.5   Magnesium  --  1.9       CARDIAC BIOMARKERS:        COAGS:        LIPIDS/LFTS:  Recent Labs   Lab 10/27/21  1432 07/26/23  0724   Cholesterol  --  190   Triglycerides  --  103   HDL  --  58   LDL Cholesterol  --  111.4   Non-HDL Cholesterol  --  132   AST 22 17   ALT 23 18       Hemoglobin A1C   Date Value Ref Range Status   07/26/2023 5.0 4.0 - 5.6 % Final     Comment:     ADA Screening Guidelines:  5.7-6.4%  Consistent with prediabetes  >or=6.5%  Consistent with diabetes    High levels of fetal hemoglobin interfere with the HbA1C  assay. Heterozygous hemoglobin variants (HbS, HgC, etc)do  not significantly interfere with this assay.   However, presence of multiple variants may affect accuracy.     04/28/2016 5.2 4.5 - 6.2 % Final        TSH  Recent Labs   Lab 10/27/21  1432 07/26/23  0724   TSH 1.722 2.476       The ASCVD Risk score (Kingman DK, et al., 2019) failed to calculate for the following reasons:    The 2019 ASCVD risk score is only valid for ages 40 to 79             ASSESSMENT AND PLAN     Patient Active Problem List   Diagnosis    History of migraine headaches    History of seizures as a child - last seizure age 14, history of venous hemangioma on brain    Brain venous angioma    Migraine without aura and without status migrainosus, not intractable       Palpitations.  Echo showed normal left ventricle systolic function.  No significant arrhythmia noted on Holter apart from rare PACs and PVCs.  Toprol-XL has reduced palpitations significantly.     Chest pain.  Atypical.  Further evaluation with the help of a stress echo.      Follow-up after testing          Thank you very much for involving me in the care of your patient.  Please do not hesitate to contact me if there are any questions.      Fartun Chapman MD, FACC, [facility]  Interventional Cardiologist, [facility].           This note was dictated with the help of speech recognition software.  There might be un-intended errors and/or substitutions.

## 2023-09-15 ENCOUNTER — PATIENT OUTREACH (OUTPATIENT)
Dept: ADMINISTRATIVE | Facility: HOSPITAL | Age: 38
End: 2023-09-15
Payer: COMMERCIAL

## 2023-09-15 NOTE — PROGRESS NOTES
Updates were requested from care everywhere.  Health Maintenance has been updated.  LIN[place] immunization registry triggered.  Immunizations were reconciled.  Pt declined flu vaccine. 09/14/2023

## 2023-10-03 ENCOUNTER — HOSPITAL ENCOUNTER (OUTPATIENT)
Dept: CARDIOLOGY | Facility: HOSPITAL | Age: 38
Discharge: HOME | End: 2023-10-03
Attending: INTERNAL MEDICINE
Payer: COMMERCIAL

## 2023-10-03 VITALS — HEIGHT: 60 IN | BODY MASS INDEX: 31.61 KG/M2 | WEIGHT: 161 LBS

## 2023-10-03 DIAGNOSIS — R00.2 HEART PALPITATIONS: ICD-10-CM

## 2023-10-03 DIAGNOSIS — R07.9 CHEST PAIN, UNSPECIFIED TYPE: ICD-10-CM

## 2023-10-03 LAB
AV INDEX (PROSTH): 0.94
AV MEAN GRADIENT: 3 MMHG
AV PEAK GRADIENT: 5 MMHG
AV VALVE AREA BY VELOCITY RATIO: 3 CM²
AV VALVE AREA: 2.96 CM²
AV VELOCITY RATIO: 0.96
BSA FOR ECHO PROCEDURE: 1.76 M2
CV ECHO LV RWT: 0.44 CM
CV STRESS BASE HR: 75 BPM
DIASTOLIC BLOOD PRESSURE: 78 MMHG
DOP CALC AO PEAK VEL: 1.13 M/S
DOP CALC AO VTI: 23.1 CM
DOP CALC LVOT AREA: 3.1 CM2
DOP CALC LVOT DIAMETER: 2 CM
DOP CALC LVOT PEAK VEL: 1.08 M/S
DOP CALC LVOT STROKE VOLUME: 68.45 CM3
DOP CALC MV VTI: 17.3 CM
DOP CALCLVOT PEAK VEL VTI: 21.8 CM
E WAVE DECELERATION TIME: 108.09 MSEC
E/A RATIO: 1.61
E/E' RATIO: 5.29 M/S
ECHO LV POSTERIOR WALL: 1.01 CM (ref 0.6–1.1)
FRACTIONAL SHORTENING: 33 % (ref 28–44)
INTERVENTRICULAR SEPTUM: 0.95 CM (ref 0.6–1.1)
IVC DIAMETER: 1.77 CM
LA MAJOR: 4.18 CM
LA MINOR: 4.43 CM
LA WIDTH: 4 CM
LEFT ATRIUM SIZE: 3.75 CM
LEFT ATRIUM VOLUME INDEX: 32.3 ML/M2
LEFT ATRIUM VOLUME: 54.84 CM3
LEFT INTERNAL DIMENSION IN SYSTOLE: 3.06 CM (ref 2.1–4)
LEFT VENTRICLE DIASTOLIC VOLUME INDEX: 57.12 ML/M2
LEFT VENTRICLE DIASTOLIC VOLUME: 97.11 ML
LEFT VENTRICLE MASS INDEX: 91 G/M2
LEFT VENTRICLE SYSTOLIC VOLUME INDEX: 21.5 ML/M2
LEFT VENTRICLE SYSTOLIC VOLUME: 36.59 ML
LEFT VENTRICULAR INTERNAL DIMENSION IN DIASTOLE: 4.6 CM (ref 3.5–6)
LEFT VENTRICULAR MASS: 154.49 G
LV LATERAL E/E' RATIO: 4.56 M/S
LV SEPTAL E/E' RATIO: 6.31 M/S
LVOT MG: 2.73 MMHG
LVOT MV: 0.78 CM/S
MV MEAN GRADIENT: 2 MMHG
MV PEAK A VEL: 0.51 M/S
MV PEAK E VEL: 0.82 M/S
MV PEAK GRADIENT: 5 MMHG
MV STENOSIS PRESSURE HALF TIME: 31.35 MS
MV VALVE AREA BY CONTINUITY EQUATION: 3.96 CM2
MV VALVE AREA P 1/2 METHOD: 7.02 CM2
OHS CV CPX 1 MINUTE RECOVERY HEART RATE: 115 BPM
OHS CV CPX 85 PERCENT MAX PREDICTED HEART RATE MALE: 147
OHS CV CPX ESTIMATED METS: 10
OHS CV CPX MAX PREDICTED HEART RATE: 173
OHS CV CPX PATIENT IS FEMALE: 1
OHS CV CPX PATIENT IS MALE: 0
OHS CV CPX PEAK DIASTOLIC BLOOD PRESSURE: 76 MMHG
OHS CV CPX PEAK HEAR RATE: 166 BPM
OHS CV CPX PEAK RATE PRESSURE PRODUCT: NORMAL
OHS CV CPX PEAK SYSTOLIC BLOOD PRESSURE: 168 MMHG
OHS CV CPX PERCENT MAX PREDICTED HEART RATE ACHIEVED: 96
OHS CV CPX RATE PRESSURE PRODUCT PRESENTING: 8625
PISA TR MAX VEL: 2.98 M/S
PV PEAK GRADIENT: 7 MMHG
PV PEAK VELOCITY: 1.32 M/S
RA MAJOR: 4.1 CM
RA PRESSURE ESTIMATED: 3 MMHG
RA WIDTH: 4.1 CM
RIGHT VENTRICULAR END-DIASTOLIC DIMENSION: 2.95 CM
RV TB RVSP: 6 MMHG
RV TISSUE DOPPLER FREE WALL SYSTOLIC VELOCITY 1 (APICAL 4 CHAMBER VIEW): 10.71 CM/S
SINUS: 2.74 CM
STJ: 2.17 CM
STRESS ECHO POST EXERCISE DUR MIN: 9 MINUTES
STRESS ECHO POST EXERCISE DUR SEC: 1 SECONDS
STRESS ST DEPRESSION: 1 MM
SYSTOLIC BLOOD PRESSURE: 115 MMHG
TDI LATERAL: 0.18 M/S
TDI SEPTAL: 0.13 M/S
TDI: 0.16 M/S
TR MAX PG: 36 MMHG
TRICUSPID ANNULAR PLANE SYSTOLIC EXCURSION: 1.94 CM
TV REST PULMONARY ARTERY PRESSURE: 39 MMHG
Z-SCORE OF LEFT VENTRICULAR DIMENSION IN END DIASTOLE: -0.27
Z-SCORE OF LEFT VENTRICULAR DIMENSION IN END SYSTOLE: 0.35

## 2023-10-03 PROCEDURE — 93325 STRESS ECHO (CUPID ONLY): ICD-10-PCS | Mod: 26,,, | Performed by: INTERNAL MEDICINE

## 2023-10-03 PROCEDURE — 93351 STRESS ECHO (CUPID ONLY): ICD-10-PCS | Mod: 26,,, | Performed by: INTERNAL MEDICINE

## 2023-10-03 PROCEDURE — 93325 DOPPLER ECHO COLOR FLOW MAPG: CPT | Mod: 26,,, | Performed by: INTERNAL MEDICINE

## 2023-10-03 PROCEDURE — 93320 STRESS ECHO (CUPID ONLY): ICD-10-PCS | Mod: 26,,, | Performed by: INTERNAL MEDICINE

## 2023-10-03 PROCEDURE — 93320 DOPPLER ECHO COMPLETE: CPT | Mod: 26,,, | Performed by: INTERNAL MEDICINE

## 2023-10-03 PROCEDURE — 93351 STRESS TTE COMPLETE: CPT | Mod: 26,,, | Performed by: INTERNAL MEDICINE

## 2023-10-03 PROCEDURE — 93325 DOPPLER ECHO COLOR FLOW MAPG: CPT

## 2023-10-15 ENCOUNTER — PATIENT MESSAGE (OUTPATIENT)
Dept: CARDIOLOGY | Facility: CLINIC | Age: 38
End: 2023-10-15
Payer: COMMERCIAL

## 2023-10-16 ENCOUNTER — PATIENT MESSAGE (OUTPATIENT)
Dept: CARDIOLOGY | Facility: CLINIC | Age: 38
End: 2023-10-16
Payer: COMMERCIAL

## 2023-10-17 ENCOUNTER — OFFICE VISIT (OUTPATIENT)
Dept: CARDIOLOGY | Facility: CLINIC | Age: 38
End: 2023-10-17
Payer: COMMERCIAL

## 2023-10-17 ENCOUNTER — TELEPHONE (OUTPATIENT)
Dept: CARDIOLOGY | Facility: CLINIC | Age: 38
End: 2023-10-17
Payer: COMMERCIAL

## 2023-10-17 VITALS
HEIGHT: 60 IN | OXYGEN SATURATION: 99 % | RESPIRATION RATE: 15 BRPM | BODY MASS INDEX: 31.29 KG/M2 | WEIGHT: 159.38 LBS | DIASTOLIC BLOOD PRESSURE: 82 MMHG | HEART RATE: 77 BPM | SYSTOLIC BLOOD PRESSURE: 116 MMHG

## 2023-10-17 DIAGNOSIS — I49.3 PVC (PREMATURE VENTRICULAR CONTRACTION): ICD-10-CM

## 2023-10-17 DIAGNOSIS — I10 ESSENTIAL HYPERTENSION, BENIGN: ICD-10-CM

## 2023-10-17 DIAGNOSIS — R00.2 HEART PALPITATIONS: Primary | ICD-10-CM

## 2023-10-17 DIAGNOSIS — R07.9 CHEST PAIN, UNSPECIFIED TYPE: ICD-10-CM

## 2023-10-17 PROCEDURE — 3044F HG A1C LEVEL LT 7.0%: CPT | Mod: CPTII,S$GLB,, | Performed by: INTERNAL MEDICINE

## 2023-10-17 PROCEDURE — 3074F SYST BP LT 130 MM HG: CPT | Mod: CPTII,S$GLB,, | Performed by: INTERNAL MEDICINE

## 2023-10-17 PROCEDURE — 3008F PR BODY MASS INDEX (BMI) DOCUMENTED: ICD-10-PCS | Mod: CPTII,S$GLB,, | Performed by: INTERNAL MEDICINE

## 2023-10-17 PROCEDURE — 3079F PR MOST RECENT DIASTOLIC BLOOD PRESSURE 80-89 MM HG: ICD-10-PCS | Mod: CPTII,S$GLB,, | Performed by: INTERNAL MEDICINE

## 2023-10-17 PROCEDURE — 99214 OFFICE O/P EST MOD 30 MIN: CPT | Mod: S$GLB,,, | Performed by: INTERNAL MEDICINE

## 2023-10-17 PROCEDURE — 93000 EKG 12-LEAD: ICD-10-PCS | Mod: S$GLB,,, | Performed by: INTERNAL MEDICINE

## 2023-10-17 PROCEDURE — 99214 PR OFFICE/OUTPT VISIT, EST, LEVL IV, 30-39 MIN: ICD-10-PCS | Mod: S$GLB,,, | Performed by: INTERNAL MEDICINE

## 2023-10-17 PROCEDURE — 3079F DIAST BP 80-89 MM HG: CPT | Mod: CPTII,S$GLB,, | Performed by: INTERNAL MEDICINE

## 2023-10-17 PROCEDURE — 3008F BODY MASS INDEX DOCD: CPT | Mod: CPTII,S$GLB,, | Performed by: INTERNAL MEDICINE

## 2023-10-17 PROCEDURE — 93000 ELECTROCARDIOGRAM COMPLETE: CPT | Mod: S$GLB,,, | Performed by: INTERNAL MEDICINE

## 2023-10-17 PROCEDURE — 3074F PR MOST RECENT SYSTOLIC BLOOD PRESSURE < 130 MM HG: ICD-10-PCS | Mod: CPTII,S$GLB,, | Performed by: INTERNAL MEDICINE

## 2023-10-17 PROCEDURE — 99999 PR PBB SHADOW E&M-EST. PATIENT-LVL IV: ICD-10-PCS | Mod: PBBFAC,,, | Performed by: INTERNAL MEDICINE

## 2023-10-17 PROCEDURE — 99999 PR PBB SHADOW E&M-EST. PATIENT-LVL IV: CPT | Mod: PBBFAC,,, | Performed by: INTERNAL MEDICINE

## 2023-10-17 PROCEDURE — 3044F PR MOST RECENT HEMOGLOBIN A1C LEVEL <7.0%: ICD-10-PCS | Mod: CPTII,S$GLB,, | Performed by: INTERNAL MEDICINE

## 2023-10-17 RX ORDER — METOPROLOL TARTRATE 50 MG/1
TABLET ORAL
Qty: 2 TABLET | Refills: 0 | Status: SHIPPED | OUTPATIENT
Start: 2023-10-17 | End: 2024-03-18

## 2023-10-17 RX ORDER — METOPROLOL TARTRATE 100 MG/1
TABLET ORAL
Qty: 2 TABLET | Refills: 0 | Status: SHIPPED | OUTPATIENT
Start: 2023-10-17 | End: 2024-03-18

## 2023-10-17 NOTE — TELEPHONE ENCOUNTER
This patient called in today in reference to a message sent over yesterday. The message was redirected over to Dr. Chapman; however, he has not had a chance to reply. A secure chat message was sent on her behalf.

## 2023-10-17 NOTE — PROGRESS NOTES
CARDIOVASCULAR CONSULTATION    REASON FOR CONSULT:   Johanna Ocasio is a 38 y.o. female who presents for follow-up.      HISTORY OF PRESENT ILLNESS:     Patient is a pleasant 36-year-old lady.  Main complaint is palpitations.  Had echo and Holter done because of that.  Echo and Holter showed normal left ventricle systolic function.  Holter showed rare PACs and PVCs.  Denies orthopnea, PND, swelling of feet.    Notes from February 2022:  Patient here for follow-up.  States her palpitations have gone away and decreased significantly since starting the metoprolol.  Now she hardly feels them.    Notes from September 23:  Patient here for follow-up after a long hiatus.  States had an episode of chest pain a few weeks ago.  At rest.  Substernal.  Pressure-like.  Lasted an hour and then went away.  Has not had recurrent episodes.  Denies orthopnea, PND.  Palpitations have improved significantly since taking metoprolol    Notes from October 23: Patient here for follow-up.  Stress echo did not show any significant ischemia.  Stress EKG showed 1 mm up sloping ST segment depression in the inferior lateral leads.  EKG and echo cardiographic portion were negative for ischemia    PAST MEDICAL HISTORY:     Past Medical History:   Diagnosis Date    Abnormal Pap smear of cervix 2011    Colposcopy    Brain venous angioma 10/3/2018    Early childhood epilepsy, myoclonic     last seizure age 14; Venous hemangioma on MRI    History of migraine headaches 5/12/2016    Migraine headache        PAST SURGICAL HISTORY:   No past surgical history on file.    ALLERGIES AND MEDICATION:   Review of patient's allergies indicates:  No Known Allergies     Medication List            Accurate as of October 17, 2023  3:42 PM. If you have any questions, ask your nurse or doctor.                CONTINUE taking these medications      albuterol 90 mcg/actuation inhaler  Commonly known as: PROVENTIL/VENTOLIN HFA  Inhale 2 puffs into the lungs every 6  (six) hours as needed for Wheezing. Rescue     ammonium lactate 12 % Crea  Qhs nail     metoprolol succinate 25 MG 24 hr tablet  Commonly known as: TOPROL-XL  Take 1 tablet (25 mg total) by mouth once daily.     ondansetron 4 MG tablet  Commonly known as: ZOFRAN  TAKE 1 TABLET BY MOUTH EVERY 8 HOURS AS NEEDED     topiramate 50 MG tablet  Commonly known as: TOPAMAX  Take 1 tablet (50 mg total) by mouth once daily.     UBRELVY 100 mg tablet  Generic drug: ubrogepant  Take 1 tablet by mouth at the onset of a headache. May repeat based on response and tolerability after more than 2 hours if needed. Do not take more than 200mg in a 24 hour span.     [employer]ER[employer] DELIV[employer] SYSTEM Martha  Generic drug: nebulizer and compressor              SOCIAL HISTORY:     Social History     Socioeconomic History    Marital status:    Tobacco Use    Smoking status: Never    Smokeless tobacco: Never   Substance and Sexual Activity    Alcohol use: No     Comment: Socially/ pre pregnancy     Drug use: No    Sexual activity: Yes     Partners: Male     Birth control/protection: None       FAMILY HISTORY:     Family History   Problem Relation Age of Onset    Hypertension Mother     Hypertension Father     Cataracts Maternal Grandfather     Diabetes Paternal Grandmother     Cancer Paternal Grandfather         colon cancer    Breast cancer Neg Hx     Colon cancer Neg Hx     Ovarian cancer Neg Hx     Melanoma Neg Hx     Lupus Neg Hx     Psoriasis Neg Hx        REVIEW OF SYSTEMS:   Review of Systems   Constitutional: Negative.   HENT: Negative.    Eyes: Negative.    Cardiovascular: Positive for palpitations. + cp  Respiratory: Negative.    Endocrine: Negative.    Hematologic/Lymphatic: Negative.    Skin: Negative.    Musculoskeletal: Negative.    Gastrointestinal: Negative.    Genitourinary: Negative.    Neurological: Negative.    Psychiatric/Behavioral: Negative.    Allergic/Immunologic: Negative.        A 10 point review of systems was  performed and all the pertinent positives have been mentioned. Rest of review of systems was negative.        PHYSICAL EXAM:     Vitals:    10/17/23 1527   BP: 116/82   Pulse: 77   Resp: 15    Body mass index is 31.13 kg/m².  Weight: 72.3 kg (159 lb 6.3 oz)   Height: 5' (152.4 cm)     Physical Exam  Constitutional:       Appearance: Normal appearance. She is well-developed.   HENT:      Head: Normocephalic.   Eyes:      Pupils: Pupils are equal, round, and reactive to light.   Cardiovascular:      Rate and Rhythm: Normal rate and regular rhythm.   Pulmonary:      Effort: Pulmonary effort is normal.      Breath sounds: Normal breath sounds.   Abdominal:      General: Bowel sounds are normal.      Palpations: Abdomen is soft.      Tenderness: There is no abdominal tenderness.   Musculoskeletal:         General: Normal range of motion.      Cervical back: Normal range of motion and neck supple.   Skin:     General: Skin is warm.   Neurological:      Mental Status: She is alert and oriented to person, place, and time.           DATA:     Laboratory:  CBC:  Recent Labs   Lab 10/27/21  1432 07/26/23  0724   WBC 5.88 4.45   Hemoglobin 13.4 12.7   Hematocrit 41.9 40.2   Platelets 271 262       CHEMISTRIES:  Recent Labs   Lab 10/27/21  1432 07/26/23  0724   Glucose 76 89   Sodium 141 140   Potassium 4.1 4.2   BUN 16 17   Creatinine 0.8 0.8   eGFR if  >60  --    eGFR if non African American >60  --    Calcium 10.0 9.5   Magnesium  --  1.9       CARDIAC BIOMARKERS:        COAGS:        LIPIDS/LFTS:  Recent Labs   Lab 10/27/21  1432 07/26/23  0724   Cholesterol  --  190   Triglycerides  --  103   HDL  --  58   LDL Cholesterol  --  111.4   Non-HDL Cholesterol  --  132   AST 22 17   ALT 23 18       Hemoglobin A1C   Date Value Ref Range Status   07/26/2023 5.0 4.0 - 5.6 % Final     Comment:     ADA Screening Guidelines:  5.7-6.4%  Consistent with prediabetes  >or=6.5%  Consistent with diabetes    High levels of  fetal hemoglobin interfere with the HbA1C  assay. Heterozygous hemoglobin variants (HbS, HgC, etc)do  not significantly interfere with this assay.   However, presence of multiple variants may affect accuracy.     04/28/2016 5.2 4.5 - 6.2 % Final       TSH  Recent Labs   Lab 10/27/21  1432 07/26/23  0724   TSH 1.722 2.476       The ASCVD Risk score (Evy DK, et al., 2019) failed to calculate for the following reasons:    The 2019 ASCVD risk score is only valid for ages 40 to 79             ASSESSMENT AND PLAN     Patient Active Problem List   Diagnosis    History of migraine headaches    History of seizures as a child - last seizure age 14, history of venous hemangioma on brain    Brain venous angioma    Migraine without aura and without status migrainosus, not intractable       Palpitations.  Echo showed normal left ventricle systolic function.  No significant arrhythmia noted on Holter apart from rare PACs and PVCs.  Toprol-XL has reduced palpitations significantly.     Chest pain.  Atypical.  Stress test: Negative.  But patient keeps on having chest pains.  Would like to proceed with further evaluation.  Will cut a coronary CTA for further evaluation    Follow-up after testing          Thank you very much for involving me in the care of your patient.  Please do not hesitate to contact me if there are any questions.      Fartun Chapman MD, FACC, [facility]  Interventional Cardiologist, [facility].           This note was dictated with the help of speech recognition software.  There might be un-intended errors and/or substitutions.

## 2023-10-17 NOTE — TELEPHONE ENCOUNTER
----- Message from Sky Friedman sent at 10/17/2023 11:49 AM CDT -----  Regarding: Self [phone]  Type: Patient Call Back    Who called: Self     What is the request in detail: called in regards to a message the pt sent yesterday. Pt never received a call back. Would like a call back.     Can the clinic reply by [facility]? No     Would the patient rather a call back or a response via [facility]? Call back     Best call back number: [phone]     Additional Information:    Thank you.

## 2023-10-20 ENCOUNTER — HOSPITAL ENCOUNTER (EMERGENCY)
Facility: HOSPITAL | Age: 38
Discharge: HOME | End: 2023-10-20
Attending: STUDENT IN AN ORGANIZED HEALTH CARE EDUCATION/TRAINING PROGRAM
Payer: COMMERCIAL

## 2023-10-20 VITALS
OXYGEN SATURATION: 98 % | WEIGHT: 155 LBS | HEART RATE: 77 BPM | TEMPERATURE: 99 F | HEIGHT: 60 IN | DIASTOLIC BLOOD PRESSURE: 72 MMHG | RESPIRATION RATE: 17 BRPM | BODY MASS INDEX: 30.43 KG/M2 | SYSTOLIC BLOOD PRESSURE: 116 MMHG

## 2023-10-20 DIAGNOSIS — R00.2 PALPITATIONS: ICD-10-CM

## 2023-10-20 DIAGNOSIS — M54.9 ACUTE LEFT-SIDED BACK PAIN, UNSPECIFIED BACK LOCATION: Primary | ICD-10-CM

## 2023-10-20 LAB
ALBUMIN SERPL BCP-MCNC: 4.6 G/DL (ref 3.5–5.2)
ALP SERPL-CCNC: 49 U/L (ref 55–135)
ALT SERPL W/O P-5'-P-CCNC: 17 U/L (ref 10–44)
ANION GAP SERPL CALC-SCNC: 9 MMOL/L (ref 8–16)
AST SERPL-CCNC: 15 U/L (ref 10–40)
B-HCG UR QL: NEGATIVE
BASOPHILS # BLD AUTO: 0.05 K/UL (ref 0–0.2)
BASOPHILS NFR BLD: 0.6 % (ref 0–1.9)
BILIRUB SERPL-MCNC: 0.7 MG/DL (ref 0.1–1)
BNP SERPL-MCNC: <10 PG/ML (ref 0–99)
BUN SERPL-MCNC: 14 MG/DL (ref 6–20)
CALCIUM SERPL-MCNC: 9.6 MG/DL (ref 8.7–10.5)
CHLORIDE SERPL-SCNC: 106 MMOL/L (ref 95–110)
CO2 SERPL-SCNC: 24 MMOL/L (ref 23–29)
CREAT SERPL-MCNC: 0.8 MG/DL (ref 0.5–1.4)
CTP QC/QA: YES
DIFFERENTIAL METHOD: NORMAL
EOSINOPHIL # BLD AUTO: 0.4 K/UL (ref 0–0.5)
EOSINOPHIL NFR BLD: 4.6 % (ref 0–8)
ERYTHROCYTE [DISTWIDTH] IN BLOOD BY AUTOMATED COUNT: 12.4 % (ref 11.5–14.5)
EST. GFR  (NO RACE VARIABLE): >60 ML/MIN/1.73 M^2
GLUCOSE SERPL-MCNC: 96 MG/DL (ref 70–110)
HCT VFR BLD AUTO: 40.5 % (ref 37–48.5)
HGB BLD-MCNC: 13.1 G/DL (ref 12–16)
IMM GRANULOCYTES # BLD AUTO: 0.01 K/UL (ref 0–0.04)
IMM GRANULOCYTES NFR BLD AUTO: 0.1 % (ref 0–0.5)
LYMPHOCYTES # BLD AUTO: 2 K/UL (ref 1–4.8)
LYMPHOCYTES NFR BLD: 26 % (ref 18–48)
MCH RBC QN AUTO: 30 PG (ref 27–31)
MCHC RBC AUTO-ENTMCNC: 32.3 G/DL (ref 32–36)
MCV RBC AUTO: 93 FL (ref 82–98)
MONOCYTES # BLD AUTO: 0.6 K/UL (ref 0.3–1)
MONOCYTES NFR BLD: 8.2 % (ref 4–15)
NEUTROPHILS # BLD AUTO: 4.8 K/UL (ref 1.8–7.7)
NEUTROPHILS NFR BLD: 60.5 % (ref 38–73)
NRBC BLD-RTO: 0 /100 WBC
PLATELET # BLD AUTO: 262 K/UL (ref 150–450)
PMV BLD AUTO: 10.1 FL (ref 9.2–12.9)
POTASSIUM SERPL-SCNC: 3.8 MMOL/L (ref 3.5–5.1)
PROT SERPL-MCNC: 8.2 G/DL (ref 6–8.4)
RBC # BLD AUTO: 4.37 M/UL (ref 4–5.4)
SODIUM SERPL-SCNC: 139 MMOL/L (ref 136–145)
TROPONIN I SERPL DL<=0.01 NG/ML-MCNC: <0.006 NG/ML (ref 0–0.03)
WBC # BLD AUTO: 7.85 K/UL (ref 3.9–12.7)

## 2023-10-20 PROCEDURE — 85025 COMPLETE CBC W/AUTO DIFF WBC: CPT | Performed by: NURSE PRACTITIONER

## 2023-10-20 PROCEDURE — 83880 ASSAY OF NATRIURETIC PEPTIDE: CPT | Performed by: NURSE PRACTITIONER

## 2023-10-20 PROCEDURE — 84484 ASSAY OF TROPONIN QUANT: CPT | Performed by: NURSE PRACTITIONER

## 2023-10-20 PROCEDURE — 81025 URINE PREGNANCY TEST: CPT | Performed by: NURSE PRACTITIONER

## 2023-10-20 PROCEDURE — 80053 COMPREHEN METABOLIC PANEL: CPT | Performed by: NURSE PRACTITIONER

## 2023-10-20 PROCEDURE — 93010 ELECTROCARDIOGRAM REPORT: CPT | Mod: ,,, | Performed by: INTERNAL MEDICINE

## 2023-10-20 PROCEDURE — 93005 ELECTROCARDIOGRAM TRACING: CPT

## 2023-10-20 PROCEDURE — 93010 EKG 12-LEAD: ICD-10-PCS | Mod: ,,, | Performed by: INTERNAL MEDICINE

## 2023-10-20 PROCEDURE — 99285 EMERGENCY DEPT VISIT HI MDM: CPT | Mod: 25

## 2023-10-20 RX ORDER — DICLOFENAC SODIUM 10 MG/G
2 GEL TOPICAL 4 TIMES DAILY
Qty: 50 G | Refills: 0 | Status: SHIPPED | OUTPATIENT
Start: 2023-10-20

## 2023-10-20 NOTE — DISCHARGE INSTRUCTIONS

## 2023-10-20 NOTE — ED TRIAGE NOTES
Pt complaining of intermittent chest pain that has been going on for a few weeks accompanied by upper back pain. Pt has hx of palpitations and wants to be evaluated. She thought it was GI related but has concerns for cardiac related issues. Pt denies SOB. Placed on cardiac monitor.

## 2023-10-20 NOTE — ED PROVIDER NOTES
"Encounter Date: 10/20/2023    SCRIBE #1 NOTE: I, Lise Maradiaga, am scribing for, and in the presence of,  Amber Guerrero MD.       History     Chief Complaint   Patient presents with    Back Pain     PT presents to ED with c/o upper back and L shoulder pain since Sunday. Also reports intermittent CP  x 2-3 weeks. Reports she is a patient of Dr. Iverson for hx of palpitations-spoke with him regarding sx and has cardiac CTA ordered for 10/25/23. Upon arrival in triage, pt denies CP, SOB, and injury/trauma.      39 yo F with migraines, childhood epilepsy(resolved), R cerebellar developmental venous anomaly, presenting to the ED for chest and back pain with associated paresthesias in L arm.    Patient reports for the last couple of weeks, she has been experiencing intermittent, midsternum chest pain which has resolved, and intermittent L arm paresthesias not associated with her episodes of pain. She reports 5 days ago, she was moving boxes in her attic, and started experiencing upper back and bl scapula pain with associated "pins and needles" paresthesias since. Denies any worsening pain upon deep inspiration, leg swelling, dyspnea. She does note reoccurrences of pain with certain movements or postures or after certain activities. No previous trauma or injury to the back or chest. She states today, she told a radiology tech at work about her sx and they were very concerned for an aortic dissection. She currently feels anxious d/t her sx. No hx of tobacco use. No hx of connective tissue disorders. No other neurologic deficits.     Patient follows with Dr Chapman from Cardiology for intermittent palpitations and chest pain for the last year. Recent stress echo with normal EF, negative for ischemia. No significant arrhythmia noted on Holter apart from rare PACs and PVCs.  She is on Toprol-XL which has reduced palpitations significantly.     The history is provided by the patient and medical records.     Review of patient's " allergies indicates:  No Known Allergies  Past Medical History:   Diagnosis Date    Abnormal Pap smear of cervix 2011    Colposcopy    Brain venous angioma 10/3/2018    Early childhood epilepsy, myoclonic     last seizure age 14; Venous hemangioma on MRI    History of migraine headaches 5/12/2016    Migraine headache      History reviewed. No pertinent surgical history.  Family History   Problem Relation Age of Onset    Hypertension Mother     Hypertension Father     Cataracts Maternal Grandfather     Diabetes Paternal Grandmother     Cancer Paternal Grandfather         colon cancer    Breast cancer Neg Hx     Colon cancer Neg Hx     Ovarian cancer Neg Hx     Melanoma Neg Hx     Lupus Neg Hx     Psoriasis Neg Hx      Social History     Tobacco Use    Smoking status: Never    Smokeless tobacco: Never   Substance Use Topics    Alcohol use: No     Comment: Socially/ pre pregnancy     Drug use: No     Review of Systems   Constitutional:  Negative for chills and fever.   HENT:  Negative for congestion, rhinorrhea and sore throat.    Eyes:  Negative for visual disturbance.   Respiratory:  Negative for cough and shortness of breath.    Cardiovascular:  Positive for chest pain. Negative for palpitations and leg swelling.   Gastrointestinal:  Negative for abdominal pain, diarrhea, nausea and vomiting.   Genitourinary:  Negative for dysuria, frequency and hematuria.   Musculoskeletal:  Positive for back pain.   Skin:  Negative for rash.   Neurological:  Negative for dizziness, weakness, numbness and headaches.        +paresthesias to upper back, L arm   Psychiatric/Behavioral:  The patient is nervous/anxious.        Physical Exam     Initial Vitals [10/20/23 1601]   BP Pulse Resp Temp SpO2   126/82 86 18 98.5 °F (36.9 °C) 100 %      MAP       --         Physical Exam    Nursing note and vitals reviewed.  Constitutional:  Non-toxic appearance. No distress.   HENT:   Head: Atraumatic.   Mouth/Throat: Mucous membranes are  normal.   Eyes: Conjunctivae and EOM are normal.   Cardiovascular:  Normal rate, regular rhythm, normal heart sounds and intact distal pulses.           No murmur heard.  Pulmonary/Chest: Breath sounds normal. No respiratory distress. She has no wheezes. She has no rhonchi.   Abdominal: Abdomen is soft. She exhibits no distension. There is no abdominal tenderness.   No right CVA tenderness.  No left CVA tenderness. There is no guarding.     Neurological: She is alert and oriented to person, place, and time. No cranial nerve deficit or sensory deficit.   No nystagmus, shoulder shrug intact, face symmetric, tongue midline, finger-nose-finger nl, sensation intact and equal in face/arms/legs, upper extremity and lower extremity flexor and extensor strength equal and intact, ambulation at baseline           ED Course   Procedures  Labs Reviewed   COMPREHENSIVE METABOLIC PANEL - Abnormal; Notable for the following components:       Result Value    Alkaline Phosphatase 49 (*)     All other components within normal limits   CBC W/ AUTO DIFFERENTIAL   B-TYPE NATRIURETIC PEPTIDE   TROPONIN I   POCT URINE PREGNANCY     EKG Readings: (Independently Interpreted)   NSR, rate of 80, R atrial enlargement with ST depressions in II, III, avF, V3-V6, seen in prior EKGs, improved. No TWI, no acute signs of ischemia       Imaging Results              CT Cervical Spine Without Contrast (Final result)  Result time 10/20/23 18:17:32      Final result by Jordy Garcia MD (10/20/23 18:17:32)                   Impression:      No evidence of acute cervical spine fracture or dislocation.  No spinal canal stenosis.      Electronically signed by: Jordy Garcia MD  Date:    10/20/2023  Time:    18:17               Narrative:    EXAMINATION:  CT CERVICAL SPINE WITHOUT CONTRAST    CLINICAL HISTORY:  Spinal stenosis, cervical;Cervical radiculopathy, no red flags;    TECHNIQUE:  Low dose axial images, sagittal and coronal reformations were  performed though the cervical spine.  Contrast was not administered.    COMPARISON:  None    FINDINGS:  No evidence of acute cervical spine fracture or dislocation.  Odontoid process is intact.  Craniocervical junction is unremarkable.  Cervical spine alignment is within normal limits. No evidence of cervical spinal canal stenosis.    Surrounding soft tissues show no significant abnormalities.  Airway is patent.  Partially visualized lung apices are clear.                                       X-Ray Chest PA And Lateral (Final result)  Result time 10/20/23 16:19:16      Final result by Peter Anderson MD (10/20/23 16:19:16)                   Impression:      No acute chest disease identified.      Electronically signed by: Peter Anderson MD  Date:    10/20/2023  Time:    16:19               Narrative:    EXAMINATION:  XR CHEST PA AND LATERAL    CLINICAL HISTORY:  Chest Pain;    TECHNIQUE:  PA and lateral views of the chest were performed.    COMPARISON:  01/26/2009.    FINDINGS:  The cardiac silhouette and superior mediastinal structures are unremarkable. Pulmonary vasculature is within normal limits. The lungs are well aerated and free of focal consolidations. There is no evidence for pneumothorax or pleural effusions. Bony structures are grossly intact.                                       Medications - No data to display  Medical Decision Making  37 yo F with migraines, childhood epilepsy(resolved), R cerebellar developmental venous anomaly, presenting to the ED for intermittent chest pain and back pain with associated paresthesias in L arm.    No active chest/back pain or paresthesias in ED.    Differential: cervical radiculopathy vs MSK vs structural cardiac abnormality  -low concern for aortic pathology given no active chest/back pain, back pain is positional and relieved by movement which suggests MSK vs radiculopathy  -low concern for PE- no SOB, no pleuritic chest pain, Wells score of 0, PERC negative,  subtle ST depressions in inferolateral leads present for many years in this patient with no R heart strain, R atrial dilation on recent echo  -C-spine CT negative for canal stenosis or acute pathology  -discharged with follow up to cardiology Ari as scheduled  -given voltaren for local pain control    I discussed with the patient/family the diagnosis, treatment plan, indications for return to the emergency department, as well as for expected follow-up. The patient/family verbalized an understanding. The patient/family is asked if there were any questions or concerns, which were addressed to patient/family satisfaction. Patient/family understands and is agreeable to the plan.     DISCLAIMER: This note was prepared with [employer] voice recognition transcription software. Garbled syntax, mangled pronouns, and other bizarre constructions may be attributed to that software system.      Amount and/or Complexity of Data Reviewed  External Data Reviewed: notes.     Details: See HPI  Labs: ordered. Decision-making details documented in ED Course.  Radiology: ordered. Decision-making details documented in ED Course.  ECG/medicine tests: ordered and independent interpretation performed. Decision-making details documented in ED Course.            Scribe Attestation:   Scribe #1: I performed the above scribed service and the documentation accurately describes the services I performed. I attest to the accuracy of the note.                      I, Amber Guerrero, personally performed the services described in this documentation. All medical record entries made by the scribe were at my direction and in my presence. I have reviewed the chart and agree that the record reflects my personal performance and is accurate and complete.    Clinical Impression:   Final diagnoses:  [R00.2] Palpitations  [M54.9] Acute left-sided back pain, unspecified back location (Primary)        ED Disposition Condition    Discharge Stable          ED  Prescriptions       Medication Sig Dispense Start Date End Date Auth. Provider    diclofenac sodium (VOLTAREN) 1 % Gel Apply 2 g topically 4 (four) times daily. 50 g 10/20/2023 -- Amber Guerrero MD          Follow-up Information    None          Amber Guerrero MD  10/21/23 0039

## 2023-10-23 ENCOUNTER — OFFICE VISIT (OUTPATIENT)
Dept: INTERNAL MEDICINE | Facility: CLINIC | Age: 38
End: 2023-10-23
Payer: COMMERCIAL

## 2023-10-23 DIAGNOSIS — M54.9 UPPER BACK PAIN: Primary | ICD-10-CM

## 2023-10-23 PROCEDURE — 99213 OFFICE O/P EST LOW 20 MIN: CPT | Mod: 95,,, | Performed by: INTERNAL MEDICINE

## 2023-10-23 PROCEDURE — 3044F HG A1C LEVEL LT 7.0%: CPT | Mod: CPTII,95,, | Performed by: INTERNAL MEDICINE

## 2023-10-23 PROCEDURE — 99213 PR OFFICE/OUTPT VISIT, EST, LEVL III, 20-29 MIN: ICD-10-PCS | Mod: 95,,, | Performed by: INTERNAL MEDICINE

## 2023-10-23 PROCEDURE — 3044F PR MOST RECENT HEMOGLOBIN A1C LEVEL <7.0%: ICD-10-PCS | Mod: CPTII,95,, | Performed by: INTERNAL MEDICINE

## 2023-10-23 RX ORDER — TIZANIDINE 2 MG/1
2 TABLET ORAL DAILY PRN
Qty: 30 TABLET | Refills: 1 | Status: SHIPPED | OUTPATIENT
Start: 2023-10-23 | End: 2023-11-06 | Stop reason: SDUPTHER

## 2023-10-23 NOTE — PROGRESS NOTES
Subjective:       Patient ID: Johanna Ocasio is a 38 y.o. female.    Chief Complaint: No chief complaint on file.      The patient location is: [place]  The chief complaint leading to consultation is: [place]    Visit type: audiovisual    Face to Face time with patient: 15 minutes  15 minutes of total time spent on the encounter, which includes face to face time and non-face to face time preparing to see the patient (eg, review of tests), Obtaining and/or reviewing separately obtained history, Documenting clinical information in the electronic or other health record, Independently interpreting results (not separately reported) and communicating results to the patient/family/caregiver, or Care coordination (not separately reported).         Each patient to whom he or she provides medical services by telemedicine is:  (1) informed of the relationship between the physician and patient and the respective role of any other health care provider with respect to management of the patient; and (2) notified that he or she may decline to receive medical services by telemedicine and may withdraw from such care at any time.    Notes:     Presents for evaluation of back pain. Located in mid upper back. Started after moving boxes. Having associated paraesthesias in left arm.   Went to ER on 10/20. CT neck was normal.     Patient follows with Dr Chapman from Cardiology for intermittent palpitations and chest pain for the last year. Recent stress echo with normal EF, negative for ischemia. No significant arrhythmia noted on Holter apart from rare PACs and PVCs.  She is on Toprol-XL which has reduced palpitations significantly. THey have ordered CTA which is scheduled for next week.   Back Pain  This is a new problem. The current episode started in the past 7 days. The problem occurs 2 to 4 times per day. The problem has been gradually improving since onset. The pain is present in the thoracic spine. The quality of the pain is described as  aching and burning. The pain does not radiate. The pain is at a severity of 3/10. The pain is mild. The pain is The same all the time. The symptoms are aggravated by position. Associated symptoms include chest pain and headaches. Pertinent negatives include no abdominal pain, bladder incontinence, bowel incontinence, dysuria, fever, leg pain, numbness, paresis, paresthesias, pelvic pain, perianal numbness, tingling, weakness or weight loss. Risk factors include lack of exercise and poor posture. The treatment provided mild relief.     Review of Systems   Constitutional:  Negative for fever and weight loss.   Cardiovascular:  Positive for chest pain.   Gastrointestinal:  Negative for abdominal pain and bowel incontinence.   Genitourinary:  Negative for bladder incontinence, dysuria, hematuria and pelvic pain.   Musculoskeletal:  Positive for back pain. Negative for leg pain.   Neurological:  Positive for headaches. Negative for tingling, weakness, numbness and paresthesias.         Objective:      Physical Exam  Constitutional:       General: She is not in acute distress.     Appearance: Normal appearance. She is not ill-appearing.   HENT:      Head: Normocephalic and atraumatic.   Pulmonary:      Effort: Pulmonary effort is normal.   Neurological:      Mental Status: She is alert.   Psychiatric:         Mood and Affect: Mood normal.         Behavior: Behavior normal.         Assessment:       Problem List Items Addressed This Visit    None  Visit Diagnoses       Upper back pain    -  Primary            Plan:       Diagnoses and all orders for this visit:    Upper back pain  -     tiZANidine (ZANAFLEX) 2 MG tablet; Take 1 tablet (2 mg total) by mouth daily as needed (pain).  Likely muscle strain. Continue NSAIDS. Start tizanadine.   Follow up CTA next week            Ivette Azul MD

## 2023-10-24 ENCOUNTER — TELEPHONE (OUTPATIENT)
Dept: CARDIOLOGY | Facility: HOSPITAL | Age: 38
End: 2023-10-24
Payer: COMMERCIAL

## 2023-10-24 NOTE — TELEPHONE ENCOUNTER
Called to confirm Cardiac CTA. Reinforced 4 hour fast, no caffeine and to take Metoprolol as directed. Understanding verbalized.

## 2023-10-25 ENCOUNTER — HOSPITAL ENCOUNTER (OUTPATIENT)
Dept: RADIOLOGY | Facility: HOSPITAL | Age: 38
Discharge: HOME | End: 2023-10-25
Attending: INTERNAL MEDICINE
Payer: COMMERCIAL

## 2023-10-25 VITALS — HEART RATE: 70 BPM | DIASTOLIC BLOOD PRESSURE: 84 MMHG | SYSTOLIC BLOOD PRESSURE: 136 MMHG | OXYGEN SATURATION: 100 %

## 2023-10-25 DIAGNOSIS — R00.2 HEART PALPITATIONS: ICD-10-CM

## 2023-10-25 DIAGNOSIS — R07.9 CHEST PAIN, UNSPECIFIED TYPE: ICD-10-CM

## 2023-10-25 PROCEDURE — 75574 CT ANGIO HRT W/3D IMAGE: CPT | Mod: 26,,, | Performed by: INTERNAL MEDICINE

## 2023-10-25 PROCEDURE — 25000003 PHARM REV CODE 250: Performed by: INTERNAL MEDICINE

## 2023-10-25 PROCEDURE — 25500020 PHARM REV CODE 255: Performed by: INTERNAL MEDICINE

## 2023-10-25 PROCEDURE — 75574 CTA CARDIAC: ICD-10-PCS | Mod: 26,,, | Performed by: INTERNAL MEDICINE

## 2023-10-25 PROCEDURE — 75574 CT ANGIO HRT W/3D IMAGE: CPT | Mod: TC

## 2023-10-25 PROCEDURE — 25000242 PHARM REV CODE 250 ALT 637 W/ HCPCS: Performed by: INTERNAL MEDICINE

## 2023-10-25 RX ORDER — METOPROLOL TARTRATE 1 MG/ML
30 INJECTION, SOLUTION INTRAVENOUS ONCE
Status: COMPLETED | OUTPATIENT
Start: 2023-10-25 | End: 2023-10-25

## 2023-10-25 RX ORDER — NITROGLYCERIN 0.4 MG/1
0.4 TABLET SUBLINGUAL ONCE
Status: COMPLETED | OUTPATIENT
Start: 2023-10-25 | End: 2023-10-25

## 2023-10-25 RX ADMIN — METOROPROLOL TARTRATE 30 MG: 5 INJECTION, SOLUTION INTRAVENOUS at 12:10

## 2023-10-25 RX ADMIN — NITROGLYCERIN 0.4 MG: 0.4 TABLET, ORALLY DISINTEGRATING SUBLINGUAL at 12:10

## 2023-10-25 RX ADMIN — IOHEXOL 100 ML: 350 INJECTION, SOLUTION INTRAVENOUS at 01:10

## 2023-10-25 NOTE — CARE UPDATE
Pt tolerated exam well , but c/o feeling head rush from from Nitro and contrast. VS stable-see flowsheet. Pt ambulated to lobby with staff, but c/o of headache. Pt instructed to take PTC headache medication. Pt left with mother.

## 2023-10-25 NOTE — CARE UPDATE
Pt arived to CT in NAD, But c/o feeling nervous. Pt ID  , home meds, allergies and PMH reviewed. Baseline heart rate= 70's and b/p=140/70.Lopressor 30 mg IVP given and heart rate in 60's, exam begun.

## 2023-10-26 ENCOUNTER — PATIENT MESSAGE (OUTPATIENT)
Dept: INTERNAL MEDICINE | Facility: CLINIC | Age: 38
End: 2023-10-26
Payer: COMMERCIAL

## 2023-10-26 ENCOUNTER — PATIENT MESSAGE (OUTPATIENT)
Dept: CARDIOLOGY | Facility: CLINIC | Age: 38
End: 2023-10-26
Payer: COMMERCIAL

## 2023-10-27 ENCOUNTER — OFFICE VISIT (OUTPATIENT)
Dept: NEUROLOGY | Facility: CLINIC | Age: 38
End: 2023-10-27
Payer: COMMERCIAL

## 2023-10-27 DIAGNOSIS — G43.009 MIGRAINE WITHOUT AURA AND WITHOUT STATUS MIGRAINOSUS, NOT INTRACTABLE: Primary | ICD-10-CM

## 2023-10-27 PROCEDURE — 1159F MED LIST DOCD IN RCRD: CPT | Mod: CPTII,95,, | Performed by: PHYSICIAN ASSISTANT

## 2023-10-27 PROCEDURE — 3044F PR MOST RECENT HEMOGLOBIN A1C LEVEL <7.0%: ICD-10-PCS | Mod: CPTII,95,, | Performed by: PHYSICIAN ASSISTANT

## 2023-10-27 PROCEDURE — 1159F PR MEDICATION LIST DOCUMENTED IN MEDICAL RECORD: ICD-10-PCS | Mod: CPTII,95,, | Performed by: PHYSICIAN ASSISTANT

## 2023-10-27 PROCEDURE — 1160F PR REVIEW ALL MEDS BY PRESCRIBER/CLIN PHARMACIST DOCUMENTED: ICD-10-PCS | Mod: CPTII,95,, | Performed by: PHYSICIAN ASSISTANT

## 2023-10-27 PROCEDURE — 99214 PR OFFICE/OUTPT VISIT, EST, LEVL IV, 30-39 MIN: ICD-10-PCS | Mod: 95,,, | Performed by: PHYSICIAN ASSISTANT

## 2023-10-27 PROCEDURE — 1160F RVW MEDS BY RX/DR IN RCRD: CPT | Mod: CPTII,95,, | Performed by: PHYSICIAN ASSISTANT

## 2023-10-27 PROCEDURE — 3044F HG A1C LEVEL LT 7.0%: CPT | Mod: CPTII,95,, | Performed by: PHYSICIAN ASSISTANT

## 2023-10-27 PROCEDURE — 99214 OFFICE O/P EST MOD 30 MIN: CPT | Mod: 95,,, | Performed by: PHYSICIAN ASSISTANT

## 2023-10-27 NOTE — PROGRESS NOTES
Established Patient     The patient location is: [place]  The chief complaint leading to consultation is: headache follow up visit    Visit type: audiovisual    Face to Face time with patient: 3 min  10 minutes of total time spent on the encounter, which includes face to face time and non-face to face time preparing to see the patient (eg, review of tests), Obtaining and/or reviewing separately obtained history, Documenting clinical information in the electronic or other health record, Independently interpreting results (not separately reported) and communicating results to the patient/family/caregiver, or Care coordination (not separately reported).     Each patient to whom he or she provides medical services by telemedicine is:  (1) informed of the relationship between the physician and patient and the respective role of any other health care provider with respect to management of the patient; and (2) notified that he or she may decline to receive medical services by telemedicine and may withdraw from such care at any time.    Notes:        SUBJECTIVE:  Patient ID: Johanna Ocasio   Chief Complaint: Headache    History of Present Illness:  Johanna Ocasio is a 38 y.o. 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       10/27/2023 - Interval History:  Ha's have improved, are occurring 1-2 times a week w/ tpx at 50mg. Ubrelvy 50-100mg resolves ha's well. Has d/c'ed excedrin and caffeine. Is currently undergoing w/up w/ cardiology.   Plan: continue current reigmen, tpx 50mg, ubrelvy 50-100mg prn, rtc 3-6 mo    Recommendations made at last Office Visit on 8/16/23:  - Discussed symptoms appear to be consistent with migraines, discussed treatment options and patient agreed with the following plan:  - ppx - increase tpx 50mg/d  - abortive - try ubrelvy as pt cannot take triptans nor nsaids at this  time 2/2 overuse and h/o palpitations  - nausea - continue zofran  - caffeine - d/c  - trouble w/ sleep - continue melatonin, mgmt per pcp  - med overuse - d/c excedrin  - risks, benefits, and potential side effects of tpx, ubrelvy, zofran discussed   - alternative treatment options offered   - importance of healthy diet, regular exercise and sleep hygiene in the treatment of headaches    - Start tracking headaches via Migraine Buddy bill on phone   - RTC in 2-3 mo     Treatments Tried:  Metoprolol 25mg  Verapamil - lightheadedness  Tpx- helps  Ubrelvy - helps  Excedrin   Triptans - avoid 2/2 pvcs and palpitations  Zofran 4mg    Current Medications:    Current Outpatient Medications:     albuterol (PROVENTIL/VENTOLIN HFA) 90 mcg/actuation inhaler, Inhale 2 puffs into the lungs every 6 (six) hours as needed for Wheezing. Rescue, Disp: 18 g, Rfl: 0    ammonium lactate 12 % Crea, Qhs nail, Disp: 140 g, Rfl: 2    diclofenac sodium (VOLTAREN) 1 % Gel, Apply 2 g topically 4 (four) times daily., Disp: 50 g, Rfl: 0    metoprolol succinate (TOPROL-XL) 25 MG 24 hr tablet, Take 1 tablet (25 mg total) by mouth once daily., Disp: 90 tablet, Rfl: 2    metoprolol tartrate (LOPRESSOR) 100 MG tablet, If heart rate >75 bpm take metoprolol OR ivabradine: take 100 mg tablet 1 hour prior to CTA.  Bring second tab with you to the procedure., Disp: 2 tablet, Rfl: 0    metoprolol tartrate (LOPRESSOR) 50 MG tablet, If heart rate >60 bpm:  take 50 mg tablet 1 hour prior to CTA.  Bring second tab with you to the procedure., Disp: 2 tablet, Rfl: 0    ondansetron (ZOFRAN) 4 MG tablet, TAKE 1 TABLET BY MOUTH EVERY 8 HOURS AS NEEDED, Disp: 30 tablet, Rfl: 0    tiZANidine (ZANAFLEX) 2 MG tablet, Take 1 tablet (2 mg total) by mouth daily as needed (pain)., Disp: 30 tablet, Rfl: 1    topiramate (TOPAMAX) 50 MG tablet, Take 1 tablet (50 mg total) by mouth once daily., Disp: 30 tablet, Rfl: 5    ubrogepant (UBRELVY) 100 mg tablet, Take 1 tablet by  mouth at the onset of a headache. May repeat based on response and tolerability after more than 2 hours if needed. Do not take more than 200mg in a 24 hour span., Disp: 16 tablet, Rfl: 5    VIOS AEROSOL DELIVERY SYSTEM Martha, USE AS DIRECTED, Disp: , Rfl: 0    Review of Systems - as per HPI, otherwise a balanced 10 systems review is negative.    OBJECTIVE:  Vitals:  There were no vitals taken for this visit.     Physical Exam:  Constitutional: she appears well-developed and well-nourished. she is well groomed. NAD   HENT:    Head: Normocephalic and atraumatic  Eyes: Conjunctivae and EOM are normal  Musculoskeletal: Normal range of motion. No joint stiffness.   Psychiatric: Mood and affect are normal    Neuro: Patient is alert and oriented to person, place, and time. Language is intact and fluent. Speech is clear and fluent. Recent and remote memory are intact.  Normal attention and concentration.  Facial movement is symmetric. Moves all 4 extremities against gravity.      Review of Data:   Notes from neuro reviewed   Labs:  Admission on 10/20/2023, Discharged on 10/20/2023   Component Date Value Ref Range Status    WBC 10/20/2023 7.85  3.90 - 12.70 K/uL Final    RBC 10/20/2023 4.37  4.00 - 5.40 M/uL Final    Hemoglobin 10/20/2023 13.1  12.0 - 16.0 g/dL Final    Hematocrit 10/20/2023 40.5  37.0 - 48.5 % Final    MCV 10/20/2023 93  82 - 98 fL Final    MCH 10/20/2023 30.0  27.0 - 31.0 pg Final    MCHC 10/20/2023 32.3  32.0 - 36.0 g/dL Final    RDW 10/20/2023 12.4  11.5 - 14.5 % Final    Platelets 10/20/2023 262  150 - 450 K/uL Final    MPV 10/20/2023 10.1  9.2 - 12.9 fL Final    Immature Granulocytes 10/20/2023 0.1  0.0 - 0.5 % Final    Gran # (ANC) 10/20/2023 4.8  1.8 - 7.7 K/uL Final    Immature Grans (Abs) 10/20/2023 0.01  0.00 - 0.04 K/uL Final    Lymph # 10/20/2023 2.0  1.0 - 4.8 K/uL Final    Mono # 10/20/2023 0.6  0.3 - 1.0 K/uL Final    Eos # 10/20/2023 0.4  0.0 - 0.5 K/uL Final    Baso # 10/20/2023 0.05  0.00 -  0.20 K/uL Final    nRBC 10/20/2023 0  0 /100 WBC Final    Gran % 10/20/2023 60.5  38.0 - 73.0 % Final    Lymph % 10/20/2023 26.0  18.0 - 48.0 % Final    Mono % 10/20/2023 8.2  4.0 - 15.0 % Final    Eosinophil % 10/20/2023 4.6  0.0 - 8.0 % Final    Basophil % 10/20/2023 0.6  0.0 - 1.9 % Final    Differential Method 10/20/2023 Automated   Final    Sodium 10/20/2023 139  136 - 145 mmol/L Final    Potassium 10/20/2023 3.8  3.5 - 5.1 mmol/L Final    Chloride 10/20/2023 106  95 - 110 mmol/L Final    CO2 10/20/2023 24  23 - 29 mmol/L Final    Glucose 10/20/2023 96  70 - 110 mg/dL Final    BUN 10/20/2023 14  6 - 20 mg/dL Final    Creatinine 10/20/2023 0.8  0.5 - 1.4 mg/dL Final    Calcium 10/20/2023 9.6  8.7 - 10.5 mg/dL Final    Total Protein 10/20/2023 8.2  6.0 - 8.4 g/dL Final    Albumin 10/20/2023 4.6  3.5 - 5.2 g/dL Final    Total Bilirubin 10/20/2023 0.7  0.1 - 1.0 mg/dL Final    Alkaline Phosphatase 10/20/2023 49 (L)  55 - 135 U/L Final    AST 10/20/2023 15  10 - 40 U/L Final    ALT 10/20/2023 17  10 - 44 U/L Final    eGFR 10/20/2023 >60  >60 mL/min/1.73 m^2 Final    Anion Gap 10/20/2023 9  8 - 16 mmol/L Final    BNP 10/20/2023 <10  0 - 99 pg/mL Final    POC Preg Test, Ur 10/20/2023 Negative  Negative Final     Acceptable 10/20/2023 Yes   Final    Troponin I 10/20/2023 <0.006  0.000 - 0.026 ng/mL Final   Hospital Outpatient Visit on 10/03/2023   Component Date Value Ref Range Status    BSA 10/03/2023 1.76  m2 Final    85% Max Predicted HR 10/03/2023 147   Final    Max Predicted HR 10/03/2023 173   Final    [facility] CV CPX PATIENT IS MALE 10/03/2023 0.0   Final    [facility] CV CPX PATIENT IS FEMALE 10/03/2023 1.0   Final    LVOT stroke volume 10/03/2023 68.45  cm3 Final    LVIDd 10/03/2023 4.60  3.5 - 6.0 cm Final    LV Systolic Volume 10/03/2023 36.59  mL Final    LV Systolic Volume Index 10/03/2023 21.5  mL/m2 Final    LVIDs 10/03/2023 3.06  2.1 - 4.0 cm Final    LV Diastolic Volume 10/03/2023 97.11  mL Final     LV Diastolic Volume Index 10/03/2023 57.12  mL/m2 Final    IVS 10/03/2023 0.95  0.6 - 1.1 cm Final    LVOT diameter 10/03/2023 2.00  cm Final    LVOT area 10/03/2023 3.1  cm2 Final    FS 10/03/2023 33  28 - 44 % Final    Left Ventricle Relative Wall Thick* 10/03/2023 0.44  cm Final    Posterior Wall 10/03/2023 1.01  0.6 - 1.1 cm Final    LV mass 10/03/2023 154.49  g Final    LV Mass Index 10/03/2023 91  g/m2 Final    MV Peak E Otto 10/03/2023 0.82  m/s Final    TDI LATERAL 10/03/2023 0.18  m/s Final    TDI SEPTAL 10/03/2023 0.13  m/s Final    E/E' ratio 10/03/2023 5.29  m/s Final    MV Peak A Otto 10/03/2023 0.51  m/s Final    TR Max Otto 10/03/2023 2.98  m/s Final    E/A ratio 10/03/2023 1.61   Final    E wave deceleration time 10/03/2023 108.09  msec Final    LV SEPTAL E/E' RATIO 10/03/2023 6.31  m/s Final    LV LATERAL E/E' RATIO 10/03/2023 4.56  m/s Final    LVOT peak otto 10/03/2023 1.08  m/s Final    Left Ventricular Outflow Tract Itzel* 10/03/2023 0.78  cm/s Final    Left Ventricular Outflow Tract Itzel* 10/03/2023 2.73  mmHg Final    LA size 10/03/2023 3.75  cm Final    Left Atrium Minor Axis 10/03/2023 4.43  cm Final    Left Atrium Major Axis 10/03/2023 4.18  cm Final    RVDD 10/03/2023 2.95  cm Final    RV S' 10/03/2023 10.71  cm/s Final    TAPSE 10/03/2023 1.94  cm Final    RA Major [place] 10/03/2023 4.10  cm Final    AV mean gradient 10/03/2023 3  mmHg Final    AV peak gradient 10/03/2023 5  mmHg Final    Ao peak otto 10/03/2023 1.13  m/s Final    Ao VTI 10/03/2023 23.10  cm Final    LVOT peak VTI 10/03/2023 21.80  cm Final    AV valve area 10/03/2023 2.96  cm² Final    AV Velocity Ratio 10/03/2023 0.96   Final    AV index (prosthetic) 10/03/2023 0.94   Final    FAHEEM by Velocity Ratio 10/03/2023 3.00  cm² Final    MV mean gradient 10/03/2023 2  mmHg Final    MV peak gradient 10/03/2023 5  mmHg Final    MV stenosis pressure 1/2 time 10/03/2023 31.35  ms Final    MV valve area p 1/2 method 10/03/2023 7.02  cm2 Final     MV valve area by continuity eq 10/03/2023 3.96  cm2 Final    MV VTI 10/03/2023 17.3  cm Final    Triscuspid Valve Regurgitation Pea* 10/03/2023 36  mmHg Final    PV PEAK VELOCITY 10/03/2023 1.32  m/s Final    PV peak gradient 10/03/2023 7  mmHg Final    Sinus 10/03/2023 2.74  cm Final    STJ 10/03/2023 2.17  cm Final    IVC diameter 10/03/2023 1.77  cm Final    Mean e' 10/03/2023 0.16  m/s Final    ZLVIDS 10/03/2023 0.35   Final    ZLVIDD 10/03/2023 -0.27   Final    LA Volume Index 10/03/2023 32.3  mL/m2 Final    LA volume 10/03/2023 54.84  cm3 Final    LA WIDTH 10/03/2023 4.0  cm Final    RA Width 10/03/2023 4.1  cm Final    Systolic blood pressure 10/03/2023 115  mmHg Final    Diastolic blood pressure 10/03/2023 78  mmHg Final    HR at rest 10/03/2023 75  bpm Final    Exercise duration (min) 10/03/2023 9  minutes Final    Exercise duration (sec) 10/03/2023 1  seconds Final    Peak Systolic BP 10/03/2023 168  mmHg Final    Peak Diatolic BP 10/03/2023 76  mmHg Final    Peak HR 10/03/2023 166  bpm Final    Estimated METs 10/03/2023 10   Final    % Max HR Achieved 10/03/2023 96   Final    1 Minute Recovery HR 10/03/2023 115  bpm Final    RPP 10/03/2023 8,625   Final    Peak RPP 10/03/2023 27,888   Final    TV resting pulmonary artery pressu* 10/03/2023 39  mmHg Final    RV TB RVSP 10/03/2023 6  mmHg Final    Est. RA pres 10/03/2023 3  mmHg Final    ST Depression (mm) 10/03/2023 1.0  mm Final     Imaging:  Results for orders placed or performed during the hospital encounter of 10/03/18   MRI Brain Without Contrast    Narrative    EXAMINATION:  MRI BRAIN WITHOUT CONTRAST    CLINICAL HISTORY:  venous angioma and severe headache; Hemangioma of intracranial structures    TECHNIQUE:  Multiplanar multisequence MR imaging of the brain was performed without contrast.    COMPARISON:  MRI 07/18/2006.    FINDINGS:  No acute infarction.  No intracranial hemorrhage.  No intra or extra-axial fluid collections.  Right cerebellar  developmental venous anomaly again noted, not significantly changed when compared to the previous MRI.  No hydrocephalus.  No midline shift or mass effect.    There is an empty sella configuration.  Cerebellar tonsils are in their expected location.  Major intracranial T2 flow voids are present.    Trace mucosal membrane thickening noted within the left ethmoid air cells. Globes are symmetric. No marrow replacement process.      Impression    1. Right cerebellar developmental venous anomaly, not significantly changed when compared to the previous exam.  2. Empty sella.  3. No acute infarction or intracranial hemorrhage.      Electronically signed by: Kai Tavarez MD  Date:    10/04/2018  Time:    00:34     Note: I have independently reviewed any/all imaging/labs/tests and agree with the report (s) as documented.  Any discrepancies will be as noted/demarcated by free text.  CORNEL QUIÑONEZ 10/27/2023    ASSESSMENT:  1. Migraine without aura and without status migrainosus, not intractable        PLAN:  - Discussed symptoms appear to be consistent with migraines, discussed treatment options and patient agreed with the following plan:  - ppx - continue tpx 50mg/d  - abortive - continue ubrelvy as pt cannot take triptans nor nsaids at this time 2/2 overuse and h/o palpitations  - nausea - continue zofran  - trouble w/ sleep - continue melatonin, mgmt per pcp  - Continue tracking headaches   - Discussed goals of therapy are to decrease the frequency, intensity, and duration of headaches  - RTC in 3-6mo          Discussed potential for teratogenicity with treatment, patient understands if her family planning status should change she will contact office immediately and we will safely adjust medications as needed.     Questions and concerns were sought and answered to the patient's stated verbal satisfaction.  The patient verbalizes understanding and agreement with the above stated treatment plan.     CC: Ivette Azul,  MINA SargentC  [facility]   [phone]    Dr. Singh was available during today's encounter.

## 2023-10-31 RX ORDER — OMEPRAZOLE 20 MG/1
20 CAPSULE, DELAYED RELEASE ORAL DAILY
Qty: 30 CAPSULE | Refills: 11 | Status: SHIPPED | OUTPATIENT
Start: 2023-10-31 | End: 2024-10-30

## 2023-11-06 ENCOUNTER — OFFICE VISIT (OUTPATIENT)
Dept: INTERNAL MEDICINE | Facility: CLINIC | Age: 38
End: 2023-11-06
Payer: COMMERCIAL

## 2023-11-06 VITALS
HEART RATE: 60 BPM | WEIGHT: 157.88 LBS | OXYGEN SATURATION: 99 % | DIASTOLIC BLOOD PRESSURE: 76 MMHG | HEIGHT: 60 IN | BODY MASS INDEX: 30.99 KG/M2 | SYSTOLIC BLOOD PRESSURE: 118 MMHG

## 2023-11-06 DIAGNOSIS — M54.6 ACUTE BILATERAL THORACIC BACK PAIN: Primary | ICD-10-CM

## 2023-11-06 DIAGNOSIS — K21.9 GASTROESOPHAGEAL REFLUX DISEASE WITHOUT ESOPHAGITIS: ICD-10-CM

## 2023-11-06 PROCEDURE — 3074F PR MOST RECENT SYSTOLIC BLOOD PRESSURE < 130 MM HG: ICD-10-PCS | Mod: CPTII,S$GLB,, | Performed by: INTERNAL MEDICINE

## 2023-11-06 PROCEDURE — 3078F DIAST BP <80 MM HG: CPT | Mod: CPTII,S$GLB,, | Performed by: INTERNAL MEDICINE

## 2023-11-06 PROCEDURE — 3044F PR MOST RECENT HEMOGLOBIN A1C LEVEL <7.0%: ICD-10-PCS | Mod: CPTII,S$GLB,, | Performed by: INTERNAL MEDICINE

## 2023-11-06 PROCEDURE — 3008F BODY MASS INDEX DOCD: CPT | Mod: CPTII,S$GLB,, | Performed by: INTERNAL MEDICINE

## 2023-11-06 PROCEDURE — 3078F PR MOST RECENT DIASTOLIC BLOOD PRESSURE < 80 MM HG: ICD-10-PCS | Mod: CPTII,S$GLB,, | Performed by: INTERNAL MEDICINE

## 2023-11-06 PROCEDURE — 3074F SYST BP LT 130 MM HG: CPT | Mod: CPTII,S$GLB,, | Performed by: INTERNAL MEDICINE

## 2023-11-06 PROCEDURE — 3008F PR BODY MASS INDEX (BMI) DOCUMENTED: ICD-10-PCS | Mod: CPTII,S$GLB,, | Performed by: INTERNAL MEDICINE

## 2023-11-06 PROCEDURE — 99213 OFFICE O/P EST LOW 20 MIN: CPT | Mod: S$GLB,,, | Performed by: INTERNAL MEDICINE

## 2023-11-06 PROCEDURE — 99999 PR PBB SHADOW E&M-EST. PATIENT-LVL III: ICD-10-PCS | Mod: PBBFAC,,, | Performed by: INTERNAL MEDICINE

## 2023-11-06 PROCEDURE — 99999 PR PBB SHADOW E&M-EST. PATIENT-LVL III: CPT | Mod: PBBFAC,,, | Performed by: INTERNAL MEDICINE

## 2023-11-06 PROCEDURE — 3044F HG A1C LEVEL LT 7.0%: CPT | Mod: CPTII,S$GLB,, | Performed by: INTERNAL MEDICINE

## 2023-11-06 PROCEDURE — 99213 PR OFFICE/OUTPT VISIT, EST, LEVL III, 20-29 MIN: ICD-10-PCS | Mod: S$GLB,,, | Performed by: INTERNAL MEDICINE

## 2023-11-06 RX ORDER — TIZANIDINE 2 MG/1
2 TABLET ORAL DAILY PRN
Qty: 30 TABLET | Refills: 1 | Status: SHIPPED | OUTPATIENT
Start: 2023-11-06 | End: 2024-01-05

## 2023-11-06 RX ORDER — ALBUTEROL SULFATE 90 UG/1
2 AEROSOL, METERED RESPIRATORY (INHALATION) EVERY 6 HOURS PRN
Qty: 18 G | Refills: 3 | Status: SHIPPED | OUTPATIENT
Start: 2023-11-06 | End: 2024-11-05

## 2023-11-06 NOTE — PROGRESS NOTES
Subjective:       Patient ID: Johanna Ocasio is a 38 y.o. female.    Chief Complaint: GI Problem and Back Pain      Went to the ER on 10/20 after developing acute upper back and left shoulder pain. Was also having intermittent, midsternum chest pain  with associated left arm paraesthesias. This all occurred after moving some boxes around in her attic.   She also was having reflux like symptoms with globus sensation. We started a PPI last week for this and states her symptoms of GERD and lump in her throat have improved.   She had CT cervical spine, stress echo, CTA cardiac, and Chest XRAY as workup for her symptoms which have all been normal.   We started tizanidine for possible thoracic muscle strain and seems to be helping.     Past medical history:    PVCS: was having palpitations and saw cardiology. Work up revealed PVCs and she is on metoprolol. Recent stress test normal.     Migraines: now following with neurology. Now on topamax and Ubrelvy and has improved.      Health Maintenance:  HIV: negative 2019   Hep C: negative 2017   Lipids: normal lipids in 2022   Pap Smear: negative PAP and HPV in 2022   Vaccines:  up to date       Back Pain  This is a new problem. The current episode started more than 1 month ago. The problem occurs 2 to 4 times per day. The problem is unchanged. The pain is present in the thoracic spine. The quality of the pain is described as aching and burning. The pain does not radiate. The pain is at a severity of 3/10. The pain is mild. The pain is Worse during the day. The symptoms are aggravated by bending, sitting and standing. Associated symptoms include chest pain and headaches. Pertinent negatives include no abdominal pain, bladder incontinence, bowel incontinence, dysuria, fever, leg pain, numbness, paresis, paresthesias, pelvic pain, perianal numbness, tingling, weakness or weight loss. Risk factors include lack of exercise and poor posture. The treatment provided mild relief.      Review of Systems   Constitutional:  Negative for fever and weight loss.   Cardiovascular:  Positive for chest pain.   Gastrointestinal:  Negative for abdominal pain and bowel incontinence.   Genitourinary:  Negative for bladder incontinence, dysuria, hematuria and pelvic pain.   Musculoskeletal:  Positive for back pain. Negative for leg pain.   Neurological:  Positive for headaches. Negative for tingling, weakness, numbness and paresthesias.           Past Medical History:   Diagnosis Date    Abnormal Pap smear of cervix 2011    Colposcopy    Brain venous angioma 10/3/2018    Early childhood epilepsy, myoclonic     last seizure age 14; Venous hemangioma on MRI    History of migraine headaches 5/12/2016    Migraine headache      No past surgical history on file.   Patient Active Problem List   Diagnosis    History of migraine headaches    History of seizures as a child - last seizure age 14, history of venous hemangioma on brain    Brain venous angioma    Migraine without aura and without status migrainosus, not intractable        Objective:      Physical Exam  Constitutional:       Appearance: Normal appearance.   HENT:      Head: Normocephalic.   Cardiovascular:      Rate and Rhythm: Normal rate and regular rhythm.      Pulses: Normal pulses.      Heart sounds: Normal heart sounds.   Pulmonary:      Effort: Pulmonary effort is normal.      Breath sounds: Normal breath sounds.   Abdominal:      General: Abdomen is flat. Bowel sounds are normal.      Palpations: Abdomen is soft.   Musculoskeletal:         General: Normal range of motion.      Cervical back: Normal range of motion and neck supple.   Skin:     General: Skin is warm and dry.   Neurological:      General: No focal deficit present.      Mental Status: She is alert and oriented to person, place, and time.   Psychiatric:         Mood and Affect: Mood normal.         Assessment:       Problem List Items Addressed This Visit    None  Visit Diagnoses        Acute bilateral thoracic back pain    -  Primary    Relevant Orders    Ambulatory referral/consult to Physical/Occupational Therapy    Gastroesophageal reflux disease without esophagitis                Plan:         Johanna was seen today for gi problem and back pain.    Diagnoses and all orders for this visit:    Acute bilateral thoracic back pain  -     Ambulatory referral/consult to Physical/Occupational Therapy; Future  -     tiZANidine (ZANAFLEX) 2 MG tablet; Take 1 tablet (2 mg total) by mouth daily as needed (pain).  Will start PT and continue tizanadine.   If no improvement consider MRI thoracic spine     Gastroesophageal reflux disease without esophagitis  Continue PPI for 6 weeks.   Symptoms have resolved since starting     Other orders  -     albuterol (PROVENTIL/VENTOLIN HFA) 90 mcg/actuation inhaler; Inhale 2 puffs into the lungs every 6 (six) hours as needed for Wheezing. Rescue              Ivette Azul MD   Internal Medicine   Primary Care

## 2023-11-07 ENCOUNTER — IMMUNIZATION (OUTPATIENT)
Dept: INTERNAL MEDICINE | Facility: CLINIC | Age: 38
End: 2023-11-07
Payer: COMMERCIAL

## 2023-11-07 PROCEDURE — 90686 FLU VACCINE (QUAD) GREATER THAN OR EQUAL TO 3YO PRESERVATIVE FREE IM: ICD-10-PCS | Mod: S$GLB,,, | Performed by: INTERNAL MEDICINE

## 2023-11-07 PROCEDURE — 90471 FLU VACCINE (QUAD) GREATER THAN OR EQUAL TO 3YO PRESERVATIVE FREE IM: ICD-10-PCS | Mod: S$GLB,,, | Performed by: INTERNAL MEDICINE

## 2023-11-07 PROCEDURE — 90471 IMMUNIZATION ADMIN: CPT | Mod: S$GLB,,, | Performed by: INTERNAL MEDICINE

## 2023-11-07 PROCEDURE — 90686 IIV4 VACC NO PRSV 0.5 ML IM: CPT | Mod: S$GLB,,, | Performed by: INTERNAL MEDICINE

## 2023-11-14 ENCOUNTER — CLINICAL SUPPORT (OUTPATIENT)
Dept: REHABILITATION | Facility: HOSPITAL | Age: 38
End: 2023-11-14
Payer: COMMERCIAL

## 2023-11-14 DIAGNOSIS — M54.6 ACUTE BILATERAL THORACIC BACK PAIN: ICD-10-CM

## 2023-11-14 PROCEDURE — 97161 PT EVAL LOW COMPLEX 20 MIN: CPT

## 2023-11-14 PROCEDURE — 97112 NEUROMUSCULAR REEDUCATION: CPT

## 2023-11-15 NOTE — PLAN OF CARE
[facility]  Physical Therapy Initial Evaluation    Name: Johanna Ocasio  [facility] Number: [id number]    Therapy Diagnosis:   Encounter Diagnosis   Name Primary?    Acute bilateral thoracic back pain      Physician: Ivette Azul MD    Physician Orders: PT Eval and Treat  Medical Diagnosis from Referral: M54.6 (ICD-10-CM) - Acute bilateral thoracic back pain   Evaluation Date: 11/14/2023  Authorization Period Expiration: 11/05/2024  Plan of Care Expiration: 02/14/2024  Visit # / Visits authorized: 1/1    FOTO: 57  FOTO 1st Follow-up: NA  FOTO 2nd Follow-up: NA    Time In: 1300  Time Out: 1400  Total Billable Time: 60 minutes    Precautions: Standard    Subjective     Date of onset: September, 2023  History of current condition - Johanna reports: 3-month stint of L-sided upper back and L-sided chest pain.  Reported no true JESUS; feels like she developed L-sided chest pain and N/T into the LUE. Also developed some GERD-like sx's. Had a myriad of imaging like cleared GI dysfunction; reflux responded well to meds. During this period, also developed some L-sided back pain after lifting some boxes into her attic. Pain is deep and localized to L-side of upper back. Denies any N/T at this point in time. Feels like it is a line from her chest to her upper back. Has attempted massage to slight relief, but hits a point that makes her sx's increase.  Is relatively sedentary; used to work out every day, but has not worked out since March of this year.  Has 2 kids (one requires extended needs) so she has to perform a lot of lifting and carrying (30-40#)     Imaging:   CT C-sp (10/20/2023)  Impression:  No evidence of acute cervical spine fracture or dislocation.  No spinal canal stenosis.    Prior Therapy: Yes; B knee pain  Social History: Lives with family  Occupation: RN, interventional radiology  Prior Level of Function: No issues  Current Level of Function: Issues with prolonged  "posturing    Pain:  Current 4/10, worst 8/10, best 1/10   Location: left t-sp  Description: Aching, Dull, and Tight  Aggravating Factors: Sitting, Bending, Extension, Flexing, and Lifting  Easing Factors: massage, ice, and rest    Pts Goals: Return to pain-free posturing and lifting activities    Medical History:   Past Medical History:   Diagnosis Date    Abnormal Pap smear of cervix 2011    Colposcopy    Brain venous angioma 10/3/2018    Early childhood epilepsy, myoclonic     last seizure age 14; Venous hemangioma on MRI    History of migraine headaches 5/12/2016    Migraine headache        Surgical History:   Johanna Ocasio  has no past surgical history on file.    Medications:   Johanna has a current medication list which includes the following prescription(s): albuterol, diclofenac sodium, metoprolol succinate, metoprolol tartrate, metoprolol tartrate, omeprazole, ondansetron, tizanidine, topiramate, ubrelvy, and vios aerosol delivery system.    Allergies:   Review of patient's allergies indicates:  No Known Allergies     Objective     Observation: flat t-sp    Functional Tests:   Plank: 12" pain with positioning  Wood: 5"    Lumbar ROM:    ROM Pain   Flexion 100% -   Extension 100% -   L Side Bending 100% -   R Side Bending 100% -     Limited t-sp ROM in all planes with pain at L midscap    Strength:  Shoulder Right Left   Flexion 5/5 5/5   Abduction 5/5 5/5   ER 4/5 4/5   IR 5/5 5/5   Serratus Anterior 3/5 3/5   Middle Trap 3/5 3/5   Low Trap 3/5 3/5       Joint Mobility:  Hypomobile L sided ribs 4-7    Palpation:  -Erector Spinae: L TTP  -Multifidi activation: L TTP    Intake Outcome Measure for FOTO Back Survey    Therapist reviewed FOTO scores for Johanna Ocasio on 11/14/2023.   FOTO documents entered into [employer] - see Media section.    Intake Score: 57%     Treatment     Treatment Time In: 1300  Treatment Time Out: 1400  Total Treatment time separate from Evaluation: 45 minutes    Johanna received the " treatments listed below:      Neuromuscular re-education activities to improve: Balance, Coordination, Kinesthetic, Sense, Proprioception, and Posture for 30 minutes. The following activities were included:  P/A L-sided ribs 4-7  Corkscrew P/A Mobs upper t-sp  Upper t-sp manip  Thread needle 2 x 10  Lunge t-sp openers at wall 2 x 10 ea  Prone Extensions 2 x 10  No-Money GTB 3 x 12     Therapeutic activities to improve functional performance for 0 minutes, including:    Home Exercises and Patient Education Provided     Education provided:   - periscapular strengthening; t-sp mobility  - Prognosis, activity modification, goals for therapy, role of therapy for care, exercises/HEP    Written Home Exercises Provided:  Exercises were reviewed and Johanna was able to demonstrate them prior to the end of the session.   Pt received a written copy of exercises to perform at home. Johanna demonstrated good understanding of the education provided.     See EMR under patient instructions for exercises given.     Assessment     Johanna is a 38 y.o. female referred to outpatient Physical Therapy with L-sided ribs 4-7 dysfunction; resultant tightness L-sided paraspinals, secondary weakness L midscap musculature. Will work on t-sp mobility and subsequent periscapular strengthening/endurance moving forward to improve prolonged posturing tolerance.    Pt will benefit from skilled outpatient Physical Therapy to address the deficits stated above and in the chart below, provide pt/family education, and to maximize pt's level of independence. Pt prognosis is Good.     Plan of care discussed with patient: Yes  Pt's spiritual, cultural and educational needs considered and patient is agreeable to the plan of care and goals as stated below:     Anticipated Barriers for therapy: chronic nature of condition    Medical Necessity is demonstrated by the following:    History  Co-morbidities and personal factors that may impact the plan of care [x] LOW:  no personal factors / co-morbidities  [] MODERATE: 1-2 personal factors / co-morbidities  [] HIGH: 3+ personal factors / co-morbidities    Moderate / High Support Documentation:   Co-morbidities affecting plan of care: None    Personal Factors:   No deficits     Examination  Body Structures and Functions, activity limitations and participation restrictions that may impact the plan of care [x] LOW: addressing 1-2 elements  [] MODERATE: 3+ elements  [] HIGH: 4+ elements (please support below)    Moderate / High Support Documentation: None     Clinical Presentation [x] LOW: stable  [] MODERATE: Evolving  [] HIGH: Unstable     Decision Making/ Complexity Score: low       GOALS   Short Term Goals: 4 weeks  Pt will report decreased L-sided rib pain  < / =  3/10  to increase tolerance for ADL performance and recreational routine.  Pt will improve t-sp ROM to WFL in order to be able to perform ADLs without difficulty.  Pt will improve L midscap strength by 1/3 MMT grade to increase tolerance for ADL and work activities  Pt will demonstrate tolerance to HEP to improve independence with ADL's    Long Term Goals: 8 weeks  Pt will report decreased L-sided rib pain  < / =  1/10  to increase tolerance for ADL performance and recreational routine  Pt will improve t-sp ROM to WNL in order to be able to perform ADLs without difficulty  Pt will improve L midscap strength by 1/3 MMT grade to increase tolerance for ADL and work activities  Pt will report at CJ level (20-40% impaired) on FOTO Back to demonstrate increase in LE function with every day tasks.     Plan   Plan of care Certification: 11/14/2023 to 02/14/2024.    Outpatient Physical Therapy 1-2 times weekly for 8 weeks to include the following interventions: Gait Training, Manual Therapy, Moist Heat/ Ice, Neuromuscular Re-ed, Patient Education, Therapeutic Activites, Therapeutic Exercise, and Functional Dry Needling with/or without Electrical Stimulation as needed.      Kofi Morillo, PT , DPT, OCS  Board Certified in Orthopedic Physical Therapy

## 2023-11-20 ENCOUNTER — CLINICAL SUPPORT (OUTPATIENT)
Dept: REHABILITATION | Facility: HOSPITAL | Age: 38
End: 2023-11-20
Payer: COMMERCIAL

## 2023-11-20 DIAGNOSIS — M54.6 CHRONIC LEFT-SIDED THORACIC BACK PAIN: Primary | ICD-10-CM

## 2023-11-20 DIAGNOSIS — G89.29 CHRONIC LEFT-SIDED THORACIC BACK PAIN: Primary | ICD-10-CM

## 2023-11-20 PROCEDURE — 97530 THERAPEUTIC ACTIVITIES: CPT

## 2023-11-20 PROCEDURE — 97112 NEUROMUSCULAR REEDUCATION: CPT

## 2023-11-20 NOTE — PROGRESS NOTES
Physical Therapy Daily Treatment Note     Name: Johanna Ocasio  Clinic Number: [id number]    Therapy Diagnosis:   Encounter Diagnosis   Name Primary?    Chronic left-sided thoracic back pain Yes     Physician: No ref. provider found    Visit Date: 11/20/2023    Physician Orders: PT Eval and Treat  Medical Diagnosis from Referral: M54.6 (ICD-10-CM) - Acute bilateral thoracic back pain   Evaluation Date: 11/14/2023  Authorization Period Expiration: 11/05/2024  Plan of Care Expiration: 02/14/2024  Visit # / Visits authorized: 2/10     FOTO: 57  FOTO 1st Follow-up: NA  FOTO 2nd Follow-up: NA     Time In: 1100  Time Out: 1200  Total Billable Time: 60 minutes     Precautions: Standard    Subjective     Pt reports first return since last session; sore in the immediate days, but improved after that time.  She was compliant with home exercise program.  Response to previous treatment: Good  Functional change: improved resting sx behavior    Pain: 4/10  Location: left back      Objective     Objective Measures updated at progress report unless specified    Treatment     PT Tech Extender utilized under supervision throughout tx session    Johanna received the treatments listed below:       Neuromuscular re-education activities to improve: Balance, Coordination, Kinesthetic, Sense, Proprioception, and Posture for 38 minutes. The following activities were included:  P/A L-sided ribs 4-7  Corkscrew P/A Mobs upper t-sp  Upper t-sp manip  Thread needle 2 x 10  Lunge t-sp openers at wall 2 x 10 ea  Prone Extensions 2 x 10  No-Money GTB 3 x 12      Therapeutic activities to improve functional performance for 23 minutes, including:  UBE completed for 10' to increase ROM, endurance, and decrease pain to improve tolerance to ADLs and age-related activities  Banded Carries 5 laps     Home Exercises and Patient Education Provided      Education provided:   - periscapular strengthening; t-sp mobility  - Prognosis, activity modification, goals  for therapy, role of therapy for care, exercises/HEP     Written Home Exercises Provided:  Exercises were reviewed and Johanna was able to demonstrate them prior to the end of the session.   Pt received a written copy of exercises to perform at home. Johanna demonstrated good understanding of the education provided.      See EMR under patient instructions for exercises given.     Assessment     L-sided ribs 4-7 dysfunction   First return visit. Introduced more MT to L-sided musculature; progressed active periscapular activity to offload irritated tissue.  Johanna Is progressing well towards her goals.   Pt prognosis is Good.     Pt will continue to benefit from skilled outpatient physical therapy to address the deficits listed in the problem list box on initial evaluation, provide pt/family education and to maximize pt's level of independence in the home and community environment.     Pt's spiritual, cultural and educational needs considered and pt agreeable to plan of care and goals.    Anticipated barriers to physical therapy: chronic nature of condition     GOALS   Short Term Goals: 4 weeks  Pt will report decreased L-sided rib pain  < / =  3/10  to increase tolerance for ADL performance and recreational routine.  Pt will improve t-sp ROM to WFL in order to be able to perform ADLs without difficulty.  Pt will improve L midscap strength by 1/3 MMT grade to increase tolerance for ADL and work activities  Pt will demonstrate tolerance to HEP to improve independence with ADL's     Long Term Goals: 8 weeks  Pt will report decreased L-sided rib pain  < / =  1/10  to increase tolerance for ADL performance and recreational routine  Pt will improve t-sp ROM to WNL in order to be able to perform ADLs without difficulty  Pt will improve L midscap strength by 1/3 MMT grade to increase tolerance for ADL and work activities  Pt will report at CJ level (20-40% impaired) on FOTO Back to demonstrate increase in LE function with every  day tasks.     Plan     Continue per RAY Morillo, PT, DPT  Board Certified in Orthopedic Physical Therapy

## 2023-11-27 ENCOUNTER — CLINICAL SUPPORT (OUTPATIENT)
Dept: REHABILITATION | Facility: HOSPITAL | Age: 38
End: 2023-11-27
Payer: COMMERCIAL

## 2023-11-27 DIAGNOSIS — G89.29 CHRONIC LEFT-SIDED THORACIC BACK PAIN: Primary | ICD-10-CM

## 2023-11-27 DIAGNOSIS — M54.6 CHRONIC LEFT-SIDED THORACIC BACK PAIN: Primary | ICD-10-CM

## 2023-11-27 PROCEDURE — 97112 NEUROMUSCULAR REEDUCATION: CPT

## 2023-11-27 PROCEDURE — 97530 THERAPEUTIC ACTIVITIES: CPT

## 2023-11-27 NOTE — PROGRESS NOTES
Physical Therapy Daily Treatment Note     Name: Johanna Ocasio  Clinic Number: [id number]    Therapy Diagnosis:   Encounter Diagnosis   Name Primary?    Chronic left-sided thoracic back pain Yes     Physician: Ivette Azul MD    Visit Date: 11/27/2023    Physician Orders: PT Eval and Treat  Medical Diagnosis from Referral: M54.6 (ICD-10-CM) - Acute bilateral thoracic back pain   Evaluation Date: 11/14/2023  Authorization Period Expiration: 11/05/2024  Plan of Care Expiration: 02/14/2024  Visit # / Visits authorized: 2/12 (+ EVAL)     FOTO: 57  FOTO 1st Follow-up: NA  FOTO 2nd Follow-up: NA     Time In: 1000  Time Out: 1100  Total Billable Time: 60 minutes     Precautions: Standard    Subjective     Pt reports improvement in midscap pain since last session.  She was compliant with home exercise program.  Response to previous treatment: Good  Functional change: improved resting sx behavior    Pain: 4/10  Location: left back      Objective     Objective Measures updated at progress report unless specified    Treatment     Johanna received the treatments listed below:       Neuromuscular re-education activities to improve: Balance, Coordination, Kinesthetic, Sense, Proprioception, and Posture for 38 minutes. The following activities were included:  P/A L-sided ribs 4-7  Corkscrew P/A Mobs upper t-sp  Upper t-sp manip  Thread needle 2 x 10  Lunge t-sp openers at wall 2 x 10 ea  Prone Extensions 2 x 10  No-Money GTB 3 x 12      Therapeutic activities to improve functional performance for 23 minutes, including:  UBE completed for 10' to increase ROM, endurance, and decrease pain to improve tolerance to ADLs and age-related activities  Banded Carries 5 laps     Home Exercises and Patient Education Provided      Education provided:   - periscapular strengthening; t-sp mobility  - Prognosis, activity modification, goals for therapy, role of therapy for care, exercises/HEP     Written Home Exercises Provided:  Exercises  were reviewed and Johanna was able to demonstrate them prior to the end of the session.   Pt received a written copy of exercises to perform at home. Johanna demonstrated good understanding of the education provided.      See EMR under patient instructions for exercises given.     Assessment     L-sided ribs 4-7 dysfunction   Continued to work on L UT and L midscap muscle tightness and t-sp stiffness. Continued to advocate for endurance of periscapular musculature moving forward and more active HEP.  Johanna Is progressing well towards her goals.   Pt prognosis is Good.     Pt will continue to benefit from skilled outpatient physical therapy to address the deficits listed in the problem list box on initial evaluation, provide pt/family education and to maximize pt's level of independence in the home and community environment.     Pt's spiritual, cultural and educational needs considered and pt agreeable to plan of care and goals.    Anticipated barriers to physical therapy: chronic nature of condition     GOALS   Short Term Goals: 4 weeks  Pt will report decreased L-sided rib pain  < / =  3/10  to increase tolerance for ADL performance and recreational routine.  Pt will improve t-sp ROM to WFL in order to be able to perform ADLs without difficulty.  Pt will improve L midscap strength by 1/3 MMT grade to increase tolerance for ADL and work activities  Pt will demonstrate tolerance to HEP to improve independence with ADL's     Long Term Goals: 8 weeks  Pt will report decreased L-sided rib pain  < / =  1/10  to increase tolerance for ADL performance and recreational routine  Pt will improve t-sp ROM to WNL in order to be able to perform ADLs without difficulty  Pt will improve L midscap strength by 1/3 MMT grade to increase tolerance for ADL and work activities  Pt will report at CJ level (20-40% impaired) on FOTO Back to demonstrate increase in LE function with every day tasks.     Plan     Continue per RAY Kirby  Yisel, PT, DPT  Board Certified in Orthopedic Physical Therapy

## 2023-11-30 ENCOUNTER — OFFICE VISIT (OUTPATIENT)
Dept: CARDIOLOGY | Facility: CLINIC | Age: 38
End: 2023-11-30
Payer: COMMERCIAL

## 2023-11-30 VITALS
SYSTOLIC BLOOD PRESSURE: 114 MMHG | HEART RATE: 91 BPM | HEIGHT: 60 IN | WEIGHT: 158.75 LBS | OXYGEN SATURATION: 99 % | RESPIRATION RATE: 15 BRPM | DIASTOLIC BLOOD PRESSURE: 73 MMHG | BODY MASS INDEX: 31.17 KG/M2

## 2023-11-30 DIAGNOSIS — R07.9 CHEST PAIN, UNSPECIFIED TYPE: Primary | ICD-10-CM

## 2023-11-30 PROCEDURE — 99214 PR OFFICE/OUTPT VISIT, EST, LEVL IV, 30-39 MIN: ICD-10-PCS | Mod: S$GLB,,, | Performed by: INTERNAL MEDICINE

## 2023-11-30 PROCEDURE — 99999 PR PBB SHADOW E&M-EST. PATIENT-LVL IV: CPT | Mod: PBBFAC,,, | Performed by: INTERNAL MEDICINE

## 2023-11-30 PROCEDURE — 99999 PR PBB SHADOW E&M-EST. PATIENT-LVL IV: ICD-10-PCS | Mod: PBBFAC,,, | Performed by: INTERNAL MEDICINE

## 2023-11-30 PROCEDURE — 3078F PR MOST RECENT DIASTOLIC BLOOD PRESSURE < 80 MM HG: ICD-10-PCS | Mod: CPTII,S$GLB,, | Performed by: INTERNAL MEDICINE

## 2023-11-30 PROCEDURE — 3044F PR MOST RECENT HEMOGLOBIN A1C LEVEL <7.0%: ICD-10-PCS | Mod: CPTII,S$GLB,, | Performed by: INTERNAL MEDICINE

## 2023-11-30 PROCEDURE — 1159F MED LIST DOCD IN RCRD: CPT | Mod: CPTII,S$GLB,, | Performed by: INTERNAL MEDICINE

## 2023-11-30 PROCEDURE — 3044F HG A1C LEVEL LT 7.0%: CPT | Mod: CPTII,S$GLB,, | Performed by: INTERNAL MEDICINE

## 2023-11-30 PROCEDURE — 3008F BODY MASS INDEX DOCD: CPT | Mod: CPTII,S$GLB,, | Performed by: INTERNAL MEDICINE

## 2023-11-30 PROCEDURE — 3074F PR MOST RECENT SYSTOLIC BLOOD PRESSURE < 130 MM HG: ICD-10-PCS | Mod: CPTII,S$GLB,, | Performed by: INTERNAL MEDICINE

## 2023-11-30 PROCEDURE — 99214 OFFICE O/P EST MOD 30 MIN: CPT | Mod: S$GLB,,, | Performed by: INTERNAL MEDICINE

## 2023-11-30 PROCEDURE — 3008F PR BODY MASS INDEX (BMI) DOCUMENTED: ICD-10-PCS | Mod: CPTII,S$GLB,, | Performed by: INTERNAL MEDICINE

## 2023-11-30 PROCEDURE — 3074F SYST BP LT 130 MM HG: CPT | Mod: CPTII,S$GLB,, | Performed by: INTERNAL MEDICINE

## 2023-11-30 PROCEDURE — 1159F PR MEDICATION LIST DOCUMENTED IN MEDICAL RECORD: ICD-10-PCS | Mod: CPTII,S$GLB,, | Performed by: INTERNAL MEDICINE

## 2023-11-30 PROCEDURE — 3078F DIAST BP <80 MM HG: CPT | Mod: CPTII,S$GLB,, | Performed by: INTERNAL MEDICINE

## 2023-11-30 NOTE — PROGRESS NOTES
CARDIOVASCULAR CONSULTATION    REASON FOR CONSULT:   Johanna Ocasio is a 38 y.o. female who presents for follow-up.      HISTORY OF PRESENT ILLNESS:     Patient is a pleasant 36-year-old lady.  Main complaint is palpitations.  Had echo and Holter done because of that.  Echo and Holter showed normal left ventricle systolic function.  Holter showed rare PACs and PVCs.  Denies orthopnea, PND, swelling of feet.    Notes from February 2022:  Patient here for follow-up.  States her palpitations have gone away and decreased significantly since starting the metoprolol.  Now she hardly feels them.    Notes from September 23:  Patient here for follow-up after a long hiatus.  States had an episode of chest pain a few weeks ago.  At rest.  Substernal.  Pressure-like.  Lasted an hour and then went away.  Has not had recurrent episodes.  Denies orthopnea, PND.  Palpitations have improved significantly since taking metoprolol    Notes from November 23:  Patient here for follow-up.  Denies any chest pains at rest on exertion, orthopnea, PND, swelling of feet.  Has been doing fine.  Coronary CTA did not show any significant coronary artery stenosis or calcium score was 0        Coronary angiography     Dominance: Left.     LM: The left main coronary artery arises from the left sinus of Valsalva. It divides into the LAD  and LCx arteries. It is normal.     LAD: The left anterior descending artery is a moderate size vessel that wraps around the apex.  It is normal.  Two diagonal branches are identified and appear normal.     LCx: The left circumflex artery is a moderate size vessel that runs in the AV groove. It is normal. Two marginal branches are identified.  The distal LCx extends up to the posterior interventricular groove.  The (L) -PDA is not identified.     RCA: The right coronary artery arises from the right sinus of Valsalva. It is a moderate size vessel.  It is normal.     Cardiac and great vessel morphology     The aortic  root and ascending aorta are normal in size.   No dissection is visualized within the field of view. The pulmonary artery is normal in size.     The aortic valve appears trileaflet and normal.  The pericardium is normal. Pulmonary venous drainage is normal.     CONCLUSION:     1. CAD-RADS 0.     2.  Normal coronary arteries.  Smaller distal vessels are not clearly visualized.     3. Coronary calcium score 0.     4.  Post processed images are available for review in PACS under the heading Recons: HKD.      Left Ventricle: There is normal systolic function with a visually estimated ejection fraction of 55 - 60%.    Right Ventricle: Normal right ventricular cavity size. Systolic function is normal.    Pulmonary Artery: The estimated pulmonary artery systolic pressure is 39 mmHg.    IVC/SVC: Normal venous pressure at 3 mmHg.    Stress Protocol: The patient exercised for 9 minutes 1 seconds on a Luis Miguel protocol, corresponding to a functional capacity of 10 METS, achieving a peak heart rate of 166 bpm, which is 96 % of the age predicted maximum heart rate.    Baseline ECG: The Baseline ECG reveals sinus rhythm.    Stress ECG: There is 1.0 mm of upward-sloping ST segment depression in the inferolateral leads (II, III, aVF, V5 and V6) noted during stress.    ECG Conclusion: The ECG portion of the study is negative for ischemia.    Post-stress Echo: The left ventricle systolic function is normal.    Post-stress Impression: The study is negative with no echocardiographic evidence of stress induced ischemia.         Results for orders placed during the hospital encounter of 11/17/21    Echo    Interpretation Summary  · The estimated ejection fraction is 55%.  · Normal systolic function.  · Normal left ventricular diastolic function.  · Normal right ventricular size with normal right ventricular systolic function.  · Mild left atrial enlargement.  · Normal central venous pressure (3 mmHg).  · The estimated PA systolic pressure  is 28 mmHg.      PAST MEDICAL HISTORY:     Past Medical History:   Diagnosis Date    Abnormal Pap smear of cervix 2011    Colposcopy    Brain venous angioma 10/3/2018    Early childhood epilepsy, myoclonic     last seizure age 14; Venous hemangioma on MRI    History of migraine headaches 5/12/2016    Migraine headache        PAST SURGICAL HISTORY:   No past surgical history on file.    ALLERGIES AND MEDICATION:   Review of patient's allergies indicates:  No Known Allergies     Medication List            Accurate as of November 30, 2023  9:48 AM. If you have any questions, ask your nurse or doctor.                CONTINUE taking these medications      albuterol 90 mcg/actuation inhaler  Commonly known as: PROVENTIL/VENTOLIN HFA  Inhale 2 puffs into the lungs every 6 (six) hours as needed for Wheezing. Rescue     diclofenac sodium 1 % Gel  Commonly known as: VOLTAREN  Apply 2 g topically 4 (four) times daily.     metoprolol succinate 25 MG 24 hr tablet  Commonly known as: TOPROL-XL  Take 1 tablet (25 mg total) by mouth once daily.     * metoprolol tartrate 50 MG tablet  Commonly known as: LOPRESSOR  If heart rate >60 bpm:  take 50 mg tablet 1 hour prior to CTA.  Bring second tab with you to the procedure.     * metoprolol tartrate 100 MG tablet  Commonly known as: LOPRESSOR  If heart rate >75 bpm take metoprolol OR ivabradine: take 100 mg tablet 1 hour prior to CTA.  Bring second tab with you to the procedure.     omeprazole 20 MG capsule  Commonly known as: PRILOSEC  Take 1 capsule (20 mg total) by mouth once daily.     ondansetron 4 MG tablet  Commonly known as: ZOFRAN  TAKE 1 TABLET BY MOUTH EVERY 8 HOURS AS NEEDED     tiZANidine 2 MG tablet  Commonly known as: ZANAFLEX  Take 1 tablet (2 mg total) by mouth daily as needed (pain).     topiramate 50 MG tablet  Commonly known as: TOPAMAX  Take 1 tablet (50 mg total) by mouth once daily.     UBRELVY 100 mg tablet  Generic drug: ubrogepant  Take 1 tablet by mouth at the  onset of a headache. May repeat based on response and tolerability after more than 2 hours if needed. Do not take more than 200mg in a 24 hour span.     VIOS AEROSOL DELIVERY SYSTEM Martha  Generic drug: nebulizer and compressor           * This list has 2 medication(s) that are the same as other medications prescribed for you. Read the directions carefully, and ask your doctor or other care provider to review them with you.                  SOCIAL HISTORY:     Social History     Socioeconomic History    Marital status:    Tobacco Use    Smoking status: Never    Smokeless tobacco: Never   Substance and Sexual Activity    Alcohol use: No     Comment: Socially/ pre pregnancy     Drug use: No    Sexual activity: Yes     Partners: Male     Birth control/protection: None       FAMILY HISTORY:     Family History   Problem Relation Age of Onset    Hypertension Mother     Hypertension Father     Cataracts Maternal Grandfather     Diabetes Paternal Grandmother     Cancer Paternal Grandfather         colon cancer    Breast cancer Neg Hx     Colon cancer Neg Hx     Ovarian cancer Neg Hx     Melanoma Neg Hx     Lupus Neg Hx     Psoriasis Neg Hx        REVIEW OF SYSTEMS:   Review of Systems   Constitutional: Negative.   HENT: Negative.    Eyes: Negative.    Respiratory: Negative.    Endocrine: Negative.    Hematologic/Lymphatic: Negative.    Skin: Negative.    Musculoskeletal: Negative.    Gastrointestinal: Negative.    Genitourinary: Negative.    Neurological: Negative.    Psychiatric/Behavioral: Negative.    Allergic/Immunologic: Negative.        A 10 point review of systems was performed and all the pertinent positives have been mentioned. Rest of review of systems was negative.        PHYSICAL EXAM:     Vitals:    11/30/23 0935   BP: 114/73   Pulse: 91   Resp: 15    Body mass index is 31 kg/m².  Weight: 72 kg (158 lb 11.7 oz)   Height: 5' (152.4 cm)     Physical Exam  Constitutional:       Appearance: Normal appearance.  She is well-developed.   HENT:      Head: Normocephalic.   Eyes:      Pupils: Pupils are equal, round, and reactive to light.   Cardiovascular:      Rate and Rhythm: Normal rate and regular rhythm.   Pulmonary:      Effort: Pulmonary effort is normal.      Breath sounds: Normal breath sounds.   Abdominal:      General: Bowel sounds are normal.      Palpations: Abdomen is soft.      Tenderness: There is no abdominal tenderness.   Musculoskeletal:         General: Normal range of motion.      Cervical back: Normal range of motion and neck supple.   Skin:     General: Skin is warm.   Neurological:      Mental Status: She is alert and oriented to person, place, and time.           DATA:     Laboratory:  CBC:  Recent Labs   Lab 10/27/21  1432 07/26/23  0724 10/20/23  1705   WBC 5.88 4.45 7.85   Hemoglobin 13.4 12.7 13.1   Hematocrit 41.9 40.2 40.5   Platelets 271 262 262       CHEMISTRIES:  Recent Labs   Lab 10/27/21  1432 07/26/23  0724 10/20/23  1705   Glucose 76 89 96   Sodium 141 140 139   Potassium 4.1 4.2 3.8   BUN 16 17 14   Creatinine 0.8 0.8 0.8   eGFR if  >60  --   --    eGFR if non  >60  --   --    Calcium 10.0 9.5 9.6   Magnesium  --  1.9  --        CARDIAC BIOMARKERS:  Recent Labs   Lab 10/20/23  1705   Troponin I <0.006       COAGS:        LIPIDS/LFTS:  Recent Labs   Lab 10/27/21  1432 07/26/23  0724 10/20/23  1705   Cholesterol  --  190  --    Triglycerides  --  103  --    HDL  --  58  --    LDL Cholesterol  --  111.4  --    Non-HDL Cholesterol  --  132  --    AST 22 17 15   ALT 23 18 17       Hemoglobin A1C   Date Value Ref Range Status   07/26/2023 5.0 4.0 - 5.6 % Final     Comment:     ADA Screening Guidelines:  5.7-6.4%  Consistent with prediabetes  >or=6.5%  Consistent with diabetes    High levels of fetal hemoglobin interfere with the HbA1C  assay. Heterozygous hemoglobin variants (HbS, HgC, etc)do  not significantly interfere with this assay.   However, presence of  multiple variants may affect accuracy.     04/28/2016 5.2 4.5 - 6.2 % Final       TSH  Recent Labs   Lab 10/27/21  1432 07/26/23  0724   TSH 1.722 2.476       The ASCVD Risk score (Evy DK, et al., 2019) failed to calculate for the following reasons:    The 2019 ASCVD risk score is only valid for ages 40 to 79             ASSESSMENT AND PLAN     Patient Active Problem List   Diagnosis    History of migraine headaches    History of seizures as a child - last seizure age 14, history of venous hemangioma on brain    Brain venous angioma    Migraine without aura and without status migrainosus, not intractable    Thoracic back pain       Palpitations.  Echo showed normal left ventricle systolic function.  No significant arrhythmia noted on Holter apart from rare PACs and PVCs.  Toprol-XL has reduced palpitations significantly.     Chest pain.  Extensive workup has been done.  Stress echo has been negative.  CTA did not show any significant coronary artery stenosis.  Calcium score 0.  Noncardiac chest pain.  Patient states chest pain is getting better.    Follow-up after6 m          Thank you very much for involving me in the care of your patient.  Please do not hesitate to contact me if there are any questions.      Fartun Chapman MD, FACC, [facility]  Interventional Cardiologist, [facility].           This note was dictated with the help of speech recognition software.  There might be un-intended errors and/or substitutions.

## 2023-12-06 ENCOUNTER — CLINICAL SUPPORT (OUTPATIENT)
Dept: REHABILITATION | Facility: HOSPITAL | Age: 38
End: 2023-12-06
Payer: COMMERCIAL

## 2023-12-06 DIAGNOSIS — G89.29 CHRONIC LEFT-SIDED THORACIC BACK PAIN: Primary | ICD-10-CM

## 2023-12-06 DIAGNOSIS — M54.6 CHRONIC LEFT-SIDED THORACIC BACK PAIN: Primary | ICD-10-CM

## 2023-12-06 PROCEDURE — 97530 THERAPEUTIC ACTIVITIES: CPT

## 2023-12-06 PROCEDURE — 97112 NEUROMUSCULAR REEDUCATION: CPT

## 2023-12-06 NOTE — PROGRESS NOTES
Physical Therapy Daily Treatment Note     Name: Johanna Ocasio  Clinic Number: [id number]    Therapy Diagnosis:   Encounter Diagnosis   Name Primary?    Chronic left-sided thoracic back pain Yes     Physician: Ivette Azul MD    Visit Date: 12/6/2023    Physician Orders: PT Eval and Treat  Medical Diagnosis from Referral: M54.6 (ICD-10-CM) - Acute bilateral thoracic back pain   Evaluation Date: 11/14/2023  Authorization Period Expiration: 11/05/2024  Plan of Care Expiration: 02/14/2024  Visit # / Visits authorized: 3/12 (+ EVAL)     FOTO: 57  FOTO 1st Follow-up: NA  FOTO 2nd Follow-up: NA     Time In: 1000  Time Out: 1100  Total Billable Time: 60 minutes     Precautions: Standard    Subjective     Pt reports continued improvement in resting sx behavior and sitting tolerance.  She was compliant with home exercise program.  Response to previous treatment: Good  Functional change: improved sitting tolerance    Pain: 4/10  Location: left back      Objective     Objective Measures updated at progress report unless specified    Treatment     PT Tech Extender utilized under supervision throughout tx session    Johanna received the treatments listed below:       Neuromuscular re-education activities to improve: Balance, Coordination, Kinesthetic, Sense, Proprioception, and Posture for 38 minutes. The following activities were included:  P/A L-sided ribs 4-7  Corkscrew P/A Mobs upper t-sp  Upper t-sp manip  Thread needle 2 x 10  Lunge t-sp openers at wall 2 x 10 ea  Prone Extensions 2 x 10  No-Money GTB 3 x 12      Therapeutic activities to improve functional performance for 23 minutes, including:  UBE completed for 10' to increase ROM, endurance, and decrease pain to improve tolerance to ADLs and age-related activities  Banded Carries 5 laps     Home Exercises and Patient Education Provided      Education provided:   - periscapular strengthening; t-sp mobility  - Prognosis, activity modification, goals for therapy,  role of therapy for care, exercises/HEP     Written Home Exercises Provided:  Exercises were reviewed and Johanna was able to demonstrate them prior to the end of the session.   Pt received a written copy of exercises to perform at home. Johanna demonstrated good understanding of the education provided.      See EMR under patient instructions for exercises given.     Assessment     L-sided ribs 4-7 dysfunction   Continued to educate on need for strengthening/endurance moving forward to prevent compensatory midscap tightness. Weak/needs frequent rest breaks with midscap interventions, but improving each session.  Johanna Is progressing well towards her goals.   Pt prognosis is Good.     Pt will continue to benefit from skilled outpatient physical therapy to address the deficits listed in the problem list box on initial evaluation, provide pt/family education and to maximize pt's level of independence in the home and community environment.     Pt's spiritual, cultural and educational needs considered and pt agreeable to plan of care and goals.    Anticipated barriers to physical therapy: chronic nature of condition     GOALS   Short Term Goals: 4 weeks  Pt will report decreased L-sided rib pain  < / =  3/10  to increase tolerance for ADL performance and recreational routine.  Pt will improve t-sp ROM to WFL in order to be able to perform ADLs without difficulty.  Pt will improve L midscap strength by 1/3 MMT grade to increase tolerance for ADL and work activities  Pt will demonstrate tolerance to HEP to improve independence with ADL's     Long Term Goals: 8 weeks  Pt will report decreased L-sided rib pain  < / =  1/10  to increase tolerance for ADL performance and recreational routine  Pt will improve t-sp ROM to WNL in order to be able to perform ADLs without difficulty  Pt will improve L midscap strength by 1/3 MMT grade to increase tolerance for ADL and work activities  Pt will report at CJ level (20-40% impaired) on  FOTO Back to demonstrate increase in LE function with every day tasks.     Plan     Continue per POC    Kofi Morillo, PT, DPT  Board Certified in Orthopedic Physical Therapy

## 2023-12-11 ENCOUNTER — CLINICAL SUPPORT (OUTPATIENT)
Dept: REHABILITATION | Facility: HOSPITAL | Age: 38
End: 2023-12-11
Payer: COMMERCIAL

## 2023-12-11 DIAGNOSIS — M54.6 CHRONIC LEFT-SIDED THORACIC BACK PAIN: Primary | ICD-10-CM

## 2023-12-11 DIAGNOSIS — G89.29 CHRONIC LEFT-SIDED THORACIC BACK PAIN: Primary | ICD-10-CM

## 2023-12-11 PROCEDURE — 97112 NEUROMUSCULAR REEDUCATION: CPT

## 2023-12-11 PROCEDURE — 97530 THERAPEUTIC ACTIVITIES: CPT

## 2023-12-11 NOTE — PROGRESS NOTES
Physical Therapy Daily Treatment Note     Name: Johanna Ocasio  Clinic Number: [id number]    Therapy Diagnosis:   Encounter Diagnosis   Name Primary?    Chronic left-sided thoracic back pain Yes     Physician: Ivette Azul MD    Visit Date: 12/11/2023    Physician Orders: PT Eval and Treat  Medical Diagnosis from Referral: M54.6 (ICD-10-CM) - Acute bilateral thoracic back pain   Evaluation Date: 11/14/2023  Authorization Period Expiration: 11/05/2024  Plan of Care Expiration: 02/14/2024  Visit # / Visits authorized: 4/12 (+ EVAL)     FOTO: 57  FOTO 1st Follow-up: NA  FOTO 2nd Follow-up: NA     Time In: 1010  Time Out: 1110  Total Billable Time: 60 minutes     Precautions: Standard    Subjective     Pt reports relatively minimal improvement in sx behavior since last session. Had to have her  use the massage gun on the symptomatic region this weekend.  She was compliant with home exercise program.  Response to previous treatment: Good  Functional change: improved sitting tolerance    Pain: 4/10  Location: left back      Objective     Objective Measures updated at progress report unless specified    Treatment     PT Tech Extender utilized under supervision throughout tx session    Johanna received the treatments listed below:       Neuromuscular re-education activities to improve: Balance, Coordination, Kinesthetic, Sense, Proprioception, and Posture for 38 minutes. The following activities were included:  P/A L-sided ribs 4-7  Corkscrew P/A Mobs upper t-sp  Upper t-sp manip  Thread needle 2 x 10  Prone Extensions 2 x 10       Therapeutic activities to improve functional performance for 23 minutes, including:  UBE completed for 10' to increase ROM, endurance, and decrease pain to improve tolerance to ADLs and age-related activities  Prone T's 1# 3 x 12     Home Exercises and Patient Education Provided      Education provided:   - periscapular strengthening; t-sp mobility  - Prognosis, activity modification,  goals for therapy, role of therapy for care, exercises/HEP     Written Home Exercises Provided:  Exercises were reviewed and Johanna was able to demonstrate them prior to the end of the session.   Pt received a written copy of exercises to perform at home. Johanna demonstrated good understanding of the education provided.      See EMR under patient instructions for exercises given.     Assessment     L-sided ribs 4-7 dysfunction   Discussed subjective feeling of sx improvement with continued PT sessions. In-person sessions focused more on hands-on training and exercise progression she can perform outside of the clinic. Advocated for continued strengthening program as part of HEP.  Johanna Is progressing well towards her goals.   Pt prognosis is Good.     Pt will continue to benefit from skilled outpatient physical therapy to address the deficits listed in the problem list box on initial evaluation, provide pt/family education and to maximize pt's level of independence in the home and community environment.     Pt's spiritual, cultural and educational needs considered and pt agreeable to plan of care and goals.    Anticipated barriers to physical therapy: chronic nature of condition     GOALS   Short Term Goals: 4 weeks  Pt will report decreased L-sided rib pain  < / =  3/10  to increase tolerance for ADL performance and recreational routine.  Pt will improve t-sp ROM to WFL in order to be able to perform ADLs without difficulty.  Pt will improve L midscap strength by 1/3 MMT grade to increase tolerance for ADL and work activities  Pt will demonstrate tolerance to HEP to improve independence with ADL's     Long Term Goals: 8 weeks  Pt will report decreased L-sided rib pain  < / =  1/10  to increase tolerance for ADL performance and recreational routine  Pt will improve t-sp ROM to WNL in order to be able to perform ADLs without difficulty  Pt will improve L midscap strength by 1/3 MMT grade to increase tolerance for ADL  and work activities  Pt will report at CJ level (20-40% impaired) on FOTO Back to demonstrate increase in LE function with every day tasks.     Plan     Continue per RAY Morillo, PT, DPT  Board Certified in Orthopedic Physical Therapy

## 2023-12-17 ENCOUNTER — PATIENT MESSAGE (OUTPATIENT)
Dept: REHABILITATION | Facility: HOSPITAL | Age: 38
End: 2023-12-17
Payer: COMMERCIAL

## 2023-12-19 ENCOUNTER — CLINICAL SUPPORT (OUTPATIENT)
Dept: REHABILITATION | Facility: HOSPITAL | Age: 38
End: 2023-12-19
Payer: COMMERCIAL

## 2023-12-19 DIAGNOSIS — M54.6 CHRONIC LEFT-SIDED THORACIC BACK PAIN: Primary | ICD-10-CM

## 2023-12-19 DIAGNOSIS — G89.29 CHRONIC LEFT-SIDED THORACIC BACK PAIN: Primary | ICD-10-CM

## 2023-12-19 PROCEDURE — 97530 THERAPEUTIC ACTIVITIES: CPT

## 2023-12-19 PROCEDURE — 97112 NEUROMUSCULAR REEDUCATION: CPT

## 2023-12-19 NOTE — PROGRESS NOTES
Physical Therapy Daily Treatment Note     Name: Johanna Ocasio  Clinic Number: [id number]    Therapy Diagnosis:   Encounter Diagnosis   Name Primary?    Chronic left-sided thoracic back pain Yes     Physician: Ivette Azul MD    Visit Date: 12/19/2023    Physician Orders: PT Eval and Treat  Medical Diagnosis from Referral: M54.6 (ICD-10-CM) - Acute bilateral thoracic back pain   Evaluation Date: 11/14/2023  Authorization Period Expiration: 11/05/2024  Plan of Care Expiration: 02/14/2024  Visit # / Visits authorized: 5/12 (+ EVAL)     FOTO: 57  FOTO 1st Follow-up: NA  FOTO 2nd Follow-up: NA     Time In: 1300  Time Out: 1400  Total Billable Time: 60 minutes     Precautions: Standard    Subjective     Pt reports still having sx behavior into the L medial scap border.   She was compliant with home exercise program.  Response to previous treatment: Good  Functional change: improved sitting tolerance    Pain: 4/10  Location: left back      Objective     Objective Measures updated at progress report unless specified    Treatment     PT Tech Extender utilized under supervision throughout tx session    Johanna received the treatments listed below:       Neuromuscular re-education activities to improve: Balance, Coordination, Kinesthetic, Sense, Proprioception, and Posture for 38 minutes. The following activities were included:  P/A L-sided ribs 4-7  Corkscrew P/A Mobs upper t-sp  Upper t-sp manip  Thread needle 2 x 10  Prone Extensions 2 x 10       Therapeutic activities to improve functional performance for 23 minutes, including:  UBE completed for 10' to increase ROM, endurance, and decrease pain to improve tolerance to ADLs and age-related activities  Prone T's 1# 3 x 12     Home Exercises and Patient Education Provided      Education provided:   - periscapular strengthening; t-sp mobility  - Prognosis, activity modification, goals for therapy, role of therapy for care, exercises/HEP     Written Home Exercises  Provided:  Exercises were reviewed and Johanna was able to demonstrate them prior to the end of the session.   Pt received a written copy of exercises to perform at home. Johanna demonstrated good understanding of the education provided.      See EMR under patient instructions for exercises given.     Assessment     L-sided ribs 4-7 dysfunction   Majority of session spent active with continued conditioning for periscapular region. Fatigues easily, but less sx behavior during session. Assess response next session.  Johanna Is progressing well towards her goals.   Pt prognosis is Good.     Pt will continue to benefit from skilled outpatient physical therapy to address the deficits listed in the problem list box on initial evaluation, provide pt/family education and to maximize pt's level of independence in the home and community environment.     Pt's spiritual, cultural and educational needs considered and pt agreeable to plan of care and goals.    Anticipated barriers to physical therapy: chronic nature of condition     GOALS   Short Term Goals: 4 weeks  Pt will report decreased L-sided rib pain  < / =  3/10  to increase tolerance for ADL performance and recreational routine.  Pt will improve t-sp ROM to WFL in order to be able to perform ADLs without difficulty.  Pt will improve L midscap strength by 1/3 MMT grade to increase tolerance for ADL and work activities  Pt will demonstrate tolerance to HEP to improve independence with ADL's     Long Term Goals: 8 weeks  Pt will report decreased L-sided rib pain  < / =  1/10  to increase tolerance for ADL performance and recreational routine  Pt will improve t-sp ROM to WNL in order to be able to perform ADLs without difficulty  Pt will improve L midscap strength by 1/3 MMT grade to increase tolerance for ADL and work activities  Pt will report at CJ level (20-40% impaired) on FOTO Back to demonstrate increase in LE function with every day tasks.     Plan     Continue per  RAY Morillo, PT, DPT  Board Certified in Orthopedic Physical Therapy

## 2023-12-29 ENCOUNTER — PATIENT MESSAGE (OUTPATIENT)
Dept: REHABILITATION | Facility: HOSPITAL | Age: 38
End: 2023-12-29
Payer: COMMERCIAL

## 2024-01-10 ENCOUNTER — CLINICAL SUPPORT (OUTPATIENT)
Dept: REHABILITATION | Facility: HOSPITAL | Age: 39
End: 2024-01-10
Payer: COMMERCIAL

## 2024-01-10 DIAGNOSIS — G89.29 CHRONIC LEFT-SIDED THORACIC BACK PAIN: Primary | ICD-10-CM

## 2024-01-10 DIAGNOSIS — M54.6 CHRONIC LEFT-SIDED THORACIC BACK PAIN: Primary | ICD-10-CM

## 2024-01-10 PROCEDURE — 97530 THERAPEUTIC ACTIVITIES: CPT

## 2024-01-10 PROCEDURE — 97112 NEUROMUSCULAR REEDUCATION: CPT

## 2024-01-10 NOTE — PROGRESS NOTES
Physical Therapy Daily Treatment Note     Name: Johanna Ocasio  Clinic Number: [id number]    Therapy Diagnosis:   Encounter Diagnosis   Name Primary?    Chronic left-sided thoracic back pain Yes     Physician: Ivette Azul MD    Visit Date: 1/10/2024    Physician Orders: PT Eval and Treat  Medical Diagnosis from Referral: M54.6 (ICD-10-CM) - Acute bilateral thoracic back pain   Evaluation Date: 11/14/2023  Authorization Period Expiration: 11/05/2024  Plan of Care Expiration: 02/14/2024  Visit # / Visits authorized: 1/20  Episodes: 7     FOTO: 57  FOTO 1st Follow-up: NA  FOTO 2nd Follow-up: NA     Time In: 1000  Time Out: 1100  Total Billable Time: 60 minutes     Precautions: Standard    Subjective     Pt reports less sx behavior into the L mid shoulder; stated that she was pulling on springs to set up a trampoline and had some soreness after the activity.  She was compliant with home exercise program.  Response to previous treatment: Good  Functional change: improved sitting tolerance    Pain: 4/10  Location: left back      Objective     Objective Measures updated at progress report unless specified    Treatment     PT Tech Extender utilized under supervision throughout tx session    Johanna received the treatments listed below:       Neuromuscular re-education activities to improve: Balance, Coordination, Kinesthetic, Sense, Proprioception, and Posture for 30 minutes. The following activities were included:  P/A L-sided ribs 4-7  Corkscrew P/A Mobs upper t-sp  Upper t-sp manip  Thread needle 2 x 10  Prone Extensions 2 x 10    Therapeutic activities to improve functional performance for 30 minutes, including:  UBE completed for 10' to increase ROM, endurance, and decrease pain to improve tolerance to ADLs and age-related activities  Prone T's 1# 3 x 12  Standing W's to OH Rene Tb 3 x 12     Home Exercises and Patient Education Provided      Education provided:   - periscapular strengthening; t-sp mobility  -  Prognosis, activity modification, goals for therapy, role of therapy for care, exercises/HEP     Written Home Exercises Provided:  Exercises were reviewed and Johanna was able to demonstrate them prior to the end of the session.   Pt received a written copy of exercises to perform at home. Johanna demonstrated good understanding of the education provided.      See EMR under patient instructions for exercises given.     Assessment     L-sided ribs 4-7 dysfunction   First session in a few weeks. Over that time, mid back sx behavior has improved; less palpable tissue restrictions along medial scap border. Progressed upper back strengthening and endurance activities across a variety of demands.  Johanna Is progressing well towards her goals.   Pt prognosis is Good.     Pt will continue to benefit from skilled outpatient physical therapy to address the deficits listed in the problem list box on initial evaluation, provide pt/family education and to maximize pt's level of independence in the home and community environment.     Pt's spiritual, cultural and educational needs considered and pt agreeable to plan of care and goals.    Anticipated barriers to physical therapy: chronic nature of condition     GOALS   Short Term Goals: 4 weeks  Pt will report decreased L-sided rib pain  < / =  3/10  to increase tolerance for ADL performance and recreational routine.  Pt will improve t-sp ROM to WFL in order to be able to perform ADLs without difficulty.  Pt will improve L midscap strength by 1/3 MMT grade to increase tolerance for ADL and work activities  Pt will demonstrate tolerance to HEP to improve independence with ADL's     Long Term Goals: 8 weeks  Pt will report decreased L-sided rib pain  < / =  1/10  to increase tolerance for ADL performance and recreational routine  Pt will improve t-sp ROM to WNL in order to be able to perform ADLs without difficulty  Pt will improve L midscap strength by 1/3 MMT grade to increase tolerance  for ADL and work activities  Pt will report at CJ level (20-40% impaired) on FOTO Back to demonstrate increase in LE function with every day tasks.     Plan     Continue per RAY Morillo, PT, DPT  Board Certified in Orthopedic Physical Therapy

## 2024-01-11 RX ORDER — ONDANSETRON 4 MG/1
TABLET, FILM COATED ORAL
Qty: 30 TABLET | Refills: 0 | Status: SHIPPED | OUTPATIENT
Start: 2024-01-11

## 2024-01-17 ENCOUNTER — CLINICAL SUPPORT (OUTPATIENT)
Dept: REHABILITATION | Facility: HOSPITAL | Age: 39
End: 2024-01-17
Payer: COMMERCIAL

## 2024-01-17 DIAGNOSIS — M54.6 CHRONIC LEFT-SIDED THORACIC BACK PAIN: Primary | ICD-10-CM

## 2024-01-17 DIAGNOSIS — G89.29 CHRONIC LEFT-SIDED THORACIC BACK PAIN: Primary | ICD-10-CM

## 2024-01-17 PROCEDURE — 97112 NEUROMUSCULAR REEDUCATION: CPT

## 2024-01-17 PROCEDURE — 97530 THERAPEUTIC ACTIVITIES: CPT

## 2024-01-17 NOTE — PROGRESS NOTES
Physical Therapy Daily Treatment Note     Name: Johanna Ocasio  Clinic Number: [id number]    Therapy Diagnosis:   Encounter Diagnosis   Name Primary?    Chronic left-sided thoracic back pain Yes     Physician: Ivette Azul MD    Visit Date: 1/17/2024    Physician Orders: PT Eval and Treat  Medical Diagnosis from Referral: M54.6 (ICD-10-CM) - Acute bilateral thoracic back pain   Evaluation Date: 11/14/2023  Authorization Period Expiration: 11/05/2024  Plan of Care Expiration: 02/14/2024  Visit # / Visits authorized: 2/20  Episodes: 8     FOTO: 57  FOTO 1st Follow-up: NA  FOTO 2nd Follow-up: NA     Time In: 1000  Time Out: 1100  Total Billable Time: 60 minutes     Precautions: Standard    Subjective     Pt reports midback/shoulder continues to feel better; has still been having some R-sided lower-back pain that developed after sitting in a cramped seat for a prolonged period.  She was compliant with home exercise program.  Response to previous treatment: Good  Functional change: improved tolerance to LUE use    Pain: 4/10  Location: lR lower-back    Objective     Objective Measures updated at progress report unless specified    Treatment     PT Tech Extender utilized under supervision throughout tx session    Johanna received the treatments listed below:       Neuromuscular re-education activities to improve: Balance, Coordination, Kinesthetic, Sense, Proprioception, and Posture for 30 minutes. The following activities were included:  P/A L-sided ribs 4-7  Corkscrew P/A Mobs upper t-sp  Upper t-sp manip  Thread needle 2 x 10  Prone Extensions 2 x 10    Therapeutic activities to improve functional performance for 30 minutes, including:  UBE completed for 10' to increase ROM, endurance, and decrease pain to improve tolerance to ADLs and age-related activities  Rock-Backs 3 x 10  Reverse HS Squats 3 x 10    NP:  Prone T's 1# 3 x 12  Standing W's to OH Rene Tb 3 x 12     Home Exercises and Patient Education Provided       Education provided:   - periscapular strengthening; t-sp mobility  - Prognosis, activity modification, goals for therapy, role of therapy for care, exercises/HEP     Written Home Exercises Provided:  Exercises were reviewed and Johanna was able to demonstrate them prior to the end of the session.   Pt received a written copy of exercises to perform at home. Johanna demonstrated good understanding of the education provided.      See EMR under patient instructions for exercises given.     Assessment     L-sided ribs 4-7 dysfunction   Shifted focus to issue with R-sided lower-back pain that developed after a period of prolonged sitting in a small space. Suspected resultant R L-sp paraspinal tightness; worked in more MT and active openers to lower-back tissue. Will assess response next session.  Johanna Is progressing well towards her goals.   Pt prognosis is Good.     Pt will continue to benefit from skilled outpatient physical therapy to address the deficits listed in the problem list box on initial evaluation, provide pt/family education and to maximize pt's level of independence in the home and community environment.     Pt's spiritual, cultural and educational needs considered and pt agreeable to plan of care and goals.    Anticipated barriers to physical therapy: chronic nature of condition     GOALS   Short Term Goals: 4 weeks  Pt will report decreased L-sided rib pain  < / =  3/10  to increase tolerance for ADL performance and recreational routine.  Pt will improve t-sp ROM to WFL in order to be able to perform ADLs without difficulty.  Pt will improve L midscap strength by 1/3 MMT grade to increase tolerance for ADL and work activities  Pt will demonstrate tolerance to HEP to improve independence with ADL's     Long Term Goals: 8 weeks  Pt will report decreased L-sided rib pain  < / =  1/10  to increase tolerance for ADL performance and recreational routine  Pt will improve t-sp ROM to WNL in order to be able to  perform ADLs without difficulty  Pt will improve L midscap strength by 1/3 MMT grade to increase tolerance for ADL and work activities  Pt will report at CJ level (20-40% impaired) on FOTO Back to demonstrate increase in LE function with every day tasks.     Plan     Continue per RAY Morillo, PT, DPT  Board Certified in Orthopedic Physical Therapy

## 2024-01-18 ENCOUNTER — OFFICE VISIT (OUTPATIENT)
Dept: INTERNAL MEDICINE | Facility: CLINIC | Age: 39
End: 2024-01-18
Payer: COMMERCIAL

## 2024-01-18 VITALS
BODY MASS INDEX: 31.41 KG/M2 | OXYGEN SATURATION: 97 % | HEIGHT: 60 IN | WEIGHT: 160 LBS | DIASTOLIC BLOOD PRESSURE: 80 MMHG | SYSTOLIC BLOOD PRESSURE: 100 MMHG | HEART RATE: 74 BPM

## 2024-01-18 DIAGNOSIS — J32.9 RHINOSINUSITIS: Primary | ICD-10-CM

## 2024-01-18 DIAGNOSIS — H92.09 OTALGIA, UNSPECIFIED LATERALITY: ICD-10-CM

## 2024-01-18 PROCEDURE — 1159F MED LIST DOCD IN RCRD: CPT | Mod: CPTII,S$GLB,, | Performed by: NURSE PRACTITIONER

## 2024-01-18 PROCEDURE — 99999 PR PBB SHADOW E&M-EST. PATIENT-LVL IV: CPT | Mod: PBBFAC,,, | Performed by: NURSE PRACTITIONER

## 2024-01-18 PROCEDURE — 3079F DIAST BP 80-89 MM HG: CPT | Mod: CPTII,S$GLB,, | Performed by: NURSE PRACTITIONER

## 2024-01-18 PROCEDURE — 3008F BODY MASS INDEX DOCD: CPT | Mod: CPTII,S$GLB,, | Performed by: NURSE PRACTITIONER

## 2024-01-18 PROCEDURE — 3074F SYST BP LT 130 MM HG: CPT | Mod: CPTII,S$GLB,, | Performed by: NURSE PRACTITIONER

## 2024-01-18 PROCEDURE — 99213 OFFICE O/P EST LOW 20 MIN: CPT | Mod: S$GLB,,, | Performed by: NURSE PRACTITIONER

## 2024-01-18 RX ORDER — METHYLPREDNISOLONE 4 MG/1
TABLET ORAL
Qty: 21 EACH | Refills: 0 | Status: SHIPPED | OUTPATIENT
Start: 2024-01-18 | End: 2024-02-08

## 2024-01-18 NOTE — PROGRESS NOTES
INTERNAL MEDICINE PROGRESS/URGENT CARE NOTE    CHIEF COMPLAINT     Chief Complaint   Patient presents with    Otalgia       HPI     Johanna Ocasio is a 38 y.o. female who presents for an urgent visit today.    Pt c/o ear pain around ear on left and pain around TMJ area on right for about 2 weeks. Started 2 weeks ago with URI symptoms- congestion, cough. Everything else resolved but still having congestion. No fevers, muffled hearing, ear discharge, cough. No dental pain or pain in jaw when eating.     Has been taking NSAIDs and doesn't notice much of a difference. Has appt with dentist next week.     Past Medical History:  Past Medical History:   Diagnosis Date    Abnormal Pap smear of cervix 2011    Colposcopy    Brain venous angioma 10/3/2018    Early childhood epilepsy, myoclonic     last seizure age 14; Venous hemangioma on MRI    History of migraine headaches 5/12/2016    Migraine headache         Past Surgical History:  No past surgical history on file.     Allergies:  Review of patient's allergies indicates:  No Known Allergies    Home Medications:    Current Outpatient Medications:     albuterol (PROVENTIL/VENTOLIN HFA) 90 mcg/actuation inhaler, Inhale 2 puffs into the lungs every 6 (six) hours as needed for Wheezing. Rescue, Disp: 18 g, Rfl: 3    diclofenac sodium (VOLTAREN) 1 % Gel, Apply 2 g topically 4 (four) times daily., Disp: 50 g, Rfl: 0    methylPREDNISolone (MEDROL DOSEPACK) 4 mg tablet, use as directed, Disp: 21 each, Rfl: 0    metoprolol succinate (TOPROL-XL) 25 MG 24 hr tablet, Take 1 tablet (25 mg total) by mouth once daily., Disp: 90 tablet, Rfl: 2    metoprolol tartrate (LOPRESSOR) 100 MG tablet, If heart rate >75 bpm take metoprolol OR ivabradine: take 100 mg tablet 1 hour prior to CTA.  Bring second tab with you to the procedure., Disp: 2 tablet, Rfl: 0    metoprolol tartrate (LOPRESSOR) 50 MG tablet, If heart rate >60 bpm:  take 50 mg tablet 1 hour prior to CTA.  Bring second tab with  you to the procedure., Disp: 2 tablet, Rfl: 0    omeprazole (PRILOSEC) 20 MG capsule, Take 1 capsule (20 mg total) by mouth once daily., Disp: 30 capsule, Rfl: 11    ondansetron (ZOFRAN) 4 MG tablet, TAKE 1 TABLET BY MOUTH EVERY 8 HOURS AS NEEDED, Disp: 30 tablet, Rfl: 0    topiramate (TOPAMAX) 50 MG tablet, Take 1 tablet (50 mg total) by mouth once daily., Disp: 30 tablet, Rfl: 5    ubrogepant (UBRELVY) 100 mg tablet, Take 1 tablet by mouth at the onset of a headache. May repeat based on response and tolerability after more than 2 hours if needed. Do not take more than 200mg in a 24 hour span., Disp: 16 tablet, Rfl: 5    VIOS AEROSOL DELIVERY SYSTEM Martha, USE AS DIRECTED, Disp: , Rfl: 0     Review of Systems:  Review of Systems   Constitutional:  Negative for fever.   HENT:  Positive for congestion, ear pain, postnasal drip and sinus pressure. Negative for dental problem, facial swelling, hearing loss, sore throat and trouble swallowing.    Respiratory:  Negative for cough and shortness of breath.    Cardiovascular:  Negative for chest pain.   Neurological:  Negative for weakness and headaches.         PHYSICAL EXAM     Vitals:    01/18/24 1021   BP: 100/80   BP Location: Right arm   Patient Position: Sitting   BP Method: Large (Manual)   Pulse: 74   SpO2: 97%   Weight: 72.6 kg (160 lb)   Height: 5' (1.524 m)      Body mass index is 31.25 kg/m².     Physical Exam  Vitals reviewed.   Constitutional:       Appearance: Normal appearance.   HENT:      Head: Normocephalic.      Jaw: No tenderness, swelling, pain on movement or malocclusion.      Right Ear: Tympanic membrane, ear canal and external ear normal. No mastoid tenderness.      Left Ear: Tympanic membrane, ear canal and external ear normal. No mastoid tenderness.      Nose:      Right Sinus: No maxillary sinus tenderness or frontal sinus tenderness.      Left Sinus: No maxillary sinus tenderness or frontal sinus tenderness.      Mouth/Throat:      Mouth: Mucous  membranes are moist.      Dentition: Normal dentition. No gingival swelling, dental abscesses or gum lesions.      Pharynx: Oropharynx is clear. Uvula midline. No uvula swelling.   Eyes:      Conjunctiva/sclera: Conjunctivae normal.      Pupils: Pupils are equal, round, and reactive to light.   Cardiovascular:      Rate and Rhythm: Normal rate and regular rhythm.   Pulmonary:      Effort: Pulmonary effort is normal.      Breath sounds: Normal breath sounds.   Skin:     General: Skin is warm and dry.   Neurological:      General: No focal deficit present.      Mental Status: She is alert.   Psychiatric:         Mood and Affect: Mood normal.         Behavior: Behavior normal.         LABS     Lab Results   Component Value Date    HGBA1C 5.0 07/26/2023     CMP  Sodium   Date Value Ref Range Status   10/20/2023 139 136 - 145 mmol/L Final     Potassium   Date Value Ref Range Status   10/20/2023 3.8 3.5 - 5.1 mmol/L Final     Chloride   Date Value Ref Range Status   10/20/2023 106 95 - 110 mmol/L Final     CO2   Date Value Ref Range Status   10/20/2023 24 23 - 29 mmol/L Final     Glucose   Date Value Ref Range Status   10/20/2023 96 70 - 110 mg/dL Final     BUN   Date Value Ref Range Status   10/20/2023 14 6 - 20 mg/dL Final     Creatinine   Date Value Ref Range Status   10/20/2023 0.8 0.5 - 1.4 mg/dL Final     Calcium   Date Value Ref Range Status   10/20/2023 9.6 8.7 - 10.5 mg/dL Final     Total Protein   Date Value Ref Range Status   10/20/2023 8.2 6.0 - 8.4 g/dL Final     Albumin   Date Value Ref Range Status   10/20/2023 4.6 3.5 - 5.2 g/dL Final     Total Bilirubin   Date Value Ref Range Status   10/20/2023 0.7 0.1 - 1.0 mg/dL Final     Comment:     For infants and newborns, interpretation of results should be based  on gestational age, weight and in agreement with clinical  observations.    Premature Infant recommended reference ranges:  Up to 24 hours.............<8.0 mg/dL  Up to 48 hours............<12.0  mg/dL  3-5 days..................<15.0 mg/dL  6-29 days.................<15.0 mg/dL       Alkaline Phosphatase   Date Value Ref Range Status   10/20/2023 49 (L) 55 - 135 U/L Final     AST   Date Value Ref Range Status   10/20/2023 15 10 - 40 U/L Final     ALT   Date Value Ref Range Status   10/20/2023 17 10 - 44 U/L Final     Anion Gap   Date Value Ref Range Status   10/20/2023 9 8 - 16 mmol/L Final     eGFR if    Date Value Ref Range Status   10/27/2021 >60 >60 mL/min/1.73 m^2 Final     eGFR if non    Date Value Ref Range Status   10/27/2021 >60 >60 mL/min/1.73 m^2 Final     Comment:     Calculation used to obtain the estimated glomerular filtration  rate (eGFR) is the CKD-EPI equation.        Lab Results   Component Value Date    WBC 7.85 10/20/2023    HGB 13.1 10/20/2023    HCT 40.5 10/20/2023    MCV 93 10/20/2023     10/20/2023     Lab Results   Component Value Date    CHOL 190 07/26/2023    CHOL 196 09/21/2010    CHOL 202 (H) 07/28/2008     Lab Results   Component Value Date    HDL 58 07/26/2023    HDL 86 (H) 09/21/2010    HDL 64 07/28/2008     Lab Results   Component Value Date    LDLCALC 111.4 07/26/2023    LDLCALC 100.6 09/21/2010    LDLCALC 124.0 07/28/2008     Lab Results   Component Value Date    TRIG 103 07/26/2023    TRIG 47 09/21/2010    TRIG 70 07/28/2008     Lab Results   Component Value Date    CHOLHDL 30.5 07/26/2023    CHOLHDL 43.9 09/21/2010    CHOLHDL 31.7 07/28/2008     Lab Results   Component Value Date    TSH 2.476 07/26/2023       ASSESSMENT     1. Rhinosinusitis    2. Otalgia, unspecified laterality           PLAN  Possible referred pain from sinuses? Teeth look okay. No sign of bacterial infection currently. Try medrol pack and keep follow up with dentist.   F/u with me for any worsening or failure to improve.     1. Otalgia, unspecified laterality    2. Rhinosinusitis  - methylPREDNISolone (MEDROL DOSEPACK) 4 mg tablet; use as directed  Dispense: 21  each; Refill: 0     Patient's plan/treatment was discussed including medications and possible side effects. Verbalized understanding of all instructions.          BIPIN Landeros  Department of Internal Medicine - [facility]  01/18/2024

## 2024-01-24 ENCOUNTER — PATIENT MESSAGE (OUTPATIENT)
Dept: OPTOMETRY | Facility: CLINIC | Age: 39
End: 2024-01-24
Payer: COMMERCIAL

## 2024-01-24 ENCOUNTER — CLINICAL SUPPORT (OUTPATIENT)
Dept: REHABILITATION | Facility: HOSPITAL | Age: 39
End: 2024-01-24
Payer: COMMERCIAL

## 2024-01-24 DIAGNOSIS — G89.29 CHRONIC LEFT-SIDED THORACIC BACK PAIN: Primary | ICD-10-CM

## 2024-01-24 DIAGNOSIS — M54.6 CHRONIC LEFT-SIDED THORACIC BACK PAIN: Primary | ICD-10-CM

## 2024-01-24 PROCEDURE — 97112 NEUROMUSCULAR REEDUCATION: CPT

## 2024-01-24 PROCEDURE — 97530 THERAPEUTIC ACTIVITIES: CPT

## 2024-01-24 NOTE — PROGRESS NOTES
Physical Therapy Daily Treatment Note     Name: Johanna Ocasio  Clinic Number: [id number]    Therapy Diagnosis:   Encounter Diagnosis   Name Primary?    Chronic left-sided thoracic back pain Yes     Physician: Ivette Azul MD    Visit Date: 1/24/2024    Physician Orders: PT Eval and Treat  Medical Diagnosis from Referral: M54.6 (ICD-10-CM) - Acute bilateral thoracic back pain   Evaluation Date: 11/14/2023  Authorization Period Expiration: 11/05/2024  Plan of Care Expiration: 02/14/2024  Visit # / Visits authorized: 3/20  Episodes: 9     FOTO: 57  FOTO 1st Follow-up: NA  FOTO 2nd Follow-up: NA     Time In: 1000  Time Out: 1100  Total Billable Time: 60 minutes     Precautions: Standard    Subjective     Pt reports feels improvement in R-side lower-back sx behavior, but pain still lingers throughout the day.  She was compliant with home exercise program.  Response to previous treatment: Good  Functional change: improved tolerance to LUE use    Pain: 4/10  Location: lR lower-back    Objective     Objective Measures updated at progress report unless specified    Treatment     PT Tech Extender utilized under supervision throughout tx session    Johanna received the treatments listed below:       Neuromuscular re-education activities to improve: Balance, Coordination, Kinesthetic, Sense, Proprioception, and Posture for 30 minutes. The following activities were included:  P/A L-sided ribs 4-7  Corkscrew P/A Mobs upper t-sp  Upper t-sp manip  Thread needle 2 x 10  Prone Extensions 2 x 10    Therapeutic activities to improve functional performance for 30 minutes, including:  UBE completed for 10' to increase ROM, endurance, and decrease pain to improve tolerance to ADLs and age-related activities  Rock-Backs 3 x 10  Reverse HS Squats 3 x 10    NP:  Prone T's 1# 3 x 12  Standing W's to OH Rene Tb 3 x 12     Home Exercises and Patient Education Provided      Education provided:   - periscapular strengthening; t-sp  mobility  - Prognosis, activity modification, goals for therapy, role of therapy for care, exercises/HEP     Written Home Exercises Provided:  Exercises were reviewed and Johanna was able to demonstrate them prior to the end of the session.   Pt received a written copy of exercises to perform at home. Johanna demonstrated good understanding of the education provided.      See EMR under patient instructions for exercises given.     Assessment     L-sided ribs 4-7 dysfunction   Mild improvement in lower-back sx behavior since last session, but sx behavior still present. Shifted focus to more active interventions targeting lower-back extensors and paraspinals. Will assess response to active loading next session.  Jhoanna Is progressing well towards her goals.   Pt prognosis is Good.     Pt will continue to benefit from skilled outpatient physical therapy to address the deficits listed in the problem list box on initial evaluation, provide pt/family education and to maximize pt's level of independence in the home and community environment.     Pt's spiritual, cultural and educational needs considered and pt agreeable to plan of care and goals.    Anticipated barriers to physical therapy: chronic nature of condition     GOALS   Short Term Goals: 4 weeks  Pt will report decreased L-sided rib pain  < / =  3/10  to increase tolerance for ADL performance and recreational routine.  Pt will improve t-sp ROM to WFL in order to be able to perform ADLs without difficulty.  Pt will improve L midscap strength by 1/3 MMT grade to increase tolerance for ADL and work activities  Pt will demonstrate tolerance to HEP to improve independence with ADL's     Long Term Goals: 8 weeks  Pt will report decreased L-sided rib pain  < / =  1/10  to increase tolerance for ADL performance and recreational routine  Pt will improve t-sp ROM to WNL in order to be able to perform ADLs without difficulty  Pt will improve L midscap strength by 1/3 MMT grade to  increase tolerance for ADL and work activities  Pt will report at CJ level (20-40% impaired) on FOTO Back to demonstrate increase in LE function with every day tasks.     Plan     Continue per RAY Morillo, PT, DPT  Board Certified in Orthopedic Physical Therapy

## 2024-01-26 ENCOUNTER — OFFICE VISIT (OUTPATIENT)
Dept: NEUROLOGY | Facility: CLINIC | Age: 39
End: 2024-01-26
Payer: COMMERCIAL

## 2024-01-26 DIAGNOSIS — G43.009 MIGRAINE WITHOUT AURA AND WITHOUT STATUS MIGRAINOSUS, NOT INTRACTABLE: ICD-10-CM

## 2024-01-26 PROCEDURE — 1159F MED LIST DOCD IN RCRD: CPT | Mod: CPTII,95,, | Performed by: PHYSICIAN ASSISTANT

## 2024-01-26 PROCEDURE — 1160F RVW MEDS BY RX/DR IN RCRD: CPT | Mod: CPTII,95,, | Performed by: PHYSICIAN ASSISTANT

## 2024-01-26 PROCEDURE — 99214 OFFICE O/P EST MOD 30 MIN: CPT | Mod: 95,,, | Performed by: PHYSICIAN ASSISTANT

## 2024-01-26 RX ORDER — UBROGEPANT 100 MG/1
TABLET ORAL
Qty: 16 TABLET | Refills: 11 | Status: SHIPPED | OUTPATIENT
Start: 2024-01-26 | End: 2025-01-26

## 2024-01-26 RX ORDER — TOPIRAMATE 25 MG/1
25 TABLET ORAL DAILY
Qty: 30 TABLET | Refills: 2 | Status: SHIPPED | OUTPATIENT
Start: 2024-01-26 | End: 2024-06-03 | Stop reason: SDUPTHER

## 2024-01-26 NOTE — PROGRESS NOTES
Established Patient     The patient location is: [place]  The chief complaint leading to consultation is: headache follow up visit    Visit type: audiovisual    Face to Face time with patient: 3 min  7 minutes of total time spent on the encounter, which includes face to face time and non-face to face time preparing to see the patient (eg, review of tests), Obtaining and/or reviewing separately obtained history, Documenting clinical information in the electronic or other health record, Independently interpreting results (not separately reported) and communicating results to the patient/family/caregiver, or Care coordination (not separately reported).     Each patient to whom he or she provides medical services by telemedicine is:  (1) informed of the relationship between the physician and patient and the respective role of any other health care provider with respect to management of the patient; and (2) notified that he or she may decline to receive medical services by telemedicine and may withdraw from such care at any time.    Notes:        SUBJECTIVE:  Patient ID: Johanna Ocasio   Chief Complaint: Headache    History of Present Illness:  Johanna Ocasio is a 38 y.o. 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       01/26/2024 - Interval History:  Ha's remain well controlled on current regimen. Would like to try weaning off tpx.   Plan: decrease tpx 25mg/d and msg in 1 mo w/ udpate, continue ubrelvy 50-100mg prn, rtc prn    10/27/2023 - Interval History:  Ha's have improved, are occurring 1-2 times a week w/ tpx at 50mg. Ubrelvy 50-100mg resolves ha's well. Has d/c'ed excedrin and caffeine. Is currently undergoing w/up w/ cardiology.   Plan: continue current reigmen, tpx 50mg, ubrelvy 50-100mg prn, rtc 3-6 mo    Recommendations made at last Office Visit on 8/16/23:  - Discussed symptoms  appear to be consistent with migraines, discussed treatment options and patient agreed with the following plan:  - ppx - increase tpx 50mg/d  - abortive - try ubrelvy as pt cannot take triptans nor nsaids at this time 2/2 overuse and h/o palpitations  - nausea - continue zofran  - caffeine - d/c  - trouble w/ sleep - continue melatonin, mgmt per pcp  - med overuse - d/c excedrin  - risks, benefits, and potential side effects of tpx, ubrelvy, zofran discussed   - alternative treatment options offered   - importance of healthy diet, regular exercise and sleep hygiene in the treatment of headaches    - Start tracking headaches via Migraine Buddy bill on phone   - RTC in 2-3 mo     Treatments Tried:  Metoprolol 25mg  Verapamil - lightheadedness  Tpx- helps  Ubrelvy - helps  Excedrin   Triptans - avoid 2/2 pvcs and palpitations  Zofran 4mg    Current Medications:    Current Outpatient Medications:     albuterol (PROVENTIL/VENTOLIN HFA) 90 mcg/actuation inhaler, Inhale 2 puffs into the lungs every 6 (six) hours as needed for Wheezing. Rescue, Disp: 18 g, Rfl: 3    diclofenac sodium (VOLTAREN) 1 % Gel, Apply 2 g topically 4 (four) times daily., Disp: 50 g, Rfl: 0    methylPREDNISolone (MEDROL DOSEPACK) 4 mg tablet, use as directed, Disp: 21 each, Rfl: 0    metoprolol succinate (TOPROL-XL) 25 MG 24 hr tablet, Take 1 tablet (25 mg total) by mouth once daily., Disp: 90 tablet, Rfl: 2    metoprolol tartrate (LOPRESSOR) 100 MG tablet, If heart rate >75 bpm take metoprolol OR ivabradine: take 100 mg tablet 1 hour prior to CTA.  Bring second tab with you to the procedure., Disp: 2 tablet, Rfl: 0    metoprolol tartrate (LOPRESSOR) 50 MG tablet, If heart rate >60 bpm:  take 50 mg tablet 1 hour prior to CTA.  Bring second tab with you to the procedure., Disp: 2 tablet, Rfl: 0    omeprazole (PRILOSEC) 20 MG capsule, Take 1 capsule (20 mg total) by mouth once daily., Disp: 30 capsule, Rfl: 11    ondansetron (ZOFRAN) 4 MG tablet, TAKE 1  TABLET BY MOUTH EVERY 8 HOURS AS NEEDED, Disp: 30 tablet, Rfl: 0    topiramate (TOPAMAX) 25 MG tablet, Take 1 tablet (25 mg total) by mouth once daily., Disp: 30 tablet, Rfl: 2    ubrogepant (UBRELVY) 100 mg tablet, Take 1 tablet by mouth at the onset of a headache. May repeat based on response and tolerability after more than 2 hours if needed. Do not take more than 200mg in a 24 hour span., Disp: 16 tablet, Rfl: 11    VIOS AEROSOL DELIVERY SYSTEM Martha, USE AS DIRECTED, Disp: , Rfl: 0    Review of Systems - as per HPI, otherwise a balanced 10 systems review is negative.    OBJECTIVE:  Vitals:  There were no vitals taken for this visit.     Physical Exam:  Constitutional: she appears well-developed and well-nourished. she is well groomed. NAD   HENT:    Head: Normocephalic and atraumatic  Eyes: Conjunctivae and EOM are normal  Musculoskeletal: Normal range of motion. No joint stiffness.   Psychiatric: Mood and affect are normal    Neuro: Patient is alert and oriented to person, place, and time. Language is intact and fluent. Speech is clear and fluent. Recent and remote memory are intact.  Normal attention and concentration.  Facial movement is symmetric. Moves all 4 extremities against gravity.      Review of Data:   Notes from neuro reviewed   Labs:  No visits with results within 3 Month(s) from this visit.   Latest known visit with results is:   Admission on 10/20/2023, Discharged on 10/20/2023   Component Date Value Ref Range Status    WBC 10/20/2023 7.85  3.90 - 12.70 K/uL Final    RBC 10/20/2023 4.37  4.00 - 5.40 M/uL Final    Hemoglobin 10/20/2023 13.1  12.0 - 16.0 g/dL Final    Hematocrit 10/20/2023 40.5  37.0 - 48.5 % Final    MCV 10/20/2023 93  82 - 98 fL Final    MCH 10/20/2023 30.0  27.0 - 31.0 pg Final    MCHC 10/20/2023 32.3  32.0 - 36.0 g/dL Final    RDW 10/20/2023 12.4  11.5 - 14.5 % Final    Platelets 10/20/2023 262  150 - 450 K/uL Final    MPV 10/20/2023 10.1  9.2 - 12.9 fL Final    Immature  Granulocytes 10/20/2023 0.1  0.0 - 0.5 % Final    Gran # (ANC) 10/20/2023 4.8  1.8 - 7.7 K/uL Final    Immature Grans (Abs) 10/20/2023 0.01  0.00 - 0.04 K/uL Final    Lymph # 10/20/2023 2.0  1.0 - 4.8 K/uL Final    Mono # 10/20/2023 0.6  0.3 - 1.0 K/uL Final    Eos # 10/20/2023 0.4  0.0 - 0.5 K/uL Final    Baso # 10/20/2023 0.05  0.00 - 0.20 K/uL Final    nRBC 10/20/2023 0  0 /100 WBC Final    Gran % 10/20/2023 60.5  38.0 - 73.0 % Final    Lymph % 10/20/2023 26.0  18.0 - 48.0 % Final    Mono % 10/20/2023 8.2  4.0 - 15.0 % Final    Eosinophil % 10/20/2023 4.6  0.0 - 8.0 % Final    Basophil % 10/20/2023 0.6  0.0 - 1.9 % Final    Differential Method 10/20/2023 Automated   Final    Sodium 10/20/2023 139  136 - 145 mmol/L Final    Potassium 10/20/2023 3.8  3.5 - 5.1 mmol/L Final    Chloride 10/20/2023 106  95 - 110 mmol/L Final    CO2 10/20/2023 24  23 - 29 mmol/L Final    Glucose 10/20/2023 96  70 - 110 mg/dL Final    BUN 10/20/2023 14  6 - 20 mg/dL Final    Creatinine 10/20/2023 0.8  0.5 - 1.4 mg/dL Final    Calcium 10/20/2023 9.6  8.7 - 10.5 mg/dL Final    Total Protein 10/20/2023 8.2  6.0 - 8.4 g/dL Final    Albumin 10/20/2023 4.6  3.5 - 5.2 g/dL Final    Total Bilirubin 10/20/2023 0.7  0.1 - 1.0 mg/dL Final    Alkaline Phosphatase 10/20/2023 49 (L)  55 - 135 U/L Final    AST 10/20/2023 15  10 - 40 U/L Final    ALT 10/20/2023 17  10 - 44 U/L Final    eGFR 10/20/2023 >60  >60 mL/min/1.73 m^2 Final    Anion Gap 10/20/2023 9  8 - 16 mmol/L Final    BNP 10/20/2023 <10  0 - 99 pg/mL Final    POC Preg Test, Ur 10/20/2023 Negative  Negative Final     Acceptable 10/20/2023 Yes   Final    Troponin I 10/20/2023 <0.006  0.000 - 0.026 ng/mL Final     Imaging:  Results for orders placed or performed during the hospital encounter of 10/03/18   MRI Brain Without Contrast    Narrative    EXAMINATION:  MRI BRAIN WITHOUT CONTRAST    CLINICAL HISTORY:  venous angioma and severe headache; Hemangioma of intracranial  structures    TECHNIQUE:  Multiplanar multisequence MR imaging of the brain was performed without contrast.    COMPARISON:  MRI 07/18/2006.    FINDINGS:  No acute infarction.  No intracranial hemorrhage.  No intra or extra-axial fluid collections.  Right cerebellar developmental venous anomaly again noted, not significantly changed when compared to the previous MRI.  No hydrocephalus.  No midline shift or mass effect.    There is an empty sella configuration.  Cerebellar tonsils are in their expected location.  Major intracranial T2 flow voids are present.    Trace mucosal membrane thickening noted within the left ethmoid air cells. Globes are symmetric. No marrow replacement process.      Impression    1. Right cerebellar developmental venous anomaly, not significantly changed when compared to the previous exam.  2. Empty sella.  3. No acute infarction or intracranial hemorrhage.      Electronically signed by: Kai Tavarez MD  Date:    10/04/2018  Time:    00:34     Note: I have independently reviewed any/all imaging/labs/tests and agree with the report (s) as documented.  Any discrepancies will be as noted/demarcated by free text.  CORNEL QUIÑONEZ 1/26/2024    ASSESSMENT:  1. Migraine without aura and without status migrainosus, not intractable          PLAN:  - Discussed symptoms appear to be consistent with migraines, discussed treatment options and patient agreed with the following plan:  - ppx - wean off tpx   - abortive - continue ubrelvy as pt cannot take triptans nor nsaids at this time 2/2 overuse and h/o palpitations  - nausea - continue zofran  - trouble w/ sleep - continue melatonin, mgmt per pcp  - Continue tracking headaches   - Discussed goals of therapy are to decrease the frequency, intensity, and duration of headaches  - RTC prn    Orders Placed This Encounter    topiramate (TOPAMAX) 25 MG tablet    ubrogepant (UBRELVY) 100 mg tablet       Discussed potential for teratogenicity with treatment,  patient understands if her family planning status should change she will contact office immediately and we will safely adjust medications as needed.     Questions and concerns were sought and answered to the patient's stated verbal satisfaction.  The patient verbalizes understanding and agreement with the above stated treatment plan.     CC: Ivette Azul MD Sarena Patel, PA-C  [facility]   [phone]    Dr. Singh was available during today's encounter.

## 2024-02-02 ENCOUNTER — CLINICAL SUPPORT (OUTPATIENT)
Dept: REHABILITATION | Facility: HOSPITAL | Age: 39
End: 2024-02-02
Payer: COMMERCIAL

## 2024-02-02 DIAGNOSIS — M54.6 CHRONIC LEFT-SIDED THORACIC BACK PAIN: Primary | ICD-10-CM

## 2024-02-02 DIAGNOSIS — G89.29 CHRONIC LEFT-SIDED THORACIC BACK PAIN: Primary | ICD-10-CM

## 2024-02-02 PROCEDURE — 97112 NEUROMUSCULAR REEDUCATION: CPT

## 2024-02-02 PROCEDURE — 97530 THERAPEUTIC ACTIVITIES: CPT

## 2024-02-05 NOTE — PROGRESS NOTES
Physical Therapy Daily Treatment Note     Name: Johanna Ocasio  Clinic Number: [id number]    Therapy Diagnosis:   Encounter Diagnosis   Name Primary?    Chronic left-sided thoracic back pain Yes     Physician: Ivette Azul MD    Visit Date: 2/2/2024    Physician Orders: PT Eval and Treat  Medical Diagnosis from Referral: M54.6 (ICD-10-CM) - Acute bilateral thoracic back pain   Evaluation Date: 11/14/2023  Authorization Period Expiration: 11/05/2024  Plan of Care Expiration: 02/14/2024  Visit # / Visits authorized: 4/20  Episodes: 10     FOTO: 57  FOTO 1st Follow-up: NA  FOTO 2nd Follow-up: NA     Time In: 1000  Time Out: 1100  Total Billable Time: 60 minutes     Precautions: Standard    Subjective     Pt reports less symptomatic lower-back pain since last session. Has been noticing that maybe sx behavior is related to her carrying and lifting activities.  She was compliant with home exercise program.  Response to previous treatment: Good  Functional change: improved tolerance to LUE use    Pain: 4/10  Location: lR lower-back    Objective     Objective Measures updated at progress report unless specified    Treatment     PT Tech Extender utilized under supervision throughout tx session    Johanna received the treatments listed below:       Neuromuscular re-education activities to improve: Balance, Coordination, Kinesthetic, Sense, Proprioception, and Posture for 30 minutes. The following activities were included:  P/A L-sided ribs 4-7  Corkscrew P/A Mobs upper t-sp  Upper t-sp manip  Thread needle 2 x 10  Prone Extensions 2 x 10    Therapeutic activities to improve functional performance for 30 minutes, including:  Plate Carries 15# x 10 laps  Lunge Rows CC 10# 3 x 15  Paloff Press 3 x 15    NP:  UBE completed for 10' to increase ROM, endurance, and decrease pain to improve tolerance to ADLs and age-related activities  Rock-Backs 3 x 10  Reverse HS Squats 3 x 10      NP:  Prone T's 1# 3 x 12  Standing W's to OH  Tan Tb 3 x 12     Home Exercises and Patient Education Provided      Education provided:   - periscapular strengthening; t-sp mobility  - Prognosis, activity modification, goals for therapy, role of therapy for care, exercises/HEP     Written Home Exercises Provided:  Exercises were reviewed and Johanna was able to demonstrate them prior to the end of the session.   Pt received a written copy of exercises to perform at home. Johanna demonstrated good understanding of the education provided.      See EMR under patient instructions for exercises given.     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 Would progress moving forward with a loaded, carrying program. Fatigues with loaded carries, but good motivation throughout session. Will look to progress tolerance moving forward.  Johanna Is progressing well towards her goals.   Pt prognosis is Good.     Pt will continue to benefit from skilled outpatient physical therapy to address the deficits listed in the problem list box on initial evaluation, provide pt/family education and to maximize pt's level of independence in the home and community environment.     Pt's spiritual, cultural and educational needs considered and pt agreeable to plan of care and goals.    Anticipated barriers to physical therapy: chronic nature of condition     GOALS   Short Term Goals: 4 weeks  Pt will report decreased L-sided rib pain  < / =  3/10  to increase tolerance for ADL performance and recreational routine.  Pt will improve t-sp ROM to WFL in order to be able to perform ADLs without difficulty.  Pt will improve L midscap strength by 1/3 MMT grade to increase tolerance for ADL and work activities  Pt will demonstrate tolerance to HEP to improve independence with ADL's     Long Term Goals: 8 weeks  Pt will report decreased L-sided rib pain  < / =  1/10  to increase tolerance for ADL performance and  recreational routine  Pt will improve t-sp ROM to WNL in order to be able to perform ADLs without difficulty  Pt will improve L midscap strength by 1/3 MMT grade to increase tolerance for ADL and work activities  Pt will report at CJ level (20-40% impaired) on FOTO Back to demonstrate increase in LE function with every day tasks.     Plan     Continue per RAY Morillo, PT, DPT  Board Certified in Orthopedic Physical Therapy

## 2024-02-07 ENCOUNTER — CLINICAL SUPPORT (OUTPATIENT)
Dept: OTHER | Facility: CLINIC | Age: 39
End: 2024-02-07

## 2024-02-07 DIAGNOSIS — Z00.8 ENCOUNTER FOR OTHER GENERAL EXAMINATION: ICD-10-CM

## 2024-02-08 VITALS
BODY MASS INDEX: 29.63 KG/M2 | SYSTOLIC BLOOD PRESSURE: 121 MMHG | HEIGHT: 62 IN | DIASTOLIC BLOOD PRESSURE: 79 MMHG | WEIGHT: 161 LBS

## 2024-02-08 LAB
GLUCOSE SERPL-MCNC: 87 MG/DL (ref 60–140)
HDLC SERPL-MCNC: 50 MG/DL
POC CHOLESTEROL, LDL (DOCK): 130 MG/DL
POC CHOLESTEROL, TOTAL: 202 MG/DL
TRIGL SERPL-MCNC: 124 MG/DL

## 2024-02-15 ENCOUNTER — CLINICAL SUPPORT (OUTPATIENT)
Dept: REHABILITATION | Facility: HOSPITAL | Age: 39
End: 2024-02-15
Payer: COMMERCIAL

## 2024-02-15 DIAGNOSIS — M54.6 CHRONIC LEFT-SIDED THORACIC BACK PAIN: Primary | ICD-10-CM

## 2024-02-15 DIAGNOSIS — G89.29 CHRONIC LEFT-SIDED THORACIC BACK PAIN: Primary | ICD-10-CM

## 2024-02-15 PROCEDURE — 97112 NEUROMUSCULAR REEDUCATION: CPT

## 2024-02-15 PROCEDURE — 97530 THERAPEUTIC ACTIVITIES: CPT

## 2024-02-15 NOTE — PROGRESS NOTES
Physical Therapy Daily Treatment Note     Name: Johanna Ocasio  Clinic Number: [id number]    Therapy Diagnosis:   Encounter Diagnosis   Name Primary?    Chronic left-sided thoracic back pain Yes     Physician: Ivette Azul MD    Visit Date: 2/15/2024    Physician Orders: PT Eval and Treat  Medical Diagnosis from Referral: M54.6 (ICD-10-CM) - Acute bilateral thoracic back pain   Evaluation Date: 11/14/2023  Authorization Period Expiration: 11/05/2024  Plan of Care Expiration: 02/14/2024  Visit # / Visits authorized: 5/20  Episodes: 11     FOTO: 57  FOTO 1st Follow-up: NA  FOTO 2nd Follow-up: NA     Time In: 1010  Time Out: 1110  Total Billable Time: 60 minutes     Precautions: Standard    Subjective     Pt reports attempted to institute more workouts at home; felt some bilateral lower-back pain following workout activities. No adverse sx behavior following last session.  She was compliant with home exercise program.  Response to previous treatment: Good  Functional change: improved LBP limiting standing tolerance    Pain: 2/10  Location: lR lower-back    Objective     Objective Measures updated at progress report unless specified    Treatment     PT Tech Extender utilized under supervision throughout tx session    Johanna received the treatments listed below:       Neuromuscular re-education activities to improve: Balance, Coordination, Kinesthetic, Sense, Proprioception, and Posture for 30 minutes. The following activities were included:  P/A L-sided ribs 4-7  Corkscrew P/A Mobs upper t-sp  Bird-Dogs 3 x 10    NP:  Upper t-sp manip  Thread needle 2 x 10  Prone Extensions 2 x 10    Therapeutic activities to improve functional performance for 30 minutes, including:  Elliptical completed for 8' to increase ROM, endurance, and decrease pain to improve tolerance to ADLs and age-related activities  Plate Carries 15# x 10 laps  Lunge Rows CC 10# 3 x 15  Paloff Press 3 x 15    NP:  UBE completed for 10' to increase  ROM, endurance, and decrease pain to improve tolerance to ADLs and age-related activities  Rock-Backs 3 x 10  Reverse HS Squats 3 x 10      NP:  Prone T's 1# 3 x 12  Standing W's to OH Rene Tb 3 x 12     Home Exercises and Patient Education Provided      Education provided:   - periscapular strengthening; t-sp mobility  - Prognosis, activity modification, goals for therapy, role of therapy for care, exercises/HEP     Written Home Exercises Provided:  Exercises were reviewed and Johanna was able to demonstrate them prior to the end of the session.   Pt received a written copy of exercises to perform at home. Johanna demonstrated good understanding of the education provided.      See EMR under patient instructions for exercises given.     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 Discussed bridge in stability interventions required prior to returning to traiditonal crossfit-style lifting.  Johanna Is progressing well towards her goals.   Pt prognosis is Good.     Pt will continue to benefit from skilled outpatient physical therapy to address the deficits listed in the problem list box on initial evaluation, provide pt/family education and to maximize pt's level of independence in the home and community environment.     Pt's spiritual, cultural and educational needs considered and pt agreeable to plan of care and goals.    Anticipated barriers to physical therapy: chronic nature of condition     GOALS   Short Term Goals: 4 weeks  Pt will report decreased L-sided rib pain  < / =  3/10  to increase tolerance for ADL performance and recreational routine.  Pt will improve t-sp ROM to WFL in order to be able to perform ADLs without difficulty.  Pt will improve L midscap strength by 1/3 MMT grade to increase tolerance for ADL and work activities  Pt will demonstrate tolerance to HEP to improve independence  with ADL's     Long Term Goals: 8 weeks  Pt will report decreased L-sided rib pain  < / =  1/10  to increase tolerance for ADL performance and recreational routine  Pt will improve t-sp ROM to WNL in order to be able to perform ADLs without difficulty  Pt will improve L midscap strength by 1/3 MMT grade to increase tolerance for ADL and work activities  Pt will report at CJ level (20-40% impaired) on FOTO Back to demonstrate increase in LE function with every day tasks.     Plan     Continue per POC    Kofi Morillo, PT, DPT  Board Certified in Orthopedic Physical Therapy

## 2024-02-21 ENCOUNTER — CLINICAL SUPPORT (OUTPATIENT)
Dept: REHABILITATION | Facility: HOSPITAL | Age: 39
End: 2024-02-21
Payer: COMMERCIAL

## 2024-02-21 DIAGNOSIS — M54.6 CHRONIC LEFT-SIDED THORACIC BACK PAIN: Primary | ICD-10-CM

## 2024-02-21 DIAGNOSIS — G89.29 CHRONIC LEFT-SIDED THORACIC BACK PAIN: Primary | ICD-10-CM

## 2024-02-21 PROCEDURE — 97530 THERAPEUTIC ACTIVITIES: CPT

## 2024-02-21 PROCEDURE — 97112 NEUROMUSCULAR REEDUCATION: CPT

## 2024-02-21 NOTE — PROGRESS NOTES
Physical Therapy Daily Treatment Note     Name: Johanna Ocasio  Clinic Number: [id number]    Therapy Diagnosis:   Encounter Diagnosis   Name Primary?    Chronic left-sided thoracic back pain Yes     Physician: Ivette Azul MD    Visit Date: 2/21/2024    Physician Orders: PT Eval and Treat  Medical Diagnosis from Referral: M54.6 (ICD-10-CM) - Acute bilateral thoracic back pain   Evaluation Date: 11/14/2023  Authorization Period Expiration: 11/05/2024  Plan of Care Expiration: 02/14/2024  Visit # / Visits authorized: 6/20  Episodes: 12     FOTO: 57  FOTO 1st Follow-up: NA  FOTO 2nd Follow-up: NA     Time In: 1000  Time Out: 1100  Total Billable Time: 60 minutes     Precautions: Standard    Subjective     Pt reports radiating pain in RLE today that extends down to knee, but states this pain has been present since onset of back pain and is worse with prolonged sitting, and sitting with legs crossed. States pain will sometimes originate at lateral aspect of R knee and radiate down R lower leg.  She was compliant with home exercise program.  Response to previous treatment: Fair  Functional change: Has been able to tolerate exercise at home well, but does complain of R lower leg radiating pain    Pain: 2/10  Location: lR lower-back    Objective     Objective Measures updated at progress report unless specified    Treatment     PT Tech Extender utilized under supervision throughout tx session    Johanna received the treatments listed below:       Neuromuscular re-education activities to improve: Balance, Coordination, Kinesthetic, Sense, Proprioception, and Posture for 30 minutes. The following activities were included:  Assessment of radiating symptoms in RLE  Peroneal and sural nerve glides on RLE  Supine passive HS str 3 x 30s ea cassidy    NP:  Upper t-sp manip  Thread needle 2 x 10  Prone Extensions 2 x 10  P/A L-sided ribs 4-7  Corkscrew P/A Mobs upper t-sp  Bird-Dogs 3 x 10    Therapeutic activities to improve  functional performance for 30 minutes, including:  Dynamic warmup: Quad pulls, HS scoops, toy soldiers, and side lunges  Circuit:  Supine KB OH pulls 15# KB 4 x 15  Lateral MB tosses against wall 4 x 10  Circuit:  Landmine SL RDLs 45# 3 x 10 ea cassidy  Landmine squat to press 45# 3 x 10    NP:  UBE completed for 10' to increase ROM, endurance, and decrease pain to improve tolerance to ADLs and age-related activities  Rock-Backs 3 x 10  Reverse HS Squats 3 x 10  Elliptical completed for 8' to increase ROM, endurance, and decrease pain to improve tolerance to ADLs and age-related activities  Plate Carries 15# x 10 laps  Lunge Rows CC 10# 3 x 15  Paloff Press 3 x 15    NP:  Prone T's 1# 3 x 12  Standing W's to OH Rene Tb 3 x 12     Home Exercises and Patient Education Provided      Education provided:   - periscapular strengthening; t-sp mobility  - Prognosis, activity modification, goals for therapy, role of therapy for care, exercises/HEP     Written Home Exercises Provided:  Exercises were reviewed and Johanna was able to demonstrate them prior to the end of the session.   Pt received a written copy of exercises to perform at home. Johanna demonstrated good understanding of the education provided.      See EMR under patient instructions for exercises given.     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 Tolerated nerve glides biasing R peroneal and R sural nerves well. Tolerated all strengthening exercises well today with good core activation and form, and no complaints of LBP throughout. Will continue to progress all strengthening exercises as appropriate in order to facilitate return to pain free gym activities.  Johanna Is progressing well towards her goals.   Pt prognosis is Good.     Pt will continue to benefit from skilled outpatient physical therapy to address the  deficits listed in the problem list box on initial evaluation, provide pt/family education and to maximize pt's level of independence in the home and community environment.     Pt's spiritual, cultural and educational needs considered and pt agreeable to plan of care and goals.    Anticipated barriers to physical therapy: chronic nature of condition     GOALS   Short Term Goals: 4 weeks  Pt will report decreased L-sided rib pain  < / =  3/10  to increase tolerance for ADL performance and recreational routine.  Pt will improve t-sp ROM to WFL in order to be able to perform ADLs without difficulty.  Pt will improve L midscap strength by 1/3 MMT grade to increase tolerance for ADL and work activities  Pt will demonstrate tolerance to HEP to improve independence with ADL's     Long Term Goals: 8 weeks  Pt will report decreased L-sided rib pain  < / =  1/10  to increase tolerance for ADL performance and recreational routine  Pt will improve t-sp ROM to WNL in order to be able to perform ADLs without difficulty  Pt will improve L midscap strength by 1/3 MMT grade to increase tolerance for ADL and work activities  Pt will report at CJ level (20-40% impaired) on FOTO Back to demonstrate increase in LE function with every day tasks.     Plan     Continue per POC    Kofi Morillo, PT, DPT  Board Certified in Orthopedic Physical Therapy    Co-treat with: Sami Pedraza, SPT

## 2024-03-18 ENCOUNTER — PATIENT MESSAGE (OUTPATIENT)
Dept: INTERNAL MEDICINE | Facility: CLINIC | Age: 39
End: 2024-03-18

## 2024-03-18 ENCOUNTER — HOSPITAL ENCOUNTER (OUTPATIENT)
Dept: RADIOLOGY | Facility: HOSPITAL | Age: 39
Discharge: HOME | End: 2024-03-18
Attending: INTERNAL MEDICINE
Payer: COMMERCIAL

## 2024-03-18 ENCOUNTER — OFFICE VISIT (OUTPATIENT)
Dept: INTERNAL MEDICINE | Facility: CLINIC | Age: 39
End: 2024-03-18
Payer: COMMERCIAL

## 2024-03-18 VITALS
WEIGHT: 161.38 LBS | HEART RATE: 74 BPM | HEIGHT: 60 IN | SYSTOLIC BLOOD PRESSURE: 112 MMHG | DIASTOLIC BLOOD PRESSURE: 66 MMHG | BODY MASS INDEX: 31.68 KG/M2

## 2024-03-18 DIAGNOSIS — M54.50 LUMBAR BACK PAIN: Primary | ICD-10-CM

## 2024-03-18 DIAGNOSIS — M54.50 LUMBAR BACK PAIN: ICD-10-CM

## 2024-03-18 PROCEDURE — 72100 X-RAY EXAM L-S SPINE 2/3 VWS: CPT | Mod: TC

## 2024-03-18 PROCEDURE — 3074F SYST BP LT 130 MM HG: CPT | Mod: CPTII,S$GLB,, | Performed by: INTERNAL MEDICINE

## 2024-03-18 PROCEDURE — 72100 X-RAY EXAM L-S SPINE 2/3 VWS: CPT | Mod: 26,,, | Performed by: RADIOLOGY

## 2024-03-18 PROCEDURE — 3008F BODY MASS INDEX DOCD: CPT | Mod: CPTII,S$GLB,, | Performed by: INTERNAL MEDICINE

## 2024-03-18 PROCEDURE — 1159F MED LIST DOCD IN RCRD: CPT | Mod: CPTII,S$GLB,, | Performed by: INTERNAL MEDICINE

## 2024-03-18 PROCEDURE — 99214 OFFICE O/P EST MOD 30 MIN: CPT | Mod: S$GLB,,, | Performed by: INTERNAL MEDICINE

## 2024-03-18 PROCEDURE — 99999 PR PBB SHADOW E&M-EST. PATIENT-LVL IV: CPT | Mod: PBBFAC,,, | Performed by: INTERNAL MEDICINE

## 2024-03-18 PROCEDURE — 3078F DIAST BP <80 MM HG: CPT | Mod: CPTII,S$GLB,, | Performed by: INTERNAL MEDICINE

## 2024-03-18 RX ORDER — METHYLPREDNISOLONE 4 MG/1
TABLET ORAL
Qty: 21 EACH | Refills: 0 | Status: SHIPPED | OUTPATIENT
Start: 2024-03-18 | End: 2024-04-08

## 2024-03-18 RX ORDER — TIZANIDINE 2 MG/1
2 TABLET ORAL EVERY 8 HOURS PRN
Qty: 20 TABLET | Refills: 0 | Status: SHIPPED | OUTPATIENT
Start: 2024-03-18 | End: 2024-04-08

## 2024-03-18 NOTE — PROGRESS NOTES
Subjective:       Patient ID: Johanna Ocasio is a 38 y.o. female.    Chief Complaint: Low-back Pain      Started having lumbar back pain after episode of prolonged sitting.   Pain is located over right lumbar back and radiates into lateral thigh and behind her knee. The pain did initially get better but then two weeks ago she was bending over and came up quickly and had severe pain in bilateral lumbar spine. This has improved over the last two days. Denies LE weakness, fevers, fecal/urinary incontinence.     Past medical history:     PVCS: was having palpitations and saw cardiology. Work up revealed PVCs and she is on metoprolol. Recent stress test normal.      Migraines: now following with neurology. Now on topamax and Ubrelvy and has improved.      Health Maintenance:  HIV: negative 2019   Hep C: negative 2017   Lipids: normal lipids in 2022   Pap Smear: negative PAP and HPV in 2022   Vaccines:  up to date       Back Pain  This is a chronic problem. The current episode started more than 1 month ago. The problem occurs constantly. The problem has been waxing and waning since onset. The pain is present in the sacro-iliac. The quality of the pain is described as aching and shooting. The pain radiates to the right knee. The pain is at a severity of 5/10. The pain is mild. The pain is The same all the time. The symptoms are aggravated by position. Pertinent negatives include no abdominal pain, bladder incontinence, bowel incontinence, chest pain, dysuria, fever, headaches, leg pain, numbness, paresis, paresthesias, pelvic pain, perianal numbness, tingling, weakness or weight loss. Risk factors include lack of exercise and poor posture. The treatment provided mild relief.     Review of Systems   Constitutional:  Negative for fever and weight loss.   Cardiovascular:  Negative for chest pain.   Gastrointestinal:  Negative for abdominal pain and bowel incontinence.   Genitourinary:  Negative for bladder incontinence,  dysuria, hematuria and pelvic pain.   Musculoskeletal:  Positive for back pain. Negative for leg pain.   Neurological:  Negative for tingling, weakness, numbness, headaches and paresthesias.           Past Medical History:   Diagnosis Date    Abnormal Pap smear of cervix 2011    Colposcopy    Brain venous angioma 10/3/2018    Early childhood epilepsy, myoclonic     last seizure age 14; Venous hemangioma on MRI    History of migraine headaches 5/12/2016    Migraine headache      No past surgical history on file.   Patient Active Problem List   Diagnosis    History of migraine headaches    History of seizures as a child - last seizure age 14, history of venous hemangioma on brain    Brain venous angioma    Migraine without aura and without status migrainosus, not intractable    Thoracic back pain        Objective:      Physical Exam  Vitals reviewed.   Constitutional:       Appearance: Normal appearance.   HENT:      Head: Normocephalic.      Jaw: No tenderness, swelling, pain on movement or malocclusion.      Right Ear: No mastoid tenderness.      Left Ear: No mastoid tenderness.      Nose:      Right Sinus: No maxillary sinus tenderness or frontal sinus tenderness.      Left Sinus: No maxillary sinus tenderness or frontal sinus tenderness.      Mouth/Throat:      Dentition: Normal dentition. No gingival swelling, dental abscesses or gum lesions.      Pharynx: Uvula midline. No uvula swelling.   Cardiovascular:      Rate and Rhythm: Normal rate and regular rhythm.   Pulmonary:      Effort: Pulmonary effort is normal.      Breath sounds: Normal breath sounds.   Skin:     General: Skin is warm and dry.   Neurological:      General: No focal deficit present.      Mental Status: She is alert.   Psychiatric:         Mood and Affect: Mood normal.         Behavior: Behavior normal.         Assessment:       Problem List Items Addressed This Visit          Oncology    Brain venous angioma     Other Visit Diagnoses        Lumbar back pain    -  Primary    Relevant Orders    X-Ray Lumbar Spine AP And Lateral (Completed)    Ambulatory referral/consult to [facility]            Plan:         Johanna was seen today for low-back pain.    Diagnoses and all orders for this visit:    Lumbar back pain  -     X-Ray Lumbar Spine AP And Lateral; Future  -     Ambulatory referral/consult to [facility]; Future  -     methylPREDNISolone (MEDROL DOSEPACK) 4 mg tablet; use as directed  -     tiZANidine (ZANAFLEX) 2 MG tablet; Take 1 tablet (2 mg total) by mouth every 8 (eight) hours as needed (pain).  Start steroids and muscle relaxer  Check XRAY.   Start PT  If no improvement will check MRI and refer to ortho            Ivette Azul MD   Internal Medicine   Primary Care      Answers submitted by the patient for this visit:  Back Pain Questionnaire (Submitted on 3/16/2024)  Chief Complaint: Back pain  genital pain: No

## 2024-03-20 ENCOUNTER — CLINICAL SUPPORT (OUTPATIENT)
Dept: REHABILITATION | Facility: HOSPITAL | Age: 39
End: 2024-03-20
Payer: COMMERCIAL

## 2024-03-20 DIAGNOSIS — G89.29 CHRONIC LEFT-SIDED THORACIC BACK PAIN: Primary | ICD-10-CM

## 2024-03-20 DIAGNOSIS — M54.6 CHRONIC LEFT-SIDED THORACIC BACK PAIN: Primary | ICD-10-CM

## 2024-03-20 PROCEDURE — 97112 NEUROMUSCULAR REEDUCATION: CPT

## 2024-03-20 PROCEDURE — 97530 THERAPEUTIC ACTIVITIES: CPT

## 2024-03-21 NOTE — PROGRESS NOTES
Physical Therapy Daily Treatment Note     Name: Johanna Ocasio  Clinic Number: [id number]    Therapy Diagnosis:   Encounter Diagnosis   Name Primary?    Chronic left-sided thoracic back pain Yes     Physician: Ivette Azul MD    Visit Date: 3/20/2024    Physician Orders: PT Eval and Treat  Medical Diagnosis from Referral: M54.6 (ICD-10-CM) - Acute bilateral thoracic back pain   Evaluation Date: 11/14/2023  Authorization Period Expiration: 11/05/2024  Plan of Care Expiration: 02/14/2024  Visit # / Visits authorized: 7/20     FOTO: 57  FOTO 1st Follow-up: NA  FOTO 2nd Follow-up: NA     Time In: 1000  Time Out: 1100  Total Billable Time: 60 minutes     Precautions: Standard    Subjective     Pt reports went to lift her child from the rub and felt a locking pain across her lower-back that limited her for a few days.  She was compliant with home exercise program.  Response to previous treatment: Fair  Functional change: improved ROM in days following exacerbating incident    Pain: 2/10  Location: lR lower-back    Objective     Objective Measures updated at progress report unless specified    Treatment     PT Tech Extender utilized under supervision throughout tx session    Johanna received the treatments listed below:       Neuromuscular re-education activities to improve: Balance, Coordination, Kinesthetic, Sense, Proprioception, and Posture for 30 minutes. The following activities were included:  Assessment of radiating symptoms in RLE  Peroneal and sural nerve glides on RLE  Supine passive HS str 3 x 30s ea cassidy    NP:  Upper t-sp manip  Thread needle 2 x 10  Prone Extensions 2 x 10  P/A L-sided ribs 4-7  Corkscrew P/A Mobs upper t-sp  Bird-Dogs 3 x 10    Therapeutic activities to improve functional performance for 30 minutes, including:  Dynamic warmup: Quad pulls, HS scoops, toy soldiers, and side lunges  Circuit:  Supine KB OH pulls 15# KB 4 x 15  Lateral MB tosses against wall 4 x 10  Circuit:  Landmine SL  RDLs 45# 3 x 10 ea cassidy  Landmine squat to press 45# 3 x 10    NP:  UBE completed for 10' to increase ROM, endurance, and decrease pain to improve tolerance to ADLs and age-related activities  Rock-Backs 3 x 10  Reverse HS Squats 3 x 10  Elliptical completed for 8' to increase ROM, endurance, and decrease pain to improve tolerance to ADLs and age-related activities  Plate Carries 15# x 10 laps  Lunge Rows CC 10# 3 x 15  Paloff Press 3 x 15    NP:  Prone T's 1# 3 x 12  Standing W's to OH Rene Tb 3 x 12     Home Exercises and Patient Education Provided      Education provided:   - periscapular strengthening; t-sp mobility  - Prognosis, activity modification, goals for therapy, role of therapy for care, exercises/HEP     Written Home Exercises Provided:  Exercises were reviewed and Johanna was able to demonstrate them prior to the end of the session.   Pt received a written copy of exercises to perform at home. Johanna demonstrated good understanding of the education provided.      See EMR under patient instructions for exercises given.     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 Overall, lower-back strength and endurance continue to improve, but still having issues with hinge moment L4-S1 region with loaded posturing when returning from flexed to extended spine position such as when bending down to  her child. Continue to work on lower-back endurance and posterior chain LE strength.  Johanna Is progressing well towards her goals.   Pt prognosis is Good.     Pt will continue to benefit from skilled outpatient physical therapy to address the deficits listed in the problem list box on initial evaluation, provide pt/family education and to maximize pt's level of independence in the home and community environment.     Pt's spiritual, cultural and educational needs considered and pt agreeable to plan of care and goals.    Anticipated barriers to physical therapy: chronic nature of  condition     GOALS   Short Term Goals: 4 weeks  Pt will report decreased L-sided rib pain  < / =  3/10  to increase tolerance for ADL performance and recreational routine. MET  Pt will improve t-sp ROM to WFL in order to be able to perform ADLs without difficulty. MET  Pt will improve L midscap strength by 1/3 MMT grade to increase tolerance for ADL and work activities MET  Pt will demonstrate tolerance to HEP to improve independence with ADL's MET     Long Term Goals: 8 weeks  Pt will report decreased L-sided rib pain  < / =  1/10  to increase tolerance for ADL performance and recreational routine  Pt will improve t-sp ROM to WNL in order to be able to perform ADLs without difficulty  Pt will improve L midscap strength by 1/3 MMT grade to increase tolerance for ADL and work activities  Pt will report at CJ level (20-40% impaired) on FOTO Back to demonstrate increase in LE function with every day tasks.     Plan     Continue per RAY Morillo, PT, DPT  Board Certified in Orthopedic Physical Therapy

## 2024-03-21 NOTE — PLAN OF CARE
Physical Therapy Daily Treatment Note     Name: Johanna Ocasio  Clinic Number: [id number]    Therapy Diagnosis:   Encounter Diagnosis   Name Primary?    Chronic left-sided thoracic back pain Yes     Physician: Ivette Azul MD    Visit Date: 3/20/2024    Physician Orders: PT Eval and Treat  Medical Diagnosis from Referral: M54.6 (ICD-10-CM) - Acute bilateral thoracic back pain   Evaluation Date: 11/14/2023  Authorization Period Expiration: 11/05/2024  Plan of Care Expiration: 02/14/2024  Visit # / Visits authorized: 7/20     FOTO: 57  FOTO 1st Follow-up: NA  FOTO 2nd Follow-up: NA     Time In: 1000  Time Out: 1100  Total Billable Time: 60 minutes     Precautions: Standard    Subjective     Pt reports went to lift her child from the rub and felt a locking pain across her lower-back that limited her for a few days.  She was compliant with home exercise program.  Response to previous treatment: Fair  Functional change: improved ROM in days following exacerbating incident    Pain: 2/10  Location: lR lower-back    Objective     Objective Measures updated at progress report unless specified    Treatment     PT Tech Extender utilized under supervision throughout tx session    Johanna received the treatments listed below:       Neuromuscular re-education activities to improve: Balance, Coordination, Kinesthetic, Sense, Proprioception, and Posture for 30 minutes. The following activities were included:  Assessment of radiating symptoms in RLE  Peroneal and sural nerve glides on RLE  Supine passive HS str 3 x 30s ea cassidy    NP:  Upper t-sp manip  Thread needle 2 x 10  Prone Extensions 2 x 10  P/A L-sided ribs 4-7  Corkscrew P/A Mobs upper t-sp  Bird-Dogs 3 x 10    Therapeutic activities to improve functional performance for 30 minutes, including:  Dynamic warmup: Quad pulls, HS scoops, toy soldiers, and side lunges  Circuit:  Supine KB OH pulls 15# KB 4 x 15  Lateral MB tosses against wall 4 x 10  Circuit:  Landmine SL  RDLs 45# 3 x 10 ea cassidy  Landmine squat to press 45# 3 x 10    NP:  UBE completed for 10' to increase ROM, endurance, and decrease pain to improve tolerance to ADLs and age-related activities  Rock-Backs 3 x 10  Reverse HS Squats 3 x 10  Elliptical completed for 8' to increase ROM, endurance, and decrease pain to improve tolerance to ADLs and age-related activities  Plate Carries 15# x 10 laps  Lunge Rows CC 10# 3 x 15  Paloff Press 3 x 15    NP:  Prone T's 1# 3 x 12  Standing W's to OH Rene Tb 3 x 12     Home Exercises and Patient Education Provided      Education provided:   - periscapular strengthening; t-sp mobility  - Prognosis, activity modification, goals for therapy, role of therapy for care, exercises/HEP     Written Home Exercises Provided:  Exercises were reviewed and Johanna was able to demonstrate them prior to the end of the session.   Pt received a written copy of exercises to perform at home. Johanna demonstrated good understanding of the education provided.      See EMR under patient instructions for exercises given.     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 Overall, lower-back strength and endurance continue to improve, but still having issues with hinge moment L4-S1 region with loaded posturing when returning from flexed to extended spine position such as when bending down to  her child. Continue to work on lower-back endurance and posterior chain LE strength.  Johanna Is progressing well towards her goals.   Pt prognosis is Good.     Pt will continue to benefit from skilled outpatient physical therapy to address the deficits listed in the problem list box on initial evaluation, provide pt/family education and to maximize pt's level of independence in the home and community environment.     Pt's spiritual, cultural and educational needs considered and pt agreeable to plan of care and goals.    Anticipated barriers to physical therapy: chronic nature of  condition     GOALS   Short Term Goals: 4 weeks  Pt will report decreased L-sided rib pain  < / =  3/10  to increase tolerance for ADL performance and recreational routine. MET  Pt will improve t-sp ROM to WFL in order to be able to perform ADLs without difficulty. MET  Pt will improve L midscap strength by 1/3 MMT grade to increase tolerance for ADL and work activities MET  Pt will demonstrate tolerance to HEP to improve independence with ADL's MET     Long Term Goals: 8 weeks  Pt will report decreased L-sided rib pain  < / =  1/10  to increase tolerance for ADL performance and recreational routine  Pt will improve t-sp ROM to WNL in order to be able to perform ADLs without difficulty  Pt will improve L midscap strength by 1/3 MMT grade to increase tolerance for ADL and work activities  Pt will report at CJ level (20-40% impaired) on FOTO Back to demonstrate increase in LE function with every day tasks.     Plan     Continue per RAY Morillo, PT, DPT  Board Certified in Orthopedic Physical Therapy

## 2024-03-28 ENCOUNTER — CLINICAL SUPPORT (OUTPATIENT)
Dept: REHABILITATION | Facility: HOSPITAL | Age: 39
End: 2024-03-28
Payer: COMMERCIAL

## 2024-03-28 DIAGNOSIS — G89.29 CHRONIC LEFT-SIDED THORACIC BACK PAIN: Primary | ICD-10-CM

## 2024-03-28 DIAGNOSIS — M54.6 CHRONIC LEFT-SIDED THORACIC BACK PAIN: Primary | ICD-10-CM

## 2024-03-28 PROCEDURE — 97112 NEUROMUSCULAR REEDUCATION: CPT

## 2024-03-28 PROCEDURE — 97530 THERAPEUTIC ACTIVITIES: CPT

## 2024-03-28 NOTE — PROGRESS NOTES
Physical Therapy Daily Treatment Note     Name: Johanna Ocasio  Clinic Number: [id number]    Therapy Diagnosis:   Encounter Diagnosis   Name Primary?    Chronic left-sided thoracic back pain Yes     Physician: Ivette Azul MD    Visit Date: 3/28/2024    Physician Orders: PT Eval and Treat  Medical Diagnosis from Referral: M54.6 (ICD-10-CM) - Acute bilateral thoracic back pain   Evaluation Date: 11/14/2023  Authorization Period Expiration: 11/05/2024  Plan of Care Expiration: 02/14/2024  Visit # / Visits authorized: 8/20     FOTO: 57  FOTO 1st Follow-up: NA  FOTO 2nd Follow-up: NA     Time In: 1100  Time Out: 1200  Total Billable Time: 60 minutes     Precautions: Standard    Subjective     Pt reports improved lower-back sx behavior since last session; mild mid t-sp discomfort.  She was compliant with home exercise program.  Response to previous treatment: Fair  Functional change: improved ROM in days following exacerbating incident    Pain: 2/10  Location: lR lower-back    Objective     Objective Measures updated at progress report unless specified    Treatment     PT Tech Extender utilized under supervision throughout tx session    Johanna received the treatments listed below:       Neuromuscular re-education activities to improve: Balance, Coordination, Kinesthetic, Sense, Proprioception, and Posture for 30 minutes. The following activities were included:  Assessment of radiating symptoms in RLE  Peroneal and sural nerve glides on RLE  Supine passive HS str 3 x 30s ea cassidy    NP:  Upper t-sp manip  Thread needle 2 x 10  Prone Extensions 2 x 10  P/A L-sided ribs 4-7  Corkscrew P/A Mobs upper t-sp  Bird-Dogs 3 x 10    Therapeutic activities to improve functional performance for 30 minutes, including:  Dynamic warmup: Quad pulls, HS scoops, toy soldiers, and side lunges  Circuit:  Supine KB OH pulls 15# KB 4 x 15  Lateral MB tosses against wall 4 x 10  Circuit:  Landmine SL RDLs 45# 3 x 10 ea cassidy  Landmine squat  to press 45# 3 x 10    NP:  UBE completed for 10' to increase ROM, endurance, and decrease pain to improve tolerance to ADLs and age-related activities  Rock-Backs 3 x 10  Reverse HS Squats 3 x 10  Elliptical completed for 8' to increase ROM, endurance, and decrease pain to improve tolerance to ADLs and age-related activities  Plate Carries 15# x 10 laps  Lunge Rows CC 10# 3 x 15  Paloff Press 3 x 15    NP:  Prone T's 1# 3 x 12  Standing W's to OH Rene Tb 3 x 12     Home Exercises and Patient Education Provided      Education provided:   - periscapular strengthening; t-sp mobility  - Prognosis, activity modification, goals for therapy, role of therapy for care, exercises/HEP     Written Home Exercises Provided:  Exercises were reviewed and Johanna was able to demonstrate them prior to the end of the session.   Pt received a written copy of exercises to perform at home. Johanna demonstrated good understanding of the education provided.      See EMR under patient instructions for exercises given.     Assessment     L-sided ribs 4-7 dysfunction   Able to tolerate more dynamic mobility to t-sp and subsequent flexion-based mobility to L-sp region. Progressed stability interventions with loaded and anti-rotation demands.  Johanna Is progressing well towards her goals.   Pt prognosis is Good.     Pt will continue to benefit from skilled outpatient physical therapy to address the deficits listed in the problem list box on initial evaluation, provide pt/family education and to maximize pt's level of independence in the home and community environment.     Pt's spiritual, cultural and educational needs considered and pt agreeable to plan of care and goals.    Anticipated barriers to physical therapy: chronic nature of condition     GOALS   Short Term Goals: 4 weeks  Pt will report decreased L-sided rib pain  < / =  3/10  to increase tolerance for ADL performance and recreational routine. MET  Pt will improve t-sp ROM to WFL in order  to be able to perform ADLs without difficulty. MET  Pt will improve L midscap strength by 1/3 MMT grade to increase tolerance for ADL and work activities MET  Pt will demonstrate tolerance to HEP to improve independence with ADL's MET     Long Term Goals: 8 weeks  Pt will report decreased L-sided rib pain  < / =  1/10  to increase tolerance for ADL performance and recreational routine  Pt will improve t-sp ROM to WNL in order to be able to perform ADLs without difficulty  Pt will improve L midscap strength by 1/3 MMT grade to increase tolerance for ADL and work activities  Pt will report at CJ level (20-40% impaired) on FOTO Back to demonstrate increase in LE function with every day tasks.     Plan     Continue per RAY Morillo, PT, DPT  Board Certified in Orthopedic Physical Therapy

## 2024-04-05 ENCOUNTER — CLINICAL SUPPORT (OUTPATIENT)
Dept: REHABILITATION | Facility: OTHER | Age: 39
End: 2024-04-05
Attending: PHYSICAL MEDICINE & REHABILITATION
Payer: COMMERCIAL

## 2024-04-05 ENCOUNTER — CLINICAL SUPPORT (OUTPATIENT)
Dept: REHABILITATION | Facility: OTHER | Age: 39
End: 2024-04-05
Payer: COMMERCIAL

## 2024-04-05 VITALS
BODY MASS INDEX: 31.99 KG/M2 | HEIGHT: 60 IN | SYSTOLIC BLOOD PRESSURE: 110 MMHG | WEIGHT: 162.94 LBS | HEART RATE: 60 BPM | DIASTOLIC BLOOD PRESSURE: 75 MMHG

## 2024-04-05 DIAGNOSIS — G89.29 CHRONIC RIGHT-SIDED LOW BACK PAIN WITHOUT SCIATICA: Primary | ICD-10-CM

## 2024-04-05 DIAGNOSIS — M54.50 LUMBAR BACK PAIN: ICD-10-CM

## 2024-04-05 DIAGNOSIS — M54.50 CHRONIC RIGHT-SIDED LOW BACK PAIN WITHOUT SCIATICA: Primary | ICD-10-CM

## 2024-04-05 DIAGNOSIS — Z76.89 SEEN BY HEALTH AND WELLBEING COACH: Primary | ICD-10-CM

## 2024-04-05 PROCEDURE — 97750 PHYSICAL PERFORMANCE TEST: CPT | Mod: 32 | Performed by: PHYSICAL MEDICINE & REHABILITATION

## 2024-04-05 NOTE — PROGRESS NOTES
Health  met with patient to complete initial outcomes for the Healthy Back lumbar program.  Questions were reviewed with patient and discussed with Dr. Reinoso. The patient will meet with Dr. Reinoso to determine program enrollment.     Any changes to patient answers are below in red font.        4/5/2024     9:13 AM   HealthyBack Questionnaire    Location of your worst pain: Lower Back   Please select the location of any additional pain: (hold down the control key, and click to select multiple responses) Leg- Right    Did the pain begin after an event, injury, or accident? No   How long has this pain been going on?  3 months   Please indicate how the pain is changing.  No Change   What is the WORST level of pain that you have experienced in the last week?  4   What is the LEAST level of pain that you have experienced in the past week?  0   What can you NOT do not that you used to be able to do?  Nothing   Are your limitations mainly due to your pain?  No   What are your additional complaints, if any? None   Is there ever a time during the day when your pain stops, even for a brief moment, before returning? Yes   Does bending forward make your typical pain worse? no   Morning: better   Afternoon: worse   Evening:  worse   Nighttime: worse   Standing:  worse   Lying on stomach: Worse   Lying on back: Better   Sitting:  Worse   Walking: worse   Climbing stairs: better   Emergency department  No   Health care providers (hold down the control key, and click to select multiple responses) Family doctor    Physical therapist   Investigations done (hold down the control key, and click to select multiple responses) X-ray   Procedures (hold down the control key, and click to select multiple responses) None   Have you had any surgery on your back?  No   Please esme what you are experiencing. 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 College   What is your work status? Employed   How did you hear about the Healthyback program?  Physician

## 2024-04-05 NOTE — PROGRESS NOTES
Subjective:     Patient ID: Johanna Ocasio is a 38 y.o. female.    Chief Complaint: No chief complaint on file.    Ms Barrientos is a 37 yo female sent in consultation by Dr. Azul for evaluation for the healthy back lumbar program.  she has had low back pain for 4 months.  She noticed it after sitting.  The pain got worse and then had flare 3 weeks ago that improved but still having low back pain.  She has been going to PT for her upper back pain.  She has left upper back issues.  The pain is right low back dominant, and goes to the left side, behind the right knee and right foot.  The pain is dull and achy. She has tingling in right foot.  There is no numbness, burning or weakness.  The pain is intermittent 0-4/10.  It is worse with walking, getting up from sitting, standing, lying on stomach, extensions, afternoon, evening and nighttime.  It is better bending, lsitting until she gets up lying on back, climbing stairs, heat, morning.  She has been doing PT for her upper back since nov 2023. Her goals are sitting, sleeping and standing. Pattern 2    X-ray lumbar 3/2024  Lumbar spine AP lateral two views.     No fracture, dislocation, or bone destruction seen.  No acute process seen.  Disc spaces are maintained.     Impression:     No acute process seen.    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    Review of patient's family history indicates:  Problem: Hypertension      Relation: Mother          Age of Onset: (Not Specified)  Problem: Hypertension      Relation: Father          Age of Onset: (Not Specified)  Problem: Cataracts      Relation: Maternal Grandfather          Age of Onset: (Not Specified)  Problem: Diabetes       Relation: Paternal Grandmother          Age of Onset: (Not Specified)  Problem: Cancer      Relation: Paternal Grandfather          Age of Onset: (Not Specified)          Comment: colon cancer  Problem: Breast cancer      Relation: Neg Hx          Age of Onset: (Not Specified)  Problem: Colon cancer      Relation: Neg Hx          Age of Onset: (Not Specified)  Problem: Ovarian cancer      Relation: Neg Hx          Age of Onset: (Not Specified)  Problem: Melanoma      Relation: Neg Hx          Age of Onset: (Not Specified)  Problem: Lupus      Relation: Neg Hx          Age of Onset: (Not Specified)  Problem: Psoriasis      Relation: Neg Hx          Age of Onset: (Not Specified)      Social History    Socioeconomic History      Marital status:     Tobacco Use      Smoking status: Never      Smokeless tobacco: Never    Substance and Sexual Activity      Alcohol use: No        Comment: Socially/ pre pregnancy       Drug use: No      Sexual activity: Yes        Partners: Male        Birth control/protection: None    Social Determinants of Health  Financial Resource Strain: Low Risk  (3/16/2024)      Overall Financial Resource Strain (CARDIA)          Difficulty of Paying Living Expenses: Not hard at all  Food Insecurity: No Food Insecurity (3/16/2024)      Hunger Vital Sign          Worried About Running Out of Food in the Last Year: Never true          Ran Out of Food in the Last Year: Never true  Transportation Needs: No Transportation Needs (3/16/2024)      PRAPARE - Transportation          Lack of Transportation (Medical): No          Lack of Transportation (Non-Medical): No  Physical Activity: Insufficiently Active (3/16/2024)      Exercise Vital Sign          Days of Exercise per Week: 1 day          Minutes of Exercise per Session: 20 min  Stress: No Stress Concern Present (3/16/2024)      [employer] [employer] - Occupational Stress Questionnaire          Feeling of Stress : Only a  little  Social Connections: Unknown (3/16/2024)      Social Connection and Isolation Panel [NHANES]          Frequency of Communication with Friends and Family: More than three times a week          Frequency of Social Gatherings with Friends and Family: Once a week          Active Member of Clubs or Organizations: No          Attends Club or Organization Meetings: Never          Marital Status:   Housing Stability: Low Risk  (3/16/2024)      Housing Stability Vital Sign          Unable to Pay for Housing in the Last Year: No          Number of Places Lived in the Last Year: 1          Unstable Housing in the Last Year: No    Current Outpatient Medications:  albuterol (PROVENTIL/VENTOLIN HFA) 90 mcg/actuation inhaler, Inhale 2 puffs into the lungs every 6 (six) hours as needed for Wheezing. Rescue, Disp: 18 g, Rfl: 3  diclofenac sodium (VOLTAREN) 1 % Gel, Apply 2 g topically 4 (four) times daily., Disp: 50 g, Rfl: 0  methylPREDNISolone (MEDROL DOSEPACK) 4 mg tablet, use as directed, Disp: 21 each, Rfl: 0  metoprolol succinate (TOPROL-XL) 25 MG 24 hr tablet, Take 1 tablet (25 mg total) by mouth once daily., Disp: 90 tablet, Rfl: 2  omeprazole (PRILOSEC) 20 MG capsule, Take 1 capsule (20 mg total) by mouth once daily., Disp: 30 capsule, Rfl: 11  ondansetron (ZOFRAN) 4 MG tablet, TAKE 1 TABLET BY MOUTH EVERY 8 HOURS AS NEEDED, Disp: 30 tablet, Rfl: 0  tiZANidine (ZANAFLEX) 2 MG tablet, Take 1 tablet (2 mg total) by mouth every 8 (eight) hours as needed (pain)., Disp: 20 tablet, Rfl: 0  topiramate (TOPAMAX) 25 MG tablet, Take 1 tablet (25 mg total) by mouth once daily., Disp: 30 tablet, Rfl: 2  ubrogepant (UBRELVY) 100 mg tablet, Take 1 tablet by mouth at the onset of a headache. May repeat based on response and tolerability after more than 2 hours if needed. Do not take more than 200mg in a 24 hour span., Disp: 16 tablet, Rfl: 11  VIOS AEROSOL DELIVERY SYSTEM Martha, USE AS DIRECTED, Disp: , Rfl: 0    No current  facility-administered medications for this visit.      Review of patient's allergies indicates:  No Known Allergies          Review of Systems   Constitutional: Negative for weight gain and weight loss.   Cardiovascular:  Negative for chest pain.   Respiratory:  Negative for shortness of breath.    Musculoskeletal:  Positive for back pain. Negative for joint pain and joint swelling.   Gastrointestinal:  Negative for abdominal pain, bowel incontinence, nausea and vomiting.   Genitourinary:  Negative for bladder incontinence.   Neurological:  Negative for numbness and paresthesias.        Objective:     General: Johanna is well-developed, well-nourished, appears stated age, in no acute distress, alert and oriented to time, place and person.     General    Vitals reviewed.  Constitutional: She is oriented to person, place, and time. She appears well-developed and well-nourished.   HENT:   Head: Normocephalic and atraumatic.   Pulmonary/Chest: Effort normal.   Neurological: She is alert and oriented to person, place, and time.   Psychiatric: She has a normal mood and affect. Her behavior is normal. Judgment and thought content normal.     General Musculoskeletal Exam   Gait: normal     Right Ankle/Foot Exam     Tests   Heel Walk: able to perform  Tiptoe Walk: able to perform    Left Ankle/Foot Exam     Tests   Heel Walk: able to perform  Tiptoe Walk: able to perform  Back (L-Spine & T-Spine) / Neck (C-Spine) Exam     Tenderness Right paramedian tenderness of the Lower L-Spine.     Back (L-Spine & T-Spine) Range of Motion   Extension:  20 (with pain)   Flexion:  90   Lateral bend right:  20   Lateral bend left:  20   Rotation right:  40   Rotation left:  40     Spinal Sensation   Right Side Sensation  C-Spine Level: normal   L-Spine Level: normal  S-Spine Level: normal  Left Side Sensation  C-Spine Level: normal  L-Spine Level: normal  S-Spine Level: normal    Back (L-Spine & T-Spine) Tests   Right Side Tests  Straight leg  raise:        Sitting SLR: > 70 degrees    Left Side Tests  Straight leg raise:       Sitting SLR: > 70 degrees      Other   She has no scoliosis .  Spinal Kyphosis:  Absent    Comments:  Neg VALERIA bilateral      Muscle Strength   Right Upper Extremity   Biceps: 5/5   Deltoid:  5/5  Triceps:  5/5  Wrist extension: 5/5   Finger Flexors:  5/5  Left Upper Extremity  Biceps: 5/5   Deltoid:  5/5  Triceps:  5/5  Wrist extension: 5/5   Finger Flexors:  5/5  Right Lower Extremity   Hip Flexion: 5/5   Quadriceps:  5/5   Anterior tibial:  5/5   EHL:  5/5  Left Lower Extremity   Hip Flexion: 5/5   Quadriceps:  5/5   Anterior tibial:  5/5   EHL:  5/5    Reflexes     Left Side  Biceps:  2+  Triceps:  2+  Brachioradialis:  2+  Achilles:  2+  Left Jarrell's Sign:  Absent  Babinski Sign:  absent  Quadriceps:  2+    Right Side   Biceps:  2+  Triceps:  2+  Brachioradialis:  2+  Achilles:  2+  Right Jarrell's Sign:  absent  Babinski Sign:  absent  Quadriceps:  2+    Vascular Exam     Right Pulses        Carotid:                  2+    Left Pulses        Carotid:                  2+          Assessment:     1. Chronic right-sided low back pain without sciatica    2. Lumbar back pain         Plan:     Orders Placed This Encounter    Ambulatory referral/consult to [facility]     The patient has had a history of low back pain with limitations in there activities of Daily living    Previous treatment has not provided relief.    The situation was discussed at length with the patient.  We discussed different causes of back pain and different treatment options.  We discussed the importance of stretching and strengthening.  We discussed posture.  We discussed the pros and cons of further diagnostic testing, alternative treatment and Medications    Based on the history, physical exam, and functional index, an active physical therapy program is recommended.  The goal is to restore the patients function and reduce pain.  A program of  progressive resistance exercises, biomechanical, and mobility maneuvers, instructions in proper body mechanics, aerobic conditioning and HEP will be utilized. The program will continue as long as making improvements.    An assessment of patients progress will be made at each visit to document change in status.    The patient will be actively involved in their own treatment, and responsible for appointments and home program    The patient's lumbar isometric strength will be tested and they will be placed in a program of isolated strength training based on 50% of their total functional torque and advanced as clinically appropriate.      Directional preference of pain will further influence the patients active rehabilitation program    The patient was instructed there might be an initial increase in discomfort    They are enrolled with good prognosis  Pattern 2      Follow-up: No follow-ups on file. If there are any questions prior to this, the patient was instructed to contact the office.

## 2024-04-06 NOTE — TELEPHONE ENCOUNTER
No care due was identified.  [facility] Embedded Care Due Messages. Reference number: [id number].   4/06/2024 4:24:06 PM CDT

## 2024-04-07 NOTE — TELEPHONE ENCOUNTER
Refill Routing Note   Medication(s) are not appropriate for processing by [facility] for the following reason(s):        Outside of protocol    [facility] action(s):  Route               Appointments  past 12m or future 3m with PCP    Date Provider   Last Visit   7/6/2023 Yennifer Garcia MD   Next Visit   Visit date not found Yennifer Garcia MD   ED visits in past 90 days: 0        Note composed:10:17 AM 04/07/2024

## 2024-04-07 NOTE — TELEPHONE ENCOUNTER
Refill Routing Note   Medication(s) are not appropriate for processing by [facility] for the following reason(s):        Outside of protocol    [facility] action(s):  Route             Appointments  past 12m or future 3m with PCP    Date Provider   Last Visit   3/18/2024 Ivette Azul MD   Next Visit   Visit date not found Ivette Azul MD   ED visits in past 90 days: 0        Note composed:8:02 AM 04/07/2024

## 2024-04-08 RX ORDER — TIZANIDINE 2 MG/1
2 TABLET ORAL EVERY 8 HOURS PRN
Qty: 20 TABLET | Refills: 0 | Status: SHIPPED | OUTPATIENT
Start: 2024-04-08

## 2024-04-09 RX ORDER — ONDANSETRON 4 MG/1
TABLET, FILM COATED ORAL
Qty: 30 TABLET | Refills: 0 | Status: SHIPPED | OUTPATIENT
Start: 2024-04-09

## 2024-04-21 ENCOUNTER — PATIENT MESSAGE (OUTPATIENT)
Dept: OPTOMETRY | Facility: CLINIC | Age: 39
End: 2024-04-21
Payer: COMMERCIAL

## 2024-04-23 ENCOUNTER — OFFICE VISIT (OUTPATIENT)
Dept: OPTOMETRY | Facility: CLINIC | Age: 39
End: 2024-04-23
Payer: COMMERCIAL

## 2024-04-23 DIAGNOSIS — S05.01XA ABRASION OF RIGHT CORNEA, INITIAL ENCOUNTER: Primary | ICD-10-CM

## 2024-04-23 PROCEDURE — 92012 INTRM OPH EXAM EST PATIENT: CPT | Mod: S$GLB,,, | Performed by: OPTOMETRIST

## 2024-04-23 PROCEDURE — 1159F MED LIST DOCD IN RCRD: CPT | Mod: CPTII,S$GLB,, | Performed by: OPTOMETRIST

## 2024-04-23 PROCEDURE — 99999 PR PBB SHADOW E&M-EST. PATIENT-LVL III: CPT | Mod: PBBFAC,,, | Performed by: OPTOMETRIST

## 2024-04-23 RX ORDER — OFLOXACIN 3 MG/ML
1 SOLUTION/ DROPS OPHTHALMIC 4 TIMES DAILY
Qty: 5 ML | Refills: 0 | Status: SHIPPED | OUTPATIENT
Start: 2024-04-23 | End: 2024-04-30

## 2024-04-23 NOTE — PROGRESS NOTES
HPI    Urgent care    Patient states on Sunday she was bending down to  a cat and a piece   of long grass stuck her in the OD. Patient reports a FBS OD, tearing, and   painful w/light sensitivity. Pain scale 4     Gtts; AT's OU  Last edited by Fatimah Barraza, OD on 4/23/2024 11:46 AM.            Assessment /Plan     For exam results, see Encounter Report.    Abrasion of right cornea, initial encounter    Other orders  -     ofloxacin (OCUFLOX) 0.3 % ophthalmic solution; Place 1 drop into the right eye 4 (four) times daily. for 7 days  Dispense: 5 mL; Refill: 0      Educated pt on findings. Rx ocuflox 1 gtt OD QID for 7 days. Also recommend preservative free ATs prn and lubricating dionicio QHS. If symptoms worsen, RTC. Monitor 1 week.       RTC in 1 week for cornea f/u or sooner with any worsening.

## 2024-05-02 ENCOUNTER — OFFICE VISIT (OUTPATIENT)
Dept: OPTOMETRY | Facility: CLINIC | Age: 39
End: 2024-05-02
Payer: COMMERCIAL

## 2024-05-02 DIAGNOSIS — S05.01XA ABRASION OF RIGHT CORNEA, INITIAL ENCOUNTER: Primary | ICD-10-CM

## 2024-05-02 PROCEDURE — 1159F MED LIST DOCD IN RCRD: CPT | Mod: CPTII,S$GLB,, | Performed by: OPTOMETRIST

## 2024-05-02 PROCEDURE — 99999 PR PBB SHADOW E&M-EST. PATIENT-LVL II: CPT | Mod: PBBFAC,,, | Performed by: OPTOMETRIST

## 2024-05-02 PROCEDURE — 92012 INTRM OPH EXAM EST PATIENT: CPT | Mod: S$GLB,,, | Performed by: OPTOMETRIST

## 2024-05-02 NOTE — PROGRESS NOTES
HPI     Follow-up            Comments: Patient presents for corneal abrasion f/u OD. Patient reports   no pain and improvement of vision OD. Finished with ofloxacin drops after   5 days.          Last edited by Beti Hope, OD on 5/2/2024  9:54 AM.            Assessment /Plan     For exam results, see Encounter Report.    Abrasion of right cornea, initial encounter      MONITOR. ED PT ON ALL EXAM FINDINGS  IMPROVE K ABRASION OD; CLEAR CORNEA; NO S/S REPORTED ; OVERALL DOING WELL; RESOLVED  RTC FOR REE/DFE W/ DR. REYNOSO IN JULY

## 2024-05-20 ENCOUNTER — OFFICE VISIT (OUTPATIENT)
Dept: OPTOMETRY | Facility: CLINIC | Age: 39
End: 2024-05-20
Payer: COMMERCIAL

## 2024-05-20 DIAGNOSIS — H18.831 RCE (RECURRENT CORNEAL EROSION), RIGHT: Primary | ICD-10-CM

## 2024-05-20 PROCEDURE — 92012 INTRM OPH EXAM EST PATIENT: CPT | Mod: S$GLB,,, | Performed by: OPTOMETRIST

## 2024-05-20 PROCEDURE — 1159F MED LIST DOCD IN RCRD: CPT | Mod: CPTII,S$GLB,, | Performed by: OPTOMETRIST

## 2024-05-20 PROCEDURE — 99999 PR PBB SHADOW E&M-EST. PATIENT-LVL II: CPT | Mod: PBBFAC,,, | Performed by: OPTOMETRIST

## 2024-05-20 NOTE — PROGRESS NOTES
HPI    Patient is here today for urgent care appointment. Patient states she woke   up in the middle of the night on Saturday (05/18/24) and her right eye was   closed shut. When she opened her eye, there was a burning sensation. She   first used iVizia that morning and it did not help. Since then started   using Systane PF gtts and GenTeals Tears at night. Denies eye pain and   discomfort. She reports symptoms have improved since Saturday.   Last edited by Fatimah Barraza, OD on 5/20/2024  3:39 PM.            Assessment /Plan     For exam results, see Encounter Report.    Rce (recurrent corneal erosion), right      Educated pt on findings. Recommended ATs TID-QID + dionicio QHS. Stressed importance of nighttime lubrication. If symptoms worsen, pt is RTC. Monitor.       RTC with any worsening.

## 2024-05-27 ENCOUNTER — CLINICAL SUPPORT (OUTPATIENT)
Dept: REHABILITATION | Facility: OTHER | Age: 39
End: 2024-05-27
Attending: PHYSICAL MEDICINE & REHABILITATION
Payer: COMMERCIAL

## 2024-05-27 DIAGNOSIS — M54.50 LUMBAR BACK PAIN: ICD-10-CM

## 2024-05-27 DIAGNOSIS — R29.898 DECREASED STRENGTH OF TRUNK AND BACK: Primary | ICD-10-CM

## 2024-05-27 DIAGNOSIS — R68.89 DECREASED ACTIVITY TOLERANCE: ICD-10-CM

## 2024-05-27 PROCEDURE — 97750 PHYSICAL PERFORMANCE TEST: CPT | Mod: 32

## 2024-05-27 NOTE — PLAN OF CARE
[facility]  Physical Therapy Lumbar Evaluation      Name: Johanna Ocasio  Clinic Number: [id number]    Therapy Diagnosis:   Encounter Diagnoses   Name Primary?    Lumbar back pain     Decreased strength of trunk and back Yes    Decreased activity tolerance      Physician: Lorena Reinoso, *    Physician Orders: PT Eval and Treat  Medical Diagnosis from Referral:   1. Decreased strength of trunk and back    2. Lumbar back pain    3. Decreased activity tolerance      Evaluation Date: 5/27/2024  Authorization Period Expiration: 7/10/2024  Plan of Care Expiration: 8/27/2024  Reassessment Due: 6/27/2024  Visit # / Visits authorized: 1/1    Time In: 10:07  Time Out: 10:50  Total Billable Time: 43 minutes  INSURANCE and OUTCOMES: Program Benefit Group with Lumbar Outcomes (Oswestry and AQoL) 1/3    Precautions: standard    Pattern of pain determined: 1 PEP    Subjective     Date of onset/History of current condition: Johanna reports the pain initially started with the L shoulder blade, back in October she thought she was having cardiac issues. She stated having muscle issues in the back. She was seeing López over at [place] and was getting better. She had another flair while sitting for a long time at dinner and she started having R side sciatic issue so they were working on both in PT. The shoulder thing was getting a lot better, the last couple weeks the shoulder had been doing a lot better and noticeable with prolonged sitting and standing. Currently the lower R back hurts slightly and the shoulder blade it not hurting too bad but throughout the day it getting worse. Uses topicals (voltaren). Used to have numbness and tinging down into the legs but not lately but also will have occasional discomfort int he knee but can't say it is totally related.      Per MD:  Started having lumbar back pain after episode of prolonged sitting.   Pain is located over right lumbar back and  radiates into lateral thigh and behind her knee. The pain did initially get better but then two weeks ago she was bending over and came up quickly and had severe pain in bilateral lumbar spine. This has improved over the last two days. Denies LE weakness, fevers, fecal/urinary incontinence.     Back Pain  This is a chronic problem. The current episode started more than 1 month ago. The problem occurs constantly. The problem has been waxing and waning since onset. The pain is present in the sacro-iliac. The quality of the pain is described as aching and shooting. The pain radiates to the right knee. The pain is at a severity of 5/10. The pain is mild. The pain is The same all the time. The symptoms are aggravated by position. Pertinent negatives include no abdominal pain, bladder incontinence, bowel incontinence, chest pain, dysuria, fever, headaches, leg pain, numbness, paresis, paresthesias, pelvic pain, perianal numbness, tingling, weakness or weight loss. Risk factors include lack of exercise and poor posture. The treatment provided mild relief.      Medical History:   Past Medical History:   Diagnosis Date    Abnormal Pap smear of cervix 2011    Colposcopy    Brain venous angioma 10/3/2018    Early childhood epilepsy, myoclonic     last seizure age 14; Venous hemangioma on MRI    History of migraine headaches 5/12/2016    Migraine headache        Surgical History:   Johanna Ocasio  has no past surgical history on file.    Medications:   Johanna has a current medication list which includes the following prescription(s): albuterol, diclofenac sodium, metoprolol succinate, omeprazole, ondansetron, tizanidine, topiramate, ubrelvy, and vios aerosol delivery system.    Allergies:   Review of patient's allergies indicates:  No Known Allergies     Imaging: X-ray 3/18/2024  EXAMINATION:  XR LUMBAR SPINE AP AND LATERAL     CLINICAL HISTORY:  Low back pain, unspecified     FINDINGS:  Lumbar spine AP lateral two views.      No fracture, dislocation, or bone destruction seen.  No acute process seen.  Disc spaces are maintained.     Impression:     No acute process seen.       Prior Therapy: Yes, with benefit (has has cupping, dry needling with electrodes)  Prior Treatment: None  Social History:  lives with their family (daughter special needs)  Occupation: IR procedure nurse. A lot of standing and sitting wearing heavy lead (new ones with lumbar supporter)  Leisure: Spending time with family      Prior Level of Function: I with ADL  Current Level of Function: I with ADL   DME owned/used: Bench, weights, KB, bands, pull up bar, box/box jumps  Gym Membership: [employer] employee gym    Pain:  Current 1/10, worst 7/10, best 0/10   Location: right lower back  Description: Tight  Aggravating Factors: Sitting and Standing, repetitive bending  Easing Factors:  Heating pad, laying flat on her back, changing positions  Disturbed Sleep: No    Pattern of pain questions:  1.  Where is your pain the worst? back  2.  Is your pain constant or intermittent? constant  3.  Does bending forward make your typical pain worse? yes  4.  Since the start of your back pain, has there been a change in your bowel or bladder? no  5.  What can't you do now that you use to be able to do? Static standing, static standing, repetitive bending.    Pts goals: Want to get the back better and stick with it.    Red Flag Screening:   Cough/Sneeze Strain: (--)  Bladder/Bowel: (--)  Falls: (--)  Night pain: (--)  Unexplained weight loss: (--)  General health: good    Objective      Postural examination/scapula alignment: Rounded shoulder L shoulder and pelvic elevated  Joint integrity: Firm end feeling with tenderness at the R side low back with palpation  Skin integrity:WNL   Edema: None  Correction of posture: better with lumbar roll  Sitting: fair  Standing: fair    GAIT:  Assistive Device used: none  Level of Assistance: independent  Patient displays the following gait  deviations: no gait deviations observed.    MOVEMENT LOSS - Lumbar   Norms ROM Loss Initial   Flexion Fingers touch toes, sacral angle >/= 70 deg, uniform spinal curvature, posterior weight shift  minimal loss   Extension ASIS surpasses toes, spine of scapulae surpasses heels, uniform spinal curve moderate lossP!   Side glide Right  within functional limits P! On R   Side glide Left  within functional limits   Rotation Right PT observes contralateral shoulder minimal loss P! On R   Rotation Left PT observes contralateral shoulder within functional limits     Lower Extremity Strength  Right LE  Left LE    Hip flexion: 4/5 Hip flexion: 4-/5   Hip extension:  3+/5 Hip extension: 4/5   Hip abduction: 5/5 Hip abduction: 5/5   Hip adduction:  5/5 Hip adduction:  5/5   Hip External Rotation 5/5 Hip External Rotation 5/5   Hip Internal rotation   5/5 Hip Internal rotation 5/5   Knee Flexion 5/5 Knee Flexion 5/5   Knee Extension 5/5 Knee Extension 5/5   Ankle dorsiflexion: 5/5 Ankle dorsiflexion: 5/5   Ankle plantarflexion: 5/5 Ankle plantarflexion: 5/5       Special Tests:   Test Name  Test Result   Prone Instability Test (+)   SI Joint Provocation Test (--)   Thigh thrust (--)   Sacral Thrust (--)   Straight Leg Raise (--)   Neural Tension Test (--)   Crossed Straight Leg Raise (--)     NEUROLOGICAL SCREENING:     Sensory deficits: Intact to light touch    Reflexes:    Left Right   Patella Tendon absent absent   Clonus (--) (--)     REPEATED TEST MOVEMENTS:    Baseline symptoms:  Repeated Flexion in Standing better   Repeated Extension in Standing better but going forward was better   Repeated Flexion in lying no effect   Repeated Extension in lying  better       STATIC TESTS and other movements:   Prone lie worse   Prone lie on elbows better   Sitting slouched  better   Sitting erect worse       Lumbar testing Visit 2      OUTCOMES SELECTION:   Program Patient Outcome Measures    Oswestry Score:  12/50 = 24% disability      AQoL Score:  3/36 = 8% disability          Treatment     Treatment Time In: 10:30  Treatment Time Out: 10:50  Total Treatment time separate from Evaluation:  20 minutes    Johanna received therapeutic exercises to develop/improve posture, lumbar ROM, strength, and muscular endurance for 8 minutes including the following exercises:       [x] Extension in standing x10  [x] Extension in Lying x10  [] Glut set    Next visit:    [] Bridges  [] Prone hip extension  []       MedX Testing:  MedX testing to be performed next visit    Therapeutic Education/Activity provided for 10 minutes:   - Patient was given an [facility] Visit 1 handout which discusses the following:  - what to expect in therapy  - an overview of the program, including health coaching and wellness  - importance of spinal hygiene, proper posture, lifting mechanics, sleep quality, and nutrition/hydration   - Carolee roll trialed, recommended, and purchase information was provided.  - Patient received a handout regarding anticipated muscular soreness following the isometric test and strategies for management were reviewed with patient including stretching, using ice and scheduled rest.   - Patient received verbal education on the following:   - Healthy Back program   - purpose of the isometric test  - safe progression of lumbar strengthening, wellness approach, and systemic strengthening.   - safe usage of MedX machine and testing protocols.      Written Home Exercises Provided: Patient instructed to cont prior HEP.    HEP AS FOLLOWS:  Extension in standing x10  Extension in Lying x10  Glut set    Exercises were reviewed and Johanna was able to demonstrate them prior to the end of the session.  Johanna demonstrated good  understanding of the education provided.     See EMR under Patient Instructions for exercises provided 5/27/2024.    Assessment     Johanna is a 39 y.o. female referred to [facility] with a medical diagnosis of M54.50  (ICD-10-CM) - Lumbar back pain. Johanna presents to therapy with complaints of L shoulder blade/thoracic level pain that she has had previous PT for with benefit and R side low back pain that causes difficulty with prolonged sitting, prolonged standing, and repetitive bending. The evaluation shows fair in cardinal plane mobility with gets better in repeated sagittal plane movements, no neurological findings, and minimal decrease in hip strength findings. Pt appears to have extension movement preference and would benefit from extension based exercises as well as stability exercises to improve pain free movement and functional activity. Johanna has been given initial HEP and will also benefit from strength and stability exercises as they progress through the program. Plan to complete Lumbar MedX testing on visit 2.      Pain Pattern: 1 PEP       Pt prognosis is Excellent.     Pt will benefit from skilled outpatient Physical Therapy to address the deficits stated above and in the chart below, to provide pt/family education, and to maximize pt's level of independence. Based on the above history and physical examination an active physical therapy program is recommended.      Plan of care discussed with patient: Yes  Pt's spiritual, cultural and educational needs considered and patient is agreeable to the plan of care and goals as stated below:     Anticipated Barriers for therapy: None    PT Evaluation Completed? Yes    Medical necessity is demonstrated by the following problem list:    History  Co-morbidities and personal factors that may impact the plan of care [] LOW: no personal factors / co-morbidities  [x] MODERATE: 1-2 personal factors / co-morbidities  [] HIGH: 3+ personal factors / co-morbidities    Moderate / High Support Documentation:   Co-morbidities affecting plan of care: Migraine headache    Personal Factors:   lifestyle     Examination  Body Structures and Functions, activity limitations and participation  restrictions that may impact the plan of care [] LOW: addressing 1-2 elements  [x] MODERATE: 3+ elements  [] HIGH: 4+ elements (please support below)    Moderate / High Support Documentation: prolonged sitting standing and repetitive bending      Clinical Presentation [] LOW: stable  [x] MODERATE: Evolving  [] HIGH: Unstable     Decision Making/ Complexity Score: moderate         GOALS: Pt is in agreement with the following goals.    Short term goals:  6 weeks or 10 visits   - Pt will demonstrate increased lumbar ROM by at least 3 degrees from the initial ROM value with improvements noted in functional ROM and ability to perform ADLs. Appropriate and Ongoing  - Pt will demonstrate increased MedX average isometric strength value by 15% from initial test resulting in improved ability to perform bending, lifting, and carrying activities safely, confidently. Appropriate and Ongoing  - Pt will report a reduction in worst pain score by 1-2 points for improved tolerance for static sitting. Appropriate and Ongoing  - Pt able to perform HEP correctly with minimal cueing or supervision from therapist to encourage independent management of symptoms. Appropriate and Ongoing    Long term goals: 10 weeks or 20 visits   - Pt will demonstrate increased lumbar ROM by at least 6 degrees from initial ROM value, resulting in improved ability to perform functional forward bending while standing and sitting. Appropriate and Ongoing  - Pt will demonstrate increased MedX average isometric strength value by 25% from initial test resulting in improved ability to perform bending, lifting, and carrying activities safely and confidently. Appropriate and Ongoing  - Pt to demonstrate ability to independently control and reduce their pain through posture positioning and mechanical movements throughout a typical day. Appropriate and Ongoing  - Pt will demonstrate reduced pain and improved functional outcomes as reported on the Oswestry Disability  Index by reaching a score of 6 or less in order to demonstrate subjective improvement in pt's condition.   Appropriate and Ongoing  - Pt will demonstrate independence with the HEP at discharge. Appropriate and Ongoing  - Pt will be able to complete work related activities without increase in low back pain(patient goal) Appropriate and Ongoing    Plan     Outpatient physical therapy 2x week for 10 weeks or 20 visits to include the following:   - Patient education  - Therapeutic exercise  - Manual therapy  - Performance testing   - Neuromuscular Re-education  - Therapeutic activity   - Modalities    Pt may be seen by PTA as part of the rehabilitation team.     Therapist: Lb De La Cruz, PT  5/27/2024

## 2024-05-30 ENCOUNTER — CLINICAL SUPPORT (OUTPATIENT)
Dept: REHABILITATION | Facility: OTHER | Age: 39
End: 2024-05-30
Payer: COMMERCIAL

## 2024-05-30 ENCOUNTER — PATIENT MESSAGE (OUTPATIENT)
Dept: NEUROLOGY | Facility: CLINIC | Age: 39
End: 2024-05-30
Payer: COMMERCIAL

## 2024-05-30 DIAGNOSIS — R68.89 DECREASED ACTIVITY TOLERANCE: ICD-10-CM

## 2024-05-30 DIAGNOSIS — R29.898 DECREASED STRENGTH OF TRUNK AND BACK: Primary | ICD-10-CM

## 2024-05-30 PROCEDURE — 97750 PHYSICAL PERFORMANCE TEST: CPT | Mod: 32

## 2024-05-30 NOTE — PROGRESS NOTES
[facility]  Physical Therapy Treatment Note     Name: Johanna Ocasio  Clinic Number: [id number]    Therapy Diagnosis:   Encounter Diagnoses   Name Primary?    Decreased strength of trunk and back Yes    Decreased activity tolerance          Physician: Lorena Reinoso, *    Visit Date: 5/30/2024    Physician Orders: PT Eval and Treat  Medical Diagnosis from Referral:   M54.50 (ICD-10-CM) - Lumbar back pain     Evaluation Date: 5/27/2024  Authorization Period Expiration: 7/10/2024  Plan of Care Expiration: 8/27/2024  Reassessment Due: 6/27/2024  Visit # / Visits authorized: 2/20     PTA Visit #: 0/5     Time In: 11:05 am  Time Out: 12:10 pm   Total Billable Time: 65 minutes    INSURANCE and OUTCOMES: Program Benefit Group with Lumbar Outcomes (Oswestry and AQoL) 1/3     Precautions: standard     Pattern of pain determined: 1 PEP      Subjective     Johanna Ocasio reports no sig change in sx presentation since eval.  Patient report R LE sx about the same when standing or sitting too long, but no pain currently. Patient report compliant with HEP, still c/ pain but no worse. Received lumbar roll 5/29/24 has used it in the car and already finds it very comfortable and helpful.       Patient reports tolerating previous visit well /c no c/o of excess discomfort.  Patient reports their pain to be  0 /10 on a 0-10 scale with 0 being no pain and 10 being the worst pain imaginable.  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     Objective      Lumbar  Isometric Testing on Med X equipment: Testing administered by PT    Test Initial Baseline Midpoint Final   Date 05/30/24     ROM 0-48 deg     Max Peak Torque 108      Min Peak Torque 13      Flex/Ext Ratio 8.3:1     % variance below  normative data 56 (85% below at 0 deg)     % change from initial test N/A visit 1         OUTCOMES SELECTION:   Program Patient Outcome Measures     Oswestry Score:  12/50 = 24% disability      AQoL Score:  3/36 = 8% disability          Treatment     Johanna received the treatments listed below:      Medical MedX Treatment as follows:  MedX testing performed day 2: Patient  received neuromuscular education to engage spinal musculature correctly for motor control and engagement of musculature for 15 minutes including the MedX exercise component and practice and standard testing. MedX dynamic exercise and baseline isometric test performed with instructions to guide the patient safely through the testing procedure. Patient instructed to perform isometric test correctly and safely while building to an optimal force with a pain-free effort. Patient also instructed that they should feel support/pressure from MedX restraints but no pain/discomfort, and encouraged to report any pain to therapist. Patient demonstrated appropriate understanding of information and tolerance of test.  Education regarding purpose of test, safety during test given, and reviewed possible more soreness and strategies.             5/30/2024    11:37 AM   [facility]   Visit Number 2   VAS Pain Rating 0   Treadmill Time (in min.) 5 min   Extension in Lying 10   Extension in Standing 10   Lumbar Extension Seat Pad 2   Femur Restraint 7   Top Dead Center 24   Counterweight 260   Lumbar Flexion 48   Lumbar Extension 0   Lumbar Peak Torque 108 ft. lbs.   Min Torque 13   Test Percent Below Normative Data 56 %   Lumbar Weight 45 lbs   Repetitions 5   Ice - Z Lie (in min.) 5       Johanna participated in neuromuscular re-education activities to improve balance, coordination, proprioception, motor control and/or posture for 00 minutes. The following activities were included:         Johanna participated in therapeutic exercises to develop strength,  endurance, ROM, flexibility, posture, and core stabilization for 50 minutes including:    Treatmill 5 minutes for endurance  LTR x 10    HEP demonstrate include:  Prone press ups x 10   Glute set x 10, 5 sec hold  Bridges x 10 cue for glute activation   Prone hip extension x 10 cassidy  Standing lumbar extensions x10      Peripheral muscle strengthening which included one set of 15-20 repetitions at a slow and controlled 10-13 second per rep pace focused on strengthening supporting musculature in order to improve body mechanics and functional mobility. Patient and therapist focused on proper form during treatment to ensure optimal strengthening of each targeted muscle group.  Machines utilized included:Torso rotation, Leg Ext, Leg Curl, Chest Press, and Rowing  To be added next visit:Triceps, Biceps, Hip Abd, Hip Add, and Leg Press      Johanna participated in dynamic functional therapeutic activities to improve functional performance and simulate household and community activities for 00  minutes. The following activities were included:      Johanna received manual therapy techniques for 00  minutes. The following activities were included:      Pt given cold pack for 5 minutes to lumbar region in Z lying.    Patient Education and Home Exercises     Home exercises include:  RAFAEL/REIL  Glute set  Bridges   Prone hip extension     Cardio program (V5): -  Lifting education (V11): -  Posture/Lumbar roll: obtained 05/29/24, uses in car and work   [employer] Discharge handout (date given): -  Equipment at home/gym membership: [employer]    Education provided:   -PT role and POC  -HEP  -Patient received education in benefits of progressing mobility and strengthening gradually  -Discussed exertion scale, slow progressive resistance protocol to promote safe and healthy strengthening of supportive musculature  by performing 15-20 reps, 7 sec per rep, and increasing weight by 5 % at 20 reps only if there ex done  safely, slowly, using correct musculature, exertion and no pain.  Patient expressed understanding.      Written Home Exercises Provided: Patient instructed to cont prior HEP.  Exercises were reviewed and Johanna was able to demonstrate them prior to the end of the session.  Johanna demonstrated fair  understanding of the education provided.     See EMR under Patient Instructions for exercises provided 5/30/2024.    Assessment     Johanna presents to second healthy back visit reporting no sig change to typical sx presentation since eval.  Pt was able to demo HEP with Min VC for form. Patient educated on HEP having extension bias for sx self management. Added bridges, prone hip extension and LTR today for LE strengthening and mobility with no adverse effects. Pt reports slight improvement in sxs after mat exercises demonstrating appropriateness of extension bias. Updated HEP to include bridges and prone hip extensions. Pt was able to tolerate Medical MedX machine well as follows:  MedX testing performed and patient tolerated test well.  Patient baseline IM measures indicate 56% below norms indicating appropriateness of HB programming for paraspinal strengthening. Pt was also able to complete half of the peripheral strengthening exercises without increased discomfort and will complete the complete circuit next visit as tolerated.      Patient is making good progress towards established goals.  Pt will continue to benefit from skilled outpatient physical therapy to address the deficits stated in the impairment chart, provide pt/family education and to maximize pt's level of independence in the home and community environment.     Anticipated Barriers for therapy: none  Pt's spiritual, cultural and educational needs considered and pt agreeable to plan of care and goals as stated below:     GOALS: Pt is in agreement with the following goals.     Short term goals:  6 weeks or 10 visits   - Pt will demonstrate increased lumbar ROM  by at least 6 degrees from the initial ROM value with improvements noted in functional ROM and ability to perform ADLs. Appropriate and Ongoing  - Pt will demonstrate increased MedX average isometric strength value by 15% from initial test resulting in improved ability to perform bending, lifting, and carrying activities safely, confidently. Appropriate and Ongoing  - Pt will report a reduction in worst pain score by 1-2 points for improved tolerance for static sitting. Appropriate and Ongoing  - Pt able to perform HEP correctly with minimal cueing or supervision from therapist to encourage independent management of symptoms. Appropriate and Ongoing     Long term goals: 10 weeks or 20 visits   - Pt will demonstrate increased lumbar ROM by at least 9 degrees from initial ROM value, resulting in improved ability to perform functional forward bending while standing and sitting. Appropriate and Ongoing  - Pt will demonstrate increased MedX average isometric strength value by 25% from initial test resulting in improved ability to perform bending, lifting, and carrying activities safely and confidently. Appropriate and Ongoing  - Pt to demonstrate ability to independently control and reduce their pain through posture positioning and mechanical movements throughout a typical day. Appropriate and Ongoing  - Pt will demonstrate reduced pain and improved functional outcomes as reported on the Oswestry Disability Index by reaching a score of 6 or less in order to demonstrate subjective improvement in pt's condition.   Appropriate and Ongoing  - Pt will demonstrate independence with the HEP at discharge. Appropriate and Ongoing  - Pt will be able to complete work related activities without increase in low back pain(patient goal) Appropriate and Ongoing    Plan     Continue with established Plan of Care towards established PT goals.     I certify that I was present in the room directing the student in service delivery and  guiding them using my skilled judgment. As the co-signing therapist I have reviewed the students documentation and am responsible for the treatment, assessment, and plan.     Marisol Vargas, [facility]    Therapist: Nito Bhatt, PT  5/30/2024

## 2024-05-30 NOTE — PROGRESS NOTES
[facility]  Physical Therapy Treatment Note     Name: Johanna Ocasio  Clinic Number: [id number]    Therapy Diagnosis: No diagnosis found.    ***    Physician: Lorena Reinoso, *    Visit Date: 5/30/2024    Physician Orders: PT Eval and Treat  Medical Diagnosis from Referral:   M54.50 (ICD-10-CM) - Lumbar back pain     Evaluation Date: 5/27/2024  Authorization Period Expiration: 7/10/2024  Plan of Care Expiration: 8/27/2024  Reassessment Due: 6/27/2024  Visit # / Visits authorized: 2/20     PTA Visit #: 0/5     Time In: 11:00 am  Time Out: 12:00 pm   Total Billable Time: *** 60 minutes    INSURANCE and OUTCOMES: Program Benefit Group with Lumbar Outcomes (Oswestry and AQoL) 1/3     Precautions: standard     Pattern of pain determined: 1 PEP      Subjective     Johanna Ocasio reports no sig change in sx presentation since eval.  Patient report R LE sx {AMB PT PROGRESS SUBJECTIVE REPORTS:51517}.      Patient report HEP    Today  Since - weekend   HEP   Difficulties         Patient reports tolerating previous visit *** well /c no c/o of excess discomfort.  Patient reports their pain to be {NUMBERS 1-10:14529}/10 on a 0-10 scale with 0 being no pain and 10 being the worst pain imaginable.  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     Objective      Lumbar  Isometric Testing on Med X equipment: Testing administered by PT    Test Initial Baseline Midpoint Final   Date 05/30/24     ROM *** deg     Max Peak Torque ***      Min Peak Torque ***      Flex/Ext Ratio ***     % variance  normative data ***     % change from initial test N/A visit 1           OUTCOMES SELECTION:   Program Patient Outcome Measures     Oswestry Score:  12/50 = 24% disability      AQoL Score:  3/36  = 8% disability          Treatment     Johanna received the treatments listed below:      Medical MedX Treatment as follows:  MedX testing performed day 2: Patient  received neuromuscular education to engage spinal musculature correctly for motor control and engagement of musculature for 15 minutes including the MedX exercise component and practice and standard testing. MedX dynamic exercise and baseline isometric test performed with instructions to guide the patient safely through the testing procedure. Patient instructed to perform isometric test correctly and safely while building to an optimal force with a pain-free effort. Patient also instructed that they should feel support/pressure from MedX restraints but no pain/discomfort, and encouraged to report any pain to therapist. Patient demonstrated appropriate understanding of information and tolerance of test.  Education regarding purpose of test, safety during test given, and reviewed possible more soreness and strategies.       ***      Johanna participated in neuromuscular re-education activities to improve balance, coordination, proprioception, motor control and/or posture for 00 minutes. The following activities were included:         Johanna participated in therapeutic exercises to develop strength, endurance, ROM, flexibility, posture, and core stabilization for 45 minutes including:      LTR x 10    HEP demonstrate include:  Prone press ups x 10   Glute set x 10 5 sec hold  Bridges x 10 cue for glute activation   Prone hip extension   Standing lumbar extensions    ***    Peripheral muscle strengthening which included one set of 15-20 repetitions at a slow and controlled 10-13 second per rep pace focused on strengthening supporting musculature in order to improve body mechanics and functional mobility. Patient and therapist focused on proper form during treatment to ensure optimal strengthening of each targeted muscle group.  Machines utilized included:Torso  rotation, Leg Ext, Leg Curl, Chest Press, and Rowing  To be added next visit:Triceps, Biceps, Hip Abd, Hip Add, and Leg Press      Johanna participated in dynamic functional therapeutic activities to improve functional performance and simulate household and community activities for 00  minutes. The following activities were included:      Johanna received manual therapy techniques for 00  minutes. The following activities were included:      Pt given cold pack for 5 minutes to lumbar region in Z lying.    Patient Education and Home Exercises     Home exercises include:  RAFAEL/REIL  Glute set  Bridges   Prone hip extension     Cardio program (V5): -  Lifting education (V11): -  Posture/Lumbar roll: not obtained as of 05/30/24  [employer] Discharge handout (date given): -  Equipment at home/gym membership: [employer]    Education provided:   - PT role and POC  - HEP  -Patient received education in benefits of progressing mobility and strengthening gradually  -Discussed exertion scale, slow progressive resistance protocol to promote safe and healthy strengthening of supportive musculature  by performing 15-20 reps, 7 sec per rep, and increasing weight by 5 % at 20 reps only if there ex done safely, slowly, using correct musculature, exertion and no pain.  Patient expressed understanding.      Written Home Exercises Provided: Patient instructed to cont prior HEP.  Exercises were reviewed and Johanna was able to demonstrate them prior to the end of the session.  Johanna demonstrated fair  understanding of the education provided.     See EMR under Patient Instructions for exercises provided 5/30/2024.    Assessment     Johanna presents to second healthy back visit reporting *** no sig change to typical sx presentation since eval.   Pt was able to demo HEP with {VC:49417} VC for form.  Patient educated on HEP having extension bias for sx self     Pt was able to tolerate Medical MedX machine well as follows:  MedX  testing performed and patient tolerated test well.  Patient baseline IM measures indicate ***% below norms         Pt was also able to complete half of the peripheral strengthening exercises without increased discomfort and will complete the complete circuit next visit as tolerated.    Patient is making {GOOD FAIR POOR:23740} progress towards established goals.  Pt will continue to benefit from skilled outpatient physical therapy to address the deficits stated in the impairment chart, provide pt/family education and to maximize pt's level of independence in the home and community environment.     Anticipated Barriers for therapy: none  Pt's spiritual, cultural and educational needs considered and pt agreeable to plan of care and goals as stated below:     GOALS: Pt is in agreement with the following goals.     Short term goals:  6 weeks or 10 visits   - Pt will demonstrate increased lumbar ROM by at least 3 degrees from the initial ROM value with improvements noted in functional ROM and ability to perform ADLs. Appropriate and Ongoing  - Pt will demonstrate increased MedX average isometric strength value by 15% from initial test resulting in improved ability to perform bending, lifting, and carrying activities safely, confidently. Appropriate and Ongoing  - Pt will report a reduction in worst pain score by 1-2 points for improved tolerance for static sitting. Appropriate and Ongoing  - Pt able to perform HEP correctly with minimal cueing or supervision from therapist to encourage independent management of symptoms. Appropriate and Ongoing     Long term goals: 10 weeks or 20 visits   - Pt will demonstrate increased lumbar ROM by at least 6 degrees from initial ROM value, resulting in improved ability to perform functional forward bending while standing and sitting. Appropriate and Ongoing  - Pt will demonstrate increased MedX average isometric strength value by 25% from initial test resulting in improved ability to  perform bending, lifting, and carrying activities safely and confidently. Appropriate and Ongoing  - Pt to demonstrate ability to independently control and reduce their pain through posture positioning and mechanical movements throughout a typical day. Appropriate and Ongoing  - Pt will demonstrate reduced pain and improved functional outcomes as reported on the Oswestry Disability Index by reaching a score of 6 or less in order to demonstrate subjective improvement in pt's condition.   Appropriate and Ongoing  - Pt will demonstrate independence with the HEP at discharge. Appropriate and Ongoing  - Pt will be able to complete work related activities without increase in low back pain(patient goal) Appropriate and Ongoing    Plan     Continue with established Plan of Care towards established PT goals.     Therapist: Nito Bhatt, PT  5/30/2024

## 2024-06-03 ENCOUNTER — OFFICE VISIT (OUTPATIENT)
Dept: NEUROLOGY | Facility: CLINIC | Age: 39
End: 2024-06-03
Payer: COMMERCIAL

## 2024-06-03 DIAGNOSIS — G43.009 MIGRAINE WITHOUT AURA AND WITHOUT STATUS MIGRAINOSUS, NOT INTRACTABLE: Primary | ICD-10-CM

## 2024-06-03 PROCEDURE — 1160F RVW MEDS BY RX/DR IN RCRD: CPT | Mod: CPTII,95,, | Performed by: PHYSICIAN ASSISTANT

## 2024-06-03 PROCEDURE — 1159F MED LIST DOCD IN RCRD: CPT | Mod: CPTII,95,, | Performed by: PHYSICIAN ASSISTANT

## 2024-06-03 PROCEDURE — 99214 OFFICE O/P EST MOD 30 MIN: CPT | Mod: 95,,, | Performed by: PHYSICIAN ASSISTANT

## 2024-06-03 RX ORDER — TOPIRAMATE 25 MG/1
25 TABLET ORAL DAILY
Qty: 30 TABLET | Refills: 2 | Status: SHIPPED | OUTPATIENT
Start: 2024-06-03 | End: 2024-09-01

## 2024-06-03 NOTE — PROGRESS NOTES
Established Patient     The patient location is: [place]  The chief complaint leading to consultation is: headache follow up visit    Visit type: audiovisual    Face to Face time with patient: 8 min  12 minutes of total time spent on the encounter, which includes face to face time and non-face to face time preparing to see the patient (eg, review of tests), Obtaining and/or reviewing separately obtained history, Documenting clinical information in the electronic or other health record, Independently interpreting results (not separately reported) and communicating results to the patient/family/caregiver, or Care coordination (not separately reported).     Each patient to whom he or she provides medical services by telemedicine is:  (1) informed of the relationship between the physician and patient and the respective role of any other health care provider with respect to management of the patient; and (2) notified that he or she may decline to receive medical services by telemedicine and may withdraw from such care at any time.    Notes:        SUBJECTIVE:  Patient ID: Johanna Ocasio   Chief Complaint: Headache    History of Present Illness:  Johanna Ocasio is a 39 y.o. 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       06/03/2024 - Interval History:  Since last visit, after decreasing tpx, ha's remained well controlled typically occurring 0-1 day a week around 3/30 days per wk on tpx 25mg/d. However, 2-3 wks ago, pt had an increase in ha's around the time of weather changes and increased caffeine intake w/ ha's occurring daily that week. However, since that time, they have returned to occurring 1-2 days week. Ran out of tpx 2-3 days ago.   Plan: continue tpx 25mg/d, continue ubrelvy 50-100mg prn, track ha's, rtc 2-3 mo to discuss d/c of tpx    01/26/2024 - Interval History:  Bustos's remain  well controlled on current regimen. Would like to try weaning off tpx.   Plan: decrease tpx 25mg/d and msg in 1 mo w/ udpate, continue ubrelvy 50-100mg prn, rtc prn    10/27/2023 - Interval History:  Ha's have improved, are occurring 1-2 times a week w/ tpx at 50mg. Ubrelvy 50-100mg resolves ha's well. Has d/c'ed excedrin and caffeine. Is currently undergoing w/up w/ cardiology.   Plan: continue current reigmen, tpx 50mg, ubrelvy 50-100mg prn, rtc 3-6 mo    Recommendations made at last Office Visit on 8/16/23:  - Discussed symptoms appear to be consistent with migraines, discussed treatment options and patient agreed with the following plan:  - ppx - increase tpx 50mg/d  - abortive - try ubrelvy as pt cannot take triptans nor nsaids at this time 2/2 overuse and h/o palpitations  - nausea - continue zofran  - caffeine - d/c  - trouble w/ sleep - continue melatonin, mgmt per pcp  - med overuse - d/c excedrin  - risks, benefits, and potential side effects of tpx, ubrelvy, zofran discussed   - alternative treatment options offered   - importance of healthy diet, regular exercise and sleep hygiene in the treatment of headaches    - Start tracking headaches via Migraine Buddy bill on phone   - RTC in 2-3 mo     Treatments Tried:  Metoprolol 25mg  Verapamil - lightheadedness  Tpx- helps  Ubrelvy - helps  Excedrin   Triptans - avoid 2/2 pvcs and palpitations  Zofran 4mg    Current Medications:    Current Outpatient Medications:     albuterol (PROVENTIL/VENTOLIN HFA) 90 mcg/actuation inhaler, Inhale 2 puffs into the lungs every 6 (six) hours as needed for Wheezing. Rescue, Disp: 18 g, Rfl: 3    diclofenac sodium (VOLTAREN) 1 % Gel, Apply 2 g topically 4 (four) times daily., Disp: 50 g, Rfl: 0    metoprolol succinate (TOPROL-XL) 25 MG 24 hr tablet, Take 1 tablet (25 mg total) by mouth once daily., Disp: 90 tablet, Rfl: 2    omeprazole (PRILOSEC) 20 MG capsule, Take 1 capsule (20 mg total) by mouth once daily., Disp: 30  capsule, Rfl: 11    ondansetron (ZOFRAN) 4 MG tablet, TAKE 1 TABLET BY MOUTH EVERY 8 HOURS AS NEEDED, Disp: 30 tablet, Rfl: 0    tiZANidine (ZANAFLEX) 2 MG tablet, TAKE 1 TABLET BY MOUTH EVERY 8 HOURS AS NEEDED FOR PAIN, Disp: 20 tablet, Rfl: 0    topiramate (TOPAMAX) 25 MG tablet, Take 1 tablet (25 mg total) by mouth once daily., Disp: 30 tablet, Rfl: 2    ubrogepant (UBRELVY) 100 mg tablet, Take 1 tablet by mouth at the onset of a headache. May repeat based on response and tolerability after more than 2 hours if needed. Do not take more than 200mg in a 24 hour span., Disp: 16 tablet, Rfl: 11    VIOS AEROSOL DELIVERY SYSTEM Martha, USE AS DIRECTED, Disp: , Rfl: 0    Review of Systems - as per HPI, otherwise a balanced 10 systems review is negative.    OBJECTIVE:  Vitals:  There were no vitals taken for this visit.     Physical Exam:  Constitutional: she appears well-developed and well-nourished. she is well groomed. NAD   HENT:    Head: Normocephalic and atraumatic  Eyes: Conjunctivae and EOM are normal  Musculoskeletal: Normal range of motion. No joint stiffness.   Psychiatric: Mood and affect are normal    Neuro: Patient is alert and oriented to person, place, and time. Language is intact and fluent. Speech is clear and fluent. Recent and remote memory are intact.  Normal attention and concentration.  Facial movement is symmetric. Moves all 4 extremities against gravity.      Review of Data:   Notes from neuro reviewed   Labs:  No visits with results within 3 Month(s) from this visit.   Latest known visit with results is:   Clinical Support on 02/07/2024   Component Date Value Ref Range Status    POC Cholesterol, Total 02/07/2024 202  <240 MG/DL Final    POC Cholesterol, HDL 02/07/2024 50  MG/DL Final    POC Cholesterol, LDL 02/07/2024 130  <160 MG/DL Final    POC Triglycerides 02/07/2024 124  <160 MG/DL Final    POC Glucose 02/07/2024 87  60 - 140 MG/DL Final     Imaging:  Results for orders placed or performed  during the hospital encounter of 10/03/18   MRI Brain Without Contrast    Narrative    EXAMINATION:  MRI BRAIN WITHOUT CONTRAST    CLINICAL HISTORY:  venous angioma and severe headache; Hemangioma of intracranial structures    TECHNIQUE:  Multiplanar multisequence MR imaging of the brain was performed without contrast.    COMPARISON:  MRI 07/18/2006.    FINDINGS:  No acute infarction.  No intracranial hemorrhage.  No intra or extra-axial fluid collections.  Right cerebellar developmental venous anomaly again noted, not significantly changed when compared to the previous MRI.  No hydrocephalus.  No midline shift or mass effect.    There is an empty sella configuration.  Cerebellar tonsils are in their expected location.  Major intracranial T2 flow voids are present.    Trace mucosal membrane thickening noted within the left ethmoid air cells. Globes are symmetric. No marrow replacement process.      Impression    1. Right cerebellar developmental venous anomaly, not significantly changed when compared to the previous exam.  2. Empty sella.  3. No acute infarction or intracranial hemorrhage.      Electronically signed by: Kai Tavarez MD  Date:    10/04/2018  Time:    00:34     Note: I have independently reviewed any/all imaging/labs/tests and agree with the report (s) as documented.  Any discrepancies will be as noted/demarcated by free text.  CORNEL QUIÑONEZ 6/3/2024    ASSESSMENT:  1. Migraine without aura and without status migrainosus, not intractable            PLAN:  - Discussed symptoms appear to be consistent with migraines, discussed treatment options and patient agreed with the following plan:  - ppx - continue tpx   - abortive - continue ubrelvy as pt cannot take triptans nor nsaids at this time 2/2 overuse and h/o palpitations  - nausea - continue zofran  - trouble w/ sleep - continue melatonin, mgmt per pcp  - Continue tracking headaches   - Discussed goals of therapy are to decrease the frequency,  intensity, and duration of headaches  - RTC 2-3 mo    Orders Placed This Encounter    topiramate (TOPAMAX) 25 MG tablet         Discussed potential for teratogenicity with treatment, patient understands if her family planning status should change she will contact office immediately and we will safely adjust medications as needed.     Questions and concerns were sought and answered to the patient's stated verbal satisfaction.  The patient verbalizes understanding and agreement with the above stated treatment plan.     CC: Ivette Azul MD Sarena Patel, PA-C  [facility]   [phone]    Dr. Singh was available during today's encounter.

## 2024-06-04 ENCOUNTER — CLINICAL SUPPORT (OUTPATIENT)
Dept: REHABILITATION | Facility: OTHER | Age: 39
End: 2024-06-04
Payer: COMMERCIAL

## 2024-06-04 DIAGNOSIS — R68.89 DECREASED ACTIVITY TOLERANCE: ICD-10-CM

## 2024-06-04 DIAGNOSIS — R29.898 DECREASED STRENGTH OF TRUNK AND BACK: Primary | ICD-10-CM

## 2024-06-04 PROCEDURE — 97750 PHYSICAL PERFORMANCE TEST: CPT | Mod: 32

## 2024-06-04 NOTE — PROGRESS NOTES
[facility]  Physical Therapy Treatment Note     Name: Johanna Ocasio  Clinic Number: [id number]    Therapy Diagnosis:   Encounter Diagnoses   Name Primary?    Decreased strength of trunk and back Yes    Decreased activity tolerance            Physician: Lorena Reinoso, *    Visit Date: 6/4/2024    Physician Orders: PT Eval and Treat  Medical Diagnosis from Referral:   M54.50 (ICD-10-CM) - Lumbar back pain     Evaluation Date: 5/27/2024  Authorization Period Expiration: 7/10/2024  Plan of Care Expiration: 8/27/2024  Reassessment Due: 6/27/2024  Visit # / Visits authorized: 3/20     PTA Visit #: 0/5     Time In:930 am   Time Out: 1030 AM  Total Billable Time: 60 minutes    INSURANCE and OUTCOMES: Program Benefit Group with Lumbar Outcomes (Oswestry and AQoL) 1/3     Precautions: standard     Pattern of pain determined: 1 PEP      Subjective     Johanna Ocasio she can feel it if she twists a certain way.       Patient reports tolerating previous visit well /c no c/o of excess discomfort.  Patient reports their pain to be 2/10 on a 0-10 scale with 0 being no pain and 10 being the worst pain imaginable.  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     Objective      Lumbar  Isometric Testing on Med X equipment: Testing administered by PT    Test Initial Baseline Midpoint Final   Date 05/30/24     ROM 0-48 deg     Max Peak Torque 108      Min Peak Torque 13      Flex/Ext Ratio 8.3:1     % variance below normative data 56 (85% below at 0 deg)     % change from initial test N/A visit 1         OUTCOMES SELECTION:   Program Patient Outcome Measures     Oswestry Score:  12/50 = 24% disability      AQoL Score:  3/36 = 8% disability          Treatment     Johanna received the  "treatments listed below:      Medical MedX Treatment as follows:  Patient received neuromuscular education for 10 minutes via participation on the Medical MedX Machine. Therapist assisted patient in isolating and engaging spinal stabilization musculature in order to improve functional ability and postural control. Patient performed exercise with therapist guidance in order to accurately use pacer function, avoid valsalva, and optimally exert effort within a safe and effective range via the Rajendra Exertion Rating Scale. Patient instructed to perform at a midrange of exertion and to complete 15-20 repetitions within appropriate split time, with proper technique, and while maintaining safety.          6/4/2024     9:37 AM   [facility]   Visit Number 3   VAS Pain Rating 2   Time 5   Extension in Lying 10   Extension in Standing 10   Lumbar Weight 54 lbs   Repetitions 18   Rating of Perceived Exertion 3   Ice - Z Lie (in min.) 5           Johanna participated in neuromuscular re-education activities to improve balance, coordination, proprioception, motor control and/or posture for 00 minutes. The following activities were included:         Johanna participated in therapeutic exercises to develop strength, endurance, ROM, flexibility, posture, and core stabilization for 50 minutes including:      LTR x 10  Prone press ups x 10   Bridges 2 x 10 cue for glute activation   Prone hip extension x 10 cassidy  PPT 10 x 5" + 10 with march   Standing lumbar extensions x10      Peripheral muscle strengthening which included one set of 15-20 repetitions at a slow and controlled 10-13 second per rep pace focused on strengthening supporting musculature in order to improve body mechanics and functional mobility. Patient and therapist focused on proper form during treatment to ensure optimal strengthening of each targeted muscle group.  Machines utilized included:Torso rotation, Leg Ext, Leg Curl, Chest Press, and Rowing  To be added " next visit:Triceps, Biceps, Hip Abd, Hip Add, and Leg Press      Johanna participated in dynamic functional therapeutic activities to improve functional performance and simulate household and community activities for 00  minutes. The following activities were included:      Johanna received manual therapy techniques for 00  minutes. The following activities were included:      Pt given cold pack for 5 minutes to lumbar region in Z lying.    Patient Education and Home Exercises     Home exercises include:  RAFAEL/REIL  Glute set  Bridges   Prone hip extension     Cardio program (V5): -  Lifting education (V11): -  Posture/Lumbar roll: obtained 05/29/24, uses in car and work   Fridge Magnet Discharge handout (date given): -  Equipment at home/gym membership: [employer] gym    Education provided:   -PT role and POC  -HEP  -Patient received education in benefits of progressing mobility and strengthening gradually  -Discussed exertion scale, slow progressive resistance protocol to promote safe and healthy strengthening of supportive musculature  by performing 15-20 reps, 7 sec per rep, and increasing weight by 5 % at 20 reps only if there ex done safely, slowly, using correct musculature, exertion and no pain.  Patient expressed understanding.      Written Home Exercises Provided: Patient instructed to cont prior HEP.  Exercises were reviewed and Johanna was able to demonstrate them prior to the end of the session.  Johanna demonstrated fair  understanding of the education provided.     See EMR under Patient Instructions for exercises provided 5/30/2024.    Assessment     Johanna presents to session with low levels of pain. Reviewed HEP with pt requiring mod verbal cues for performance. Stressed the importance  of regularly compliance to optimize therapeutic outcomes. Pt was able to initiate MedX strengthening at 50% max torque with 18 reps performed at 3/10 RPE. Pt was able to complete full peripheral circuit without  issue.       Patient is making good progress towards established goals.  Pt will continue to benefit from skilled outpatient physical therapy to address the deficits stated in the impairment chart, provide pt/family education and to maximize pt's level of independence in the home and community environment.     Anticipated Barriers for therapy: none  Pt's spiritual, cultural and educational needs considered and pt agreeable to plan of care and goals as stated below:     GOALS: Pt is in agreement with the following goals.     Short term goals:  6 weeks or 10 visits   - Pt will demonstrate increased lumbar ROM by at least 6 degrees from the initial ROM value with improvements noted in functional ROM and ability to perform ADLs. Appropriate and Ongoing  - Pt will demonstrate increased MedX average isometric strength value by 15% from initial test resulting in improved ability to perform bending, lifting, and carrying activities safely, confidently. Appropriate and Ongoing  - Pt will report a reduction in worst pain score by 1-2 points for improved tolerance for static sitting. Appropriate and Ongoing  - Pt able to perform HEP correctly with minimal cueing or supervision from therapist to encourage independent management of symptoms. Appropriate and Ongoing     Long term goals: 10 weeks or 20 visits   - Pt will demonstrate increased lumbar ROM by at least 9 degrees from initial ROM value, resulting in improved ability to perform functional forward bending while standing and sitting. Appropriate and Ongoing  - Pt will demonstrate increased MedX average isometric strength value by 25% from initial test resulting in improved ability to perform bending, lifting, and carrying activities safely and confidently. Appropriate and Ongoing  - Pt to demonstrate ability to independently control and reduce their pain through posture positioning and mechanical movements throughout a typical day. Appropriate and Ongoing  - Pt will  demonstrate reduced pain and improved functional outcomes as reported on the Oswestry Disability Index by reaching a score of 6 or less in order to demonstrate subjective improvement in pt's condition.   Appropriate and Ongoing  - Pt will demonstrate independence with the HEP at discharge. Appropriate and Ongoing  - Pt will be able to complete work related activities without increase in low back pain(patient goal) Appropriate and Ongoing    Plan     Continue with established Plan of Care towards established PT goals.       Therapist: Anuel Hill, WARREN  6/4/2024

## 2024-06-06 ENCOUNTER — CLINICAL SUPPORT (OUTPATIENT)
Dept: REHABILITATION | Facility: OTHER | Age: 39
End: 2024-06-06
Payer: COMMERCIAL

## 2024-06-06 DIAGNOSIS — R29.898 DECREASED STRENGTH OF TRUNK AND BACK: Primary | ICD-10-CM

## 2024-06-06 DIAGNOSIS — R68.89 DECREASED ACTIVITY TOLERANCE: ICD-10-CM

## 2024-06-06 PROCEDURE — 97750 PHYSICAL PERFORMANCE TEST: CPT | Mod: 32

## 2024-06-06 NOTE — PROGRESS NOTES
"[facility]  Physical Therapy Treatment Note     Name: Johanna Ocasio  Clinic Number: [id number]    Therapy Diagnosis:   Encounter Diagnoses   Name Primary?    Decreased strength of trunk and back Yes    Decreased activity tolerance            Physician: Lorena Reinoso, *    Visit Date: 6/6/2024    Physician Orders: PT Eval and Treat  Medical Diagnosis from Referral:   M54.50 (ICD-10-CM) - Lumbar back pain     Evaluation Date: 5/27/2024  Authorization Period Expiration: 7/10/2024  Plan of Care Expiration: 8/27/2024  Reassessment Due: 6/27/2024  Visit # / Visits authorized: 4/20     PTA Visit #: 2/5     Time In:930 am   Time Out: 1030 AM  Total Billable Time: 60 minutes    INSURANCE and OUTCOMES: Program Benefit Group with Lumbar Outcomes (Oswestry and AQoL) 1/3     Precautions: standard     Pattern of pain determined: 1 PEP      Subjective     Johanna Ocasio reports feeling pretty good today, min "pinch" at R SI returning to neutral from PPT.      Patient reports tolerating previous visit well /c no c/o of excess discomfort.  Patient reports their pain to be 2/10 on a 0-10 scale with 0 being no pain and 10 being the worst pain imaginable.  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     Objective      Lumbar  Isometric Testing on Med X equipment: Testing administered by PT    Test Initial Baseline Midpoint Final   Date 05/30/24     ROM 0-48 deg     Max Peak Torque 108      Min Peak Torque 13      Flex/Ext Ratio 8.3:1     % variance below normative data 56 (85% below at 0 deg)     % change from initial test N/A visit 1         OUTCOMES SELECTION:   Program Patient Outcome Measures     Oswestry Score:  12/50 = 24% disability      AQoL Score:  3/36 = 8% disability    "       Treatment     Johanna received the treatments listed below:      Medical MedX Treatment as follows:  Patient received neuromuscular education for 10 minutes via participation on the Medical MedX Machine. Therapist assisted patient in isolating and engaging spinal stabilization musculature in order to improve functional ability and postural control. Patient performed exercise with therapist guidance in order to accurately use pacer function, avoid valsalva, and optimally exert effort within a safe and effective range via the Rajendra Exertion Rating Scale. Patient instructed to perform at a midrange of exertion and to complete 15-20 repetitions within appropriate split time, with proper technique, and while maintaining safety.          6/6/2024    12:44 PM   [facility]   Visit Number 4   VAS Pain Rating 2   Treadmill Time (in min.) 5 min   Extension in Lying 10   Extension in Standing 10   Lumbar Weight 54 lbs   Repetitions 20   Rating of Perceived Exertion 3   Ice - Z Lie (in min.) 5       Johanna participated in neuromuscular re-education activities to improve balance, coordination, proprioception, motor control and/or posture for 00 minutes. The following activities were included:         Johanna participated in therapeutic exercises to develop strength, endurance, ROM, flexibility, posture, and core stabilization for 45 minutes including:      LTR x 10  Prone press ups x 10   Bridges 2 x 10 c/ pilates ring (abd)  Prone hip extension x 10 cassidy  90/90 position march c/ PPT  x 10   Standing lumbar extensions x10      Peripheral muscle strengthening which included one set of 15-20 repetitions at a slow and controlled 10-13 second per rep pace focused on strengthening supporting musculature in order to improve body mechanics and functional mobility. Patient and therapist focused on proper form during treatment to ensure optimal strengthening of each targeted muscle group.  Machines utilized included:Torso  "rotation, Leg Ext, Leg Curl, Chest Press, and Rowing  To be added next visit:Triceps, Biceps, Hip Abd, Hip Add, and Leg Press      Johanna participated in dynamic functional therapeutic activities to improve functional performance and simulate household and community activities for 00  minutes. The following activities were included:      Johanna received manual therapy techniques for 00  minutes. The following activities were included:      Pt given cold pack for 5 minutes to lumbar region in Z lying.    Patient Education and Home Exercises     Home exercises include:  RAFAEL/REIL  Glute set  Bridges   Prone hip extension     Cardio program (V5): -  Lifting education (V11): -  Posture/Lumbar roll: obtained 05/29/24, uses in car and work   Fridge Magnet Discharge handout (date given): -  Equipment at home/gym membership: [employer] gym    Education provided:   -PT role and POC  -HEP  -Patient received education in benefits of progressing mobility and strengthening gradually  -Discussed exertion scale, slow progressive resistance protocol to promote safe and healthy strengthening of supportive musculature  by performing 15-20 reps, 7 sec per rep, and increasing weight by 5 % at 20 reps only if there ex done safely, slowly, using correct musculature, exertion and no pain.  Patient expressed understanding.      Written Home Exercises Provided: Patient instructed to cont prior HEP.  Exercises were reviewed and Johanna was able to demonstrate them prior to the end of the session.  Johanna demonstrated fair  understanding of the education provided.     See EMR under Patient Instructions for exercises provided 5/30/2024.    Assessment     Johanna presents to session with low levels of pain, described as "pinch" in R SI area.  Treatment continued with lumbopelvic/core flexibility and strengthening ex's adding pilates ring to bridges and progressing supine march to 90/90 position. All were tolerated with out increased pain, " Discussed the importance of HEP compliance  to optimize therapeutic outcomes. Pt was able to initiate MedX strengthening at 50% max torque with 18 reps performed at 3/10 RPE. Pt was able to complete full peripheral circuit without issue.       Patient is making good progress towards established goals.  Pt will continue to benefit from skilled outpatient physical therapy to address the deficits stated in the impairment chart, provide pt/family education and to maximize pt's level of independence in the home and community environment.     Anticipated Barriers for therapy: none  Pt's spiritual, cultural and educational needs considered and pt agreeable to plan of care and goals as stated below:     GOALS: Pt is in agreement with the following goals.     Short term goals:  6 weeks or 10 visits   - Pt will demonstrate increased lumbar ROM by at least 6 degrees from the initial ROM value with improvements noted in functional ROM and ability to perform ADLs. Appropriate and Ongoing  - Pt will demonstrate increased MedX average isometric strength value by 15% from initial test resulting in improved ability to perform bending, lifting, and carrying activities safely, confidently. Appropriate and Ongoing  - Pt will report a reduction in worst pain score by 1-2 points for improved tolerance for static sitting. Appropriate and Ongoing  - Pt able to perform HEP correctly with minimal cueing or supervision from therapist to encourage independent management of symptoms. Appropriate and Ongoing     Long term goals: 10 weeks or 20 visits   - Pt will demonstrate increased lumbar ROM by at least 9 degrees from initial ROM value, resulting in improved ability to perform functional forward bending while standing and sitting. Appropriate and Ongoing  - Pt will demonstrate increased MedX average isometric strength value by 25% from initial test resulting in improved ability to perform bending, lifting, and carrying activities safely and  confidently. Appropriate and Ongoing  - Pt to demonstrate ability to independently control and reduce their pain through posture positioning and mechanical movements throughout a typical day. Appropriate and Ongoing  - Pt will demonstrate reduced pain and improved functional outcomes as reported on the Oswestry Disability Index by reaching a score of 6 or less in order to demonstrate subjective improvement in pt's condition.   Appropriate and Ongoing  - Pt will demonstrate independence with the HEP at discharge. Appropriate and Ongoing  - Pt will be able to complete work related activities without increase in low back pain(patient goal) Appropriate and Ongoing    Plan     Continue with established Plan of Care towards established PT goals.       Therapist: Audra Alarcon, PTA  6/6/2024

## 2024-06-10 ENCOUNTER — PATIENT MESSAGE (OUTPATIENT)
Dept: INTERNAL MEDICINE | Facility: CLINIC | Age: 39
End: 2024-06-10
Payer: COMMERCIAL

## 2024-06-11 ENCOUNTER — OFFICE VISIT (OUTPATIENT)
Dept: CARDIOLOGY | Facility: CLINIC | Age: 39
End: 2024-06-11
Payer: COMMERCIAL

## 2024-06-11 ENCOUNTER — CLINICAL SUPPORT (OUTPATIENT)
Dept: REHABILITATION | Facility: OTHER | Age: 39
End: 2024-06-11
Payer: COMMERCIAL

## 2024-06-11 VITALS
SYSTOLIC BLOOD PRESSURE: 100 MMHG | OXYGEN SATURATION: 99 % | HEIGHT: 60 IN | DIASTOLIC BLOOD PRESSURE: 78 MMHG | RESPIRATION RATE: 15 BRPM | BODY MASS INDEX: 31.68 KG/M2 | HEART RATE: 101 BPM | WEIGHT: 161.38 LBS

## 2024-06-11 DIAGNOSIS — I10 ESSENTIAL HYPERTENSION, BENIGN: ICD-10-CM

## 2024-06-11 DIAGNOSIS — R68.89 DECREASED ACTIVITY TOLERANCE: ICD-10-CM

## 2024-06-11 DIAGNOSIS — R07.9 CHEST PAIN, UNSPECIFIED TYPE: ICD-10-CM

## 2024-06-11 DIAGNOSIS — R00.2 HEART PALPITATIONS: Primary | ICD-10-CM

## 2024-06-11 DIAGNOSIS — I49.3 PVC (PREMATURE VENTRICULAR CONTRACTION): ICD-10-CM

## 2024-06-11 DIAGNOSIS — R29.898 DECREASED STRENGTH OF TRUNK AND BACK: Primary | ICD-10-CM

## 2024-06-11 PROCEDURE — 93000 ELECTROCARDIOGRAM COMPLETE: CPT | Mod: S$GLB,,, | Performed by: INTERNAL MEDICINE

## 2024-06-11 PROCEDURE — 99999 PR PBB SHADOW E&M-EST. PATIENT-LVL IV: CPT | Mod: PBBFAC,,, | Performed by: INTERNAL MEDICINE

## 2024-06-11 PROCEDURE — 3074F SYST BP LT 130 MM HG: CPT | Mod: CPTII,S$GLB,, | Performed by: INTERNAL MEDICINE

## 2024-06-11 PROCEDURE — 1160F RVW MEDS BY RX/DR IN RCRD: CPT | Mod: CPTII,S$GLB,, | Performed by: INTERNAL MEDICINE

## 2024-06-11 PROCEDURE — 3078F DIAST BP <80 MM HG: CPT | Mod: CPTII,S$GLB,, | Performed by: INTERNAL MEDICINE

## 2024-06-11 PROCEDURE — 99214 OFFICE O/P EST MOD 30 MIN: CPT | Mod: S$GLB,,, | Performed by: INTERNAL MEDICINE

## 2024-06-11 PROCEDURE — 97750 PHYSICAL PERFORMANCE TEST: CPT | Mod: 32

## 2024-06-11 PROCEDURE — 3008F BODY MASS INDEX DOCD: CPT | Mod: CPTII,S$GLB,, | Performed by: INTERNAL MEDICINE

## 2024-06-11 PROCEDURE — 1159F MED LIST DOCD IN RCRD: CPT | Mod: CPTII,S$GLB,, | Performed by: INTERNAL MEDICINE

## 2024-06-11 NOTE — PROGRESS NOTES
CARDIOVASCULAR CONSULTATION    REASON FOR CONSULT:   Johanna Ocasio is a 39 y.o. female who presents for follow-up.      HISTORY OF PRESENT ILLNESS:     Patient is a pleasant 36-year-old lady.  Main complaint is palpitations.  Had echo and Holter done because of that.  Echo and Holter showed normal left ventricle systolic function.  Holter showed rare PACs and PVCs.  Denies orthopnea, PND, swelling of feet.    Notes from February 2022:  Patient here for follow-up.  States her palpitations have gone away and decreased significantly since starting the metoprolol.  Now she hardly feels them.    Notes from September 23:  Patient here for follow-up after a long hiatus.  States had an episode of chest pain a few weeks ago.  At rest.  Substernal.  Pressure-like.  Lasted an hour and then went away.  Has not had recurrent episodes.  Denies orthopnea, PND.  Palpitations have improved significantly since taking metoprolol    Notes from November 23:  Patient here for follow-up.  Denies any chest pains at rest on exertion, orthopnea, PND, swelling of feet.  Has been doing fine.  Coronary CTA did not show any significant coronary artery stenosis or calcium score was 0        Coronary angiography     Dominance: Left.     LM: The left main coronary artery arises from the left sinus of Valsalva. It divides into the LAD  and LCx arteries. It is normal.     LAD: The left anterior descending artery is a moderate size vessel that wraps around the apex.  It is normal.  Two diagonal branches are identified and appear normal.     LCx: The left circumflex artery is a moderate size vessel that runs in the AV groove. It is normal. Two marginal branches are identified.  The distal LCx extends up to the posterior interventricular groove.  The (L) -PDA is not identified.     RCA: The right coronary artery arises from the right sinus of Valsalva. It is a moderate size vessel.  It is normal.     Cardiac and great vessel morphology     The aortic  root and ascending aorta are normal in size.   No dissection is visualized within the field of view. The pulmonary artery is normal in size.     The aortic valve appears trileaflet and normal.  The pericardium is normal. Pulmonary venous drainage is normal.     CONCLUSION:     1. CAD-RADS 0.     2.  Normal coronary arteries.  Smaller distal vessels are not clearly visualized.     3. Coronary calcium score 0.     4.  Post processed images are available for review in PACS under the heading Recons: HKD.      Left Ventricle: There is normal systolic function with a visually estimated ejection fraction of 55 - 60%.    Right Ventricle: Normal right ventricular cavity size. Systolic function is normal.    Pulmonary Artery: The estimated pulmonary artery systolic pressure is 39 mmHg.    IVC/SVC: Normal venous pressure at 3 mmHg.    Stress Protocol: The patient exercised for 9 minutes 1 seconds on a Luis Miguel protocol, corresponding to a functional capacity of 10 METS, achieving a peak heart rate of 166 bpm, which is 96 % of the age predicted maximum heart rate.    Baseline ECG: The Baseline ECG reveals sinus rhythm.    Stress ECG: There is 1.0 mm of upward-sloping ST segment depression in the inferolateral leads (II, III, aVF, V5 and V6) noted during stress.    ECG Conclusion: The ECG portion of the study is negative for ischemia.    Post-stress Echo: The left ventricle systolic function is normal.    Post-stress Impression: The study is negative with no echocardiographic evidence of stress induced ischemia.         Results for orders placed during the hospital encounter of 11/17/21    Echo    Interpretation Summary  · The estimated ejection fraction is 55%.  · Normal systolic function.  · Normal left ventricular diastolic function.  · Normal right ventricular size with normal right ventricular systolic function.  · Mild left atrial enlargement.  · Normal central venous pressure (3 mmHg).  · The estimated PA systolic pressure  is 28 mmHg.    Notes from June 20, 2024: Patient here for follow-up.  No chest pains at rest on exertion, orthopnea, PND.  Main complaint is occasional palpitations.  Feels like her heart and then goes away.      PAST MEDICAL HISTORY:     Past Medical History:   Diagnosis Date    Abnormal Pap smear of cervix 2011    Colposcopy    Brain venous angioma 10/3/2018    Early childhood epilepsy, myoclonic     last seizure age 14; Venous hemangioma on MRI    History of migraine headaches 5/12/2016    Migraine headache        PAST SURGICAL HISTORY:   No past surgical history on file.    ALLERGIES AND MEDICATION:   Review of patient's allergies indicates:  No Known Allergies     Medication List            Accurate as of June 11, 2024  2:46 PM. If you have any questions, ask your nurse or doctor.                CONTINUE taking these medications      albuterol 90 mcg/actuation inhaler  Commonly known as: PROVENTIL/VENTOLIN HFA  Inhale 2 puffs into the lungs every 6 (six) hours as needed for Wheezing. Rescue     diclofenac sodium 1 % Gel  Commonly known as: VOLTAREN  Apply 2 g topically 4 (four) times daily.     metoprolol succinate 25 MG 24 hr tablet  Commonly known as: TOPROL-XL  Take 1 tablet (25 mg total) by mouth once daily.     omeprazole 20 MG capsule  Commonly known as: PRILOSEC  Take 1 capsule (20 mg total) by mouth once daily.     ondansetron 4 MG tablet  Commonly known as: ZOFRAN  TAKE 1 TABLET BY MOUTH EVERY 8 HOURS AS NEEDED     tiZANidine 2 MG tablet  Commonly known as: ZANAFLEX  TAKE 1 TABLET BY MOUTH EVERY 8 HOURS AS NEEDED FOR PAIN     topiramate 25 MG tablet  Commonly known as: TOPAMAX  Take 1 tablet (25 mg total) by mouth once daily.     UBRELVY 100 mg tablet  Generic drug: ubrogepant  Take 1 tablet by mouth at the onset of a headache. May repeat based on response and tolerability after more than 2 hours if needed. Do not take more than 200mg in a 24 hour span.     VIOS AEROSOL DELIVERY SYSTEM Martha  Generic  drug: nebulizer and compressor              SOCIAL HISTORY:     Social History     Socioeconomic History    Marital status:    Tobacco Use    Smoking status: Never    Smokeless tobacco: Never   Substance and Sexual Activity    Alcohol use: No     Comment: Socially/ pre pregnancy     Drug use: No    Sexual activity: Yes     Partners: Male     Birth control/protection: None     Social Determinants of Health     Financial Resource Strain: Low Risk  (3/16/2024)    Overall Financial Resource Strain (CARDIA)     Difficulty of Paying Living Expenses: Not hard at all   Food Insecurity: No Food Insecurity (3/16/2024)    Hunger Vital Sign     Worried About Running Out of Food in the Last Year: Never true     Ran Out of Food in the Last Year: Never true   Transportation Needs: No Transportation Needs (3/16/2024)    PRAPARE - Transportation     Lack of Transportation (Medical): No     Lack of Transportation (Non-Medical): No   Physical Activity: Insufficiently Active (3/16/2024)    Exercise Vital Sign     Days of Exercise per Week: 1 day     Minutes of Exercise per Session: 20 min   Stress: No Stress Concern Present (3/16/2024)    [employer] - Occupational Stress Questionnaire     Feeling of Stress : Only a little   Housing Stability: Low Risk  (3/16/2024)    Housing Stability Vital Sign     Unable to Pay for Housing in the Last Year: No     Number of Places Lived in the Last Year: 1     Unstable Housing in the Last Year: No       FAMILY HISTORY:     Family History   Problem Relation Name Age of Onset    Hypertension Mother      Hypertension Father      Cataracts Maternal Grandfather      Diabetes Paternal Grandmother      Cancer Paternal Grandfather          colon cancer    Breast cancer Neg Hx      Colon cancer Neg Hx      Ovarian cancer Neg Hx      Melanoma Neg Hx      Lupus Neg Hx      Psoriasis Neg Hx         REVIEW OF SYSTEMS:   Review of Systems   Constitutional: Negative.   HENT:  Negative.    Eyes: Negative.    Respiratory: Negative.    Endocrine: Negative.    Hematologic/Lymphatic: Negative.    Skin: Negative.    Musculoskeletal: Negative.    Gastrointestinal: Negative.    Genitourinary: Negative.    Neurological: Negative.    Psychiatric/Behavioral: Negative.    Allergic/Immunologic: Negative.        A 10 point review of systems was performed and all the pertinent positives have been mentioned. Rest of review of systems was negative.        PHYSICAL EXAM:     Vitals:    06/11/24 1426   BP: 100/78   Pulse: 101   Resp: 15    Body mass index is 31.52 kg/m².  Weight: 73.2 kg (161 lb 6 oz)   Height: 5' (152.4 cm)     Physical Exam  Constitutional:       Appearance: Normal appearance. She is well-developed.   HENT:      Head: Normocephalic.   Eyes:      Pupils: Pupils are equal, round, and reactive to light.   Cardiovascular:      Rate and Rhythm: Normal rate and regular rhythm.   Pulmonary:      Effort: Pulmonary effort is normal.      Breath sounds: Normal breath sounds.   Abdominal:      General: Bowel sounds are normal.      Palpations: Abdomen is soft.      Tenderness: There is no abdominal tenderness.   Musculoskeletal:         General: Normal range of motion.      Cervical back: Normal range of motion and neck supple.   Skin:     General: Skin is warm.   Neurological:      Mental Status: She is alert and oriented to person, place, and time.           DATA:     Laboratory:  CBC:  Recent Labs   Lab 10/27/21  1432 07/26/23  0724 10/20/23  1705   WBC 5.88 4.45 7.85   Hemoglobin 13.4 12.7 13.1   Hematocrit 41.9 40.2 40.5   Platelets 271 262 262       CHEMISTRIES:  Recent Labs   Lab 10/27/21  1432 07/26/23  0724 10/20/23  1705   Glucose 76 89 96   Sodium 141 140 139   Potassium 4.1 4.2 3.8   BUN 16 17 14   Creatinine 0.8 0.8 0.8   eGFR if  >60  --   --    eGFR if non  >60  --   --    Calcium 10.0 9.5 9.6   Magnesium  --  1.9  --        CARDIAC BIOMARKERS:  Recent  Labs   Lab 10/20/23  1705   Troponin I <0.006       COAGS:        LIPIDS/LFTS:  Recent Labs   Lab 10/27/21  1432 07/26/23  0724 10/20/23  1705   Cholesterol  --  190  --    Triglycerides  --  103  --    HDL  --  58  --    LDL Cholesterol  --  111.4  --    Non-HDL Cholesterol  --  132  --    AST 22 17 15   ALT 23 18 17       Hemoglobin A1C   Date Value Ref Range Status   07/26/2023 5.0 4.0 - 5.6 % Final     Comment:     ADA Screening Guidelines:  5.7-6.4%  Consistent with prediabetes  >or=6.5%  Consistent with diabetes    High levels of fetal hemoglobin interfere with the HbA1C  assay. Heterozygous hemoglobin variants (HbS, HgC, etc)do  not significantly interfere with this assay.   However, presence of multiple variants may affect accuracy.     04/28/2016 5.2 4.5 - 6.2 % Final       TSH  Recent Labs   Lab 10/27/21  1432 07/26/23  0724   TSH 1.722 2.476       The ASCVD Risk score (Evy DK, et al., 2019) failed to calculate for the following reasons:    The 2019 ASCVD risk score is only valid for ages 40 to 79             ASSESSMENT AND PLAN     Patient Active Problem List   Diagnosis    History of migraine headaches    History of seizures as a child - last seizure age 14, history of venous hemangioma on brain    Brain venous angioma    Migraine without aura and without status migrainosus, not intractable    Thoracic back pain    Decreased strength of trunk and back    Decreased activity tolerance       Palpitations.  Echo showed normal left ventricle systolic function.  No significant arrhythmia noted on Holter apart from rare PACs and PVCs.  Toprol-XL has reduced palpitations significantly.     Chest pain.  Extensive workup has been done.  Stress echo has been negative.  CTA did not show any significant coronary artery stenosis.  Calcium score 0.  Noncardiac chest pain.  Patient states chest pain is getting better.      Referral has been placed to psychiatry for management of  anxiety    Follow-up after 6  m          Thank you very much for involving me in the care of your patient.  Please do not hesitate to contact me if there are any questions.      Fartun Chapman MD, FACC, [facility]  Interventional Cardiologist, [facility].           This note was dictated with the help of speech recognition software.  There might be un-intended errors and/or substitutions.

## 2024-06-11 NOTE — PROGRESS NOTES
[facility]  Physical Therapy Treatment Note     Name: Johanna Ocasio  Clinic Number: [id number]    Therapy Diagnosis:   Encounter Diagnoses   Name Primary?    Decreased strength of trunk and back Yes    Decreased activity tolerance          Physician: Lorena Reinoso, *    Visit Date: 6/11/2024    Physician Orders: PT Eval and Treat  Medical Diagnosis from Referral:   M54.50 (ICD-10-CM) - Lumbar back pain     Evaluation Date: 5/27/2024  Authorization Period Expiration: 7/10/2024  Plan of Care Expiration: 8/27/2024  Reassessment Due: 6/27/2024  Visit # / Visits authorized: 5/20     PTA Visit #: 2/5     Time In: 930 am   Time Out: 915 AM  Total Billable Time: 45 minutes    INSURANCE and OUTCOMES: Program Benefit Group with Lumbar Outcomes (Oswestry and AQoL) 1/3     Precautions: standard     Pattern of pain determined: 1 PEP      Subjective     Johanna Ocasio she just started working out again. She does the elliptical and will run sometimes. Her goal is 45 minutes 5 days. They have lumbar extension machine at the gym at [facility] and she has been using.       Patient reports tolerating previous visit well /c no c/o of excess discomfort.  Patient reports their pain to be 1/10 on a 0-10 scale with 0 being no pain and 10 being the worst pain imaginable.  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     Objective      Lumbar  Isometric Testing on Med X equipment: Testing administered by PT    Test Initial Baseline Midpoint Final   Date 05/30/24     ROM 0-48 deg     Max Peak Torque 108      Min Peak Torque 13      Flex/Ext Ratio 8.3:1     % variance below normative data 56 (85% below at 0 deg)     % change from initial test N/A visit 1         OUTCOMES SELECTION:   Program  Patient Outcome Measures     Oswestry Score:  12/50 = 24% disability      AQoL Score:  3/36 = 8% disability          Treatment     Johanna received the treatments listed below:      Medical MedX Treatment as follows:  Patient received neuromuscular education for 10 minutes via participation on the Medical MedX Machine. Therapist assisted patient in isolating and engaging spinal stabilization musculature in order to improve functional ability and postural control. Patient performed exercise with therapist guidance in order to accurately use pacer function, avoid valsalva, and optimally exert effort within a safe and effective range via the Rajendra Exertion Rating Scale. Patient instructed to perform at a midrange of exertion and to complete 15-20 repetitions within appropriate split time, with proper technique, and while maintaining safety.          6/11/2024     9:44 AM   [facility]   Visit Number 5   VAS Pain Rating 1   Time 5   Extension in Lying 10   Lumbar Weight 59 lbs   Repetitions 18   Rating of Perceived Exertion 4           Johanna participated in neuromuscular re-education activities to improve balance, coordination, proprioception, motor control and/or posture for 00 minutes. The following activities were included:        Johanna participated in therapeutic exercises to develop strength, endurance, ROM, flexibility, posture, and core stabilization for 35 minutes including:      LTR x 10  Prone press ups x 10   Bridges 2 x 10 c/ pilates ring (abd)  Prone hip extension x 10 cassidy  90/90 position march c/ PPT  x 10   Standing lumbar extensions x10      Peripheral muscle strengthening which included one set of 15-20 repetitions at a slow and controlled 10-13 second per rep pace focused on strengthening supporting musculature in order to improve body mechanics and functional mobility. Patient and therapist focused on proper form during treatment to ensure optimal strengthening of each targeted muscle group.   Machines utilized included:Torso rotation, Leg Ext, Leg Curl, Chest Press, and Rowing  To be added next visit:Triceps, Biceps, Hip Abd, Hip Add, and Leg Press      Johanna participated in dynamic functional therapeutic activities to improve functional performance and simulate household and community activities for 00  minutes. The following activities were included:      Johanna received manual therapy techniques for 00  minutes. The following activities were included:      Pt given cold pack for 5 minutes to lumbar region in Z lying.    Patient Education and Home Exercises     Home exercises include:  RAFAEL/REIL  Glute set  Bridges   Prone hip extension     Cardio program (V5): -  Lifting education (V11): -  Posture/Lumbar roll: obtained 05/29/24, uses in car and work   Fridge Magnet Discharge handout (date given): -  Equipment at home/gym membership: [facility] [employer] gym    Education provided:         Written Home Exercises Provided: Patient instructed to cont prior HEP.  Exercises were reviewed and Johanna was able to demonstrate them prior to the end of the session.  Johanna demonstrated fair  understanding of the education provided.     See EMR under Patient Instructions for exercises provided 5/30/2024.    Assessment     Johanna presents to session with low levels of pain. Discussed cardio handout per HB protocol. Pt reports she has just started back to running and elliptical and would like to spend 45 minutes 5 times a week. MedX was performed at 59ft lbs with 18 reps performed at an exertion rating of 4/10. No issues with peripherals.         Patient is making good progress towards established goals.  Pt will continue to benefit from skilled outpatient physical therapy to address the deficits stated in the impairment chart, provide pt/family education and to maximize pt's level of independence in the home and community environment.     Anticipated Barriers for therapy: none  Pt's spiritual, cultural and educational  needs considered and pt agreeable to plan of care and goals as stated below:     GOALS: Pt is in agreement with the following goals.     Short term goals:  6 weeks or 10 visits   - Pt will demonstrate increased lumbar ROM by at least 6 degrees from the initial ROM value with improvements noted in functional ROM and ability to perform ADLs. Appropriate and Ongoing  - Pt will demonstrate increased MedX average isometric strength value by 15% from initial test resulting in improved ability to perform bending, lifting, and carrying activities safely, confidently. Appropriate and Ongoing  - Pt will report a reduction in worst pain score by 1-2 points for improved tolerance for static sitting. Appropriate and Ongoing  - Pt able to perform HEP correctly with minimal cueing or supervision from therapist to encourage independent management of symptoms. Appropriate and Ongoing     Long term goals: 10 weeks or 20 visits   - Pt will demonstrate increased lumbar ROM by at least 9 degrees from initial ROM value, resulting in improved ability to perform functional forward bending while standing and sitting. Appropriate and Ongoing  - Pt will demonstrate increased MedX average isometric strength value by 25% from initial test resulting in improved ability to perform bending, lifting, and carrying activities safely and confidently. Appropriate and Ongoing  - Pt to demonstrate ability to independently control and reduce their pain through posture positioning and mechanical movements throughout a typical day. Appropriate and Ongoing  - Pt will demonstrate reduced pain and improved functional outcomes as reported on the Oswestry Disability Index by reaching a score of 6 or less in order to demonstrate subjective improvement in pt's condition.   Appropriate and Ongoing  - Pt will demonstrate independence with the HEP at discharge. Appropriate and Ongoing  - Pt will be able to complete work related activities without increase in low back  pain(patient goal) Appropriate and Ongoing    Plan     Continue with established Plan of Care towards established PT goals.       Therapist: Anuel Hill, WARREN  6/11/2024

## 2024-06-12 LAB
OHS QRS DURATION: 88 MS
OHS QTC CALCULATION: 461 MS

## 2024-06-13 ENCOUNTER — CLINICAL SUPPORT (OUTPATIENT)
Dept: REHABILITATION | Facility: OTHER | Age: 39
End: 2024-06-13
Payer: COMMERCIAL

## 2024-06-13 DIAGNOSIS — R29.898 DECREASED STRENGTH OF TRUNK AND BACK: Primary | ICD-10-CM

## 2024-06-13 DIAGNOSIS — R68.89 DECREASED ACTIVITY TOLERANCE: ICD-10-CM

## 2024-06-13 PROCEDURE — 97750 PHYSICAL PERFORMANCE TEST: CPT | Mod: 32

## 2024-06-13 NOTE — PROGRESS NOTES
[facility]  Physical Therapy Treatment Note     Name: Johanna Ocasio  Clinic Number: [id number]    Therapy Diagnosis:   Encounter Diagnoses   Name Primary?    Decreased strength of trunk and back Yes    Decreased activity tolerance        Physician: Lorena Reinoso, *    Visit Date: 6/13/2024    Physician Orders: PT Eval and Treat  Medical Diagnosis from Referral:   M54.50 (ICD-10-CM) - Lumbar back pain     Evaluation Date: 5/27/2024  Authorization Period Expiration: 7/10/2024  Plan of Care Expiration: 8/27/2024  Reassessment Due: 6/27/2024  Visit # / Visits authorized: 6/20     PTA Visit #: 3/5     Time In: 930 am   Time Out: 1020 AM  Total Billable Time: 50 minutes    INSURANCE and OUTCOMES: Program Benefit Group with Lumbar Outcomes (Oswestry and AQoL) 1/3     Precautions: standard     Pattern of pain determined: 1 PEP      Subjective     Johanna Ocasio she just started working out again. She does the elliptical and will run sometimes. Her goal is 45 minutes 5 days. They have lumbar extension machine at the gym at [facility] and she has been using.       Patient reports tolerating previous visit well /c no c/o of excess discomfort.  Patient reports their pain to be 1/10 on a 0-10 scale with 0 being no pain and 10 being the worst pain imaginable.  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     Objective      Lumbar  Isometric Testing on Med X equipment: Testing administered by PT    Test Initial Baseline Midpoint Final   Date 05/30/24     ROM 0-48 deg     Max Peak Torque 108      Min Peak Torque 13      Flex/Ext Ratio 8.3:1     % variance below normative data 56 (85% below at 0 deg)     % change from initial test N/A visit 1         OUTCOMES SELECTION:   Program  Patient Outcome Measures     Oswestry Score:  12/50 = 24% disability      AQoL Score:  3/36 = 8% disability          Treatment     Johanna received the treatments listed below:      Medical MedX Treatment as follows:  Patient received neuromuscular education for 10 minutes via participation on the Medical MedX Machine. Therapist assisted patient in isolating and engaging spinal stabilization musculature in order to improve functional ability and postural control. Patient performed exercise with therapist guidance in order to accurately use pacer function, avoid valsalva, and optimally exert effort within a safe and effective range via the Rajendra Exertion Rating Scale. Patient instructed to perform at a midrange of exertion and to complete 15-20 repetitions within appropriate split time, with proper technique, and while maintaining safety.          6/13/2024     9:51 AM   [facility]   Visit Number 6   VAS Pain Rating 1   Treadmill Time (in min.) 5 min   Extension in Lying 10   Lumbar Weight 59 lbs   Repetitions 20   Rating of Perceived Exertion 4   Ice - Z Lie (in min.) 0       Johanna participated in neuromuscular re-education activities to improve balance, coordination, proprioception, motor control and/or posture for 05 minutes. The following activities were included:    +Quadruped bird dog x10    Johanna participated in therapeutic exercises to develop strength, endurance, ROM, flexibility, posture, and core stabilization for 40 minutes including:      LTR x 10  Prone press ups x 10   Bridges 2 x 10 c/ pilates ring (abd)  Prone hip extension x 10 cassidy  90/90 position march c/ PPT  x 10     Standing lumbar extensions x10      Peripheral muscle strengthening which included one set of 15-20 repetitions at a slow and controlled 10-13 second per rep pace focused on strengthening supporting musculature in order to improve body mechanics and functional mobility. Patient and therapist focused on proper form during  treatment to ensure optimal strengthening of each targeted muscle group.  Machines utilized included:Torso rotation, Leg Ext, Leg Curl, Chest Press, and Rowing  To be added next visit:Triceps, Biceps, Hip Abd, Hip Add, and Leg Press      Johanna participated in dynamic functional therapeutic activities to improve functional performance and simulate household and community activities for 00  minutes. The following activities were included:      Johanna received manual therapy techniques for 00  minutes. The following activities were included:      Pt given cold pack for 5 minutes to lumbar region in Z lying.    Patient Education and Home Exercises     Home exercises include:  RAFAEL/REIL  Glute set  Bridges   Prone hip extension     Cardio program (V5): 6/11/24  Lifting education (V11): -  Posture/Lumbar roll: obtained 05/29/24, uses in car and work   Fridge Magnet Discharge handout (date given): -  Equipment at home/gym membership: [employer]    Education provided:   - Pacing with the MedX machines      Written Home Exercises Provided: Patient instructed to cont prior HEP.  Exercises were reviewed and Johanna was able to demonstrate them prior to the end of the session.  Johanna demonstrated fair  understanding of the education provided.     See EMR under Patient Instructions for exercises provided 5/30/2024.    Assessment     Johanna presents to session with low levels of pain, compliant with HEP and performing resisted ex in work gym. Pt tolerates mat ex well adding bird dog to increase core stability/strength.  MedX was performed at 59ft lbs with 20 reps performed at an exertion rating of 4/10. No issues with peripherals.       Patient is making good progress towards established goals.  Pt will continue to benefit from skilled outpatient physical therapy to address the deficits stated in the impairment chart, provide pt/family education and to maximize pt's level of independence in the home and community  environment.     Anticipated Barriers for therapy: none  Pt's spiritual, cultural and educational needs considered and pt agreeable to plan of care and goals as stated below:     GOALS: Pt is in agreement with the following goals.     Short term goals:  6 weeks or 10 visits   - Pt will demonstrate increased lumbar ROM by at least 6 degrees from the initial ROM value with improvements noted in functional ROM and ability to perform ADLs. Appropriate and Ongoing  - Pt will demonstrate increased MedX average isometric strength value by 15% from initial test resulting in improved ability to perform bending, lifting, and carrying activities safely, confidently. Appropriate and Ongoing  - Pt will report a reduction in worst pain score by 1-2 points for improved tolerance for static sitting. Appropriate and Ongoing  - Pt able to perform HEP correctly with minimal cueing or supervision from therapist to encourage independent management of symptoms. Appropriate and Ongoing     Long term goals: 10 weeks or 20 visits   - Pt will demonstrate increased lumbar ROM by at least 9 degrees from initial ROM value, resulting in improved ability to perform functional forward bending while standing and sitting. Appropriate and Ongoing  - Pt will demonstrate increased MedX average isometric strength value by 25% from initial test resulting in improved ability to perform bending, lifting, and carrying activities safely and confidently. Appropriate and Ongoing  - Pt to demonstrate ability to independently control and reduce their pain through posture positioning and mechanical movements throughout a typical day. Appropriate and Ongoing  - Pt will demonstrate reduced pain and improved functional outcomes as reported on the Oswestry Disability Index by reaching a score of 6 or less in order to demonstrate subjective improvement in pt's condition.   Appropriate and Ongoing  - Pt will demonstrate independence with the HEP at discharge.  Appropriate and Ongoing  - Pt will be able to complete work related activities without increase in low back pain(patient goal) Appropriate and Ongoing    Plan     Continue with established Plan of Care towards established PT goals.       Therapist: Audra Alarcon, PTA  6/13/2024

## 2024-06-18 ENCOUNTER — CLINICAL SUPPORT (OUTPATIENT)
Dept: REHABILITATION | Facility: OTHER | Age: 39
End: 2024-06-18
Payer: COMMERCIAL

## 2024-06-18 DIAGNOSIS — R68.89 DECREASED ACTIVITY TOLERANCE: ICD-10-CM

## 2024-06-18 DIAGNOSIS — R29.898 DECREASED STRENGTH OF TRUNK AND BACK: Primary | ICD-10-CM

## 2024-06-18 PROCEDURE — 97750 PHYSICAL PERFORMANCE TEST: CPT | Mod: 32

## 2024-06-18 NOTE — PROGRESS NOTES
[facility]  Physical Therapy Treatment Note     Name: Johanna Ocasio  Clinic Number: [id number]    Therapy Diagnosis:   Encounter Diagnoses   Name Primary?    Decreased strength of trunk and back Yes    Decreased activity tolerance          Physician: Lorena Reinoso, *    Visit Date: 6/18/2024    Physician Orders: PT Eval and Treat  Medical Diagnosis from Referral:   M54.50 (ICD-10-CM) - Lumbar back pain     Evaluation Date: 5/27/2024  Authorization Period Expiration: 7/10/2024  Plan of Care Expiration: 8/27/2024  Reassessment Due: 6/27/2024  Visit # / Visits authorized: 6/20     PTA Visit #: 3/5     Time In: 930 am   Time Out: 1020 AM  Total Billable Time: 50 minutes    INSURANCE and OUTCOMES: Program Benefit Group with Lumbar Outcomes (Oswestry and AQoL) 1/3     Precautions: standard     Pattern of pain determined: 1 PEP      Subjective     Johanna Ocasio she just started working out again. She does the elliptical and will run sometimes. Her goal is 45 minutes 5 days. They have lumbar extension machine at the gym at main campus and she has been using. Pt c/o fatigue due to work last night but it didn't increase her pain.      Patient reports tolerating previous visit well /c no c/o of excess discomfort.  Patient reports their pain to be 1/10 on a 0-10 scale with 0 being no pain and 10 being the worst pain imaginable.  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     Objective      Lumbar  Isometric Testing on Med X equipment: Testing administered by PT    Test Initial Baseline Midpoint Final   Date 05/30/24     ROM 0-48 deg     Max Peak Torque 108      Min Peak Torque 13      Flex/Ext Ratio 8.3:1     % variance below normative data 56 (85% below at 0 deg)     % change  from initial test N/A visit 1         OUTCOMES SELECTION:   Program Patient Outcome Measures     Oswestry Score:  12/50 = 24% disability      AQoL Score:  3/36 = 8% disability          Treatment     Johanna received the treatments listed below:      Medical MedX Treatment as follows:  Patient received neuromuscular education for 10 minutes via participation on the Medical MedX Machine. Therapist assisted patient in isolating and engaging spinal stabilization musculature in order to improve functional ability and postural control. Patient performed exercise with therapist guidance in order to accurately use pacer function, avoid valsalva, and optimally exert effort within a safe and effective range via the Rajendra Exertion Rating Scale. Patient instructed to perform at a midrange of exertion and to complete 15-20 repetitions within appropriate split time, with proper technique, and while maintaining safety.        6/18/2024     9:59 AM   [facility]   Visit Number 7   VAS Pain Rating 1   Time 5   Extension in Lying 10   Lumbar Weight 61 lbs   Repetitions 15   Rating of Perceived Exertion 4.5   Ice - Z Lie (in min.) 0           Johanna participated in neuromuscular re-education activities to improve balance, coordination, proprioception, motor control and/or posture for 05 minutes. The following activities were included:    Quadruped bird dog x10    Johanna participated in therapeutic exercises to develop strength, endurance, ROM, flexibility, posture, and core stabilization for 40 minutes including:      LTR x 10  Prone press ups x 10   Bridges 2 x 10 c/ pilates ring (abd)  Prone hip extension x 10 cassidy  90/90 position march c/ PPT  x 10     Standing lumbar extensions x10      Peripheral muscle strengthening which included one set of 15-20 repetitions at a slow and controlled 10-13 second per rep pace focused on strengthening supporting musculature in order to improve body mechanics and functional mobility. Patient and  therapist focused on proper form during treatment to ensure optimal strengthening of each targeted muscle group.  Machines utilized included:Torso rotation, Leg Ext, Leg Curl, Chest Press, and Rowing  To be added next visit:Triceps, Biceps, Hip Abd, Hip Add, and Leg Press      Johanna participated in dynamic functional therapeutic activities to improve functional performance and simulate household and community activities for 00  minutes. The following activities were included:      Johanna received manual therapy techniques for 00  minutes. The following activities were included:      Pt given cold pack for 5 minutes to lumbar region in Z lying.    Patient Education and Home Exercises     Home exercises include:  RAFAEL/REIL  Glute set  Bridges   Prone hip extension     Cardio program (V5): 6/11/24  Lifting education (V11): -  Posture/Lumbar roll: obtained 05/29/24, uses in car and work   Fridge Magnet Discharge handout (date given): -  Equipment at home/gym membership: [employer] employee gym    Education provided:   - Pacing with the MedX machines      Written Home Exercises Provided: Patient instructed to cont prior HEP.  Exercises were reviewed and Johanna was able to demonstrate them prior to the end of the session.  Johanna demonstrated fair  understanding of the education provided.     See EMR under Patient Instructions for exercises provided 5/30/2024.    Assessment     Johanna presents to session with low levels of pain, compliant with HEP and performing resisted ex in work gym. Pt tolerates mat ex well adding bird dog to increase core stability/strength.  MedX was performed at 561ft lbs with 16 reps performed at an exertion rating of 4.5/10. No issues with peripherals.       Patient is making good progress towards established goals.  Pt will continue to benefit from skilled outpatient physical therapy to address the deficits stated in the impairment chart, provide pt/family education and to maximize pt's level of  independence in the home and community environment.     Anticipated Barriers for therapy: none  Pt's spiritual, cultural and educational needs considered and pt agreeable to plan of care and goals as stated below:     GOALS: Pt is in agreement with the following goals.     Short term goals:  6 weeks or 10 visits   - Pt will demonstrate increased lumbar ROM by at least 6 degrees from the initial ROM value with improvements noted in functional ROM and ability to perform ADLs. Appropriate and Ongoing  - Pt will demonstrate increased MedX average isometric strength value by 15% from initial test resulting in improved ability to perform bending, lifting, and carrying activities safely, confidently. Appropriate and Ongoing  - Pt will report a reduction in worst pain score by 1-2 points for improved tolerance for static sitting. Appropriate and Ongoing  - Pt able to perform HEP correctly with minimal cueing or supervision from therapist to encourage independent management of symptoms. Appropriate and Ongoing     Long term goals: 10 weeks or 20 visits   - Pt will demonstrate increased lumbar ROM by at least 9 degrees from initial ROM value, resulting in improved ability to perform functional forward bending while standing and sitting. Appropriate and Ongoing  - Pt will demonstrate increased MedX average isometric strength value by 25% from initial test resulting in improved ability to perform bending, lifting, and carrying activities safely and confidently. Appropriate and Ongoing  - Pt to demonstrate ability to independently control and reduce their pain through posture positioning and mechanical movements throughout a typical day. Appropriate and Ongoing  - Pt will demonstrate reduced pain and improved functional outcomes as reported on the Oswestry Disability Index by reaching a score of 6 or less in order to demonstrate subjective improvement in pt's condition.   Appropriate and Ongoing  - Pt will demonstrate  independence with the HEP at discharge. Appropriate and Ongoing  - Pt will be able to complete work related activities without increase in low back pain(patient goal) Appropriate and Ongoing    Plan     Continue with established Plan of Care towards established PT goals.       Therapist: Audra Alarcon, PTA  6/18/2024

## 2024-06-19 ENCOUNTER — PATIENT MESSAGE (OUTPATIENT)
Dept: PSYCHIATRY | Facility: CLINIC | Age: 39
End: 2024-06-19
Payer: COMMERCIAL

## 2024-06-20 ENCOUNTER — CLINICAL SUPPORT (OUTPATIENT)
Dept: REHABILITATION | Facility: OTHER | Age: 39
End: 2024-06-20
Payer: COMMERCIAL

## 2024-06-20 DIAGNOSIS — R29.898 DECREASED STRENGTH OF TRUNK AND BACK: Primary | ICD-10-CM

## 2024-06-20 DIAGNOSIS — R68.89 DECREASED ACTIVITY TOLERANCE: ICD-10-CM

## 2024-06-20 PROCEDURE — 97750 PHYSICAL PERFORMANCE TEST: CPT | Mod: 32

## 2024-06-20 NOTE — PROGRESS NOTES
"[facility] OUTPATIENT THERAPY AND WELLNESS - HEALTHY BACK  Physical Therapy Treatment Note   Name: Johanna Ocasio  Clinic Number: [id number]    Therapy Diagnosis:   Encounter Diagnoses   Name Primary?    Decreased strength of trunk and back Yes    Decreased activity tolerance            Physician: Lorena Reinoso, *    Visit Date: 6/20/2024    Physician Orders: PT Eval and Treat  Medical Diagnosis from Referral:   M54.50 (ICD-10-CM) - Lumbar back pain     Evaluation Date: 5/27/2024  Authorization Period Expiration: 7/10/2024  Plan of Care Expiration: 8/27/2024  Reassessment Due: 6/27/2024  Visit # / Visits authorized: 6/20     PTA Visit #: 3/5     Time In: 930 am   Time Out: 1020 AM  Total Billable Time: 50 minutes    INSURANCE and OUTCOMES: Program Benefit Group with Lumbar Outcomes (Oswestry and AQoL) 1/3     Precautions: standard     Pattern of pain determined: 1 PEP      Subjective   Johanna Ocasio she has no pain upon arrival, has been working out with only minimal, transient "pinching" pain.         Patient reports tolerating previous visit well /c no c/o of excess discomfort.  Patient reports their pain to be 0/10 on a 0-10 scale with 0 being no pain and 10 being the worst pain imaginable.  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     Objective      Lumbar  Isometric Testing on Med X equipment: Testing administered by PT    Test Initial Baseline Midpoint Final   Date 05/30/24     ROM 0-48 deg     Max Peak Torque 108      Min Peak Torque 13      Flex/Ext Ratio 8.3:1     % variance below normative data 56 (85% below at 0 deg)     % change from initial test N/A visit 1         OUTCOMES SELECTION:   Program Patient Outcome Measures     Oswestry Score:  12/50 = 24% disability      AQoL Score:  3/36 = 8% " "disability          Treatment     Johanna received the treatments listed below:        Johanna participated in neuromuscular re-education activities to improve balance, coordination, proprioception, motor control and/or posture for 10 minutes. The following activities were included:    Quadruped bird dog x10  +TrA w/pilates ring + SLR x10  +Pallof press red sports cord x15    Johanna participated in therapeutic exercises to develop strength, endurance, ROM, flexibility, posture, and core stabilization for 45 minutes including:      LTR x 10  Prone press ups x15  Bridges pilates ring (abd) x15,3"  Prone hip extension x10 cassidy  90/90 position march c/ PPT  x10   +Kneeling hip flexor stretch x30"  Standing lumbar extensions x10        6/20/2024     9:36 AM   [facility]   Visit Number 8   VAS Pain Rating 0   Extension in Lying 10   Lumbar Weight 61 lbs   Repetitions 18   Rating of Perceived Exertion 4          Peripheral muscle strengthening which included one set of 15-20 repetitions at a slow and controlled 10-13 second per rep pace focused on strengthening supporting musculature in order to improve body mechanics and functional mobility. Patient and therapist focused on proper form during treatment to ensure optimal strengthening of each targeted muscle group.  Machines utilized included:Torso rotation, Leg Ext, Leg Curl, Chest Press, and Rowing  To be added next visit:Triceps, Biceps, Hip Abd, Hip Add, and Leg Press      Johanna participated in dynamic functional therapeutic activities to improve functional performance and simulate household and community activities for 00  minutes. The following activities were included:      Johanna received manual therapy techniques for 00  minutes. The following activities were included:      Pt given cold pack for 5 minutes to lumbar region in Z lying  Patient Education and Home Exercises     Home exercises include:  RAFAEL/REIL  Glute set  Bridges   Prone hip extension "     Cardio program (V5): 6/11/24  Lifting education (V11): -  Posture/Lumbar roll: obtained 05/29/24, uses in car and work   Fridge Magnet Discharge handout (date given): -  Equipment at home/gym membership: [employer] gym    Education provided:   - Pacing with the MedX machines      Written Home Exercises Provided: Patient instructed to cont prior HEP.  Exercises were reviewed and Johanna was able to demonstrate them prior to the end of the session.  Johanna demonstrated fair  understanding of the education provided.     See EMR under Patient Instructions for exercises provided 5/30/2024.    Assessment   Johanna tolerated progressed core and lumbopelvic stabilization exercises well without adverse effects and appropriate challenge. Patient able to perform 18 reps on l/s medx machine with an RPE of 4/10. No issues with peripherals.       Patient is making good progress towards established goals.  Pt will continue to benefit from skilled outpatient physical therapy to address the deficits stated in the impairment chart, provide pt/family education and to maximize pt's level of independence in the home and community environment.     Anticipated Barriers for therapy: none  Pt's spiritual, cultural and educational needs considered and pt agreeable to plan of care and goals as stated below:     GOALS: Pt is in agreement with the following goals.     Short term goals:  6 weeks or 10 visits   - Pt will demonstrate increased lumbar ROM by at least 6 degrees from the initial ROM value with improvements noted in functional ROM and ability to perform ADLs. Appropriate and Ongoing  - Pt will demonstrate increased MedX average isometric strength value by 15% from initial test resulting in improved ability to perform bending, lifting, and carrying activities safely, confidently. Appropriate and Ongoing  - Pt will report a reduction in worst pain score by 1-2 points for improved tolerance for static sitting. Appropriate and  Ongoing  - Pt able to perform HEP correctly with minimal cueing or supervision from therapist to encourage independent management of symptoms. Appropriate and Ongoing     Long term goals: 10 weeks or 20 visits   - Pt will demonstrate increased lumbar ROM by at least 9 degrees from initial ROM value, resulting in improved ability to perform functional forward bending while standing and sitting. Appropriate and Ongoing  - Pt will demonstrate increased MedX average isometric strength value by 25% from initial test resulting in improved ability to perform bending, lifting, and carrying activities safely and confidently. Appropriate and Ongoing  - Pt to demonstrate ability to independently control and reduce their pain through posture positioning and mechanical movements throughout a typical day. Appropriate and Ongoing  - Pt will demonstrate reduced pain and improved functional outcomes as reported on the Oswestry Disability Index by reaching a score of 6 or less in order to demonstrate subjective improvement in pt's condition.   Appropriate and Ongoing  - Pt will demonstrate independence with the HEP at discharge. Appropriate and Ongoing  - Pt will be able to complete work related activities without increase in low back pain(patient goal) Appropriate and Ongoing    Plan     Continue with established Plan of Care towards established PT goals.       Therapist: Johanna Tate, PTA  6/20/2024

## 2024-06-25 ENCOUNTER — CLINICAL SUPPORT (OUTPATIENT)
Dept: REHABILITATION | Facility: OTHER | Age: 39
End: 2024-06-25
Payer: COMMERCIAL

## 2024-06-25 DIAGNOSIS — R68.89 DECREASED ACTIVITY TOLERANCE: ICD-10-CM

## 2024-06-25 DIAGNOSIS — R29.898 DECREASED STRENGTH OF TRUNK AND BACK: Primary | ICD-10-CM

## 2024-06-25 PROCEDURE — 97750 PHYSICAL PERFORMANCE TEST: CPT | Mod: 32

## 2024-06-25 NOTE — PROGRESS NOTES
[facility]  Physical Therapy Treatment Note   Name: Johanna Ocasio  Clinic Number: [id number]    Therapy Diagnosis:   Encounter Diagnoses   Name Primary?    Decreased strength of trunk and back Yes    Decreased activity tolerance            Physician: Lorena Reinoso, *    Visit Date: 6/25/2024    Physician Orders: PT Eval and Treat  Medical Diagnosis from Referral:   M54.50 (ICD-10-CM) - Lumbar back pain     Evaluation Date: 5/27/2024  Authorization Period Expiration: 7/10/2024  Plan of Care Expiration: 8/27/2024  Reassessment Due: 6/27/2024  Visit # / Visits authorized: **/20     PTA Visit #: 3/5     Time In: 930 am   Time Out: 1020 AM  Total Billable Time: 50 minutes    INSURANCE and OUTCOMES: Program Benefit Group with Lumbar Outcomes (Oswestry and AQoL) 1/3     Precautions: standard     Pattern of pain determined: 1 PEP      Subjective   Johanna Ocasio reports minimal low back pain after sitting for more than 1 hour.         Patient reports tolerating previous visit well /c no c/o of excess discomfort.  Patient reports their pain to be 1/10 on a 0-10 scale with 0 being no pain and 10 being the worst pain imaginable.  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     Objective      Lumbar  Isometric Testing on Med X equipment: Testing administered by PT    Test Initial Baseline Midpoint Final   Date 05/30/24     ROM 0-48 deg     Max Peak Torque 108      Min Peak Torque 13      Flex/Ext Ratio 8.3:1     % variance below normative data 56 (85% below at 0 deg)     % change from initial test N/A visit 1         OUTCOMES SELECTION:   Program Patient Outcome Measures     Oswestry Score:  12/50 = 24% disability      AQoL Score:  3/36 = 8% disability          Treatment  "    Johanna received the treatments listed below:        Johanna participated in neuromuscular re-education activities to improve balance, coordination, proprioception, motor control and/or posture for 10 minutes. The following activities were included:    Quadruped bird dog x10  TrA w/pilates ring + SLR x10  Pallof press red sports cord x15    Johanna participated in therapeutic exercises to develop strength, endurance, ROM, flexibility, posture, and core stabilization for 45 minutes including:      LTR x 10  Prone press ups x15  Bridges pilates ring (abd) x15,3"  Prone hip extension x10 cassidy  90/90 position march c/ PPT  x10   Kneeling hip flexor stretch x30"  Standing lumbar extensions x10        6/25/2024    11:04 AM   [facility]   Visit Number 9   VAS Pain Rating 1   Treadmill Time (in min.) 5 min   Extension in Lying 10   Lumbar Weight 61 lbs   Repetitions 20   Rating of Perceived Exertion 5         Peripheral muscle strengthening which included one set of 15-20 repetitions at a slow and controlled 10-13 second per rep pace focused on strengthening supporting musculature in order to improve body mechanics and functional mobility. Patient and therapist focused on proper form during treatment to ensure optimal strengthening of each targeted muscle group.  Machines utilized included:Torso rotation, Leg Ext, Leg Curl, Chest Press, and Rowing  To be added next visit:Triceps, Biceps, Hip Abd, Hip Add, and Leg Press      Johanna participated in dynamic functional therapeutic activities to improve functional performance and simulate household and community activities for 00  minutes. The following activities were included:      Johanna received manual therapy techniques for 00  minutes. The following activities were included:      Pt given cold pack for 5 minutes to lumbar region in Z lying  Patient Education and Home Exercises     Home exercises include:  RAFAEL/REIL  Glute set  Bridges   Prone hip extension     Cardio " program (V5): 6/11/24  Lifting education (V11): -  Posture/Lumbar roll: obtained 05/29/24, uses in car and work   Fridge Magnet Discharge handout (date given): -  Equipment at home/gym membership: [employer] gym    Education provided:   - Pacing with the MedX machines      Written Home Exercises Provided: Patient instructed to cont prior HEP.  Exercises were reviewed and Johanna was able to demonstrate them prior to the end of the session.  Johanna demonstrated fair  understanding of the education provided.     See EMR under Patient Instructions for exercises provided 5/30/2024.    Assessment     Johanna tolerated skilled exercises well without adverse effects. Lumbar MedX resistance maintained at 61 ft/lbs with progression from 18 to 20 reps at an RPE of 5/10. Plan to increase resistance NV. Slight increase in reps during peripheral circuit. No issues with peripherals. Will progress HB protocol as tolerated by patient.       Patient is making good progress towards established goals.  Pt will continue to benefit from skilled outpatient physical therapy to address the deficits stated in the impairment chart, provide pt/family education and to maximize pt's level of independence in the home and community environment.     Anticipated Barriers for therapy: none  Pt's spiritual, cultural and educational needs considered and pt agreeable to plan of care and goals as stated below:     GOALS: Pt is in agreement with the following goals.     Short term goals:  6 weeks or 10 visits   - Pt will demonstrate increased lumbar ROM by at least 6 degrees from the initial ROM value with improvements noted in functional ROM and ability to perform ADLs. Appropriate and Ongoing  - Pt will demonstrate increased MedX average isometric strength value by 15% from initial test resulting in improved ability to perform bending, lifting, and carrying activities safely, confidently. Appropriate and Ongoing  - Pt will report a reduction in  worst pain score by 1-2 points for improved tolerance for static sitting. Appropriate and Ongoing  - Pt able to perform HEP correctly with minimal cueing or supervision from therapist to encourage independent management of symptoms. Appropriate and Ongoing     Long term goals: 10 weeks or 20 visits   - Pt will demonstrate increased lumbar ROM by at least 9 degrees from initial ROM value, resulting in improved ability to perform functional forward bending while standing and sitting. Appropriate and Ongoing  - Pt will demonstrate increased MedX average isometric strength value by 25% from initial test resulting in improved ability to perform bending, lifting, and carrying activities safely and confidently. Appropriate and Ongoing  - Pt to demonstrate ability to independently control and reduce their pain through posture positioning and mechanical movements throughout a typical day. Appropriate and Ongoing  - Pt will demonstrate reduced pain and improved functional outcomes as reported on the Oswestry Disability Index by reaching a score of 6 or less in order to demonstrate subjective improvement in pt's condition.   Appropriate and Ongoing  - Pt will demonstrate independence with the HEP at discharge. Appropriate and Ongoing  - Pt will be able to complete work related activities without increase in low back pain(patient goal) Appropriate and Ongoing    Plan     Continue with established Plan of Care towards established PT goals.       Therapist: Negin Farmer, PT  6/25/2024

## 2024-06-27 ENCOUNTER — CLINICAL SUPPORT (OUTPATIENT)
Dept: REHABILITATION | Facility: OTHER | Age: 39
End: 2024-06-27
Payer: COMMERCIAL

## 2024-06-27 ENCOUNTER — OFFICE VISIT (OUTPATIENT)
Dept: INTERNAL MEDICINE | Facility: CLINIC | Age: 39
End: 2024-06-27
Payer: COMMERCIAL

## 2024-06-27 ENCOUNTER — HOSPITAL ENCOUNTER (OUTPATIENT)
Dept: RADIOLOGY | Facility: HOSPITAL | Age: 39
Discharge: HOME | End: 2024-06-27
Attending: INTERNAL MEDICINE
Payer: COMMERCIAL

## 2024-06-27 VITALS
DIASTOLIC BLOOD PRESSURE: 80 MMHG | BODY MASS INDEX: 31.29 KG/M2 | SYSTOLIC BLOOD PRESSURE: 110 MMHG | WEIGHT: 159.38 LBS | OXYGEN SATURATION: 99 % | HEART RATE: 77 BPM | HEIGHT: 60 IN

## 2024-06-27 DIAGNOSIS — R68.89 DECREASED ACTIVITY TOLERANCE: ICD-10-CM

## 2024-06-27 DIAGNOSIS — S46.912D STRAIN OF LEFT SHOULDER, SUBSEQUENT ENCOUNTER: ICD-10-CM

## 2024-06-27 DIAGNOSIS — Z13.220 SCREENING FOR LIPID DISORDERS: ICD-10-CM

## 2024-06-27 DIAGNOSIS — Z13.1 SCREENING FOR DIABETES MELLITUS: ICD-10-CM

## 2024-06-27 DIAGNOSIS — R29.898 DECREASED STRENGTH OF TRUNK AND BACK: Primary | ICD-10-CM

## 2024-06-27 DIAGNOSIS — I49.3 PVC (PREMATURE VENTRICULAR CONTRACTION): Primary | ICD-10-CM

## 2024-06-27 PROCEDURE — 3079F DIAST BP 80-89 MM HG: CPT | Mod: CPTII,S$GLB,, | Performed by: INTERNAL MEDICINE

## 2024-06-27 PROCEDURE — 73030 X-RAY EXAM OF SHOULDER: CPT | Mod: TC,LT

## 2024-06-27 PROCEDURE — 3008F BODY MASS INDEX DOCD: CPT | Mod: CPTII,S$GLB,, | Performed by: INTERNAL MEDICINE

## 2024-06-27 PROCEDURE — 99999 PR PBB SHADOW E&M-EST. PATIENT-LVL IV: CPT | Mod: PBBFAC,,, | Performed by: INTERNAL MEDICINE

## 2024-06-27 PROCEDURE — 73030 X-RAY EXAM OF SHOULDER: CPT | Mod: 26,LT,, | Performed by: INTERNAL MEDICINE

## 2024-06-27 PROCEDURE — 97750 PHYSICAL PERFORMANCE TEST: CPT | Mod: 32

## 2024-06-27 PROCEDURE — 3074F SYST BP LT 130 MM HG: CPT | Mod: CPTII,S$GLB,, | Performed by: INTERNAL MEDICINE

## 2024-06-27 PROCEDURE — 99214 OFFICE O/P EST MOD 30 MIN: CPT | Mod: S$GLB,,, | Performed by: INTERNAL MEDICINE

## 2024-06-27 NOTE — PROGRESS NOTES
Subjective:       Patient ID: Johanna Ocasio is a 39 y.o. female.    Chief Complaint: Follow-up      [facility].  She feels as though this is helping somewhat.    She has been  having worsening left shoulder pain.  Pain is located on posterior shoulder over her scapula.    Past medical history:     PVCS:  Follows with cardiology.  Has had extensive cardiac workup which has all been normal.  States recently she has had a change in her palpitations does not feel like the previous PVCs.  l.      Migraines: following with neurology. Now on topamax and Ubrelvy and has improved.      Health Maintenance:  HIV: negative 2019   Hep C: negative 2017   Lipids: normal lipids in 2022   Pap Smear: negative PAP and HPV in 2022   Vaccines:  up to date       Follow-up  Pertinent negatives include no abdominal pain, chest pain, fever, headaches, numbness or weakness.   Back Pain  This is a chronic problem. The current episode started more than 1 month ago. The problem occurs constantly. The problem has been waxing and waning since onset. The pain is present in the sacro-iliac. The quality of the pain is described as aching and shooting. The pain radiates to the right knee. The pain is at a severity of 5/10. The pain is mild. The pain is The same all the time. The symptoms are aggravated by position. Pertinent negatives include no abdominal pain, bladder incontinence, bowel incontinence, chest pain, dysuria, fever, headaches, leg pain, numbness, paresis, paresthesias, pelvic pain, perianal numbness, tingling, weakness or weight loss. Risk factors include lack of exercise and poor posture. The treatment provided mild relief.     Review of Systems   Constitutional:  Negative for fever and weight loss.   Cardiovascular:  Negative for chest pain.   Gastrointestinal:  Negative for abdominal pain and bowel incontinence.   Genitourinary:  Negative for bladder incontinence, dysuria,  hematuria and pelvic pain.   Musculoskeletal:  Positive for back pain. Negative for leg pain.   Neurological:  Negative for tingling, weakness, numbness, headaches and paresthesias.           Past Medical History:   Diagnosis Date    Abnormal Pap smear of cervix 2011    Colposcopy    Brain venous angioma 10/3/2018    Early childhood epilepsy, myoclonic     last seizure age 14; Venous hemangioma on MRI    History of migraine headaches 5/12/2016    Migraine headache      No past surgical history on file.   Patient Active Problem List   Diagnosis    History of migraine headaches    History of seizures as a child - last seizure age 14, history of venous hemangioma on brain    Brain venous angioma    Migraine without aura and without status migrainosus, not intractable    Thoracic back pain    Decreased strength of trunk and back    Decreased activity tolerance        Objective:      Physical Exam  Vitals reviewed.   Constitutional:       Appearance: Normal appearance.   HENT:      Head: Normocephalic.      Jaw: No tenderness, swelling, pain on movement or malocclusion.      Right Ear: No mastoid tenderness.      Left Ear: No mastoid tenderness.      Nose:      Right Sinus: No maxillary sinus tenderness or frontal sinus tenderness.      Left Sinus: No maxillary sinus tenderness or frontal sinus tenderness.      Mouth/Throat:      Dentition: Normal dentition. No gingival swelling, dental abscesses or gum lesions.      Pharynx: Uvula midline. No uvula swelling.   Cardiovascular:      Rate and Rhythm: Normal rate and regular rhythm.   Pulmonary:      Effort: Pulmonary effort is normal.      Breath sounds: Normal breath sounds.   Skin:     General: Skin is warm and dry.   Neurological:      General: No focal deficit present.      Mental Status: She is alert.   Psychiatric:         Mood and Affect: Mood normal.         Behavior: Behavior normal.         Assessment:       Problem List Items Addressed This Visit    None         Plan:         Johanna was seen today for follow-up.    Diagnoses and all orders for this visit:    PVC (premature ventricular contraction)  -     Comprehensive Metabolic Panel; Future  -     CBC Auto Differential; Future  -     TSH; Future  -     Magnesium; Future  -     T4, FREE; Future  Check electrolytes.  Continue beta-blocker    Screening for lipid disorders  -     Lipid Panel; Future    Screening for diabetes mellitus  -     Hemoglobin A1C; Future    Strain of left shoulder, subsequent encounter  -     X-Ray Shoulder 2 or More Views Left; Future  -     Ambulatory referral/consult to Physical/Occupational Therapy; Future  -     Ambulatory referral/consult to Sports Medicine; Future  We will check x-rays today and start PT for shoulder.     Ivette Azul MD

## 2024-06-27 NOTE — PROGRESS NOTES
[facility]  Physical Therapy Treatment Note   Name: Johanna Ocasio  Clinic Number: [id number]    Therapy Diagnosis:   Encounter Diagnoses   Name Primary?    Decreased strength of trunk and back Yes    Decreased activity tolerance        Physician: Lorena Reinoso, *    Visit Date: 6/27/2024    Physician Orders: PT Eval and Treat  Medical Diagnosis from Referral:   M54.50 (ICD-10-CM) - Lumbar back pain     Evaluation Date: 5/27/2024  Authorization Period Expiration: 7/10/2024  Plan of Care Expiration: 8/27/2024  Reassessment Due: 8/27/2024  Visit # / Visits authorized: 10/20     PTA Visit #: 3/5     Time In: 10:25 AM   Time Out: 11:37 AM  Total Billable Time: 60 minutes    INSURANCE and OUTCOMES: Program Benefit Group with Lumbar Outcomes (Oswestry and AQoL) 1/3     Precautions: standard     Pattern of pain determined: 1 PEP      Subjective   Johanna Ocasio reports that she thinks she's doing a little better, but continues to get low back pain when she's on the toilet or seated at work.     Patient reports tolerating previous visit well /c no c/o of excess discomfort.  Patient reports their pain to be 1/10 on a 0-10 scale with 0 being no pain and 10 being the worst pain imaginable.  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     Objective      Lumbar  Isometric Testing on Med X equipment: Testing administered by PT    Test Initial Baseline Midpoint Final   Date 05/30/24 6/27/2024    ROM 0-48 deg 0-51 deg    Max Peak Torque 108  126     Min Peak Torque 13  28     Flex/Ext Ratio 8.3:1 4.5:1     % variance below normative data 56 (85% below at 0 deg) 51     % change from initial test N/A visit 1 33         OUTCOMES SELECTION:   Program Patient Outcome Measures     Oswestry  "Score:  12/50 = 24% disability   Midpoint score 4 /50= 8% disability   AQoL Score:  3/36 = 8% disability          Treatment     Johanna received the treatments listed below:        Johanna participated in neuromuscular re-education activities to improve balance, coordination, proprioception, motor control and/or posture for 15 minutes. The following activities were included:    Quadruped bird dog x10  TrA w/pilates ring + SLR x10  + Forearm plank x 30" x2  + Side forearm plank x 30" x2    Johanna participated in therapeutic exercises to develop strength, endurance, ROM, flexibility, posture, and core stabilization for 30 minutes including:    LTR x 10  Prone press ups x15  Bridges w/BTB unilateral (abd) x15,3"  Prone hip extension x10 cassidy  90/90 position march c/ PPT  x10   Kneeling hip flexor stretch x30"  Standing lumbar extensions x10        6/27/2024    10:24 AM   [facility]   Visit Number 10   VAS Pain Rating 1   Time 5   Extension in Lying 10   Lumbar Flexion 51   Lumbar Extension 0   Lumbar Peak Torque 126 ft. lbs.   Min Torque 28   Test Percent Below Normative Data 51 %   Test Percent Gain in Strength from Initial  33 %   Ice - Z Lie (in min.) 5     Peripheral muscle strengthening which included one set of 15-20 repetitions at a slow and controlled 10-13 second per rep pace focused on strengthening supporting musculature in order to improve body mechanics and functional mobility. Patient and therapist focused on proper form during treatment to ensure optimal strengthening of each targeted muscle group.  Machines utilized included:Torso rotation, Leg Ext, Leg Curl, Chest Press, and Rowing  To be added next visit:Triceps, Biceps, Hip Abd, Hip Add, and Leg Press      Johanna participated in dynamic functional therapeutic activities to improve functional performance and simulate household and community activities for 10  minutes. The following activities were included:    Pallof press red sports cord x15  + " Standing hip ABD 2 x 10  + Standing hip EXT 2 x 10  + Standing hip ADD 2 x 10    Johanna received manual therapy techniques for 00  minutes. The following activities were included:      Pt given cold pack for 5 minutes to lumbar region in Z lying  Patient Education and Home Exercises     Home exercises include:  RAFAEL/REIL  Glute set- removed  Bridges w/unilateral hip abd. GTB  Prone hip extension > replaced with standing 4-way hip (Abd/ext/flex/add)  + Forearm plank, Forearm side planks    Cardio program (V5): 6/11/24  Lifting education (V11): -  Posture/Lumbar roll: obtained 05/29/24, uses in car and work   Fridge Magnet Discharge handout (date given): -  Equipment at home/gym membership: main [employer] employee gym    Education provided:   - Pacing with the MedX machines      Written Home Exercises Provided: Patient instructed to cont prior HEP.  Exercises were reviewed and Johanna was able to demonstrate them prior to the end of the session.  Johanna demonstrated fair  understanding of the education provided.     See EMR under Patient Instructions for exercises provided 5/30/2024.    Assessment     Patient has attended 10 visits at [facility] which included MD evaluation, PT evaluation with isometric testing, and physical therapy treatment including HEP instruction, education, aerobic activity, dynamic strengthening on MedX equipment for the spine, and whole body strengthening on MedX equipment with increasing resistance. Patient demonstrates increased ability to reduce symptoms, improve posture, improve ROM, and improve strength, as stated below:    -Improved posture, is regularly using lumbar roll  -Improved lumbar ROM, initially on MedX test 0-48 degrees and currently 0-51 degrees.  -Improved strength at each test point on lumbar MedX isometric test with 33% average improvement noted with reduced pain noted by patient.  -Initial outcome tool score 24% and current outcome tool score 8% indicating reduced pain  and improved function.    Patient is making good progress towards established goals.  Pt will continue to benefit from skilled outpatient physical therapy to address the deficits stated in the impairment chart, provide pt/family education and to maximize pt's level of independence in the home and community environment.     Anticipated Barriers for therapy: none  Pt's spiritual, cultural and educational needs considered and pt agreeable to plan of care and goals as stated below:     GOALS: Pt is in agreement with the following goals.     Short term goals:  6 weeks or 10 visits   - Pt will demonstrate increased lumbar ROM by at least 6 degrees from the initial ROM value with improvements noted in functional ROM and ability to perform ADLs. Appropriate and Ongoing  - Pt will demonstrate increased MedX average isometric strength value by 15% from initial test resulting in improved ability to perform bending, lifting, and carrying activities safely, confidently. Appropriate and Ongoing  - Pt will report a reduction in worst pain score by 1-2 points for improved tolerance for static sitting. Appropriate and Ongoing  - Pt able to perform HEP correctly with minimal cueing or supervision from therapist to encourage independent management of symptoms. Appropriate and Ongoing     Long term goals: 10 weeks or 20 visits   - Pt will demonstrate increased lumbar ROM by at least 9 degrees from initial ROM value, resulting in improved ability to perform functional forward bending while standing and sitting. Appropriate and Ongoing  - Pt will demonstrate increased MedX average isometric strength value by 25% from initial test resulting in improved ability to perform bending, lifting, and carrying activities safely and confidently. Appropriate and Ongoing  - Pt to demonstrate ability to independently control and reduce their pain through posture positioning and mechanical movements throughout a typical day. Appropriate and Ongoing  -  Pt will demonstrate reduced pain and improved functional outcomes as reported on the Oswestry Disability Index by reaching a score of 6 or less in order to demonstrate subjective improvement in pt's condition.   Appropriate and Ongoing  - Pt will demonstrate independence with the HEP at discharge. Appropriate and Ongoing  - Pt will be able to complete work related activities without increase in low back pain(patient goal) Appropriate and Ongoing    Plan     Continue with established Plan of Care towards established PT goals.       Therapist: Anh Mac, PT  6/27/2024

## 2024-06-28 ENCOUNTER — PATIENT MESSAGE (OUTPATIENT)
Dept: INTERNAL MEDICINE | Facility: CLINIC | Age: 39
End: 2024-06-28
Payer: COMMERCIAL

## 2024-06-28 ENCOUNTER — LAB VISIT (OUTPATIENT)
Dept: LAB | Facility: HOSPITAL | Age: 39
End: 2024-06-28
Payer: COMMERCIAL

## 2024-06-28 DIAGNOSIS — Z13.220 SCREENING FOR LIPID DISORDERS: ICD-10-CM

## 2024-06-28 DIAGNOSIS — Z13.1 SCREENING FOR DIABETES MELLITUS: ICD-10-CM

## 2024-06-28 DIAGNOSIS — I49.3 PVC (PREMATURE VENTRICULAR CONTRACTION): ICD-10-CM

## 2024-06-28 LAB
ALBUMIN SERPL BCP-MCNC: 4.2 G/DL (ref 3.5–5.2)
ALP SERPL-CCNC: 44 U/L (ref 55–135)
ALT SERPL W/O P-5'-P-CCNC: 13 U/L (ref 10–44)
ANION GAP SERPL CALC-SCNC: 9 MMOL/L (ref 8–16)
AST SERPL-CCNC: 14 U/L (ref 10–40)
BASOPHILS # BLD AUTO: 0.05 K/UL (ref 0–0.2)
BASOPHILS NFR BLD: 1.1 % (ref 0–1.9)
BILIRUB SERPL-MCNC: 1 MG/DL (ref 0.1–1)
BUN SERPL-MCNC: 12 MG/DL (ref 6–20)
CALCIUM SERPL-MCNC: 9.1 MG/DL (ref 8.7–10.5)
CHLORIDE SERPL-SCNC: 109 MMOL/L (ref 95–110)
CHOLEST SERPL-MCNC: 163 MG/DL (ref 120–199)
CHOLEST/HDLC SERPL: 3 {RATIO} (ref 2–5)
CO2 SERPL-SCNC: 22 MMOL/L (ref 23–29)
CREAT SERPL-MCNC: 0.8 MG/DL (ref 0.5–1.4)
DIFFERENTIAL METHOD BLD: ABNORMAL
EOSINOPHIL # BLD AUTO: 0.4 K/UL (ref 0–0.5)
EOSINOPHIL NFR BLD: 7.7 % (ref 0–8)
ERYTHROCYTE [DISTWIDTH] IN BLOOD BY AUTOMATED COUNT: 12.9 % (ref 11.5–14.5)
EST. GFR  (NO RACE VARIABLE): >60 ML/MIN/1.73 M^2
ESTIMATED AVG GLUCOSE: 100 MG/DL (ref 68–131)
GLUCOSE SERPL-MCNC: 98 MG/DL (ref 70–110)
HBA1C MFR BLD: 5.1 % (ref 4–5.6)
HCT VFR BLD AUTO: 37.6 % (ref 37–48.5)
HDLC SERPL-MCNC: 54 MG/DL (ref 40–75)
HDLC SERPL: 33.1 % (ref 20–50)
HGB BLD-MCNC: 12.6 G/DL (ref 12–16)
IMM GRANULOCYTES # BLD AUTO: 0 K/UL (ref 0–0.04)
IMM GRANULOCYTES NFR BLD AUTO: 0 % (ref 0–0.5)
LDLC SERPL CALC-MCNC: 92.6 MG/DL (ref 63–159)
LYMPHOCYTES # BLD AUTO: 2 K/UL (ref 1–4.8)
LYMPHOCYTES NFR BLD: 43.3 % (ref 18–48)
MAGNESIUM SERPL-MCNC: 2 MG/DL (ref 1.6–2.6)
MCH RBC QN AUTO: 31.3 PG (ref 27–31)
MCHC RBC AUTO-ENTMCNC: 33.5 G/DL (ref 32–36)
MCV RBC AUTO: 93 FL (ref 82–98)
MONOCYTES # BLD AUTO: 0.5 K/UL (ref 0.3–1)
MONOCYTES NFR BLD: 10.9 % (ref 4–15)
NEUTROPHILS # BLD AUTO: 1.7 K/UL (ref 1.8–7.7)
NEUTROPHILS NFR BLD: 37 % (ref 38–73)
NONHDLC SERPL-MCNC: 109 MG/DL
NRBC BLD-RTO: 0 /100 WBC
PLATELET # BLD AUTO: 229 K/UL (ref 150–450)
PMV BLD AUTO: 10 FL (ref 9.2–12.9)
POTASSIUM SERPL-SCNC: 4.1 MMOL/L (ref 3.5–5.1)
PROT SERPL-MCNC: 7.6 G/DL (ref 6–8.4)
RBC # BLD AUTO: 4.03 M/UL (ref 4–5.4)
SODIUM SERPL-SCNC: 140 MMOL/L (ref 136–145)
T4 FREE SERPL-MCNC: 1.02 NG/DL (ref 0.71–1.51)
TRIGL SERPL-MCNC: 82 MG/DL (ref 30–150)
TSH SERPL DL<=0.005 MIU/L-ACNC: 2.31 UIU/ML (ref 0.4–4)
WBC # BLD AUTO: 4.57 K/UL (ref 3.9–12.7)

## 2024-06-28 PROCEDURE — 83735 ASSAY OF MAGNESIUM: CPT | Performed by: INTERNAL MEDICINE

## 2024-06-28 PROCEDURE — 84443 ASSAY THYROID STIM HORMONE: CPT | Performed by: INTERNAL MEDICINE

## 2024-06-28 PROCEDURE — 80053 COMPREHEN METABOLIC PANEL: CPT | Performed by: INTERNAL MEDICINE

## 2024-06-28 PROCEDURE — 83036 HEMOGLOBIN GLYCOSYLATED A1C: CPT | Performed by: INTERNAL MEDICINE

## 2024-06-28 PROCEDURE — 84439 ASSAY OF FREE THYROXINE: CPT | Performed by: INTERNAL MEDICINE

## 2024-06-28 PROCEDURE — 36415 COLL VENOUS BLD VENIPUNCTURE: CPT | Performed by: INTERNAL MEDICINE

## 2024-06-28 PROCEDURE — 85025 COMPLETE CBC W/AUTO DIFF WBC: CPT | Performed by: INTERNAL MEDICINE

## 2024-06-28 PROCEDURE — 80061 LIPID PANEL: CPT | Performed by: INTERNAL MEDICINE

## 2024-07-02 ENCOUNTER — OFFICE VISIT (OUTPATIENT)
Dept: SPORTS MEDICINE | Facility: CLINIC | Age: 39
End: 2024-07-02
Payer: COMMERCIAL

## 2024-07-02 ENCOUNTER — CLINICAL SUPPORT (OUTPATIENT)
Dept: REHABILITATION | Facility: OTHER | Age: 39
End: 2024-07-02
Payer: COMMERCIAL

## 2024-07-02 VITALS
SYSTOLIC BLOOD PRESSURE: 106 MMHG | BODY MASS INDEX: 31.25 KG/M2 | HEIGHT: 60 IN | HEART RATE: 75 BPM | DIASTOLIC BLOOD PRESSURE: 72 MMHG | WEIGHT: 159.19 LBS

## 2024-07-02 DIAGNOSIS — R29.898 DECREASED STRENGTH OF TRUNK AND BACK: Primary | ICD-10-CM

## 2024-07-02 DIAGNOSIS — M25.512 CHRONIC LEFT SHOULDER PAIN: ICD-10-CM

## 2024-07-02 DIAGNOSIS — G25.89 SCAPULAR DYSKINESIS: Primary | ICD-10-CM

## 2024-07-02 DIAGNOSIS — S46.912D STRAIN OF LEFT SHOULDER, SUBSEQUENT ENCOUNTER: ICD-10-CM

## 2024-07-02 DIAGNOSIS — R68.89 DECREASED ACTIVITY TOLERANCE: ICD-10-CM

## 2024-07-02 DIAGNOSIS — G89.29 CHRONIC LEFT SHOULDER PAIN: ICD-10-CM

## 2024-07-02 PROCEDURE — 99999 PR PBB SHADOW E&M-EST. PATIENT-LVL IV: CPT | Mod: PBBFAC,,, | Performed by: PHYSICIAN ASSISTANT

## 2024-07-02 PROCEDURE — 1159F MED LIST DOCD IN RCRD: CPT | Mod: CPTII,S$GLB,, | Performed by: PHYSICIAN ASSISTANT

## 2024-07-02 PROCEDURE — 3074F SYST BP LT 130 MM HG: CPT | Mod: CPTII,S$GLB,, | Performed by: PHYSICIAN ASSISTANT

## 2024-07-02 PROCEDURE — 3008F BODY MASS INDEX DOCD: CPT | Mod: CPTII,S$GLB,, | Performed by: PHYSICIAN ASSISTANT

## 2024-07-02 PROCEDURE — 1160F RVW MEDS BY RX/DR IN RCRD: CPT | Mod: CPTII,S$GLB,, | Performed by: PHYSICIAN ASSISTANT

## 2024-07-02 PROCEDURE — 3078F DIAST BP <80 MM HG: CPT | Mod: CPTII,S$GLB,, | Performed by: PHYSICIAN ASSISTANT

## 2024-07-02 PROCEDURE — 97750 PHYSICAL PERFORMANCE TEST: CPT | Mod: 32

## 2024-07-02 PROCEDURE — 3044F HG A1C LEVEL LT 7.0%: CPT | Mod: CPTII,S$GLB,, | Performed by: PHYSICIAN ASSISTANT

## 2024-07-02 PROCEDURE — 99204 OFFICE O/P NEW MOD 45 MIN: CPT | Mod: S$GLB,,, | Performed by: PHYSICIAN ASSISTANT

## 2024-07-02 RX ORDER — MELOXICAM 7.5 MG/1
7.5 TABLET ORAL DAILY
Qty: 28 TABLET | Refills: 0 | Status: SHIPPED | OUTPATIENT
Start: 2024-07-02 | End: 2024-07-30

## 2024-07-02 NOTE — PROGRESS NOTES
CC: Left shoulder pain     39 y.o. Female presents as a new patient to me. 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 Complaint is left shoulder pain x December of last year. She says she did PT and it improved but recently over the past 2-3 months the pain has returned. Atraumatic onset.  Pain localizes posteriorly over her shoulder blade. Worse with overhead lifting but not every time. Pain is disruptive to sleep at night. Better with rest. Denies neck pain but does endorse some intermittent tingling down the left arm. Treatment thus far has included activity modifications, rest, and oral medication.  Here today to discuss diagnosis and treatment options.     PMHx notable for migranes, PVC.   Negative for tobacco.   Negative for diabetes.    Pain Score:   1    PAST MEDICAL HISTORY:   Past Medical History:   Diagnosis Date    Abnormal Pap smear of cervix 2011    Colposcopy    Brain venous angioma 10/3/2018    Early childhood epilepsy, myoclonic     last seizure age 14; Venous hemangioma on MRI    History of migraine headaches 5/12/2016    Migraine headache        PAST SURGICAL HISTORY:  History reviewed. No pertinent surgical history.    FAMILY HISTORY:  Family History   Problem Relation Name Age of Onset    Hypertension Mother      Hypertension Father      Cataracts Maternal Grandfather      Diabetes Paternal Grandmother      Cancer Paternal Grandfather          colon cancer    Breast cancer Neg Hx      Colon cancer Neg Hx      Ovarian cancer Neg Hx      Melanoma Neg Hx      Lupus Neg Hx      Psoriasis Neg Hx         MEDICATIONS:    Current Outpatient Medications:     albuterol (PROVENTIL/VENTOLIN HFA) 90 mcg/actuation inhaler, Inhale 2 puffs into the lungs every 6 (six) hours as needed for Wheezing. Rescue, Disp: 18 g, Rfl: 3    metoprolol succinate (TOPROL-XL) 25 MG 24 hr tablet, Take 1 tablet (25 mg total) by mouth once daily., Disp: 90 tablet, Rfl: 2    omeprazole (PRILOSEC) 20 MG capsule, Take 1 capsule (20  mg total) by mouth once daily., Disp: 30 capsule, Rfl: 11    ondansetron (ZOFRAN) 4 MG tablet, TAKE 1 TABLET BY MOUTH EVERY 8 HOURS AS NEEDED, Disp: 30 tablet, Rfl: 0    topiramate (TOPAMAX) 25 MG tablet, Take 1 tablet (25 mg total) by mouth once daily., Disp: 30 tablet, Rfl: 2    ubrogepant (UBRELVY) 100 mg tablet, Take 1 tablet by mouth at the onset of a headache. May repeat based on response and tolerability after more than 2 hours if needed. Do not take more than 200mg in a 24 hour span., Disp: 16 tablet, Rfl: 11    tiZANidine (ZANAFLEX) 2 MG tablet, TAKE 1 TABLET BY MOUTH EVERY 8 HOURS AS NEEDED FOR PAIN (Patient not taking: Reported on 7/2/2024), Disp: 20 tablet, Rfl: 0    VIOS AEROSOL DELIVERY SYSTEM Martha, USE AS DIRECTED (Patient not taking: Reported on 7/2/2024), Disp: , Rfl: 0    ALLERGIES:  Review of patient's allergies indicates:  No Known Allergies     REVIEW OF SYSTEMS:  Constitution: Negative. Negative for chills, fever and night sweats.    Hematologic/Lymphatic: Negative for bleeding problem. Does not bruise/bleed easily.   Skin: Negative for dry skin, itching and rash.   Musculoskeletal: Negative for falls. Positive for left shoulder pain and muscle weakness.     All other review of symptoms were reviewed and found to be noncontributory.    PHYSICAL EXAMINATION:  Vitals:  /72   Pulse 75   Ht 5' (1.524 m)   Wt 72.2 kg (159 lb 2.8 oz)   BMI 31.09 kg/m²    General: Well-developed well-nourished 39 y.o. femalein no acute distress   Cardiovascular: Regular rhythm by palpation of distal pulse, normal color and temperature, no concerning varicosities on symptomatic side   Lungs: No labored breathing or wheezing appreciated   Neuro: Alert and oriented ×3   Psychiatric: well oriented to person, place and time, demonstrates normal mood and affect   Skin: No rashes, lesions or ulcers, normal temperature, turgor, and texture on uninvolved extremity    Ortho/SPM Exam  Examination of the left shoulder  demonstrates active forward elevation to 180, ER with arm at side to 90 IR to T12. Passive FE to 180, ER to 90. No tenderness along the proximal biceps tendon. Negative AC tenderness. 5/5 resisted supraspinatus testing. 5/5 resisted infraspinatus testing. Negative belly press test. Stable shoulder. No midline neck tenderness. Negative Spurling's maneuver. Pain posteriorly over medial scapular border but minimal. Mild tightness over trap. Mild scapular dyskensis. No winging.    IMAGING:  Xrays including AP, Outlet and Axillary Lateral of Left shoulder are ordered / images reviewed by me:   No fracture or acute change appreciated. No significant glenohumeral degenerative changes. Mild to moderate AC joint arthritis. Normal acromiohumeral distance.     ASSESSMENT:      ICD-10-CM ICD-9-CM   1. Strain of left shoulder, subsequent encounter  S46.912D V58.89     840.9       PLAN:     -Findings and treatment options were discussed with the patient. Had long discussion today with her about treatment plan moving forward. I do think there is a main aspect of scapular dyskinesis in this case. She has great ROM and cuff strength on exam today. She did endorse some mild intermittent radicular symptoms but this is not consistent. No neck pain. For now we will start with conservative treatment.  -We have discussed a variety of treatment options including medications, injections, physical therapy and other alternative treatments. I also explained the indications, risks and benefits of surgery. Given the patients hx and examination today, I believe she would benefit from physical therapy. Pt agrees and would like to proceed with physical therapy.    I made the decision to obtain old records of the patient including previous notes and imaging. I independently reviewed and interpreted lab results today as well as prior imaging.     1. Mobic 7.5 mg 1 time daily PRN for pain management. Patient understands to take with food and/or OTC  prilosec to decrease GI side effects.  2. Ice compress to the affected area 2-3x a day for 15-20 minutes as needed for pain management. Alternate with heat therapy.  3. Ambulatory referral to physical therapy for periscapular/rotator cuff strengthening. Julianne protocol for scapular dyskinesis.  4. Consider MRI if symptoms worsen. Also discussed postural shirt.  5. RTC to see me in 6 weeks for follow-up.    All of the patient's questions were answered and the patient will contact us if they have any questions or concerns in the interim.

## 2024-07-02 NOTE — PROGRESS NOTES
[facility]  Physical Therapy Treatment Note   Name: Johanna Ocasio  Clinic Number: [id number]    Therapy Diagnosis:   Encounter Diagnoses   Name Primary?    Decreased strength of trunk and back Yes    Decreased activity tolerance        Physician: Lorena Reinoso, *    Visit Date: 7/2/2024    Physician Orders: PT Eval and Treat  Medical Diagnosis from Referral:   M54.50 (ICD-10-CM) - Lumbar back pain     Evaluation Date: 5/27/2024  Authorization Period Expiration: 7/10/2024  Plan of Care Expiration: 8/27/2024  Reassessment Due: 8/27/2024  Visit # / Visits authorized: 11/20     PTA Visit #: 3/5     Time In: 9:25 AM   Time Out: 10:37 AM  Total Billable Time: 60 minutes    INSURANCE and OUTCOMES: Program Benefit Group with Lumbar Outcomes (Oswestry and AQoL) 1/3     Precautions: standard     Pattern of pain determined: 1 PEP      Subjective   Johanna Ocasio reports no adverse symptoms or pain.     Patient reports tolerating previous visit well /c no c/o of excess discomfort.  Patient reports their pain to be 0/10 on a 0-10 scale with 0 being no pain and 10 being the worst pain imaginable.  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     Objective      Lumbar  Isometric Testing on Med X equipment: Testing administered by PT    Test Initial Baseline Midpoint Final   Date 05/30/24 6/27/2024    ROM 0-48 deg 0-51 deg    Max Peak Torque 108  126     Min Peak Torque 13  28     Flex/Ext Ratio 8.3:1 4.5:1     % variance below normative data 56 (85% below at 0 deg) 51     % change from initial test N/A visit 1 33         OUTCOMES SELECTION:   Program Patient Outcome Measures     Oswestry Score:  12/50 = 24% disability   Midpoint score 4 /50= 8% disability   AQoL Score:  3/36 = 8%  "disability          Treatment     Johanna received the treatments listed below:        Johanna participated in neuromuscular re-education activities to improve balance, coordination, proprioception, motor control and/or posture for 15 minutes. The following activities were included:    Quadruped bird dog x10  TrA w/pilates ring + SLR x10  Forearm plank x 30" x2  Side forearm plank x 30" x2    Johanna participated in therapeutic exercises to develop strength, endurance, ROM, flexibility, posture, and core stabilization for 30 minutes including:    LTR x 10  Prone press ups x15  Bridges w/BTB unilateral (abd) x15,3"  Prone hip extension x10 cassidy  90/90 position march c/ PPT  x10   Kneeling hip flexor stretch x30"  Standing lumbar extensions x10        7/2/2024     9:58 AM   [facility]   Visit Number 11   VAS Pain Rating 0   Time 5   Extension in Lying 10   Lumbar Weight 67 lbs   Repetitions 15   Rating of Perceived Exertion 3   Ice - Z Lie (in min.) 5         Peripheral muscle strengthening which included one set of 15-20 repetitions at a slow and controlled 10-13 second per rep pace focused on strengthening supporting musculature in order to improve body mechanics and functional mobility. Patient and therapist focused on proper form during treatment to ensure optimal strengthening of each targeted muscle group.  Machines utilized included:Torso rotation, Leg Ext, Leg Curl, Chest Press, and Rowing  To be added next visit:Triceps, Biceps, Hip Abd, Hip Add, and Leg Press      Johanna participated in dynamic functional therapeutic activities to improve functional performance and simulate household and community activities for 10  minutes. The following activities were included:    Pallof press red sports cord x15  + Standing hip ABD 2 x 10  + Standing hip EXT 2 x 10  + Standing hip ADD 2 x 10    Johanna received manual therapy techniques for 00  minutes. The following activities were included:      Pt given cold pack for 5 " minutes to lumbar region in Z lying  Patient Education and Home Exercises     Home exercises include:  RAFAEL/REIL  Glute set- removed  Bridges w/unilateral hip abd. GTB  Prone hip extension > replaced with standing 4-way hip (Abd/ext/flex/add)  + Forearm plank, Forearm side planks    Cardio program (V5): 6/11/24  Lifting education (V11): - 7/2/2024  Posture/Lumbar roll: obtained 05/29/24, uses in car and work   Fridge Magnet Discharge handout (date given): -  Equipment at home/gym membership: [employer]    Education provided:   - Pacing with the MedX machines      Written Home Exercises Provided: Patient instructed to cont prior HEP.  Exercises were reviewed and Johanna was able to demonstrate them prior to the end of the session.  Johanna demonstrated fair  understanding of the education provided.     See EMR under Patient Instructions for exercises provided 5/30/2024.    Assessment     Issued/reviewed lifting handout with patient understanding. Increased MedX resistance to 67 ft/lbs with completion of 15 reps at 3/10 RPE. Slight increase in resistance during peripheral circuit. No issues with peripherals. Will progress HB protocol as tolerated by patient.     Patient has attended 11 visits at [facility] which included MD evaluation, PT evaluation with isometric testing, and physical therapy treatment including HEP instruction, education, aerobic activity, dynamic strengthening on MedX equipment for the spine, and whole body strengthening on MedX equipment with increasing resistance. Patient demonstrates increased ability to reduce symptoms, improve posture, improve ROM, and improve strength, as stated below:    -Improved posture, is regularly using lumbar roll  -Improved lumbar ROM, initially on MedX test 0-48 degrees and currently 0-51 degrees.  -Improved strength at each test point on lumbar MedX isometric test with 33% average improvement noted with reduced pain noted by patient.  -Initial  outcome tool score 24% and current outcome tool score 8% indicating reduced pain and improved function.    Patient is making good progress towards established goals.  Pt will continue to benefit from skilled outpatient physical therapy to address the deficits stated in the impairment chart, provide pt/family education and to maximize pt's level of independence in the home and community environment.     Anticipated Barriers for therapy: none  Pt's spiritual, cultural and educational needs considered and pt agreeable to plan of care and goals as stated below:     GOALS: Pt is in agreement with the following goals.     Short term goals:  6 weeks or 10 visits   - Pt will demonstrate increased lumbar ROM by at least 6 degrees from the initial ROM value with improvements noted in functional ROM and ability to perform ADLs. Appropriate and Ongoing  - Pt will demonstrate increased MedX average isometric strength value by 15% from initial test resulting in improved ability to perform bending, lifting, and carrying activities safely, confidently. Appropriate and Ongoing  - Pt will report a reduction in worst pain score by 1-2 points for improved tolerance for static sitting. Appropriate and Ongoing  - Pt able to perform HEP correctly with minimal cueing or supervision from therapist to encourage independent management of symptoms. Appropriate and Ongoing     Long term goals: 10 weeks or 20 visits   - Pt will demonstrate increased lumbar ROM by at least 9 degrees from initial ROM value, resulting in improved ability to perform functional forward bending while standing and sitting. Appropriate and Ongoing  - Pt will demonstrate increased MedX average isometric strength value by 25% from initial test resulting in improved ability to perform bending, lifting, and carrying activities safely and confidently. Appropriate and Ongoing  - Pt to demonstrate ability to independently control and reduce their pain through posture  positioning and mechanical movements throughout a typical day. Appropriate and Ongoing  - Pt will demonstrate reduced pain and improved functional outcomes as reported on the Oswestry Disability Index by reaching a score of 6 or less in order to demonstrate subjective improvement in pt's condition.   Appropriate and Ongoing  - Pt will demonstrate independence with the HEP at discharge. Appropriate and Ongoing  - Pt will be able to complete work related activities without increase in low back pain(patient goal) Appropriate and Ongoing    Plan     Continue with established Plan of Care towards established PT goals.       Therapist: Negin Farmer, PT  7/2/2024

## 2024-07-10 NOTE — PROGRESS NOTES
[facility]  Physical Therapy Treatment Note   Name: Johanna Ocasio  Clinic Number: [id number]    Therapy Diagnosis:   Encounter Diagnoses   Name Primary?    Decreased strength of trunk and back Yes    Decreased activity tolerance        Physician: Lorena Reinoso, *    Visit Date: 7/11/2024    Physician Orders: PT Eval and Treat  Medical Diagnosis from Referral:   M54.50 (ICD-10-CM) - Lumbar back pain     Evaluation Date: 5/27/2024  Authorization Period Expiration: 7/10/2024  Plan of Care Expiration: 8/27/2024  Reassessment Due: 8/27/2024  Visit # / Visits authorized: 12/20     PTA Visit #: 3/5     Time In: 9:30 AM   Time Out: 10:23 AM  Total Billable Time: 53 minutes    INSURANCE and OUTCOMES: Program Benefit Group with Lumbar Outcomes (Oswestry and AQoL) 1/3     Precautions: standard     Pattern of pain determined: 1 PEP      Subjective   Johanna Ocasio reports that her right low back continues to mainly only hurt when sitting on the toilet.      Patient reports tolerating previous visit well /c no c/o of excess discomfort.  Patient reports their pain to be 0/10 on a 0-10 scale with 0 being no pain and 10 being the worst pain imaginable.  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     Objective      Lumbar  Isometric Testing on Med X equipment: Testing administered by PT    Test Initial Baseline Midpoint Final   Date 05/30/24 6/27/2024    ROM 0-48 deg 0-51 deg    Max Peak Torque 108  126     Min Peak Torque 13  28     Flex/Ext Ratio 8.3:1 4.5:1     % variance below normative data 56 (85% below at 0 deg) 51     % change from initial test N/A visit 1 33         OUTCOMES SELECTION:   Program Patient Outcome Measures     Oswestry Score:  12/50 = 24% disability   Midpoint  "score 4 /50= 8% disability   AQoL Score:  3/36 = 8% disability          Treatment     Johanna received the treatments listed below:        Johanna participated in neuromuscular re-education activities to improve balance, coordination, proprioception, motor control and/or posture for 8 minutes. The following activities were included:    Quadruped bird dog x10  TrA w/pilates ring + SLR x10  Forearm plank x 30" x2  Side forearm plank x 30" x2    Johanna participated in therapeutic exercises to develop strength, endurance, ROM, flexibility, posture, and core stabilization for 43 minutes including:    LTR x 10  Prone press ups x10  Bridges w/BTB unilateral (abd) x15,3"  Prone hip extension x10 cassidy  90/90 position march c/ PPT  x10   Kneeling hip flexor stretch x30"  Standing lumbar extensions x10  + Cat-cow x 10        7/11/2024     9:35 AM   [facility]   Visit Number 12   VAS Pain Rating 0   Time 5   Extension in Lying 10   Lumbar Weight 67 lbs   Repetitions 18   Rating of Perceived Exertion 3   Ice - Z Lie (in min.) 5          Peripheral muscle strengthening which included one set of 15-20 repetitions at a slow and controlled 10-13 second per rep pace focused on strengthening supporting musculature in order to improve body mechanics and functional mobility. Patient and therapist focused on proper form during treatment to ensure optimal strengthening of each targeted muscle group.  Machines utilized included:Torso rotation, Leg Ext, Leg Curl, Chest Press, and Rowing  To be added next visit:Triceps, Biceps, Hip Abd, Hip Add, and Leg Press      Johanna participated in dynamic functional therapeutic activities to improve functional performance and simulate household and community activities for 10 minutes. The following activities were included:    NP: Pallof press red sports cord x15  Standing hip ABD 2 x 10  Standing hip EXT 2 x 10  Standing hip ADD 2 x 10    Johanna received manual therapy techniques for 00  minutes. The " following activities were included:      Pt given cold pack for 5 minutes to lumbar region in Z lying  Patient Education and Home Exercises     Home exercises include:  RAFAEL/REIL  Glute set- removed  Bridges w/unilateral hip abd. GTB  Prone hip extension > replaced with standing 4-way hip (Abd/ext/flex/add)  Forearm plank, Forearm side planks    Cardio program (V5): 6/11/24  Lifting education (V11): - 7/11/2024  Posture/Lumbar roll: obtained 05/29/24, uses in car and work   Fridge Magnet Discharge handout (date given): -  Equipment at home/gym membership: [employer]    Education provided:   - Pacing with the MedX machines      Written Home Exercises Provided: Patient instructed to cont prior HEP.  Exercises were reviewed and Johanna was able to demonstrate them prior to the end of the session.  Johanna demonstrated fair  understanding of the education provided.     See EMR under Patient Instructions for exercises provided 5/30/2024.    Assessment   Patient presents reporting persistent right low back pain that is most prevalent when sitting on the toilet. She has some tenderness to deep palpation of right quadratus lumborum and when performing quadruped thoracolumbar flexion to extension. Continued with sagittal plane core activation in gravity dependent and standing positions. Patient shows difficulty maintaining abdominal brace without tactile cueing and will benefit from continued training. Patient's Lumbar MedX weight maintained to 67 ft. Lbs. And she was able to complete 18 repetitions with an RPE of 3/10. All peripheral strength circuit exercises completed without adverse effects.     Patient is making good progress towards established goals.  Pt will continue to benefit from skilled outpatient physical therapy to address the deficits stated in the impairment chart, provide pt/family education and to maximize pt's level of independence in the home and community environment.     Anticipated Barriers  for therapy: none  Pt's spiritual, cultural and educational needs considered and pt agreeable to plan of care and goals as stated below:     GOALS: Pt is in agreement with the following goals.     Short term goals:  6 weeks or 10 visits   - Pt will demonstrate increased lumbar ROM by at least 6 degrees from the initial ROM value with improvements noted in functional ROM and ability to perform ADLs. Appropriate and Ongoing  - Pt will demonstrate increased MedX average isometric strength value by 15% from initial test resulting in improved ability to perform bending, lifting, and carrying activities safely, confidently. Appropriate and Ongoing  - Pt will report a reduction in worst pain score by 1-2 points for improved tolerance for static sitting. Appropriate and Ongoing  - Pt able to perform HEP correctly with minimal cueing or supervision from therapist to encourage independent management of symptoms. Appropriate and Ongoing     Long term goals: 10 weeks or 20 visits   - Pt will demonstrate increased lumbar ROM by at least 9 degrees from initial ROM value, resulting in improved ability to perform functional forward bending while standing and sitting. Appropriate and Ongoing  - Pt will demonstrate increased MedX average isometric strength value by 25% from initial test resulting in improved ability to perform bending, lifting, and carrying activities safely and confidently. Appropriate and Ongoing  - Pt to demonstrate ability to independently control and reduce their pain through posture positioning and mechanical movements throughout a typical day. Appropriate and Ongoing  - Pt will demonstrate reduced pain and improved functional outcomes as reported on the Oswestry Disability Index by reaching a score of 6 or less in order to demonstrate subjective improvement in pt's condition.   Appropriate and Ongoing  - Pt will demonstrate independence with the HEP at discharge. Appropriate and Ongoing  - Pt will be able to  complete work related activities without increase in low back pain(patient goal) Appropriate and Ongoing    Plan     Continue with established Plan of Care towards established PT goals.       Therapist: Anh Mac, PT  7/11/2024

## 2024-07-11 ENCOUNTER — TELEPHONE (OUTPATIENT)
Dept: OPTOMETRY | Facility: CLINIC | Age: 39
End: 2024-07-11
Payer: COMMERCIAL

## 2024-07-11 ENCOUNTER — CLINICAL SUPPORT (OUTPATIENT)
Dept: REHABILITATION | Facility: OTHER | Age: 39
End: 2024-07-11
Payer: COMMERCIAL

## 2024-07-11 DIAGNOSIS — R68.89 DECREASED ACTIVITY TOLERANCE: ICD-10-CM

## 2024-07-11 DIAGNOSIS — R29.898 DECREASED STRENGTH OF TRUNK AND BACK: Primary | ICD-10-CM

## 2024-07-11 PROCEDURE — 97750 PHYSICAL PERFORMANCE TEST: CPT | Mod: 32

## 2024-07-12 ENCOUNTER — OFFICE VISIT (OUTPATIENT)
Dept: OPTOMETRY | Facility: CLINIC | Age: 39
End: 2024-07-12
Payer: COMMERCIAL

## 2024-07-12 DIAGNOSIS — Z86.69 HISTORY OF SEIZURES AS A CHILD: ICD-10-CM

## 2024-07-12 DIAGNOSIS — D18.02 BRAIN VENOUS ANGIOMA: ICD-10-CM

## 2024-07-12 DIAGNOSIS — H49.9 EXTRAOCULAR MUSCLE PALSY OF LEFT EYE: ICD-10-CM

## 2024-07-12 DIAGNOSIS — H21.301 CYST OF IRIS OF RIGHT EYE: ICD-10-CM

## 2024-07-12 DIAGNOSIS — H18.831 RCE (RECURRENT CORNEAL EROSION), RIGHT: ICD-10-CM

## 2024-07-12 DIAGNOSIS — H04.123 DRY EYE SYNDROME, BILATERAL: ICD-10-CM

## 2024-07-12 DIAGNOSIS — H52.13 MYOPIA, BILATERAL: Primary | ICD-10-CM

## 2024-07-12 PROCEDURE — 99999 PR PBB SHADOW E&M-EST. PATIENT-LVL II: CPT | Mod: PBBFAC,,, | Performed by: OPTOMETRIST

## 2024-07-12 RX ORDER — LIFITEGRAST 50 MG/ML
1 SOLUTION/ DROPS OPHTHALMIC 2 TIMES DAILY
Qty: 180 ML | Refills: 0 | Status: SHIPPED | OUTPATIENT
Start: 2024-07-12

## 2024-07-12 NOTE — PROGRESS NOTES
HPI    RAMESH: 10/12/2021 - Dr. Mariano     CC: Pt is here today for a routine eye exam. Pt states that she had   receive treatment for a k abrasion a few months ago but her overall vision   has been stable since her last exam.     (+)Dryness  (+)Burning  (+)Itchiness  (-)Tearing  (-)Flashes  (+)Floaters   (-)Photophobia  (-)Eye Pain      Diabetic: no    Contact Lens: no    Eye Meds:   Refresh OD QHS  Night time ointment OD QHS  Aloway PRN     PD: 60.5    Last edited by Concetta Stern MA on 7/12/2024  9:13 AM.            Assessment /Plan     For exam results, see Encounter Report.    Myopia, bilateral   Rx specs  Extraocular muscle palsy of left eye              Pt unable to look to left with OS              Longstanding, stable    History of seizures as a child - last seizure age 14, history of venous hemangioma on brain  History of migraine headaches  Brain venous angioma     Myopia, bilateral              Rx specs     Cyst of iris, right              Stable, per gonio 2017 :normal angle     Good internal ocular health, monitor yearly     Dry eye syndrome, bilateral  Rce (recurrent corneal erosion), right   H/o corneal abrasion OD in May 2024   Continue with PF art tears and dionicio QHS  -    Start lifitegrast (XIIDRA) 5 % Dpet; Place 1 drop into both eyes 2 (two) times daily.  Dispense: 180 mL; Refill: 0   Consider miebo/ Vevye in the future    RTC 1 year, Gonio OD Cyst before DFE next visit

## 2024-07-16 ENCOUNTER — CLINICAL SUPPORT (OUTPATIENT)
Dept: REHABILITATION | Facility: HOSPITAL | Age: 39
End: 2024-07-16
Payer: COMMERCIAL

## 2024-07-16 ENCOUNTER — CLINICAL SUPPORT (OUTPATIENT)
Dept: REHABILITATION | Facility: OTHER | Age: 39
End: 2024-07-16
Payer: COMMERCIAL

## 2024-07-16 DIAGNOSIS — R29.898 DECREASED STRENGTH OF TRUNK AND BACK: Primary | ICD-10-CM

## 2024-07-16 DIAGNOSIS — M54.6 CHRONIC LEFT-SIDED THORACIC BACK PAIN: Primary | ICD-10-CM

## 2024-07-16 DIAGNOSIS — S46.912D STRAIN OF LEFT SHOULDER, SUBSEQUENT ENCOUNTER: ICD-10-CM

## 2024-07-16 DIAGNOSIS — G89.29 CHRONIC LEFT-SIDED THORACIC BACK PAIN: Primary | ICD-10-CM

## 2024-07-16 DIAGNOSIS — R68.89 DECREASED ACTIVITY TOLERANCE: ICD-10-CM

## 2024-07-16 PROCEDURE — 97750 PHYSICAL PERFORMANCE TEST: CPT | Mod: 32

## 2024-07-16 PROCEDURE — 97140 MANUAL THERAPY 1/> REGIONS: CPT

## 2024-07-16 PROCEDURE — 97530 THERAPEUTIC ACTIVITIES: CPT

## 2024-07-16 PROCEDURE — 97161 PT EVAL LOW COMPLEX 20 MIN: CPT

## 2024-07-16 NOTE — PROGRESS NOTES
[facility]   Physical Therapy Initial Evaluation      Name: Johanna Ocasio  [facility] Number: [id number]    Therapy Diagnosis:   Encounter Diagnoses   Name Primary?    Strain of left shoulder, subsequent encounter     Chronic left-sided thoracic back pain Yes        Physician: Ivette Azul MD    Physician Orders: PT Eval and Treat   Medical Diagnosis from Referral:  Strain of left shoulder, subsequent encounter [S46.912D]   Evaluation Date: 7/16/2024  Authorization Period Expiration: 6/27/2025  Plan of Care Expiration: 9/16/2024  Progress Note Due: 8/16/2024    Visit # / Visits authorized: 1/1   FOTO: 1/ 3    Precautions: Standard     Time In: 1:05 PM  Time Out: 2:00 PM  Total Billable Time: 55 minutes    Subjective     Date of onset: chronic    History of current condition - Johanna reports: she has been experiencing some left sided scapular pain behind the shoulder blade and in her mid back. She has previously conducted therapy for this but was unable to keep coming in the past due to scheduling issues and work/life demands. Patient states she has occasional 4th/5th digit numbness and tingling but primarily notes left sided scapular and mid back pain. She states no certain mechanism of injury, but notes chronically poor posture and says it will be more noticeable during the later hours of the day. Patient mentions she has also been wearing a heavy lead vest at work now and this is somewhat new but also has been conducting low back physical therapy for her [facility] program at [employer] facility. She states she now wants to focus more on the left mid back pain because it is more prominent. Patient denies pain with breathing and states no falls onto back or shoulder. Denies pain with moving heavy machinery or heavy patient transfers at work. States she has not had to  her daughter as much as she used to, but would like for this to not be an issue. She denies neck pain  being correlated with shoulder/scapular pain.     Falls: none    Imaging: FINDINGS:  The osseous structures are intact without evidence of fracture or osseous destructive process.  There is no dislocation.  No significant degenerative changes are present.  There are no abnormal soft tissue calcifications.     Impression:     As above    Prior Therapy: earlier this year in January - March 2024  Social History: lives with family   Occupation: Interventional Radiology nurse at [employer]   Prior Level of Function: chronic/previous thoracic pain that resolved to tolerable levels  Current Level of Function: more limited with pain and movements involving lifting overhead    Pain:  Current 4/10, worst 8/10, best 2/10   Location: left shoulder  Description: Aching, Dull, Tight, and Sharp  Aggravating Factors: Morning, Extension, and Lifting  Easing Factors: massage and rest    Patients goals: be able to tolerate work demands of lifting overhead and picking up her daughter without pain in the shoulder/thoracic spine     Medical History:   Past Medical History:   Diagnosis Date    Abnormal Pap smear of cervix 2011    Colposcopy    Brain venous angioma 10/3/2018    Early childhood epilepsy, myoclonic     last seizure age 14; Venous hemangioma on MRI    History of migraine headaches 5/12/2016    Migraine headache        Surgical History:   Johanna Ocasio  has no past surgical history on file.    Medications:   Johanna has a current medication list which includes the following prescription(s): albuterol, xiidra, meloxicam, metoprolol succinate, omeprazole, ondansetron, tizanidine, topiramate, ubrelvy, and vios aerosol delivery system.    Allergies:   Review of patient's allergies indicates:  No Known Allergies     Objective      Observation: AAO x 3     Posture: flat thoracic spine; scapular abduction Right > Left; downward rotation Left > Right    Passive Range of Motion:   Shoulder Right Left   Flexion 175 175    Abduction 170 170   ER at 0 65 65   ER at 90 90 90   IR 40 40      Active Range of Motion:   Shoulder Right Left   Flexion 170 170   Abduction 170 170   ER at 0 60 60   ER at 90 90 90   IR 40 40   Reach behind head Full, pain-free Full, mild left posterior shoulder pain   Reach behind back  Full, pain-free Full, pain-free     Strength:  Shoulder Right Left   Flexion 4/5 4/5   Abduction 4/5 4/5   ER 4/5 4/5   IR 4/5 4/5   Serratus Anterior 3/5 3-/5   Middle Trap 3-/5 3/5   Low Trap 3/5 3-/5     Active Range of Motion:   Thoracic Extension Limitation?  Pain?   Flexion 0%    Extension 50% yes   Sidebend Right 0%    Sidebend Left  0%    Rotation Right 0%    Rotation Left 25% Yes     Thoracic Quadrant Testing:   Extension + Right rotation = slightly limited, painful  Extension + Left rotation = limited, painful     Special Tests:  Impingement Cluster:   Right Left   Hawkin's [place]nd[place] - -   Painful Arc - -   Resisted ER - -     Rotator Cuff Tear   Right Left   Painful Arc - -   Drop Arm test - -   Resisted ER - -       Other:   Right Left   Subscapularis Lift Off Test - -   Empty Can - -   Cross Body Adduction - -     Joint Mobility: limited thoracic mobility into extension and left rotation     Palpation: left T6/T7 tenderness lateral to SP    Sensation: intact light touch bilaterally C4-T1      Intake Outcome Measure for FOTO shoulder Survey    Therapist reviewed FOTO scores for Johanna Ocasio on 7/16/2024.   FOTO report - see Media section or FOTO account episode details.    Intake Score: 77%         Treatment     Total Treatment time (time-based codes) separate from Evaluation: 25 minutes     Johanna received the treatments listed below:        manual therapy techniques: Joint mobilizations were applied to the: thoracic spine for 09 minutes, including:    Thoracic rotation MET 4 x 6 sec holds  Thoracic PA mobs, grades II/III/IV    therapeutic activities to improve functional performance for 16 minutes,  "including:    HEP:   Sidelying open books 2 x 20   Seated T spine extension 2 x 20   Mid trap level 1, 3 x 20 with 3" holds    Education:  - POC/Prognosis   - Thoracic spine facet involvement  - Prolonged postures and work demands  - Activity modifications      Patient Education and Home Exercises     Education provided:   - HEP     Written Home Exercises Provided: yes. Exercises were reviewed and Johanna was able to demonstrate them prior to the end of the session.  Johanna demonstrated good  understanding of the education provided. See EMR under Patient Instructions for exercises provided during therapy sessions.    Assessment     Johanna is a 39 y.o. female referred to outpatient Physical Therapy with a medical diagnosis of Strain of left shoulder, subsequent encounter [S43.228D]. Patient presents with left sided thoracic spine pain that demonstrates a facet dysfunction/rib pain referral with underlying postural deficits due to weakness posterior and periscapular musculature in the mid back and low back region. Patient has been limited in quality of function and work demands due to this pain and will benefit from skilled therapy to improve upon these deficits.     Patient prognosis is Good.   Patient will benefit from skilled outpatient Physical Therapy to address the deficits stated above and in the chart below, provide patient /family education, and to maximize patientt's level of independence.     Plan of care discussed with patient: Yes  Patient's spiritual, cultural and educational needs considered and patient is agreeable to the plan of care and goals as stated below:     Anticipated Barriers for therapy: work demands    Medical Necessity is demonstrated by the following  History  Co-morbidities and personal factors that may impact the plan of care [] LOW: no personal factors / co-morbidities  [x] MODERATE: 1-2 personal factors / co-morbidities  [] HIGH: 3+ personal factors / co-morbidities    Moderate / High " Support Documentation:   Co-morbidities affecting plan of care: see EMR    Personal Factors:   no deficits     Examination  Body Structures and Functions, activity limitations and participation restrictions that may impact the plan of care [] LOW: addressing 1-2 elements  [x] MODERATE: 3+ elements  [] HIGH: 4+ elements (please support below)    Moderate / High Support Documentation: see assessment above     Clinical Presentation [x] LOW: stable  [] MODERATE: Evolving  [] HIGH: Unstable     Decision Making/ Complexity Score: low       Goals:  GOALS: Short Term Goals:  4 weeks  1.Report decreased left sided thoracic pain < / =  2/10  to increase tolerance for tolerating work activities and life demands.  2. Increase active ROM of thoracic spine to be 10-15% improved with left rotation and extension in order to show improvement in mobility.     3. Increased strength by 1/3 MMT grade in periscapular musculature and thoracic spine extensors to increase tolerance for ADL and work activities.  4. Pt to tolerate HEP to improve ROM and independence with ADL's    Long Term Goals: 8 weeks  1.Report decreased left sided thoracic spine pain  < / =  1/10  to increase tolerance for tolerating life demands and work demands such as overhead lifting and reaching.   2.Increase AROM of thoracic spine to be 100% pain-free and not limited with motion to show full functional motion improvement.   3.Increase strength to >/= 4/5 in periscapular musculature and thoracic spine extensors to increase tolerance for ADL and work activities.  4. Pt goal: be able to tolerate work demands of lifting overhead and picking up her daughter without pain in the shoulder/thoracic spine.   5. Pt will have improved score of >/= 77% on FOTO shoulder in order to demonstrate true functional improvement.   Plan     Plan of care Certification: 7/16/2024 to 9/16/2024.    Outpatient Physical Therapy 2 times weekly for 8 weeks to include the following interventions:  Manual Therapy, Moist Heat/ Ice, Neuromuscular Re-ed, Patient Education, Therapeutic Activities, Therapeutic Exercise, and Dry Needling PRN .     Alex Del Rosario, PT      Kofi Morillo PT, DPT, OCS (co-treating therapist)       Physician's Signature: _________________________________________ Date: ________________

## 2024-07-16 NOTE — PROGRESS NOTES
[facility]  Physical Therapy Treatment Note   Name: Johanna Ocasio  Clinic Number: [id number]    Therapy Diagnosis:   Encounter Diagnoses   Name Primary?    Decreased strength of trunk and back Yes    Decreased activity tolerance        Physician: Lorena Reinoso, *    Visit Date: 7/16/2024    Physician Orders: PT Eval and Treat  Medical Diagnosis from Referral:   M54.50 (ICD-10-CM) - Lumbar back pain     Evaluation Date: 5/27/2024  Authorization Period Expiration: 7/10/2024  Plan of Care Expiration: 8/27/2024  Reassessment Due: 8/27/2024  Visit # / Visits authorized: 12/20     PTA Visit #: 3/5     Time In: 9:30 AM   Time Out: 10:30 AM  Total Billable Time: 30 minutes (1:1)    INSURANCE and OUTCOMES: Program Benefit Group with Lumbar Outcomes (Oswestry and AQoL) 1/3     Precautions: standard     Pattern of pain determined: 1 PEP      Subjective   Johanna Ocasio denied having any pain upon entry, just c/o of general fatigue due to being on call.     Patient reports tolerating previous visit well /c no c/o of excess discomfort.  Patient reports their pain to be 0/10 on a 0-10 scale with 0 being no pain and 10 being the worst pain imaginable.  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     Objective      Lumbar  Isometric Testing on Med X equipment: Testing administered by PT    Test Initial Baseline Midpoint Final   Date 05/30/24 6/27/2024    ROM 0-48 deg 0-51 deg    Max Peak Torque 108  126     Min Peak Torque 13  28     Flex/Ext Ratio 8.3:1 4.5:1     % variance below normative data 56 (85% below at 0 deg) 51     % change from initial test N/A visit 1 33         OUTCOMES SELECTION:   Program Patient Outcome Measures     Oswestry Score:  12/50 = 24% disability   Midpoint  "score 4 /50= 8% disability   AQoL Score:  3/36 = 8% disability          Treatment     Johanna received the treatments listed below:        Johanna participated in neuromuscular re-education activities to improve balance, coordination, proprioception, motor control and/or posture for 0 minutes. The following activities were included:    Quadruped bird dog x10  TrA w/pilates ring + SLR x10  Forearm plank x 30" x2  Side forearm plank x 30" x2    Johanna participated in therapeutic exercises to develop strength, endurance, ROM, flexibility, posture, and core stabilization for 43 minutes including:    LTR x 10-np  Prone press ups x10  Bridges w/BTB unilateral (abd) x15,3"-np  Prone hip extension x10 cassidy-np  90/90 position march c/ PPT  x10 -np  Kneeling hip flexor stretch x30"  Standing lumbar extensions x10-np  + Cat-cow x 10        7/16/2024     9:55 AM   [facility]   Visit Number 13   VAS Pain Rating 0   Time 5   Lumbar Weight 67 lbs   Repetitions 20   Rating of Perceived Exertion 4   Ice - Z Lie (in min.) 5            Peripheral muscle strengthening which included one set of 15-20 repetitions at a slow and controlled 10-13 second per rep pace focused on strengthening supporting musculature in order to improve body mechanics and functional mobility. Patient and therapist focused on proper form during treatment to ensure optimal strengthening of each targeted muscle group.  Machines utilized included:Torso rotation, Leg Ext, Leg Curl, Chest Press, and Rowing  Triceps, Biceps, Hip Abd, Hip Add, and Leg Press      Johanna participated in dynamic functional therapeutic activities to improve functional performance and simulate household and community activities for 10 minutes. The following activities were included:    Pallof press red sports cord x10  Standing hip ABD with PB press  x 10  Standing hip extension with PB press   x 10  Standing hip ADD 2 x 10-NP    Johanna received manual therapy techniques for 00  minutes. " The following activities were included:      Pt given cold pack for 5 minutes to lumbar region in Z lying  Patient Education and Home Exercises     Home exercises include:  RAFAEL/REIL  Glute set- removed  Bridges w/unilateral hip abd. GTB  Prone hip extension > replaced with standing 4-way hip (Abd/ext/flex/add)  Forearm plank, Forearm side planks    Cardio program (V5): 6/11/24  Lifting education (V11): - 7/16/2024  Posture/Lumbar roll: obtained 05/29/24, uses in car and work   Fridge Magnet Discharge handout (date given): -  Equipment at home/gym membership: main [employer] employee gym    Education provided:   - Pacing with the MedX machines      Written Home Exercises Provided: Patient instructed to cont prior HEP.  Exercises were reviewed and Johanna was able to demonstrate them prior to the end of the session.  Johanna demonstrated fair  understanding of the education provided.     See EMR under Patient Instructions for exercises provided 5/30/2024.    Assessment   Patient presents to session without reports of pain today. She was instructed in therex to promote lumbopelvis strength and stability for improved lumbar support. She responded well to introduced theraball press with standing hip abduction/extension, demonstrating min difficulty with balancing. Patient's Lumbar MedX weight maintained to 67 ft. Lbs with completion of 20 reps at 4/10 RPE.  All peripheral strength circuit exercises completed without adverse effects.     Patient is making good progress towards established goals.  Pt will continue to benefit from skilled outpatient physical therapy to address the deficits stated in the impairment chart, provide pt/family education and to maximize pt's level of independence in the home and community environment.     Anticipated Barriers for therapy: none  Pt's spiritual, cultural and educational needs considered and pt agreeable to plan of care and goals as stated below:     GOALS: Pt is in agreement with the  following goals.     Short term goals:  6 weeks or 10 visits   - Pt will demonstrate increased lumbar ROM by at least 6 degrees from the initial ROM value with improvements noted in functional ROM and ability to perform ADLs. Appropriate and Ongoing  - Pt will demonstrate increased MedX average isometric strength value by 15% from initial test resulting in improved ability to perform bending, lifting, and carrying activities safely, confidently. Appropriate and Ongoing  - Pt will report a reduction in worst pain score by 1-2 points for improved tolerance for static sitting. Appropriate and Ongoing  - Pt able to perform HEP correctly with minimal cueing or supervision from therapist to encourage independent management of symptoms. Appropriate and Ongoing     Long term goals: 10 weeks or 20 visits   - Pt will demonstrate increased lumbar ROM by at least 9 degrees from initial ROM value, resulting in improved ability to perform functional forward bending while standing and sitting. Appropriate and Ongoing  - Pt will demonstrate increased MedX average isometric strength value by 25% from initial test resulting in improved ability to perform bending, lifting, and carrying activities safely and confidently. Appropriate and Ongoing  - Pt to demonstrate ability to independently control and reduce their pain through posture positioning and mechanical movements throughout a typical day. Appropriate and Ongoing  - Pt will demonstrate reduced pain and improved functional outcomes as reported on the Oswestry Disability Index by reaching a score of 6 or less in order to demonstrate subjective improvement in pt's condition.   Appropriate and Ongoing  - Pt will demonstrate independence with the HEP at discharge. Appropriate and Ongoing  - Pt will be able to complete work related activities without increase in low back pain(patient goal) Appropriate and Ongoing    Plan     Continue with established Plan of Care towards established  PT goals.       Therapist: Shivani Queen, PTA  7/16/2024

## 2024-07-17 NOTE — PROGRESS NOTES
[facility]PAT[facility] TH[facility] - [facility]  Physical Therapy Treatment Note   Name: Johanna Ocasio  Clinic Number: [id number]    Therapy Diagnosis:   Encounter Diagnosis   Name Primary?    Chronic left-sided thoracic back pain Yes       Physician: Lorena Reinoso, *    Visit Date: 7/18/2024    Physician Orders: PT Eval and Treat  Medical Diagnosis from Referral:   M54.50 (ICD-10-CM) - Lumbar back pain     Evaluation Date: 5/27/2024  Authorization Period Expiration: 7/10/2024  Plan of Care Expiration: 8/27/2024  Reassessment Due: 8/27/2024  Visit # / Visits authorized: 13/20     PTA Visit #: 3/5     Time In: 9:30 AM   Time Out: 10:15 AM  Total Billable Time: 30 minutes (1:1)    INSURANCE and OUTCOMES: Program Benefit Group with Lumbar Outcomes (Oswestry and AQoL) 1/3     Precautions: Standard, but not entirely.      Pattern of pain determined: 1 PEP      Subjective   Johanna Ocasio reports that she still gets pain when on the toilet. She removed the squatty potty which helped slightly, but not entirely.     Patient reports tolerating previous visit well /c no c/o of excess discomfort.  Patient reports their pain to be 0/10 on a 0-10 scale with 0 being no pain and 10 being the worst pain imaginable.  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     Objective      Lumbar  Isometric Testing on Med X equipment: Testing administered by PT    Test Initial Baseline Midpoint Final   Date 05/30/24 6/27/2024    ROM 0-48 deg 0-51 deg    Max Peak Torque 108  126     Min Peak Torque 13  28     Flex/Ext Ratio 8.3:1 4.5:1     % variance below normative data 56 (85% below at 0 deg) 51     % change from initial test N/A visit 1 33         OUTCOMES SELECTION:   Program Patient Outcome Measures     Oswestry Score:  12/50  "= 24% disability   Midpoint score 4 /50= 8% disability   AQoL Score:  3/36 = 8% disability          Treatment     Johanna received the treatments listed below:        Johanna participated in neuromuscular re-education activities to improve balance, coordination, proprioception, motor control and/or posture for 8  minutes. The following activities were included:    Quadruped bird dog x10  TrA w/pilates ring + SLR x10  Forearm plank x 30" x2  Side forearm plank x 30" x2    Johanna participated in therapeutic exercises to develop strength, endurance, ROM, flexibility, posture, and core stabilization for 22 minutes including:    LTR x 10-np  Prone press ups x10  Bridges w/BTB unilateral (abd) x15,3"-np  Prone hip extension x10 cassidy-np  90/90 position march c/ PPT  x10 -np  Kneeling hip flexor stretch x30"  Standing lumbar extensions x10-np  Cat-cow x 10        7/18/2024     9:40 AM   [facility]   Visit Number 14   VAS Pain Rating 0   Treadmill Time (in min.) 5 min   Extension in Lying 10   Lumbar Extension Seat Pad 2   Femur Restraint 7   Counterweight 260   Lumbar Weight 70 lbs   Repetitions 15   Rating of Perceived Exertion 4   Ice - Z Lie (in min.) 5          Peripheral muscle strengthening which included one set of 15-20 repetitions at a slow and controlled 10-13 second per rep pace focused on strengthening supporting musculature in order to improve body mechanics and functional mobility. Patient and therapist focused on proper form during treatment to ensure optimal strengthening of each targeted muscle group.  Machines utilized included:Torso rotation, Leg Ext, Leg Curl, Chest Press, and Rowing  Triceps, Biceps, Hip Abd, Hip Add, and Leg Press      Johanna participated in dynamic functional therapeutic activities to improve functional performance and simulate household and community activities for 15 minutes. The following activities were included:    Pallof press red sports cord x10  Standing hip ABD with PB " press 2 x 10  Standing hip extension with PB press  2 x 10 (Cue ab. Brace)  Standing hip ADD 2 x 10-NP    Johanna received manual therapy techniques for 00  minutes. The following activities were included:      Pt given cold pack for 0 minutes to lumbar region in Z lying  Patient Education and Home Exercises     Home exercises include:  RAFAEL/REIL  Glute set- removed  Bridges w/unilateral hip abd. GTB  Prone hip extension > replaced with standing 4-way hip (Abd/ext/flex/add)  Forearm plank, Forearm side planks    Cardio program (V5): 6/11/24  Lifting education (V11): - 7/18/2024  Posture/Lumbar roll: obtained 05/29/24, uses in car and work   Fridge Magnet Discharge handout (date given): -  Equipment at home/gym membership: main campus employee gym    Education provided:   - Pacing with the MedX machines      Written Home Exercises Provided: Patient instructed to cont prior HEP.  Exercises were reviewed and Johanna was able to demonstrate them prior to the end of the session.  Johanna demonstrated fair  understanding of the education provided.     See EMR under Patient Instructions for exercises provided 5/30/2024.    Assessment   Patient presents reporting persistent low back pain provocation when sitting on the toilet, however states that low back pain overall is low most of the time. Patient displays good sagittal plane core stabilization in gravity dependent positions, however frontal plane stability and oblique activation is still progressing gradually. Excessive lumbar lordosis postural tendancy likely contributing to patient's seated low back pain provocation. Lumbar MedX weight increased 5% to 70 ft. Lbs with completion of 15 reps at 4/10 RPE.  All peripheral strength circuit exercises completed without adverse effects.     Patient is making good progress towards established goals.  Pt will continue to benefit from skilled outpatient physical therapy to address the deficits stated in the impairment chart, provide  pt/family education and to maximize pt's level of independence in the home and community environment.     Anticipated Barriers for therapy: none  Pt's spiritual, cultural and educational needs considered and pt agreeable to plan of care and goals as stated below:     GOALS: Pt is in agreement with the following goals.     Short term goals:  6 weeks or 10 visits   - Pt will demonstrate increased lumbar ROM by at least 6 degrees from the initial ROM value with improvements noted in functional ROM and ability to perform ADLs. Appropriate and Ongoing  - Pt will demonstrate increased MedX average isometric strength value by 15% from initial test resulting in improved ability to perform bending, lifting, and carrying activities safely, confidently. Appropriate and Ongoing  - Pt will report a reduction in worst pain score by 1-2 points for improved tolerance for static sitting. Appropriate and Ongoing  - Pt able to perform HEP correctly with minimal cueing or supervision from therapist to encourage independent management of symptoms. Appropriate and Ongoing     Long term goals: 10 weeks or 20 visits   - Pt will demonstrate increased lumbar ROM by at least 9 degrees from initial ROM value, resulting in improved ability to perform functional forward bending while standing and sitting. Appropriate and Ongoing  - Pt will demonstrate increased MedX average isometric strength value by 25% from initial test resulting in improved ability to perform bending, lifting, and carrying activities safely and confidently. Appropriate and Ongoing  - Pt to demonstrate ability to independently control and reduce their pain through posture positioning and mechanical movements throughout a typical day. Appropriate and Ongoing  - Pt will demonstrate reduced pain and improved functional outcomes as reported on the Oswestry Disability Index by reaching a score of 6 or less in order to demonstrate subjective improvement in pt's condition.    Appropriate and Ongoing  - Pt will demonstrate independence with the HEP at discharge. Appropriate and Ongoing  - Pt will be able to complete work related activities without increase in low back pain(patient goal) Appropriate and Ongoing    Plan     Continue with established Plan of Care towards established PT goals.       Therapist: Anh Mac, PT  7/18/2024

## 2024-07-18 ENCOUNTER — CLINICAL SUPPORT (OUTPATIENT)
Dept: REHABILITATION | Facility: OTHER | Age: 39
End: 2024-07-18
Payer: COMMERCIAL

## 2024-07-18 DIAGNOSIS — G89.29 CHRONIC LEFT-SIDED THORACIC BACK PAIN: Primary | ICD-10-CM

## 2024-07-18 DIAGNOSIS — M54.6 CHRONIC LEFT-SIDED THORACIC BACK PAIN: Primary | ICD-10-CM

## 2024-07-18 PROCEDURE — 97750 PHYSICAL PERFORMANCE TEST: CPT | Mod: 32

## 2024-07-18 NOTE — PLAN OF CARE
[facility]PAT[facility]ERAP[facility] AND [facility]   Physical Therapy Initial Evaluation      Name: Johanna Ocasio  [facility] Number: [id number]    Therapy Diagnosis:   Encounter Diagnoses   Name Primary?    Strain of left shoulder, subsequent encounter     Chronic left-sided thoracic back pain Yes        Physician: Ivette Azul MD    Physician Orders: PT Eval and Treat   Medical Diagnosis from Referral:  Strain of left shoulder, subsequent encounter [S46.912D]   Evaluation Date: 7/16/2024  Authorization Period Expiration: 6/27/2025  Plan of Care Expiration: 9/16/2024  Progress Note Due: 8/16/2024    Visit # / Visits authorized: 1/1   FOTO: 1/ 3    Precautions: Standard     Time In: 1:05 PM  Time Out: 2:00 PM  Total Billable Time: 55 minutes    Subjective     Date of onset: chronic    History of current condition - Johanna reports: she has been experiencing some left sided scapular pain behind the shoulder blade and in her mid back. She has previously conducted therapy for this but was unable to keep coming in the past due to scheduling issues and work/life demands. Patient states she has occasional 4th/5th digit numbness and tingling but primarily notes left sided scapular and mid back pain. She states no certain mechanism of injury, but notes chronically poor posture and says it will be more noticeable during the later hours of the day. Patient mentions she has also been wearing a heavy lead vest at work now and this is somewhat new but also has been conducting low back physical therapy for her [facility] program at [employer] facility. She states she now wants to focus more on the left mid back pain because it is more prominent. Patient denies pain with breathing and states no falls onto back or shoulder. Denies pain with moving heavy machinery or heavy patient transfers at work. States she has not had to  her daughter as much as she used to, but would like for this to not be an issue. She denies neck pain  being correlated with shoulder/scapular pain.     Falls: none    Imaging: FINDINGS:  The osseous structures are intact without evidence of fracture or osseous destructive process.  There is no dislocation.  No significant degenerative changes are present.  There are no abnormal soft tissue calcifications.     Impression:     As above    Prior Therapy: earlier this year in January - March 2024  Social History: lives with family   Occupation: Interventional Radiology nurse at [employer]   Prior Level of Function: chronic/previous thoracic pain that resolved to tolerable levels  Current Level of Function: more limited with pain and movements involving lifting overhead    Pain:  Current 4/10, worst 8/10, best 2/10   Location: left shoulder  Description: Aching, Dull, Tight, and Sharp  Aggravating Factors: Morning, Extension, and Lifting  Easing Factors: massage and rest    Patients goals: be able to tolerate work demands of lifting overhead and picking up her daughter without pain in the shoulder/thoracic spine     Medical History:   Past Medical History:   Diagnosis Date    Abnormal Pap smear of cervix 2011    Colposcopy    Brain venous angioma 10/3/2018    Early childhood epilepsy, myoclonic     last seizure age 14; Venous hemangioma on MRI    History of migraine headaches 5/12/2016    Migraine headache        Surgical History:   Johanna Ocasio  has no past surgical history on file.    Medications:   Johanna has a current medication list which includes the following prescription(s): albuterol, xiidra, meloxicam, metoprolol succinate, omeprazole, ondansetron, tizanidine, topiramate, ubrelvy, and vios aerosol delivery system.    Allergies:   Review of patient's allergies indicates:  No Known Allergies     Objective      Observation: AAO x 3     Posture: flat thoracic spine; scapular abduction Right > Left; downward rotation Left > Right    Passive Range of Motion:   Shoulder Right Left   Flexion 175 175    Abduction 170 170   ER at 0 65 65   ER at 90 90 90   IR 40 40      Active Range of Motion:   Shoulder Right Left   Flexion 170 170   Abduction 170 170   ER at 0 60 60   ER at 90 90 90   IR 40 40   Reach behind head Full, pain-free Full, mild left posterior shoulder pain   Reach behind back  Full, pain-free Full, pain-free     Strength:  Shoulder Right Left   Flexion 4/5 4/5   Abduction 4/5 4/5   ER 4/5 4/5   IR 4/5 4/5   Serratus Anterior 3/5 3-/5   Middle Trap 3-/5 3/5   Low Trap 3/5 3-/5     Active Range of Motion:   Thoracic Extension Limitation?  Pain?   Flexion 0%    Extension 50% yes   Sidebend Right 0%    Sidebend Left  0%    Rotation Right 0%    Rotation Left 25% Yes     Thoracic Quadrant Testing:   Extension + Right rotation = slightly limited, painful  Extension + Left rotation = limited, painful     Special Tests:  Impingement Cluster:   Right Left   Hawkin's [place]ndy - -   Painful Arc - -   Resisted ER - -     Rotator Cuff Tear   Right Left   Painful Arc - -   Drop Arm test - -   Resisted ER - -       Other:   Right Left   Subscapularis Lift Off Test - -   Empty Can - -   Cross Body Adduction - -     Joint Mobility: limited thoracic mobility into extension and left rotation     Palpation: left T6/T7 tenderness lateral to SP    Sensation: intact light touch bilaterally C4-T1      Intake Outcome Measure for FOTO shoulder Survey    Therapist reviewed FOTO scores for Johanna Ocasio on 7/16/2024.   FOTO report - see Media section or FOTO account episode details.    Intake Score: 77%         Treatment     Total Treatment time (time-based codes) separate from Evaluation: 25 minutes     Johanna received the treatments listed below:        manual therapy techniques: Joint mobilizations were applied to the: thoracic spine for 09 minutes, including:    Thoracic rotation MET 4 x 6 sec holds  Thoracic PA mobs, grades II/III/IV    therapeutic activities to improve functional performance for 16 minutes,  "including:    HEP:   Sidelying open books 2 x 20   Seated T spine extension 2 x 20   Mid trap level 1, 3 x 20 with 3" holds    Education:  - POC/Prognosis   - Thoracic spine facet involvement  - Prolonged postures and work demands  - Activity modifications      Patient Education and Home Exercises     Education provided:   - HEP     Written Home Exercises Provided: yes. Exercises were reviewed and Johanna was able to demonstrate them prior to the end of the session.  Johanna demonstrated good  understanding of the education provided. See EMR under Patient Instructions for exercises provided during therapy sessions.    Assessment     Johanna is a 39 y.o. female referred to outpatient Physical Therapy with a medical diagnosis of Strain of left shoulder, subsequent encounter [S48.012I]. Patient presents with left sided thoracic spine pain that demonstrates a facet dysfunction/rib pain referral with underlying postural deficits due to weakness posterior and periscapular musculature in the mid back and low back region. Patient has been limited in quality of function and work demands due to this pain and will benefit from skilled therapy to improve upon these deficits.     Patient prognosis is Good.   Patient will benefit from skilled outpatient Physical Therapy to address the deficits stated above and in the chart below, provide patient /family education, and to maximize patientt's level of independence.     Plan of care discussed with patient: Yes  Patient's spiritual, cultural and educational needs considered and patient is agreeable to the plan of care and goals as stated below:     Anticipated Barriers for therapy: work demands    Medical Necessity is demonstrated by the following  History  Co-morbidities and personal factors that may impact the plan of care [] LOW: no personal factors / co-morbidities  [x] MODERATE: 1-2 personal factors / co-morbidities  [] HIGH: 3+ personal factors / co-morbidities    Moderate / High " Support Documentation:   Co-morbidities affecting plan of care: see EMR    Personal Factors:   no deficits     Examination  Body Structures and Functions, activity limitations and participation restrictions that may impact the plan of care [] LOW: addressing 1-2 elements  [x] MODERATE: 3+ elements  [] HIGH: 4+ elements (please support below)    Moderate / High Support Documentation: see assessment above     Clinical Presentation [x] LOW: stable  [] MODERATE: Evolving  [] HIGH: Unstable     Decision Making/ Complexity Score: low       Goals:  GOALS: Short Term Goals:  4 weeks  1.Report decreased left sided thoracic pain < / =  2/10  to increase tolerance for tolerating work activities and life demands.  2. Increase active ROM of thoracic spine to be 10-15% improved with left rotation and extension in order to show improvement in mobility.     3. Increased strength by 1/3 MMT grade in periscapular musculature and thoracic spine extensors to increase tolerance for ADL and work activities.  4. Pt to tolerate HEP to improve ROM and independence with ADL's    Long Term Goals: 8 weeks  1.Report decreased left sided thoracic spine pain  < / =  1/10  to increase tolerance for tolerating life demands and work demands such as overhead lifting and reaching.   2.Increase AROM of thoracic spine to be 100% pain-free and not limited with motion to show full functional motion improvement.   3.Increase strength to >/= 4/5 in periscapular musculature and thoracic spine extensors to increase tolerance for ADL and work activities.  4. Pt goal: be able to tolerate work demands of lifting overhead and picking up her daughter without pain in the shoulder/thoracic spine.   5. Pt will have improved score of >/= 77% on FOTO shoulder in order to demonstrate true functional improvement.   Plan     Plan of care Certification: 7/16/2024 to 9/16/2024.    Outpatient Physical Therapy 2 times weekly for 8 weeks to include the following interventions:  Manual Therapy, Moist Heat/ Ice, Neuromuscular Re-ed, Patient Education, Therapeutic Activities, Therapeutic Exercise, and Dry Needling PRN.     Alex Del Rosario, PT      Kofi Morillo PT, DPT, OCS (co-treating therapist)       Physician's Signature: _________________________________________ Date: ________________

## 2024-07-23 ENCOUNTER — CLINICAL SUPPORT (OUTPATIENT)
Dept: REHABILITATION | Facility: HOSPITAL | Age: 39
End: 2024-07-23
Payer: COMMERCIAL

## 2024-07-23 ENCOUNTER — CLINICAL SUPPORT (OUTPATIENT)
Dept: REHABILITATION | Facility: OTHER | Age: 39
End: 2024-07-23
Payer: COMMERCIAL

## 2024-07-23 DIAGNOSIS — R29.898 DECREASED STRENGTH OF TRUNK AND BACK: Primary | ICD-10-CM

## 2024-07-23 DIAGNOSIS — M54.6 CHRONIC LEFT-SIDED THORACIC BACK PAIN: Primary | ICD-10-CM

## 2024-07-23 DIAGNOSIS — G89.29 CHRONIC LEFT-SIDED THORACIC BACK PAIN: Primary | ICD-10-CM

## 2024-07-23 DIAGNOSIS — R68.89 DECREASED ACTIVITY TOLERANCE: ICD-10-CM

## 2024-07-23 PROCEDURE — 97140 MANUAL THERAPY 1/> REGIONS: CPT

## 2024-07-23 PROCEDURE — 97112 NEUROMUSCULAR REEDUCATION: CPT

## 2024-07-23 PROCEDURE — 97750 PHYSICAL PERFORMANCE TEST: CPT | Mod: 32

## 2024-07-23 PROCEDURE — 97530 THERAPEUTIC ACTIVITIES: CPT

## 2024-07-23 NOTE — PROGRESS NOTES
[facility]  Physical Therapy Treatment Note   Name: Johanna Ocasio  Clinic Number: [id number]    Therapy Diagnosis:   Encounter Diagnoses   Name Primary?    Decreased strength of trunk and back Yes    Decreased activity tolerance        Physician: Lorena Reinoso, *    Visit Date: 7/23/2024    Physician Orders: PT Eval and Treat  Medical Diagnosis from Referral:   M54.50 (ICD-10-CM) - Lumbar back pain     Evaluation Date: 5/27/2024  Authorization Period Expiration: 7/10/2024  Plan of Care Expiration: 8/27/2024  Reassessment Due: 8/27/2024  Visit # / Visits authorized: 14/20     PTA Visit #: 0/5     Time In: 7:00 AM   Time Out: 7:55 AM  Total Billable Time: 30 minutes (1:1)    INSURANCE and OUTCOMES: Program Benefit Group with Lumbar Outcomes (Oswestry and AQoL) 1/3     Precautions: Standard, but not entirely.      Pattern of pain determined: 1 PEP      Subjective   Johanna Ocasio reports that she still gets pain when on the toilet. She removed the squatty potty which helped slightly, but not entirely. She reports the lumbar roll works well in her car and her shoulder is the thing that bothers her most at work    Patient reports tolerating previous visit well /c no c/o of excess discomfort.  Patient reports their pain to be 0/10 on a 0-10 scale with 0 being no pain and 10 being the worst pain imaginable.  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     Objective      Lumbar  Isometric Testing on Med X equipment: Testing administered by PT    Test Initial Baseline Midpoint Final   Date 05/30/24 6/27/2024    ROM 0-48 deg 0-51 deg    Max Peak Torque 108  126     Min Peak Torque 13  28     Flex/Ext Ratio 8.3:1 4.5:1     % variance below normative data 56 (85% below at 0 deg)  "51     % change from initial test N/A visit 1 33         OUTCOMES SELECTION:   Program Patient Outcome Measures     Oswestry Score:  12/50 = 24% disability   Midpoint score 4 /50= 8% disability   AQoL Score:  3/36 = 8% disability          Treatment     Johanna received the treatments listed below:        Johanna participated in neuromuscular re-education activities to improve balance, coordination, proprioception, motor control and/or posture for 8  minutes. The following activities were included:    Quadruped bird dog x10  TrA w/pilates ring + SLR x10  Forearm plank x 30" x2  Side forearm plank x 30" x2    Johanna participated in therapeutic exercises to develop strength, endurance, ROM, flexibility, posture, and core stabilization for 22 minutes including:    LTR x 10-np  Prone press ups x10  Bridges w/BTB unilateral (abd) x15,3"-np  Prone hip extension x10 cassidy-np  90/90 position march c/ PPT  x10 -np  Kneeling hip flexor stretch x30"  Standing lumbar extensions x10-np  Cat-cow x 10        7/23/2024     7:35 AM   [facility]   Visit Number 15   VAS Pain Rating 0   Treadmill Time (in min.) 5 min   Lumbar Weight 70 lbs   Repetitions 18   Rating of Perceived Exertion 3   Ice - Z Lie (in min.) 5            Peripheral muscle strengthening which included one set of 15-20 repetitions at a slow and controlled 10-13 second per rep pace focused on strengthening supporting musculature in order to improve body mechanics and functional mobility. Patient and therapist focused on proper form during treatment to ensure optimal strengthening of each targeted muscle group.  Machines utilized included:Torso rotation, Leg Ext, Leg Curl, Chest Press, and Rowing  Triceps, Biceps, Hip Abd, Hip Add, and Leg Press      Johanna participated in dynamic functional therapeutic activities to improve functional performance and simulate household and community activities for 15 minutes. The following activities were included:    Pallof overhead " press green sports cord x10  Standing hip ABD with PB press 2 x 10  Standing hip extension with PB press  2 x 10 (Cue ab. Brace)  +Side step GTB 20 steps x2  Standing hip ADD 2 x 10-NP    Johanna received manual therapy techniques for 00  minutes. The following activities were included:      Pt given cold pack for 0 minutes to lumbar region in Z lying  Patient Education and Home Exercises     Home exercises include:  RAFAEL/REIL  Glute set- removed  Bridges w/unilateral hip abd. GTB  Prone hip extension > replaced with standing 4-way hip (Abd/ext/flex/add)  Forearm plank, Forearm side planks    Cardio program (V5): 6/11/24  Lifting education (V11): - 7/23/2024  Posture/Lumbar roll: obtained 05/29/24, uses in car and work   Fridge Magnet Discharge handout (date given): -  Equipment at home/gym membership: [employer]    Education provided:   - Pacing with the MedX machines      Written Home Exercises Provided: Patient instructed to cont prior HEP.  Exercises were reviewed and Johanna was able to demonstrate them prior to the end of the session.  Johanna demonstrated fair  understanding of the education provided.     See EMR under Patient Instructions for exercises provided prior visit.    Assessment     Patient presents reporting improvement in low back pain and has minimal limitations. She uses the lumbar roll in the car and that is helping. At work she is most limited by the shoulder pain and less so the back. Resumed stability exercises with cues for positions when in the plank and in standing activating Tra with the ball. Lumbar MedX weight maintained at 70 ft. Lbs with completion of 18 reps at 3/10 RPE.  All peripheral strength circuit exercises completed without adverse effects.     Patient is making good progress towards established goals.  Pt will continue to benefit from skilled outpatient physical therapy to address the deficits stated in the impairment chart, provide pt/family education and to  maximize pt's level of independence in the home and community environment.     Anticipated Barriers for therapy: none  Pt's spiritual, cultural and educational needs considered and pt agreeable to plan of care and goals as stated below:     GOALS: Pt is in agreement with the following goals.     Short term goals:  6 weeks or 10 visits   - Pt will demonstrate increased lumbar ROM by at least 6 degrees from the initial ROM value with improvements noted in functional ROM and ability to perform ADLs. Appropriate and Ongoing  - Pt will demonstrate increased MedX average isometric strength value by 15% from initial test resulting in improved ability to perform bending, lifting, and carrying activities safely, confidently. Appropriate and Ongoing  - Pt will report a reduction in worst pain score by 1-2 points for improved tolerance for static sitting. Appropriate and Ongoing  - Pt able to perform HEP correctly with minimal cueing or supervision from therapist to encourage independent management of symptoms. Appropriate and Ongoing     Long term goals: 10 weeks or 20 visits   - Pt will demonstrate increased lumbar ROM by at least 9 degrees from initial ROM value, resulting in improved ability to perform functional forward bending while standing and sitting. Appropriate and Ongoing  - Pt will demonstrate increased MedX average isometric strength value by 25% from initial test resulting in improved ability to perform bending, lifting, and carrying activities safely and confidently. Appropriate and Ongoing  - Pt to demonstrate ability to independently control and reduce their pain through posture positioning and mechanical movements throughout a typical day. Appropriate and Ongoing  - Pt will demonstrate reduced pain and improved functional outcomes as reported on the Oswestry Disability Index by reaching a score of 6 or less in order to demonstrate subjective improvement in pt's condition.   Appropriate and Ongoing  - Pt will  demonstrate independence with the HEP at discharge. Appropriate and Ongoing  - Pt will be able to complete work related activities without increase in low back pain(patient goal) Appropriate and Ongoing    Plan     Continue with established Plan of Care towards established PT goals.       Therapist: Lb De La Cruz, PT  7/23/2024

## 2024-07-24 NOTE — PROGRESS NOTES
"Physical Therapy Daily Treatment Note     Name: Johanna Ocasio  Clinic Number: [id number]    Therapy Diagnosis:   Encounter Diagnosis   Name Primary?    Chronic left-sided thoracic back pain Yes     Physician: Ivette Azul MD    Visit Date: 7/23/2024    Physician Orders: PT Eval and Treat   Medical Diagnosis from Referral:  Strain of left shoulder, subsequent encounter [S46.912D]   Evaluation Date: 7/16/2024  Authorization Period Expiration: 6/27/2025  Plan of Care Expiration: 9/16/2024  Progress Note Due: 8/16/2024     Visit # / Visits authorized: 1/20 (+ EVAL)  FOTO: 1/ 3     Precautions: Standard      Time In: 1400  Time Out: 1505  Total Billable Time: 65 minutes    Subjective     Pt reports mild improvement in initial days following eval, but sx behavior relatively returned in days following.  She was compliant with home exercise program.  Response to previous treatment: Fair  Functional change: Initial improvement, but lack of carry-over    Pain: 4/10  Location: left midscap region      Objective     (+) Pain with T-sp Ext and L SB    Treatment     PT Tech Extender utilized under supervision throughout tx session  Johanna received the treatments listed below:       Johanna received the following manual therapy techniques: Joint mobilizations, Manual traction, Myofacial release, and Soft tissue Mobilization were applied to the: L midscap and t-sp for 10 minutes, including:  Upper t-sp P/A corkscrew Mobs  L ribs 4-7 P/A Mobs with Rot and SB    Johanna participated in neuromuscular re-education activities to improve: Balance, Coordination, Kinesthetic, Proprioception, and Posture for 40 minutes. The following activities were included:  Prone Press Up c Rot 2 x 15  Hand behind head L T-sp Rot 2 x 10  Prone A's 20 x 10" holds  Primal Position Serratus Quad Press 2 x 10 x 5"    Johanna participated in dynamic functional therapeutic activities to improve functional performance for 10 minutes, including:  UBE completed " "for 10' to increase ROM, endurance, and decrease pain to improve tolerance to ADLs and age-related activities  1/2 Kneel Unil CC Pulldown 5 x 12  Bent Over Unil Suitcase Row 5 x 12     therapeutic activities to improve functional performance for 16 minutes, including:     HEP:   Sidelying open books 2 x 20   Seated T spine extension 2 x 20   Mid trap level 1, 3 x 20 with 3" holds     Education:  - POC/Prognosis   - Thoracic spine facet involvement  - Prolonged postures and work demands  - Activity modifications     Patient Education and Home Exercises      Education provided:   - HEP      Written Home Exercises Provided: yes. Exercises were reviewed and Johanna was able to demonstrate them prior to the end of the session.  Johanna demonstrated good  understanding of the education provided. See EMR under Patient Instructions for exercises provided during therapy sessions.    Assessment     Johanna with first return since original session. Remains to endorse L midscap pain with prolonged posturing and lying on side. Concordant pain with L t-sp rotation and segmental upper t-sp extension; improved gross motion following MT, but symptom behavior remained unchanged. Introduced intentional dynamic t-sp mobility; progressed endurance dosing midscap musculature.  Johanna Is progressing well towards her goals.   Pt prognosis is Good.     Pt will continue to benefit from skilled outpatient physical therapy to address the deficits listed in the problem list box on initial evaluation, provide pt/family education and to maximize pt's level of independence in the home and community environment.     Pt's spiritual, cultural and educational needs considered and pt agreeable to plan of care and goals.    Anticipated barriers to physical therapy: work demands    Goals:  GOALS: Short Term Goals:  4 weeks  1.Report decreased left sided thoracic pain < / =  2/10  to increase tolerance for tolerating work activities and life demands.  2. " Increase active ROM of thoracic spine to be 10-15% improved with left rotation and extension in order to show improvement in mobility.     3. Increased strength by 1/3 MMT grade in periscapular musculature and thoracic spine extensors to increase tolerance for ADL and work activities.  4. Pt to tolerate HEP to improve ROM and independence with ADL's     Long Term Goals: 8 weeks  1.Report decreased left sided thoracic spine pain  < / =  1/10  to increase tolerance for tolerating life demands and work demands such as overhead lifting and reaching.   2.Increase AROM of thoracic spine to be 100% pain-free and not limited with motion to show full functional motion improvement.   3.Increase strength to >/= 4/5 in periscapular musculature and thoracic spine extensors to increase tolerance for ADL and work activities.  4. Pt goal: be able to tolerate work demands of lifting overhead and picking up her daughter without pain in the shoulder/thoracic spine.   5. Pt will have improved score of >/= 77% on FOTO shoulder in order to demonstrate true functional improvement.     Plan     Continue per RAY Morillo, PT, DPT  Board Certified in Orthopedic Physical Therapy

## 2024-07-27 DIAGNOSIS — I10 ESSENTIAL HYPERTENSION, BENIGN: ICD-10-CM

## 2024-07-30 RX ORDER — METOPROLOL SUCCINATE 25 MG/1
25 TABLET, EXTENDED RELEASE ORAL
Qty: 90 TABLET | Refills: 3 | Status: SHIPPED | OUTPATIENT
Start: 2024-07-30

## 2024-07-31 ENCOUNTER — CLINICAL SUPPORT (OUTPATIENT)
Dept: REHABILITATION | Facility: OTHER | Age: 39
End: 2024-07-31
Payer: COMMERCIAL

## 2024-07-31 ENCOUNTER — CLINICAL SUPPORT (OUTPATIENT)
Dept: REHABILITATION | Facility: HOSPITAL | Age: 39
End: 2024-07-31
Payer: COMMERCIAL

## 2024-07-31 DIAGNOSIS — M54.6 CHRONIC LEFT-SIDED THORACIC BACK PAIN: Primary | ICD-10-CM

## 2024-07-31 DIAGNOSIS — R68.89 DECREASED ACTIVITY TOLERANCE: ICD-10-CM

## 2024-07-31 DIAGNOSIS — R29.898 DECREASED STRENGTH OF TRUNK AND BACK: Primary | ICD-10-CM

## 2024-07-31 DIAGNOSIS — G89.29 CHRONIC LEFT-SIDED THORACIC BACK PAIN: Primary | ICD-10-CM

## 2024-07-31 PROCEDURE — 97140 MANUAL THERAPY 1/> REGIONS: CPT

## 2024-07-31 PROCEDURE — 97750 PHYSICAL PERFORMANCE TEST: CPT | Mod: 32

## 2024-07-31 PROCEDURE — 97112 NEUROMUSCULAR REEDUCATION: CPT

## 2024-07-31 PROCEDURE — 97530 THERAPEUTIC ACTIVITIES: CPT

## 2024-07-31 PROCEDURE — 97110 THERAPEUTIC EXERCISES: CPT

## 2024-07-31 NOTE — PROGRESS NOTES
[facility] OUTPATIENT THERAPY AND WELLNESS - HEALTHY BACK  Physical Therapy Treatment Note   Name: Johanna Ocasio  Clinic Number: [id number]    Therapy Diagnosis:   Encounter Diagnoses   Name Primary?    Decreased strength of trunk and back Yes    Decreased activity tolerance        Physician: Lorena Reinoso, *    Visit Date: 7/31/2024    Physician Orders: PT Eval and Treat  Medical Diagnosis from Referral:   M54.50 (ICD-10-CM) - Lumbar back pain     Evaluation Date: 5/27/2024  Authorization Period Expiration: 7/10/2024  Plan of Care Expiration: 8/27/2024  Reassessment Due: 8/27/2024  Visit # / Visits authorized: 14/20     PTA Visit #: 1/5     Time In: 9:30 AM   Time Out: 10:30 AM  Total Billable Time: 30 minutes (1:1)    INSURANCE and OUTCOMES: Program Benefit Group with Lumbar Outcomes (Oswestry and AQoL) 1/3     Precautions: Standard, but not entirely.      Pattern of pain determined: 1 PEP      Subjective   Johanna Ocasio reports overall lumbar improvements as she was able to tolerate prolonged sitting w/o lumbar roll with return from vacation.     Patient reports tolerating previous visit well /c no c/o of excess discomfort.  Patient reports their pain to be 0/10 on a 0-10 scale with 0 being no pain and 10 being the worst pain imaginable.  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     Objective      Lumbar  Isometric Testing on Med X equipment: Testing administered by PT    Test Initial Baseline Midpoint Final   Date 05/30/24 6/27/2024    ROM 0-48 deg 0-51 deg    Max Peak Torque 108  126     Min Peak Torque 13  28     Flex/Ext Ratio 8.3:1 4.5:1     % variance below normative data 56 (85% below at 0 deg) 51     % change from initial test N/A visit 1 33         OUTCOMES SELECTION:   Program Patient Outcome  "Measures     Oswestry Score:  12/50 = 24% disability   Midpoint score 4 /50= 8% disability   AQoL Score:  3/36 = 8% disability          Treatment     Johanna received the treatments listed below:        Johanna participated in neuromuscular re-education activities to improve balance, coordination, proprioception, motor control and/or posture for 8  minutes. The following activities were included:    Quadruped bird dog x10  TrA w/pilates ring + SLR x10  Forearm plank x 30" x2  Side forearm plank x 30" x2  + SLY clams with RTB 15x     Johanna participated in therapeutic exercises to develop strength, endurance, ROM, flexibility, posture, and core stabilization for 22 minutes including:    LTR x 10  Prone press ups x10  Bridges w/BTB unilateral (abd) x15,3"-np  Prone hip extension x10 cassidy-np  90/90 position march c/ PPT  x10 -np  Kneeling hip flexor stretch x30"  Standing lumbar extensions x10-np  Cat-cow x 10        7/31/2024     9:58 AM   [facility]   Visit Number 16   VAS Pain Rating 0   Treadmill Time (in min.) 5 min   Extension in Lying 10   Lumbar Weight 70 lbs   Repetitions 20   Rating of Perceived Exertion 4   Ice - Z Lie (in min.) 5              Peripheral muscle strengthening which included one set of 15-20 repetitions at a slow and controlled 10-13 second per rep pace focused on strengthening supporting musculature in order to improve body mechanics and functional mobility. Patient and therapist focused on proper form during treatment to ensure optimal strengthening of each targeted muscle group.  Machines utilized included:Torso rotation, Leg Ext, Leg Curl, Chest Press, and Rowing  Triceps, Biceps, Hip Abd, Hip Add, and Leg Press      Johanna participated in dynamic functional therapeutic activities to improve functional performance and simulate household and community activities for 0 minutes. The following activities were included:    Pallof overhead press green sports cord x10  Standing hip ABD with PB " press 2 x 10  Standing hip extension with PB press  2 x 10 (Cue ab. Brace)  +Side step GTB 20 steps x2  Standing hip ADD 2 x 10-    Johanna received manual therapy techniques for 00  minutes. The following activities were included:      Pt given cold pack for 0 minutes to lumbar region in Z lying  Patient Education and Home Exercises     Home exercises include:  RAFAEL/REIL  Glute set- removed  Bridges w/unilateral hip abd. GTB  Prone hip extension > replaced with standing 4-way hip (Abd/ext/flex/add)  Forearm plank, Forearm side planks    Cardio program (V5): 6/11/24  Lifting education (V11): - 7/31/2024  Posture/Lumbar roll: obtained 05/29/24, uses in car and work   Fridge Magnet Discharge handout (date given): -  Equipment at home/gym membership: [employer]    Education provided:   - Pacing with the MedX machines      Written Home Exercises Provided: Patient instructed to cont prior HEP.  Exercises were reviewed and Johanna was able to demonstrate them prior to the end of the session.  Johanna demonstrated fair  understanding of the education provided.     See EMR under Patient Instructions for exercises provided prior visit.    Assessment     Patient presents to therapy with reports of improved lumbar symptoms. She tolerated session well with demonstrations of improved endurance and strength with therex. Lumbar MedX weight maintained at 70 ft. Lbs with completion of 20 reps at 4/10 RPE.  All peripheral strength circuit exercises completed without adverse effects.     Patient is making good progress towards established goals.  Pt will continue to benefit from skilled outpatient physical therapy to address the deficits stated in the impairment chart, provide pt/family education and to maximize pt's level of independence in the home and community environment.     Anticipated Barriers for therapy: none  Pt's spiritual, cultural and educational needs considered and pt agreeable to plan of care and goals as  stated below:     GOALS: Pt is in agreement with the following goals.     Short term goals:  6 weeks or 10 visits   - Pt will demonstrate increased lumbar ROM by at least 6 degrees from the initial ROM value with improvements noted in functional ROM and ability to perform ADLs. Appropriate and Ongoing  - Pt will demonstrate increased MedX average isometric strength value by 15% from initial test resulting in improved ability to perform bending, lifting, and carrying activities safely, confidently. Appropriate and Ongoing  - Pt will report a reduction in worst pain score by 1-2 points for improved tolerance for static sitting. Appropriate and Ongoing  - Pt able to perform HEP correctly with minimal cueing or supervision from therapist to encourage independent management of symptoms. Appropriate and Ongoing     Long term goals: 10 weeks or 20 visits   - Pt will demonstrate increased lumbar ROM by at least 9 degrees from initial ROM value, resulting in improved ability to perform functional forward bending while standing and sitting. Appropriate and Ongoing  - Pt will demonstrate increased MedX average isometric strength value by 25% from initial test resulting in improved ability to perform bending, lifting, and carrying activities safely and confidently. Appropriate and Ongoing  - Pt to demonstrate ability to independently control and reduce their pain through posture positioning and mechanical movements throughout a typical day. Appropriate and Ongoing  - Pt will demonstrate reduced pain and improved functional outcomes as reported on the Oswestry Disability Index by reaching a score of 6 or less in order to demonstrate subjective improvement in pt's condition.   Appropriate and Ongoing  - Pt will demonstrate independence with the HEP at discharge. Appropriate and Ongoing  - Pt will be able to complete work related activities without increase in low back pain(patient goal) Appropriate and Ongoing    Plan      Continue with established Plan of Care towards established PT goals.       Therapist: Shivani Queen, PTA  7/31/2024

## 2024-07-31 NOTE — PROGRESS NOTES
Physical Therapy Daily Treatment Note     Name: Johanna Ocasio  Clinic Number: [id number]    Therapy Diagnosis:   Encounter Diagnosis   Name Primary?    Chronic left-sided thoracic back pain Yes       Physician: Ivette Azul MD    Visit Date: 7/31/2024    Physician Orders: PT Eval and Treat   Medical Diagnosis from Referral:  Strain of left shoulder, subsequent encounter [S46.912D]   Evaluation Date: 7/16/2024  Authorization Period Expiration: 6/27/2025  Plan of Care Expiration: 9/16/2024  Progress Note Due: 8/16/2024     Visit # / Visits authorized: 2/20 (+ EVAL)  FOTO: 1/ 3     Precautions: Standard      Time In: 1:30 PM  Time Out: 2:30 PM  Total Billable Time: 60 minutes    Subjective     Pt reports mild improvement in initial days following eval, but sx behavior relatively returned in days following.    She was compliant with home exercise program.  Response to previous treatment: Fair  Functional change: Initial improvement, but lack of carry-over    Pain: 3/10  Location: left midscap region      Objective     (+) Pain with T-sp Ext and L SB   + pain with T-sp EXT and L rotation    Treatment     PT Tech Extender utilized under supervision throughout tx session  Johanna received the treatments listed below:      Therapeutic exercises to develop strength, endurance, and ROM for 10 minutes, including:    Sidelying open books 2 x 20   Seated T spine extension 2 x 20        Johanna received the following manual therapy techniques: Joint mobilizations, Manual traction, Myofacial release, and Soft tissue Mobilization were applied to the: L midscap and t-sp for 20 minutes, including:    Upper t-sp P/A corkscrew Mobs  L ribs 4-7 P/A Mobs with Rot and SB  CT junction extension mobs, grades II/III/IV      Johanna participated in neuromuscular re-education activities to improve: Balance, Coordination, Kinesthetic, Proprioception, and Posture for 15 minutes. The following activities were included:    Seated/Standing Low  "trap activation YTB 2 x 10 3" holds  Mid trap prone level 2 3 x 12 with 3" holds      Not performed today:  Prone Press Up c Rot 2 x 15  Hand behind head L T-sp Rot 2 x 10  Prone A's 20 x 10" holds  Primal Position Serratus Quad Press 2 x 10 x 5"    Johanna participated in dynamic functional therapeutic activities to improve functional performance for 15 minutes, including:    UBE completed for 10' to increase ROM, endurance, and decrease pain to improve tolerance to ADLs and age-related activities    Education:  - POC/Prognosis   - Thoracic spine facet involvement  - Prolonged postures and work demands  - Activity modifications      Not performed today:  1/2 Kneel Unil CC Pulldown 5 x 12  Bent Over Unil Suitcase Row 5 x 12        Patient Education and Home Exercises      Education provided:   - HEP      Written Home Exercises Provided: yes. Exercises were reviewed and Johanna was able to demonstrate them prior to the end of the session.  Johanna demonstrated good  understanding of the education provided. See EMR under Patient Instructions for exercises provided during therapy sessions.    Assessment     Johanna returns with slight improvement since previous visit and was able to reduce initial symptoms following manual and thoracic mobility interventions. Patient also able to improve ability to activate periscapular region, specifically mid trap and low trap holds. Progressing well and educated on prolonged posture mindfulness.     Johanna Is progressing well towards her goals.   Pt prognosis is Good.     Pt will continue to benefit from skilled outpatient physical therapy to address the deficits listed in the problem list box on initial evaluation, provide pt/family education and to maximize pt's level of independence in the home and community environment.     Pt's spiritual, cultural and educational needs considered and pt agreeable to plan of care and goals.    Anticipated barriers to physical therapy: work " demands    Goals:  GOALS: Short Term Goals:  4 weeks  1.Report decreased left sided thoracic pain < / =  2/10  to increase tolerance for tolerating work activities and life demands.  2. Increase active ROM of thoracic spine to be 10-15% improved with left rotation and extension in order to show improvement in mobility.     3. Increased strength by 1/3 MMT grade in periscapular musculature and thoracic spine extensors to increase tolerance for ADL and work activities.  4. Pt to tolerate HEP to improve ROM and independence with ADL's     Long Term Goals: 8 weeks  1.Report decreased left sided thoracic spine pain  < / =  1/10  to increase tolerance for tolerating life demands and work demands such as overhead lifting and reaching.   2.Increase AROM of thoracic spine to be 100% pain-free and not limited with motion to show full functional motion improvement.   3.Increase strength to >/= 4/5 in periscapular musculature and thoracic spine extensors to increase tolerance for ADL and work activities.  4. Pt goal: be able to tolerate work demands of lifting overhead and picking up her daughter without pain in the shoulder/thoracic spine.   5. Pt will have improved score of >/= 77% on FOTO shoulder in order to demonstrate true functional improvement.     Plan     Continue per RAY Del Rosario, PT, DPT

## 2024-08-05 RX ORDER — ONDANSETRON 4 MG/1
TABLET, FILM COATED ORAL
Qty: 30 TABLET | Refills: 0 | Status: SHIPPED | OUTPATIENT
Start: 2024-08-05

## 2024-08-06 ENCOUNTER — CLINICAL SUPPORT (OUTPATIENT)
Dept: REHABILITATION | Facility: HOSPITAL | Age: 39
End: 2024-08-06
Payer: COMMERCIAL

## 2024-08-06 ENCOUNTER — CLINICAL SUPPORT (OUTPATIENT)
Dept: REHABILITATION | Facility: OTHER | Age: 39
End: 2024-08-06
Payer: COMMERCIAL

## 2024-08-06 DIAGNOSIS — M54.6 CHRONIC LEFT-SIDED THORACIC BACK PAIN: Primary | ICD-10-CM

## 2024-08-06 DIAGNOSIS — G89.29 CHRONIC LEFT-SIDED THORACIC BACK PAIN: Primary | ICD-10-CM

## 2024-08-06 PROCEDURE — 97750 PHYSICAL PERFORMANCE TEST: CPT | Mod: 32

## 2024-08-06 PROCEDURE — 97140 MANUAL THERAPY 1/> REGIONS: CPT

## 2024-08-06 PROCEDURE — 97112 NEUROMUSCULAR REEDUCATION: CPT

## 2024-08-06 PROCEDURE — 97110 THERAPEUTIC EXERCISES: CPT

## 2024-08-13 ENCOUNTER — CLINICAL SUPPORT (OUTPATIENT)
Dept: REHABILITATION | Facility: HOSPITAL | Age: 39
End: 2024-08-13
Payer: COMMERCIAL

## 2024-08-13 ENCOUNTER — CLINICAL SUPPORT (OUTPATIENT)
Dept: REHABILITATION | Facility: OTHER | Age: 39
End: 2024-08-13
Payer: COMMERCIAL

## 2024-08-13 DIAGNOSIS — R68.89 DECREASED ACTIVITY TOLERANCE: ICD-10-CM

## 2024-08-13 DIAGNOSIS — M54.6 CHRONIC LEFT-SIDED THORACIC BACK PAIN: Primary | ICD-10-CM

## 2024-08-13 DIAGNOSIS — R29.898 DECREASED STRENGTH OF TRUNK AND BACK: Primary | ICD-10-CM

## 2024-08-13 DIAGNOSIS — G89.29 CHRONIC LEFT-SIDED THORACIC BACK PAIN: Primary | ICD-10-CM

## 2024-08-13 PROCEDURE — 97140 MANUAL THERAPY 1/> REGIONS: CPT

## 2024-08-13 PROCEDURE — 97750 PHYSICAL PERFORMANCE TEST: CPT | Mod: 32

## 2024-08-13 PROCEDURE — 97112 NEUROMUSCULAR REEDUCATION: CPT

## 2024-08-13 PROCEDURE — 97110 THERAPEUTIC EXERCISES: CPT

## 2024-08-13 NOTE — PROGRESS NOTES
[facility]  Physical Therapy Treatment Note   Name: Johanna Ocasio  Clinic Number: [id number]    Therapy Diagnosis:   Encounter Diagnoses   Name Primary?    Decreased strength of trunk and back Yes    Decreased activity tolerance          Physician: Lorena Reinoso, *    Visit Date: 8/13/2024    Physician Orders: PT Eval and Treat  Medical Diagnosis from Referral:   M54.50 (ICD-10-CM) - Lumbar back pain     Evaluation Date: 5/27/2024  Authorization Period Expiration: 7/10/2024  Plan of Care Expiration: 8/27/2024  Reassessment Due: 8/27/2024  Visit # / Visits authorized: 18/20     PTA Visit #: 0/5     Time In: 3:02 pm  Time Out: 3:50  Total Billable Time: 48 minutes     INSURANCE and OUTCOMES: Program Benefit Group with Lumbar Outcomes (Oswestry and AQoL) 1/3     Precautions: Standard, but not entirely.      Pattern of pain determined: 1 PEP      Subjective   Johanna Ocasio reports she is feeling good, has not had a chance to do the stretches today    Patient reports tolerating previous visit well /c no c/o of excess discomfort.  Patient reports their pain to be 0/10 on a 0-10 scale with 0 being no pain and 10 being the worst pain imaginable.  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     Objective      Lumbar  Isometric Testing on Med X equipment: Testing administered by PT    Test Initial Baseline Midpoint Final   Date 05/30/24 6/27/2024    ROM 0-48 deg 0-51 deg    Max Peak Torque 108  126     Min Peak Torque 13  28     Flex/Ext Ratio 8.3:1 4.5:1     % variance below normative data 56 (85% below at 0 deg) 51     % change from initial test N/A visit 1 33         OUTCOMES SELECTION:   Program Patient Outcome Measures     Oswestry Score:  12/50 = 24% disability  "  Midpoint score 4 /50= 8% disability   AQoL Score:  3/36 = 8% disability          Treatment     Johanna received the treatments listed below:        Johanna participated in neuromuscular re-education activities to improve balance, coordination, proprioception, motor control and/or posture for 8  minutes. The following activities were included:    Quadruped bird dog x10  TrA w/pilates ring + SLR x10  Forearm plank x 30" x2  Side forearm plank x 30" x2  SLY clams with GTB 15x     Johanna participated in therapeutic exercises to develop strength, endurance, ROM, flexibility, posture, and core stabilization for 22 minutes including:    LTR x 10  Prone press ups x10  Bridges w/BTB unilateral (abd) x15,3"-  Prone hip extension x10 cassidy-np  90/90 position march c/ PPT  x10   Kneeling hip flexor stretch x30"  Standing lumbar extensions x10-np  Cat-cow x 10        8/13/2024     3:31 PM   [facility]   Visit Number 18   VAS Pain Rating 0   Treadmill Time (in min.) 5 min   Extension in Lying 10   Lumbar Weight 74 lbs   Repetitions 18   Rating of Perceived Exertion 4   Ice - Z Lie (in min.) 5               Peripheral muscle strengthening which included one set of 15-20 repetitions at a slow and controlled 10-13 second per rep pace focused on strengthening supporting musculature in order to improve body mechanics and functional mobility. Patient and therapist focused on proper form during treatment to ensure optimal strengthening of each targeted muscle group.  Machines utilized included:Torso rotation, Leg Ext, Leg Curl, Chest Press, and Rowing  Triceps, Biceps, Hip Abd, Hip Add, and Leg Press      Johanna participated in dynamic functional therapeutic activities to improve functional performance and simulate household and community activities for 0 minutes. The following activities were included:    Pallof overhead press green sports cord x10  Standing hip ABD with PB press 2 x 10  Standing hip extension with PB press  2 x 10 " (Cue ab. Brace)  Side step GTB 20 steps x2  Standing hip ADD 2 x 10-    Johanna received manual therapy techniques for 00  minutes. The following activities were included:      Pt given cold pack for 0 minutes to lumbar region in Z lying  Patient Education and Home Exercises     Home exercises include:  RAFAEL/REIL  Glute set- removed  Bridges w/unilateral hip abd. GTB  Prone hip extension > replaced with standing 4-way hip (Abd/ext/flex/add)  Forearm plank, Forearm side planks    Cardio program (V5): 6/11/24  Lifting education (V11): - 6/25/2024  Posture/Lumbar roll: obtained 05/29/24, uses in car and work   Fridge Magnet Discharge handout (date given): -  Equipment at home/gym membership: [employer]    Education provided:   - Pacing with the MedX machines      Written Home Exercises Provided: Patient instructed to cont prior HEP.  Exercises were reviewed and Johanna was able to demonstrate them prior to the end of the session.  Johanna demonstrated fair  understanding of the education provided.     See EMR under Patient Instructions for exercises provided prior visit.    Assessment     Patient presents to therapy with reports of improved lumbar symptoms overall and good recall of exercises. She completed session well with demonstrations of improved endurance and strength with therex. Added back some stability exercises and would benefit from additional functional lifting as tolerated. Lumbar MedX weight maintained at 74 ft. Lbs with completion of 20 reps at 4/10 RPE.  All peripheral strength circuit exercises completed without adverse effects.     Patient is making good progress towards established goals.  Pt will continue to benefit from skilled outpatient physical therapy to address the deficits stated in the impairment chart, provide pt/family education and to maximize pt's level of independence in the home and community environment.     Anticipated Barriers for therapy: none  Pt's spiritual, cultural and  educational needs considered and pt agreeable to plan of care and goals as stated below:     GOALS: Pt is in agreement with the following goals.     Short term goals:  6 weeks or 10 visits   - Pt will demonstrate increased lumbar ROM by at least 6 degrees from the initial ROM value with improvements noted in functional ROM and ability to perform ADLs. Appropriate and Ongoing  - Pt will demonstrate increased MedX average isometric strength value by 15% from initial test resulting in improved ability to perform bending, lifting, and carrying activities safely, confidently. Appropriate and Ongoing  - Pt will report a reduction in worst pain score by 1-2 points for improved tolerance for static sitting. Appropriate and Ongoing  - Pt able to perform HEP correctly with minimal cueing or supervision from therapist to encourage independent management of symptoms. Appropriate and Ongoing     Long term goals: 10 weeks or 20 visits   - Pt will demonstrate increased lumbar ROM by at least 9 degrees from initial ROM value, resulting in improved ability to perform functional forward bending while standing and sitting. Appropriate and Ongoing  - Pt will demonstrate increased MedX average isometric strength value by 25% from initial test resulting in improved ability to perform bending, lifting, and carrying activities safely and confidently. Appropriate and Ongoing  - Pt to demonstrate ability to independently control and reduce their pain through posture positioning and mechanical movements throughout a typical day. Appropriate and Ongoing  - Pt will demonstrate reduced pain and improved functional outcomes as reported on the Oswestry Disability Index by reaching a score of 6 or less in order to demonstrate subjective improvement in pt's condition.   Appropriate and Ongoing  - Pt will demonstrate independence with the HEP at discharge. Appropriate and Ongoing  - Pt will be able to complete work related activities without increase  in low back pain(patient goal) Appropriate and Ongoing    Plan     Continue with established Plan of Care towards established PT goals.       Therapist: Lb De La Cruz, PT  8/13/2024

## 2024-08-13 NOTE — PROGRESS NOTES
Physical Therapy Daily Treatment Note     Name: Johanna Ocasio  Clinic Number: [id number]    Therapy Diagnosis:   Encounter Diagnosis   Name Primary?    Chronic left-sided thoracic back pain Yes       Physician: Ivette Azul MD    Visit Date: 8/13/2024    Physician Orders: PT Eval and Treat   Medical Diagnosis from Referral:  Strain of left shoulder, subsequent encounter [S46.912D]   Evaluation Date: 7/16/2024  Authorization Period Expiration: 6/27/2025  Plan of Care Expiration: 9/16/2024  Progress Note Due: 8/16/2024     Visit # / Visits authorized: 4/20 (+ EVAL)  FOTO: 1/ 3     Precautions: Standard      Time In: 5:55 PM  Time Out: 6:55 PM  Total Billable Time: 60 minutes    Subjective     Pt reports  improvement overall since last visit. Not having pain at work with the exception of one time in the past week.     She was compliant with home exercise program.  Response to previous treatment: Fair  Functional change: Initial improvement, but lack of carry-over    Pain: 3/10  Location: left midscap region      Objective     (-) Pain with T-sp Ext and R SB   (-) pain with T-sp EXT and L rotation    (-) pain with cervical spine all motions and quadrant motions    Treatment     Johanna received the treatments listed below:      Therapeutic exercises to develop strength, endurance, and ROM for 20 minutes, including:    Sidelying open books 2 x 20   Supine T spine extension over half foam roll at T3/T4 2 x 20   Mobility assessment/Scapular posture assessment - L scap abducted > R, bilat downward rotation  HEP update     Johanna received the following manual therapy techniques: Joint mobilizations, Manual traction, Myofacial release, and Soft tissue Mobilization were applied to the: L midscap and t-sp for 12 minutes, including:    Upper t-sp P/A corkscrew Mobs  CT junction extension mobs, grades II/III/IV    Not performed:  L ribs 4-7 P/A Mobs with Rot and SB  Left C5 Cervical spine side glides, grades  "II/III      Johanna participated in neuromuscular re-education activities to improve: Balance, Coordination, Kinesthetic, Proprioception, and Posture for 28 minutes. The following activities were included:    Seated Low trap activation YTB 2 x 10 3" holds  Mid trap prone level 2 3 x 12 with 3" holds  Robots with YTB and half foam roll with cueing for keeping chin tuck, 20x  Wall slides with chin tuck neutral, YTB at wrists 3 x 8       Not performed today:  Quadruped Serratus rocks with Y, 2 x 12   Prone Press Up c Rot 2 x 15  Hand behind head L T-sp Rot 2 x 10  Prone A's 20 x 10" holds  Primal Position Serratus Quad Press 2 x 10 x 5"    Johanna participated in dynamic functional therapeutic activities to improve functional performance for 00 minutes, including:    UBE completed for 10' to increase ROM, endurance, and decrease pain to improve tolerance to ADLs and age-related activities    Education:  - POC/Prognosis   - Thoracic spine facet involvement  - Prolonged postures and work demands  - Activity modifications      Not performed today:  1/2 Kneel Unil CC Pulldown 5 x 12  Bent Over Unil Suitcase Row 5 x 12        Patient Education and Home Exercises      Education provided:   - HEP      Written Home Exercises Provided: yes. Exercises were reviewed and Johanna was able to demonstrate them prior to the end of the session.  Johanna demonstrated good  understanding of the education provided. See EMR under Patient Instructions for exercises provided during therapy sessions.    Assessment     Johanna had minimal pain or ability to reproduce left sided thoracic pain during thoracic and cervical spine motions today, showing some good carry-over between sessions now. Able to progress serratus and low trap/mid trap force coupling to assist with periscapular endurance and overall strength of postural muscles. Has follow up next week with PA. Progressing well and educated on prolonged posture mindfulness.     Johanna Is progressing " well towards her goals.   Pt prognosis is Good.     Pt will continue to benefit from skilled outpatient physical therapy to address the deficits listed in the problem list box on initial evaluation, provide pt/family education and to maximize pt's level of independence in the home and community environment.     Pt's spiritual, cultural and educational needs considered and pt agreeable to plan of care and goals.    Anticipated barriers to physical therapy: work demands    Goals:  GOALS: Short Term Goals:  4 weeks  1.Report decreased left sided thoracic pain < / =  2/10  to increase tolerance for tolerating work activities and life demands.  2. Increase active ROM of thoracic spine to be 10-15% improved with left rotation and extension in order to show improvement in mobility.     3. Increased strength by 1/3 MMT grade in periscapular musculature and thoracic spine extensors to increase tolerance for ADL and work activities.  4. Pt to tolerate HEP to improve ROM and independence with ADL's     Long Term Goals: 8 weeks  1.Report decreased left sided thoracic spine pain  < / =  1/10  to increase tolerance for tolerating life demands and work demands such as overhead lifting and reaching.   2.Increase AROM of thoracic spine to be 100% pain-free and not limited with motion to show full functional motion improvement.   3.Increase strength to >/= 4/5 in periscapular musculature and thoracic spine extensors to increase tolerance for ADL and work activities.  4. Pt goal: be able to tolerate work demands of lifting overhead and picking up her daughter without pain in the shoulder/thoracic spine.   5. Pt will have improved score of >/= 77% on FOTO shoulder in order to demonstrate true functional improvement.     Plan     Continue per RAY Del Rosario, PT, DPT

## 2024-08-20 ENCOUNTER — CLINICAL SUPPORT (OUTPATIENT)
Dept: REHABILITATION | Facility: HOSPITAL | Age: 39
End: 2024-08-20
Payer: COMMERCIAL

## 2024-08-20 ENCOUNTER — OFFICE VISIT (OUTPATIENT)
Dept: SPORTS MEDICINE | Facility: CLINIC | Age: 39
End: 2024-08-20
Payer: COMMERCIAL

## 2024-08-20 ENCOUNTER — CLINICAL SUPPORT (OUTPATIENT)
Dept: REHABILITATION | Facility: OTHER | Age: 39
End: 2024-08-20
Payer: COMMERCIAL

## 2024-08-20 VITALS
RESPIRATION RATE: 17 BRPM | HEIGHT: 60 IN | WEIGHT: 159.81 LBS | BODY MASS INDEX: 31.38 KG/M2 | HEART RATE: 69 BPM | DIASTOLIC BLOOD PRESSURE: 67 MMHG | SYSTOLIC BLOOD PRESSURE: 103 MMHG

## 2024-08-20 DIAGNOSIS — M54.6 CHRONIC LEFT-SIDED THORACIC BACK PAIN: Primary | ICD-10-CM

## 2024-08-20 DIAGNOSIS — R29.898 DECREASED STRENGTH OF TRUNK AND BACK: Primary | ICD-10-CM

## 2024-08-20 DIAGNOSIS — R68.89 DECREASED ACTIVITY TOLERANCE: ICD-10-CM

## 2024-08-20 DIAGNOSIS — G89.29 CHRONIC LEFT-SIDED THORACIC BACK PAIN: Primary | ICD-10-CM

## 2024-08-20 DIAGNOSIS — G25.89 SCAPULAR DYSKINESIS: Primary | ICD-10-CM

## 2024-08-20 PROCEDURE — 3074F SYST BP LT 130 MM HG: CPT | Mod: CPTII,S$GLB,, | Performed by: PHYSICIAN ASSISTANT

## 2024-08-20 PROCEDURE — 3044F HG A1C LEVEL LT 7.0%: CPT | Mod: CPTII,S$GLB,, | Performed by: PHYSICIAN ASSISTANT

## 2024-08-20 PROCEDURE — 3008F BODY MASS INDEX DOCD: CPT | Mod: CPTII,S$GLB,, | Performed by: PHYSICIAN ASSISTANT

## 2024-08-20 PROCEDURE — 99999 PR PBB SHADOW E&M-EST. PATIENT-LVL III: CPT | Mod: PBBFAC,,, | Performed by: PHYSICIAN ASSISTANT

## 2024-08-20 PROCEDURE — 97110 THERAPEUTIC EXERCISES: CPT

## 2024-08-20 PROCEDURE — 1159F MED LIST DOCD IN RCRD: CPT | Mod: CPTII,S$GLB,, | Performed by: PHYSICIAN ASSISTANT

## 2024-08-20 PROCEDURE — 97140 MANUAL THERAPY 1/> REGIONS: CPT

## 2024-08-20 PROCEDURE — 97750 PHYSICAL PERFORMANCE TEST: CPT | Mod: 32

## 2024-08-20 PROCEDURE — 97530 THERAPEUTIC ACTIVITIES: CPT

## 2024-08-20 PROCEDURE — 3078F DIAST BP <80 MM HG: CPT | Mod: CPTII,S$GLB,, | Performed by: PHYSICIAN ASSISTANT

## 2024-08-20 PROCEDURE — 99213 OFFICE O/P EST LOW 20 MIN: CPT | Mod: S$GLB,,, | Performed by: PHYSICIAN ASSISTANT

## 2024-08-20 PROCEDURE — 1160F RVW MEDS BY RX/DR IN RCRD: CPT | Mod: CPTII,S$GLB,, | Performed by: PHYSICIAN ASSISTANT

## 2024-08-20 PROCEDURE — 97112 NEUROMUSCULAR REEDUCATION: CPT

## 2024-08-20 NOTE — PROGRESS NOTES
Physical Therapy Daily Treatment Note     Name: Johanna Ocasio  Clinic Number: [id number]    Therapy Diagnosis:   Encounter Diagnosis   Name Primary?    Chronic left-sided thoracic back pain Yes         Physician: Ivette Azul MD    Visit Date: 8/20/2024    Physician Orders: PT Eval and Treat   Medical Diagnosis from Referral:  Strain of left shoulder, subsequent encounter [S46.912D]   Evaluation Date: 7/16/2024  Authorization Period Expiration: 12/31/2024  Plan of Care Expiration: 9/16/2024  Progress Note Due: 8/16/2024     Visit # / Visits authorized: 5/20 (+ EVAL)  FOTO: 1/ 3     Precautions: Standard      Time In: 11:30 AM  Time Out: 12:25 PM  Total Billable Time: 55 minutes    Subjective     Pt reports:  not having much pain in the mid back.     She was compliant with home exercise program.  Response to previous treatment: Fair  Functional change: Initial improvement, but lack of carry-over    Pain: 3/10  Location: left midscap region      Objective     (-) Pain with T-sp Ext and R SB   (-) pain with T-sp EXT and L rotation    (-) pain with cervical spine all motions and quadrant motions    Treatment     Johanna received the treatments listed below:      Therapeutic exercises to develop strength, endurance, and ROM for 09 minutes, including:    Sidelying open books 2 x 20   Supine T spine extension over half foam roll at T3/T4 2 x 20     Johanna received the following manual therapy techniques: Joint mobilizations, Manual traction, Myofacial release, and Soft tissue Mobilization were applied to the: L midscap and t-sp for 11 minutes, including:    Upper t-sp P/A corkscrew Mobs  CT junction extension mobs, grades II/III/IV    Not performed:  L ribs 4-7 P/A Mobs with Rot and SB  Left C5 Cervical spine side glides, grades II/III      Johanna participated in neuromuscular re-education activities to improve: Balance, Coordination, Kinesthetic, Proprioception, and Posture for 27 minutes. The following activities  "were included:    Seated Low trap activation YTB 2 x 10 3" holds  Mid trap prone level 2, 3 x 12 with 3" holds  Low trap Level 1, 3 x 12 with 3" holds (cueing provided for form)   Robots with YTB and half foam roll with cueing for keeping chin tuck, 20x  Wall slides with chin tuck neutral, YTB at wrists 3 x 8       Not performed today:  Quadruped Serratus rocks with Y, 2 x 12   Prone Press Up c Rot 2 x 15  Hand behind head L T-sp Rot 2 x 10  Prone A's 20 x 10" holds  Primal Position Serratus Quad Press 2 x 10 x 5"    Johanna participated in dynamic functional therapeutic activities to improve functional performance for 08 minutes, including:    UBE completed for 8 mins to increase ROM, endurance, and decrease pain to improve tolerance to ADLs and age-related activities    Education:  - POC/Prognosis   - Thoracic spine facet involvement  - Prolonged postures and work demands  - Activity modifications      Not performed today:  1/2 Kneel Unil CC Pulldown 5 x 12  Bent Over Unil Suitcase Row 5 x 12        Patient Education and Home Exercises      Education provided:   - HEP      Written Home Exercises Provided: yes. Exercises were reviewed and Johanna was able to demonstrate them prior to the end of the session.  Johanna demonstrated good  understanding of the education provided. See EMR under Patient Instructions for exercises provided during therapy sessions.    Assessment     Johanna had no pain today with thoracic or cervical spine motions and was able to progress with periscapular strengthening after thoracic mobility interventions to further support postural stability. She required cueing for coordinating low trap and mid trap holds today. Was told to keep progressing in therapy following her PA program. Progressing well and educated on prolonged posture mindfulness.     Johanna Is progressing well towards her goals.   Pt prognosis is Good.     Pt will continue to benefit from skilled outpatient physical therapy to address " the deficits listed in the problem list box on initial evaluation, provide pt/family education and to maximize pt's level of independence in the home and community environment.     Pt's spiritual, cultural and educational needs considered and pt agreeable to plan of care and goals.    Anticipated barriers to physical therapy: work demands    Goals:  GOALS: Short Term Goals:  4 weeks  1.Report decreased left sided thoracic pain < / =  2/10  to increase tolerance for tolerating work activities and life demands.  2. Increase active ROM of thoracic spine to be 10-15% improved with left rotation and extension in order to show improvement in mobility.     3. Increased strength by 1/3 MMT grade in periscapular musculature and thoracic spine extensors to increase tolerance for ADL and work activities.  4. Pt to tolerate HEP to improve ROM and independence with ADL's     Long Term Goals: 8 weeks  1.Report decreased left sided thoracic spine pain  < / =  1/10  to increase tolerance for tolerating life demands and work demands such as overhead lifting and reaching.   2.Increase AROM of thoracic spine to be 100% pain-free and not limited with motion to show full functional motion improvement.   3.Increase strength to >/= 4/5 in periscapular musculature and thoracic spine extensors to increase tolerance for ADL and work activities.  4. Pt goal: be able to tolerate work demands of lifting overhead and picking up her daughter without pain in the shoulder/thoracic spine.   5. Pt will have improved score of >/= 77% on FOTO shoulder in order to demonstrate true functional improvement.     Plan     Continue per POC    Alex Mcgee, SPT  Alex Del Rosario, PT, DPT  I attest that I was present for the entirety of the treatment and agree to the assessment mentioned above.

## 2024-08-20 NOTE — PROGRESS NOTES
[facility]  Physical Therapy Treatment Note   Name: Johanna Ocasio  Clinic Number: [id number]    Therapy Diagnosis:   Encounter Diagnoses   Name Primary?    Decreased strength of trunk and back Yes    Decreased activity tolerance            Physician: Lorena Reinoso, *    Visit Date: 8/20/2024    Physician Orders: PT Eval and Treat  Medical Diagnosis from Referral:   M54.50 (ICD-10-CM) - Lumbar back pain     Evaluation Date: 5/27/2024  Authorization Period Expiration: 7/10/2024  Plan of Care Expiration: 8/27/2024  Reassessment Due: 8/27/2024  Visit # / Visits authorized: 19/20     PTA Visit #: 1/5     Time In: 9:00 am  Time Out: 10:00  Total Billable Time: 48 minutes     INSURANCE and OUTCOMES: Program Benefit Group with Lumbar Outcomes (Oswestry and AQoL) 1/3     Precautions: Standard, but not entirely.      Pattern of pain determined: 1 PEP      Subjective   Johanna Ocasio reports fatigue due to starting new schedule for children in school. Pt reports LB symptoms improved but has returned to PT for L shld/thoracic pain.     Patient reports tolerating previous visit well /c no c/o of excess discomfort.  Patient reports their pain to be 0/10 on a 0-10 scale with 0 being no pain and 10 being the worst pain imaginable.  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     Objective      Lumbar  Isometric Testing on Med X equipment: Testing administered by PT    Test Initial Baseline Midpoint Final   Date 05/30/24 6/27/2024    ROM 0-48 deg 0-51 deg    Max Peak Torque 108  126     Min Peak Torque 13  28     Flex/Ext Ratio 8.3:1 4.5:1     % variance below normative data 56 (85% below at 0 deg) 51     % change from initial test N/A visit 1 33         OUTCOMES SELECTION:   Program  "Patient Outcome Measures     Oswestry Score:  12/50 = 24% disability   Midpoint score 4 /50= 8% disability   AQoL Score:  3/36 = 8% disability          Treatment     Johanna received the treatments listed below:        Johanna participated in neuromuscular re-education activities to improve balance, coordination, proprioception, motor control and/or posture for 8  minutes. The following activities were included:    Quadruped bird dog x10  TrA w/pilates ring + SLR x10  Forearm plank x 30" x2  Side forearm plank x 30" x2  SLY clams with GTB 15x     Johanna participated in therapeutic exercises to develop strength, endurance, ROM, flexibility, posture, and core stabilization for 22 minutes including:    LTR x 10  Prone press ups x10  Bridges w/BTB unilateral (abd) x15,3"-  Prone hip extension x10 cassidy-np  90/90 position march c/ PPT  x10   Kneeling hip flexor stretch x30"  Standing lumbar extensions x10-np  Cat-cow x 10        8/20/2024     9:25 AM   [facility]   Visit Number 19   VAS Pain Rating 0   Treadmill Time (in min.) 5 min   Extension in Lying 10   Lumbar Weight 74 lbs   Repetitions 20   Rating of Perceived Exertion 4   Ice - Z Lie (in min.) 5                Peripheral muscle strengthening which included one set of 15-20 repetitions at a slow and controlled 10-13 second per rep pace focused on strengthening supporting musculature in order to improve body mechanics and functional mobility. Patient and therapist focused on proper form during treatment to ensure optimal strengthening of each targeted muscle group.  Machines utilized included:Torso rotation, Leg Ext, Leg Curl, Chest Press, and Rowing  Triceps, Biceps, Hip Abd, Hip Add, and Leg Press      Johanna participated in dynamic functional therapeutic activities to improve functional performance and simulate household and community activities for 0 minutes. The following activities were included:    Pallof overhead press green sports cord x10  Standing hip " ABD with PB press 2 x 10  Standing hip extension with PB press  2 x 10 (Cue ab. Brace)  Side step GTB 20 steps x2  Standing hip ADD 2 x 10-    Johanna received manual therapy techniques for 00  minutes. The following activities were included:      Pt given cold pack for 0 minutes to lumbar region in Z lying  Patient Education and Home Exercises     Home exercises include:  RAFAEL/REIL  Glute set- removed  Bridges w/unilateral hip abd. GTB  Prone hip extension > replaced with standing 4-way hip (Abd/ext/flex/add)  Forearm plank, Forearm side planks    Cardio program (V5): 6/11/24  Lifting education (V11): - 6/25/2024  Posture/Lumbar roll: obtained 05/29/24, uses in car and work   Fridge Magnet Discharge handout (date given): 08/20/24  Equipment at home/gym membership: [employer] employee gym    Education provided:   - Pacing with the MedX machines      Written Home Exercises Provided: Patient instructed to cont prior HEP.  Exercises were reviewed and Johanna was able to demonstrate them prior to the end of the session.  Johanna demonstrated fair  understanding of the education provided.     See EMR under Patient Instructions for exercises provided prior visit.    Assessment     Patient presents to therapy with reports of improved lumbar symptoms overall and good recall of exercises. She completed session well with demonstrations of improved endurance and strength with therex. Added back some stability exercises and would benefit from additional functional lifting as tolerated. Lumbar MedX weight maintained at 74 ft. Lbs with completion of 20 reps at 4/10 RPE.  All peripheral strength circuit exercises completed without adverse effects.     Patient is making good progress towards established goals.  Pt will continue to benefit from skilled outpatient physical therapy to address the deficits stated in the impairment chart, provide pt/family education and to maximize pt's level of independence in the home and community  environment.     Anticipated Barriers for therapy: none  Pt's spiritual, cultural and educational needs considered and pt agreeable to plan of care and goals as stated below:     GOALS: Pt is in agreement with the following goals.     Short term goals:  6 weeks or 10 visits   - Pt will demonstrate increased lumbar ROM by at least 6 degrees from the initial ROM value with improvements noted in functional ROM and ability to perform ADLs. Appropriate and Ongoing  - Pt will demonstrate increased MedX average isometric strength value by 15% from initial test resulting in improved ability to perform bending, lifting, and carrying activities safely, confidently. Appropriate and Ongoing  - Pt will report a reduction in worst pain score by 1-2 points for improved tolerance for static sitting. Appropriate and Ongoing  - Pt able to perform HEP correctly with minimal cueing or supervision from therapist to encourage independent management of symptoms. Appropriate and Ongoing     Long term goals: 10 weeks or 20 visits   - Pt will demonstrate increased lumbar ROM by at least 9 degrees from initial ROM value, resulting in improved ability to perform functional forward bending while standing and sitting. Appropriate and Ongoing  - Pt will demonstrate increased MedX average isometric strength value by 25% from initial test resulting in improved ability to perform bending, lifting, and carrying activities safely and confidently. Appropriate and Ongoing  - Pt to demonstrate ability to independently control and reduce their pain through posture positioning and mechanical movements throughout a typical day. Appropriate and Ongoing  - Pt will demonstrate reduced pain and improved functional outcomes as reported on the Oswestry Disability Index by reaching a score of 6 or less in order to demonstrate subjective improvement in pt's condition.   Appropriate and Ongoing  - Pt will demonstrate independence with the HEP at discharge.  Appropriate and Ongoing  - Pt will be able to complete work related activities without increase in low back pain(patient goal) Appropriate and Ongoing    Plan     Continue with established Plan of Care towards established PT goals.       Therapist: Audra Alarcon, PTA  8/20/2024

## 2024-08-20 NOTE — PROGRESS NOTES
CC: Left shoulder pain    Johanna Ocasio presenting for follow up of left shoulder pain. She has been doing PT with López and Alex regularly. Taking Meloxicam as needed and has been wearing a postural bra/wrap. She feels she has improved a good deal. No pain today.     Prior Hx 7/2/2024:   39 y.o. Female presents as a new patient to me. 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 Complaint is left shoulder pain x December of last year. She says she did PT and it improved but recently over the past 2-3 months the pain has returned. Atraumatic onset.  Pain localizes posteriorly over her shoulder blade. Worse with overhead lifting but not every time. Pain is disruptive to sleep at night. Better with rest. Denies neck pain but does endorse some intermittent tingling down the left arm. Treatment thus far has included activity modifications, rest, and oral medication.  Here today to discuss diagnosis and treatment options.     PMHx notable for migranes, PVC.   Negative for tobacco.   Negative for diabetes.    Pain Score:   1    PAST MEDICAL HISTORY:   Past Medical History:   Diagnosis Date    Abnormal Pap smear of cervix 2011    Colposcopy    Brain venous angioma 10/3/2018    Early childhood epilepsy, myoclonic     last seizure age 14; Venous hemangioma on MRI    History of migraine headaches 5/12/2016    Migraine headache        PAST SURGICAL HISTORY:  No past surgical history on file.    FAMILY HISTORY:  Family History   Problem Relation Name Age of Onset    Hypertension Mother      Hypertension Father      Cataracts Maternal Grandfather      Diabetes Paternal Grandmother      Cancer Paternal Grandfather          colon cancer    Breast cancer Neg Hx      Colon cancer Neg Hx      Ovarian cancer Neg Hx      Melanoma Neg Hx      Lupus Neg Hx      Psoriasis Neg Hx         MEDICATIONS:    Current Outpatient Medications:     albuterol (PROVENTIL/VENTOLIN HFA) 90 mcg/actuation inhaler, Inhale 2 puffs into the lungs every 6  (six) hours as needed for Wheezing. Rescue, Disp: 18 g, Rfl: 3    lifitegrast (XIIDRA) 5 % Dpet, Place 1 drop into both eyes 2 (two) times daily., Disp: 180 mL, Rfl: 0    metoprolol succinate (TOPROL-XL) 25 MG 24 hr tablet, Take 1 tablet by mouth once daily, Disp: 90 tablet, Rfl: 3    omeprazole (PRILOSEC) 20 MG capsule, Take 1 capsule (20 mg total) by mouth once daily., Disp: 30 capsule, Rfl: 11    ondansetron (ZOFRAN) 4 MG tablet, TAKE 1 TABLET BY MOUTH EVERY 8 HOURS AS NEEDED, Disp: 30 tablet, Rfl: 0    topiramate (TOPAMAX) 25 MG tablet, Take 1 tablet (25 mg total) by mouth once daily., Disp: 30 tablet, Rfl: 2    ubrogepant (UBRELVY) 100 mg tablet, Take 1 tablet by mouth at the onset of a headache. May repeat based on response and tolerability after more than 2 hours if needed. Do not take more than 200mg in a 24 hour span., Disp: 16 tablet, Rfl: 11    tiZANidine (ZANAFLEX) 2 MG tablet, TAKE 1 TABLET BY MOUTH EVERY 8 HOURS AS NEEDED FOR PAIN (Patient not taking: Reported on 7/2/2024), Disp: 20 tablet, Rfl: 0    VIOS AEROSOL DELIVERY SYSTEM Martha, USE AS DIRECTED (Patient not taking: Reported on 7/2/2024), Disp: , Rfl: 0    ALLERGIES:  Review of patient's allergies indicates:  No Known Allergies     REVIEW OF SYSTEMS:  Constitution: Negative. Negative for chills, fever and night sweats.    Hematologic/Lymphatic: Negative for bleeding problem. Does not bruise/bleed easily.   Skin: Negative for dry skin, itching and rash.   Musculoskeletal: Negative for falls. Negative for left shoulder pain and muscle weakness.     All other review of symptoms were reviewed and found to be noncontributory.    PHYSICAL EXAMINATION:  Vitals:  /67   Pulse 69   Resp 17   Ht 5' (1.524 m)   Wt 72.5 kg (159 lb 13.3 oz)   BMI 31.22 kg/m²    General: Well-developed well-nourished 39 y.o. femalein no acute distress   Cardiovascular: Regular rhythm by palpation of distal pulse, normal color and temperature, no concerning  varicosities on symptomatic side   Lungs: No labored breathing or wheezing appreciated   Neuro: Alert and oriented ×3   Psychiatric: well oriented to person, place and time, demonstrates normal mood and affect   Skin: No rashes, lesions or ulcers, normal temperature, turgor, and texture on uninvolved extremity    Ortho/SPM Exam  Examination of the left shoulder demonstrates active forward elevation to 180, ER with arm at side to 90 IR to T12. Passive FE to 180, ER to 90. No tenderness along the proximal biceps tendon. Negative AC tenderness. 5/5 resisted supraspinatus testing. 5/5 resisted infraspinatus testing. Negative belly press test. Stable shoulder. No midline neck tenderness. Negative Spurling's maneuver. Pain posteriorly over medial scapular border but minimal. Mild tightness over trap. Mild scapular dyskensis. No winging.    IMAGING:  Xrays including AP, Outlet and Axillary Lateral of Left shoulder are ordered / images reviewed by me:   No fracture or acute change appreciated. No significant glenohumeral degenerative changes. Mild to moderate AC joint arthritis. Normal acromiohumeral distance.     ASSESSMENT:      ICD-10-CM ICD-9-CM   1. Scapular dyskinesis  G25.89 781.3         PLAN:     -Findings and treatment options were discussed with the patient. She has improved with conservative treatment.       1. Continue Mobic 7.5 mg 1 time daily PRN for pain management. Patient understands to take with food and/or OTC prilosec to decrease GI side effects.  2. Ice compress to the affected area 2-3x a day for 15-20 minutes as needed for pain management. Alternate with heat therapy.  3. Continue physical therapy for periscapular/rotator cuff strengthening. Julianne protocol for scapular dyskinesis.  4. Follow up prn    All of the patient's questions were answered and the patient will contact us if they have any questions or concerns in the interim.

## 2024-08-25 DIAGNOSIS — G43.009 MIGRAINE WITHOUT AURA AND WITHOUT STATUS MIGRAINOSUS, NOT INTRACTABLE: ICD-10-CM

## 2024-08-26 RX ORDER — TOPIRAMATE 25 MG/1
25 TABLET ORAL DAILY
Qty: 30 TABLET | Refills: 5 | Status: SHIPPED | OUTPATIENT
Start: 2024-08-26 | End: 2024-08-30

## 2024-08-27 ENCOUNTER — CLINICAL SUPPORT (OUTPATIENT)
Dept: REHABILITATION | Facility: OTHER | Age: 39
End: 2024-08-27
Payer: COMMERCIAL

## 2024-08-27 ENCOUNTER — CLINICAL SUPPORT (OUTPATIENT)
Dept: REHABILITATION | Facility: HOSPITAL | Age: 39
End: 2024-08-27
Payer: COMMERCIAL

## 2024-08-27 DIAGNOSIS — M54.6 CHRONIC LEFT-SIDED THORACIC BACK PAIN: Primary | ICD-10-CM

## 2024-08-27 DIAGNOSIS — G89.29 CHRONIC LEFT-SIDED THORACIC BACK PAIN: Primary | ICD-10-CM

## 2024-08-27 PROCEDURE — 97110 THERAPEUTIC EXERCISES: CPT

## 2024-08-27 PROCEDURE — 97140 MANUAL THERAPY 1/> REGIONS: CPT

## 2024-08-27 PROCEDURE — 97112 NEUROMUSCULAR REEDUCATION: CPT

## 2024-08-27 PROCEDURE — 97750 PHYSICAL PERFORMANCE TEST: CPT | Mod: 32

## 2024-08-27 PROCEDURE — 97530 THERAPEUTIC ACTIVITIES: CPT

## 2024-08-27 NOTE — PROGRESS NOTES
Physical Therapy Daily Treatment Note     Name: Johanna Ocasio  Clinic Number: [id number]    Therapy Diagnosis:   Encounter Diagnosis   Name Primary?    Chronic left-sided thoracic back pain Yes       Physician: Ivette Azul MD    Visit Date: 8/27/2024    Physician Orders: PT Eval and Treat   Medical Diagnosis from Referral:  Strain of left shoulder, subsequent encounter [S46.912D]   Evaluation Date: 7/16/2024  Authorization Period Expiration: 12/31/2024  Plan of Care Expiration: 9/16/2024  Progress Note Due: 8/16/2024     Visit # / Visits authorized: 6/20 (+ EVAL)  FOTO: 1/ 3     Precautions: Standard      Time In: 5:15 PM   Time Out: 6:10 PM  Total Billable Time: 55 minutes    Subjective     Pt reports:  not having much pain in the mid back as of lately.     She was compliant with home exercise program.  Response to previous treatment: Fair  Functional change: Initial improvement, but lack of carry-over    Pain: 3/10  Location: left midscap region      Objective     (-) Pain with T-sp Ext and R SB   (-) pain with T-sp EXT and L rotation    (-) pain with cervical spine all motions and quadrant motions    Treatment     Johanna received the treatments listed below:      Therapeutic exercises to develop strength, endurance, and ROM for 09 minutes, including:    Sidelying open books 2 x 20   Supine T spine extension over half foam roll at T3/T4 2 x 20     Johanna received the following manual therapy techniques: Joint mobilizations, Manual traction, Myofacial release, and Soft tissue Mobilization were applied to the: L midscap and t-sp for 11 minutes, including:    Upper t-sp P/A corkscrew Mobs  CT junction extension mobs, grades II/III/IV    Not performed:  L ribs 4-7 P/A Mobs with Rot and SB  Left C5 Cervical spine side glides, grades II/III      Johanna participated in neuromuscular re-education activities to improve: Balance, Coordination, Kinesthetic, Proprioception, and Posture for 25 minutes. The following  "activities were included:    Seated Low trap activation YTB 2 x 10 3" holds  Mid trap prone level 2 and level 1, 3 x 12 with 3" holds  Robots with YTB and half foam roll with cueing for keeping chin tuck, 20x  Wall slides with chin tuck neutral, YTB at wrists 3 x 8       Not performed today:  Low trap Level 1, 3 x 12 with 3" holds (cueing provided for form)   Quadruped Serratus rocks with Y, 2 x 12   Prone Press Up c Rot 2 x 15  Hand behind head L T-sp Rot 2 x 10  Prone A's 20 x 10" holds  Primal Position Serratus Quad Press 2 x 10 x 5"    Johanna participated in dynamic functional therapeutic activities to improve functional performance for 10 minutes, including:    UBE completed for 10 mins to increase ROM, endurance, and decrease pain to improve tolerance to ADLs and age-related activities    Education:  - POC/Prognosis   - Thoracic spine facet involvement  - Prolonged postures and work demands  - Activity modifications      Not performed today:  1/2 Kneel Unil CC Pulldown 5 x 12  Bent Over Unil Suitcase Row 5 x 12        Patient Education and Home Exercises      Education provided:   - HEP      Written Home Exercises Provided: yes. Exercises were reviewed and Johanna was able to demonstrate them prior to the end of the session.  Johanna demonstrated good  understanding of the education provided. See EMR under Patient Instructions for exercises provided during therapy sessions.    Assessment     Johanna had no pain today during assessment of thoracic and cervical spine motion. Continued to progress with thoracic mobility and periscapular strengthening and endurance. Also continues to progress with motor control of overhead GHJ dynamic control. Progressing well and educated on prolonged posture mindfulness.     Johanna Is progressing well towards her goals.   Pt prognosis is Good.     Pt will continue to benefit from skilled outpatient physical therapy to address the deficits listed in the problem list box on initial " evaluation, provide pt/family education and to maximize pt's level of independence in the home and community environment.     Pt's spiritual, cultural and educational needs considered and pt agreeable to plan of care and goals.    Anticipated barriers to physical therapy: work demands    Goals:  GOALS: Short Term Goals:  4 weeks  1.Report decreased left sided thoracic pain < / =  2/10  to increase tolerance for tolerating work activities and life demands.  2. Increase active ROM of thoracic spine to be 10-15% improved with left rotation and extension in order to show improvement in mobility.     3. Increased strength by 1/3 MMT grade in periscapular musculature and thoracic spine extensors to increase tolerance for ADL and work activities.  4. Pt to tolerate HEP to improve ROM and independence with ADL's     Long Term Goals: 8 weeks  1.Report decreased left sided thoracic spine pain  < / =  1/10  to increase tolerance for tolerating life demands and work demands such as overhead lifting and reaching.   2.Increase AROM of thoracic spine to be 100% pain-free and not limited with motion to show full functional motion improvement.   3.Increase strength to >/= 4/5 in periscapular musculature and thoracic spine extensors to increase tolerance for ADL and work activities.  4. Pt goal: be able to tolerate work demands of lifting overhead and picking up her daughter without pain in the shoulder/thoracic spine.   5. Pt will have improved score of >/= 77% on FOTO shoulder in order to demonstrate true functional improvement.     Plan     Continue per RAY Del Rosario, PT, DPT

## 2024-08-27 NOTE — PROGRESS NOTES
Physical Therapy Daily Treatment Note     Name: Johanna Ocasio  Clinic Number: [id number]    Therapy Diagnosis:   Encounter Diagnosis   Name Primary?    Chronic left-sided thoracic back pain Yes           Physician: Lorena Reinoso, *    Visit Date: 8/27/2024    Physician Orders: PT Eval and Treat   Medical Diagnosis from Referral:  Strain of left shoulder, subsequent encounter [S46.912D]   Evaluation Date: 7/16/2024  Authorization Period Expiration: 12/31/2024  Plan of Care Expiration: 9/16/2024  Progress Note Due: 8/16/2024     Visit # / Visits authorized: 20/20 (+ EVAL) *EMPLOYEE BENEFIT*   FOTO: 1/ 3     Precautions: Standard      Time In: 9:00 AM  Time Out: 9:55 PM  Total Billable Time: 55 minutes    Subjective     Pt reports:  no pain or adverse symptoms. She plans on continuing exercising at the gym. Patient notices her back is stronger and no longer has pain.     She was compliant with home exercise program.  Response to previous treatment: Fair  Functional change: Initial improvement, but lack of carry-over    Pain: 0/10  Location: left midscap region      Objective       Lumbar  Isometric Testing on Med X equipment: Testing administered by PT     Test Initial Baseline Midpoint Final   Date 05/30/24 6/27/2024 8/27/24   ROM 0-48 deg 0-51 deg  0-57 deg   Max Peak Torque 108  126   83   Min Peak Torque 13  28   14   Flex/Ext Ratio 8.3:1 4.5:1   5.93:1   % variance below normative data 56 (85% below at 0 deg) 51   -42%   % change from initial test N/A visit 1 33   29        OUTCOMES SELECTION:   Program Patient Outcome Measures     Oswestry Score:  12/50 = 24% disability   Midpoint score 4 /50= 8% disability   Discharge score 2/50 = 4% disability   AQoL Score:  3/36 = 8% disability  Discharge 2/36 = 5% disability         (-) Pain with T-sp Ext and R SB   (-) pain with T-sp EXT and L rotation    (-) pain with cervical spine all motions and quadrant motions    Treatment     Johanna received the treatments  "listed below:      Therapeutic exercises to develop strength, endurance, and ROM for 09 minutes, including:    Sidelying open books 2 x 20   Supine T spine extension over half foam roll at T3/T4 2 x 20     Johanna received the following manual therapy techniques: Joint mobilizations, Manual traction, Myofacial release, and Soft tissue Mobilization were applied to the: L midscap and t-sp for 11 minutes, including:    Upper t-sp P/A corkscrew Mobs  CT junction extension mobs, grades II/III/IV    Not performed:  L ribs 4-7 P/A Mobs with Rot and SB  Left C5 Cervical spine side glides, grades II/III      Johanna participated in neuromuscular re-education activities to improve: Balance, Coordination, Kinesthetic, Proprioception, and Posture for 27 minutes. The following activities were included:        8/27/2024     9:09 AM   [facility]   Visit Number 20   VAS Pain Rating 0   Extension in Lying 10   Cervical Flexion 57   Cervical Extension 0   Cervical Peak Torque 83 ft. lbs.   Ice - Z Lie (in min.) 5   '    Seated Low trap activation YTB 2 x 10 3" holds  Mid trap prone level 2, 3 x 12 with 3" holds  Low trap Level 1, 3 x 12 with 3" holds (cueing provided for form)   Robots with YTB and half foam roll with cueing for keeping chin tuck, 20x  Wall slides with chin tuck neutral, YTB at wrists 3 x 8       Not performed today:  Quadruped Serratus rocks with Y, 2 x 12   Prone Press Up c Rot 2 x 15  Hand behind head L T-sp Rot 2 x 10  Prone A's 20 x 10" holds  Primal Position Serratus Quad Press 2 x 10 x 5"    Johanna participated in dynamic functional therapeutic activities to improve functional performance for 08 minutes, including:    UBE completed for 8 mins to increase ROM, endurance, and decrease pain to improve tolerance to ADLs and age-related activities    Education:  - POC/Prognosis   - Thoracic spine facet involvement  - Prolonged postures and work demands  - Activity modifications      Not performed today:  1/2 " Kneel Unil CC Pulldown 5 x 12  Bent Over Unil Suitcase Row 5 x 12        Patient Education and Home Exercises      Education provided:   - HEP      Written Home Exercises Provided: yes. Exercises were reviewed and Johanna was able to demonstrate them prior to the end of the session.  Johanna demonstrated good  understanding of the education provided. See EMR under Patient Instructions for exercises provided during therapy sessions.    Assessment     Johanna had no pain today with thoracic or cervical spine motions. She agrees to discharge plans today due to overall improvement with functional mobility and reduced symptoms. Issued/reviewed fridge magnet with patient understanding. Noted reduction in objective findings on Lumbar MedX machine. Reduction in findings indicating possible BLE compensation on previous tests. However, patient presents with overall improved mobility, strength and stability based on subjective reports of back feeling stronger and no reports of pain.     Johanna Is progressing well towards her goals.   Pt prognosis is Good.     Pt will continue to benefit from skilled outpatient physical therapy to address the deficits listed in the problem list box on initial evaluation, provide pt/family education and to maximize pt's level of independence in the home and community environment.     Pt's spiritual, cultural and educational needs considered and pt agreeable to plan of care and goals.    Anticipated barriers to physical therapy: work demands    Goals:  GOALS: Short Term Goals:  4 weeks  1.Report decreased left sided thoracic pain < / =  2/10  to increase tolerance for tolerating work activities and life demands.  2. Increase active ROM of thoracic spine to be 10-15% improved with left rotation and extension in order to show improvement in mobility.     3. Increased strength by 1/3 MMT grade in periscapular musculature and thoracic spine extensors to increase tolerance for ADL and work activities.  4. Pt  to tolerate HEP to improve ROM and independence with ADL's     Long Term Goals: 8 weeks   1.Report decreased left sided thoracic spine pain  < / =  1/10  to increase tolerance for tolerating life demands and work demands such as overhead lifting and reaching. MET 8/27/24  2.Increase AROM of thoracic spine to be 100% pain-free and not limited with motion to show full functional motion improvement.    MET 8/27/24  3.Increase strength to >/= 4/5 in periscapular musculature and thoracic spine extensors to increase tolerance for ADL and work activities.  MET 8/27/24  4. Pt goal: be able to tolerate work demands of lifting overhead and picking up her daughter without pain in the shoulder/thoracic spine. MET 8/27/24   5. Pt will have improved score of >/= 77% on FOTO shoulder in order to demonstrate true functional improvement MET 8/27/24     Plan     Continue per POC    Alex Mcgee, SPT  Negin Farmer, PT, DPT  I attest that I was present for the entirety of the treatment and agree to the assessment mentioned above.

## 2024-08-30 ENCOUNTER — OFFICE VISIT (OUTPATIENT)
Dept: NEUROLOGY | Facility: CLINIC | Age: 39
End: 2024-08-30
Payer: COMMERCIAL

## 2024-08-30 DIAGNOSIS — G43.009 MIGRAINE WITHOUT AURA AND WITHOUT STATUS MIGRAINOSUS, NOT INTRACTABLE: ICD-10-CM

## 2024-08-30 RX ORDER — TOPIRAMATE 25 MG/1
25 TABLET ORAL DAILY
Qty: 30 TABLET | Refills: 5 | Status: SHIPPED | OUTPATIENT
Start: 2024-08-30 | End: 2025-02-26

## 2024-08-30 NOTE — PROGRESS NOTES
Established Patient     The patient location is: [place]  The chief complaint leading to consultation is: headache follow up visit    Visit type: audiovisual    Face to Face time with patient: 8 min  12 minutes of total time spent on the encounter, which includes face to face time and non-face to face time preparing to see the patient (eg, review of tests), Obtaining and/or reviewing separately obtained history, Documenting clinical information in the electronic or other health record, Independently interpreting results (not separately reported) and communicating results to the patient/family/caregiver, or Care coordination (not separately reported).     Each patient to whom he or she provides medical services by telemedicine is:  (1) informed of the relationship between the physician and patient and the respective role of any other health care provider with respect to management of the patient; and (2) notified that he or she may decline to receive medical services by telemedicine and may withdraw from such care at any time.    Notes:        SUBJECTIVE:  Patient ID: Johanna Ocasio   Chief Complaint: Headache    History of Present Illness:  Johanna Ocasio is a 39 y.o. 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       08/30/2024 - Interval History:  Ha's have been well controlled since last visit except this week which could be due to menstrual cycle, weather changes, or need of different pillow. Will continue current regimen at this time.   Plan: continue tpx 25mg/d, continue ubrelvy 50-100mg prn, track ha's, rtc 2-3 mo to discuss d/c of tpx    06/03/2024 - Interval History:  Since last visit, after decreasing tpx, ha's remained well controlled typically occurring 0-1 day a week around 3/30 days per wk on tpx 25mg/d. However, 2-3 wks ago, pt had an increase in ha's around the time of  weather changes and increased caffeine intake w/ ha's occurring daily that week. However, since that time, they have returned to occurring 1-2 days week. Ran out of tpx 2-3 days ago.   Plan: continue tpx 25mg/d, continue ubrelvy 50-100mg prn, track ha's, rtc 2-3 mo to discuss d/c of tpx    01/26/2024 - Interval History:  Ha's remain well controlled on current regimen. Would like to try weaning off tpx.   Plan: decrease tpx 25mg/d and msg in 1 mo w/ udpate, continue ubrelvy 50-100mg prn, rtc prn    10/27/2023 - Interval History:  Ha's have improved, are occurring 1-2 times a week w/ tpx at 50mg. Ubrelvy 50-100mg resolves ha's well. Has d/c'ed excedrin and caffeine. Is currently undergoing w/up w/ cardiology.   Plan: continue current reigmen, tpx 50mg, ubrelvy 50-100mg prn, rtc 3-6 mo    Recommendations made at last Office Visit on 8/16/23:  - Discussed symptoms appear to be consistent with migraines, discussed treatment options and patient agreed with the following plan:  - ppx - increase tpx 50mg/d  - abortive - try ubrelvy as pt cannot take triptans nor nsaids at this time 2/2 overuse and h/o palpitations  - nausea - continue zofran  - caffeine - d/c  - trouble w/ sleep - continue melatonin, mgmt per pcp  - med overuse - d/c excedrin  - risks, benefits, and potential side effects of tpx, ubrelvy, zofran discussed   - alternative treatment options offered   - importance of healthy diet, regular exercise and sleep hygiene in the treatment of headaches    - Start tracking headaches via Migraine Buddy bill on phone   - RTC in 2-3 mo     Treatments Tried:  Metoprolol 25mg  Verapamil - lightheadedness  Tpx- helps  Ubrelvy - helps  Excedrin   Triptans - avoid 2/2 pvcs and palpitations  Zofran 4mg    Current Medications:    Current Outpatient Medications:     albuterol (PROVENTIL/VENTOLIN HFA) 90 mcg/actuation inhaler, Inhale 2 puffs into the lungs every 6 (six) hours as needed for Wheezing. Rescue, Disp: 18 g, Rfl: 3     lifitegrast (XIIDRA) 5 % Dpet, Place 1 drop into both eyes 2 (two) times daily., Disp: 180 mL, Rfl: 0    metoprolol succinate (TOPROL-XL) 25 MG 24 hr tablet, Take 1 tablet by mouth once daily, Disp: 90 tablet, Rfl: 3    omeprazole (PRILOSEC) 20 MG capsule, Take 1 capsule (20 mg total) by mouth once daily., Disp: 30 capsule, Rfl: 11    ondansetron (ZOFRAN) 4 MG tablet, TAKE 1 TABLET BY MOUTH EVERY 8 HOURS AS NEEDED, Disp: 30 tablet, Rfl: 0    tiZANidine (ZANAFLEX) 2 MG tablet, TAKE 1 TABLET BY MOUTH EVERY 8 HOURS AS NEEDED FOR PAIN (Patient not taking: Reported on 7/2/2024), Disp: 20 tablet, Rfl: 0    topiramate (TOPAMAX) 25 MG tablet, Take 1 tablet (25 mg total) by mouth once daily., Disp: 30 tablet, Rfl: 5    ubrogepant (UBRELVY) 100 mg tablet, Take 1 tablet by mouth at the onset of a headache. May repeat based on response and tolerability after more than 2 hours if needed. Do not take more than 200mg in a 24 hour span., Disp: 16 tablet, Rfl: 11    VIOS AEROSOL DELIVERY SYSTEM Martha, USE AS DIRECTED (Patient not taking: Reported on 7/2/2024), Disp: , Rfl: 0    Review of Systems - as per HPI, otherwise a balanced 10 systems review is negative.    OBJECTIVE:  Vitals:  There were no vitals taken for this visit.     Physical Exam:  Constitutional: she appears well-developed and well-nourished. she is well groomed. NAD   HENT:    Head: Normocephalic and atraumatic  Eyes: Conjunctivae and EOM are normal  Musculoskeletal: Normal range of motion. No joint stiffness.   Psychiatric: Mood and affect are normal    Neuro: Patient is alert and oriented to person, place, and time. Language is intact and fluent. Speech is clear and fluent. Recent and remote memory are intact.  Normal attention and concentration.  Facial movement is symmetric. Moves all 4 extremities against gravity.      Review of Data:   Notes from neuro reviewed   Labs:  Lab Visit on 06/28/2024   Component Date Value Ref Range Status    Sodium 06/28/2024 140  136  - 145 mmol/L Final    Potassium 06/28/2024 4.1  3.5 - 5.1 mmol/L Final    Chloride 06/28/2024 109  95 - 110 mmol/L Final    CO2 06/28/2024 22 (L)  23 - 29 mmol/L Final    Glucose 06/28/2024 98  70 - 110 mg/dL Final    BUN 06/28/2024 12  6 - 20 mg/dL Final    Creatinine 06/28/2024 0.8  0.5 - 1.4 mg/dL Final    Calcium 06/28/2024 9.1  8.7 - 10.5 mg/dL Final    Total Protein 06/28/2024 7.6  6.0 - 8.4 g/dL Final    Albumin 06/28/2024 4.2  3.5 - 5.2 g/dL Final    Total Bilirubin 06/28/2024 1.0  0.1 - 1.0 mg/dL Final    Alkaline Phosphatase 06/28/2024 44 (L)  55 - 135 U/L Final    AST 06/28/2024 14  10 - 40 U/L Final    ALT 06/28/2024 13  10 - 44 U/L Final    eGFR 06/28/2024 >60.0  >60 mL/min/1.73 m^2 Final    Anion Gap 06/28/2024 9  8 - 16 mmol/L Final    WBC 06/28/2024 4.57  3.90 - 12.70 K/uL Final    RBC 06/28/2024 4.03  4.00 - 5.40 M/uL Final    Hemoglobin 06/28/2024 12.6  12.0 - 16.0 g/dL Final    Hematocrit 06/28/2024 37.6  37.0 - 48.5 % Final    MCV 06/28/2024 93  82 - 98 fL Final    MCH 06/28/2024 31.3 (H)  27.0 - 31.0 pg Final    MCHC 06/28/2024 33.5  32.0 - 36.0 g/dL Final    RDW 06/28/2024 12.9  11.5 - 14.5 % Final    Platelets 06/28/2024 229  150 - 450 K/uL Final    MPV 06/28/2024 10.0  9.2 - 12.9 fL Final    Immature Granulocytes 06/28/2024 0.0  0.0 - 0.5 % Final    Gran # (ANC) 06/28/2024 1.7 (L)  1.8 - 7.7 K/uL Final    Immature Grans (Abs) 06/28/2024 0.00  0.00 - 0.04 K/uL Final    Lymph # 06/28/2024 2.0  1.0 - 4.8 K/uL Final    Mono # 06/28/2024 0.5  0.3 - 1.0 K/uL Final    Eos # 06/28/2024 0.4  0.0 - 0.5 K/uL Final    Baso # 06/28/2024 0.05  0.00 - 0.20 K/uL Final    nRBC 06/28/2024 0  0 /100 WBC Final    Gran % 06/28/2024 37.0 (L)  38.0 - 73.0 % Final    Lymph % 06/28/2024 43.3  18.0 - 48.0 % Final    Mono % 06/28/2024 10.9  4.0 - 15.0 % Final    Eosinophil % 06/28/2024 7.7  0.0 - 8.0 % Final    Basophil % 06/28/2024 1.1  0.0 - 1.9 % Final    Differential Method 06/28/2024 Automated   Final     Cholesterol 06/28/2024 163  120 - 199 mg/dL Final    Triglycerides 06/28/2024 82  30 - 150 mg/dL Final    HDL 06/28/2024 54  40 - 75 mg/dL Final    LDL Cholesterol 06/28/2024 92.6  63.0 - 159.0 mg/dL Final    HDL/Cholesterol Ratio 06/28/2024 33.1  20.0 - 50.0 % Final    Total Cholesterol/HDL Ratio 06/28/2024 3.0  2.0 - 5.0 Final    Non-HDL Cholesterol 06/28/2024 109  mg/dL Final    Hemoglobin A1C 06/28/2024 5.1  4.0 - 5.6 % Final    Estimated Avg Glucose 06/28/2024 100  68 - 131 mg/dL Final    TSH 06/28/2024 2.314  0.400 - 4.000 uIU/mL Final    Magnesium 06/28/2024 2.0  1.6 - 2.6 mg/dL Final    Free T4 06/28/2024 1.02  0.71 - 1.51 ng/dL Final   Office Visit on 06/11/2024   Component Date Value Ref Range Status    QRS Duration 06/11/2024 88  ms Final    [facility] QTC Calculation 06/11/2024 461  ms Final     Imaging:  Results for orders placed or performed during the hospital encounter of 10/03/18   MRI Brain Without Contrast    Narrative    EXAMINATION:  MRI BRAIN WITHOUT CONTRAST    CLINICAL HISTORY:  venous angioma and severe headache; Hemangioma of intracranial structures    TECHNIQUE:  Multiplanar multisequence MR imaging of the brain was performed without contrast.    COMPARISON:  MRI 07/18/2006.    FINDINGS:  No acute infarction.  No intracranial hemorrhage.  No intra or extra-axial fluid collections.  Right cerebellar developmental venous anomaly again noted, not significantly changed when compared to the previous MRI.  No hydrocephalus.  No midline shift or mass effect.    There is an empty sella configuration.  Cerebellar tonsils are in their expected location.  Major intracranial T2 flow voids are present.    Trace mucosal membrane thickening noted within the left ethmoid air cells. Globes are symmetric. No marrow replacement process.      Impression    1. Right cerebellar developmental venous anomaly, not significantly changed when compared to the previous exam.  2. Empty sella.  3. No acute infarction or  intracranial hemorrhage.      Electronically signed by: Kai Tavarez MD  Date:    10/04/2018  Time:    00:34     Note: I have independently reviewed any/all imaging/labs/tests and agree with the report (s) as documented.  Any discrepancies will be as noted/demarcated by free text.  CORNEL QUIÑONEZ 8/30/2024    ASSESSMENT:  1. Migraine without aura and without status migrainosus, not intractable        PLAN:  - Discussed symptoms appear to be consistent with migraines, discussed treatment options and patient agreed with the following plan:  - ppx - continue tpx   - abortive - continue ubrelvy as pt cannot take triptans nor nsaids at this time 2/2 overuse and h/o palpitations  - nausea - continue zofran  - trouble w/ sleep - continue melatonin, mgmt per pcp  - Continue tracking headaches   - Discussed goals of therapy are to decrease the frequency, intensity, and duration of headaches  - RTC 2-3 mo    Orders Placed This Encounter    topiramate (TOPAMAX) 25 MG tablet           Discussed potential for teratogenicity with treatment, patient understands if her family planning status should change she will contact office immediately and we will safely adjust medications as needed.     Questions and concerns were sought and answered to the patient's stated verbal satisfaction.  The patient verbalizes understanding and agreement with the above stated treatment plan.     CC: Ivette Azul MD Sarena Patel, PA-C  [facility]   [phone]    Dr. Singh was available during today's encounter.       Visit today included increased complexity associated with the care of the episodic problem migraines addressed and managing the longitudinal care of the patient due to the serious and/or complex managed problem(s) migraines.

## 2024-09-01 ENCOUNTER — PATIENT MESSAGE (OUTPATIENT)
Dept: OBSTETRICS AND GYNECOLOGY | Facility: CLINIC | Age: 39
End: 2024-09-01
Payer: COMMERCIAL

## 2024-09-03 ENCOUNTER — CLINICAL SUPPORT (OUTPATIENT)
Dept: REHABILITATION | Facility: HOSPITAL | Age: 39
End: 2024-09-03
Payer: COMMERCIAL

## 2024-09-03 ENCOUNTER — OFFICE VISIT (OUTPATIENT)
Dept: OBSTETRICS AND GYNECOLOGY | Facility: CLINIC | Age: 39
End: 2024-09-03
Payer: COMMERCIAL

## 2024-09-03 ENCOUNTER — PATIENT MESSAGE (OUTPATIENT)
Dept: NEUROLOGY | Facility: CLINIC | Age: 39
End: 2024-09-03
Payer: COMMERCIAL

## 2024-09-03 VITALS
WEIGHT: 159.19 LBS | HEIGHT: 61 IN | DIASTOLIC BLOOD PRESSURE: 58 MMHG | BODY MASS INDEX: 30.06 KG/M2 | SYSTOLIC BLOOD PRESSURE: 117 MMHG

## 2024-09-03 DIAGNOSIS — G89.29 CHRONIC LEFT-SIDED THORACIC BACK PAIN: Primary | ICD-10-CM

## 2024-09-03 DIAGNOSIS — Z01.419 WELL WOMAN EXAM WITH ROUTINE GYNECOLOGICAL EXAM: Primary | ICD-10-CM

## 2024-09-03 DIAGNOSIS — M54.6 CHRONIC LEFT-SIDED THORACIC BACK PAIN: Primary | ICD-10-CM

## 2024-09-03 PROCEDURE — 97110 THERAPEUTIC EXERCISES: CPT

## 2024-09-03 PROCEDURE — 97112 NEUROMUSCULAR REEDUCATION: CPT

## 2024-09-03 PROCEDURE — 3078F DIAST BP <80 MM HG: CPT | Mod: CPTII,S$GLB,, | Performed by: OBSTETRICS & GYNECOLOGY

## 2024-09-03 PROCEDURE — 3044F HG A1C LEVEL LT 7.0%: CPT | Mod: CPTII,S$GLB,, | Performed by: OBSTETRICS & GYNECOLOGY

## 2024-09-03 PROCEDURE — 99999 PR PBB SHADOW E&M-EST. PATIENT-LVL III: CPT | Mod: PBBFAC,,, | Performed by: OBSTETRICS & GYNECOLOGY

## 2024-09-03 PROCEDURE — 99395 PREV VISIT EST AGE 18-39: CPT | Mod: S$GLB,,, | Performed by: OBSTETRICS & GYNECOLOGY

## 2024-09-03 PROCEDURE — 97140 MANUAL THERAPY 1/> REGIONS: CPT

## 2024-09-03 PROCEDURE — 3074F SYST BP LT 130 MM HG: CPT | Mod: CPTII,S$GLB,, | Performed by: OBSTETRICS & GYNECOLOGY

## 2024-09-03 PROCEDURE — 1159F MED LIST DOCD IN RCRD: CPT | Mod: CPTII,S$GLB,, | Performed by: OBSTETRICS & GYNECOLOGY

## 2024-09-03 PROCEDURE — 3008F BODY MASS INDEX DOCD: CPT | Mod: CPTII,S$GLB,, | Performed by: OBSTETRICS & GYNECOLOGY

## 2024-09-03 NOTE — PROGRESS NOTES
History & Physical  Gynecology      SUBJECTIVE:     Chief Complaint: Annual Exam (No complaints)       History of Present Illness:  Annual Exam-Premenopausal  Patient presents for annual exam. She has no complaints today. Menstrual cycles are monthly lasting 7 days.  She is sexually active. GYN screening history: last pap: approximate date 2022 paphpv and was normal. She participates in regular exercise: yes.  Smoking status:  no    Contraception: condoms; counseled about the option of an IUD for heavy bleeding    FH:  Breast cancer: neg  Colon cancer: neg  Ovarian cancer: neg    Review of patient's allergies indicates:  No Known Allergies    Past Medical History:   Diagnosis Date    Abnormal Pap smear of cervix     Colposcopy    Brain venous angioma 10/3/2018    Early childhood epilepsy, myoclonic     last seizure age 14; Venous hemangioma on MRI    History of migraine headaches 2016    Migraine headache      History reviewed. No pertinent surgical history.  OB History          2    Para   2    Term   1       1    AB        Living   2         SAB        IAB        Ectopic        Multiple   0    Live Births   2               Family History   Problem Relation Name Age of Onset    Hypertension Mother      Hypertension Father      Cataracts Maternal Grandfather      Diabetes Paternal Grandmother      Cancer Paternal Grandfather          colon cancer    Breast cancer Neg Hx      Colon cancer Neg Hx      Ovarian cancer Neg Hx      Melanoma Neg Hx      Lupus Neg Hx      Psoriasis Neg Hx       Social History     Tobacco Use    Smoking status: Never    Smokeless tobacco: Never   Substance Use Topics    Alcohol use: No     Comment: occ    Drug use: No       Current Outpatient Medications   Medication Sig    albuterol (PROVENTIL/VENTOLIN HFA) 90 mcg/actuation inhaler Inhale 2 puffs into the lungs every 6 (six) hours as needed for Wheezing. Rescue    lifitegrast (XIIDRA) 5 % Dpet Place 1 drop into both  eyes 2 (two) times daily.    metoprolol succinate (TOPROL-XL) 25 MG 24 hr tablet Take 1 tablet by mouth once daily    omeprazole (PRILOSEC) 20 MG capsule Take 1 capsule (20 mg total) by mouth once daily.    ondansetron (ZOFRAN) 4 MG tablet TAKE 1 TABLET BY MOUTH EVERY 8 HOURS AS NEEDED    topiramate (TOPAMAX) 25 MG tablet Take 1 tablet (25 mg total) by mouth once daily.    ubrogepant (UBRELVY) 100 mg tablet Take 1 tablet by mouth at the onset of a headache. May repeat based on response and tolerability after more than 2 hours if needed. Do not take more than 200mg in a 24 hour span.    tiZANidine (ZANAFLEX) 2 MG tablet TAKE 1 TABLET BY MOUTH EVERY 8 HOURS AS NEEDED FOR PAIN (Patient not taking: Reported on 7/2/2024)    [facility]  (Patient not taking: Reported on 9/3/2024)     No current facility-administered medications for this visit.         Review of Systems:  Review of Systems   Constitutional:  Negative for appetite change, fever and unexpected weight change.   Respiratory:  Negative for shortness of breath.    Cardiovascular:  Negative for chest pain.   Gastrointestinal:  Negative for nausea and vomiting.   Genitourinary:  Negative for bladder incontinence, menorrhagia, menstrual problem, pelvic pain, vaginal bleeding, vaginal discharge and vaginal pain.   Integumentary:  Negative for breast mass.   Breast: Negative for lump and mass       OBJECTIVE:     Physical Exam:  Physical Exam  Vitals and nursing note reviewed.   Constitutional:       Appearance: She is well-developed.   Cardiovascular:      Rate and Rhythm: Normal rate and regular rhythm.      Heart sounds: Normal heart sounds.   Pulmonary:      Effort: Pulmonary effort is normal.      Breath sounds: Normal breath sounds.   Chest:   Breasts:     Breasts are symmetrical.      Right: No inverted nipple, mass, nipple discharge, skin change or tenderness.      Left: No inverted nipple, mass, nipple discharge, skin change or  tenderness.   Abdominal:      Palpations: Abdomen is soft.   Genitourinary:     General: Normal vulva.      Labia:         Right: No rash, tenderness, lesion or injury.         Left: No rash, tenderness, lesion or injury.       Urethra: No prolapse, urethral pain, urethral swelling or urethral lesion.      Vagina: Normal. No signs of injury and foreign body. No vaginal discharge, erythema, tenderness or bleeding.      Cervix: No cervical motion tenderness, discharge or friability.      Uterus: Not deviated, not enlarged, not fixed and not tender.       Adnexa:         Right: No mass, tenderness or fullness.          Left: No mass, tenderness or fullness.        Rectum: No anal fissure or external hemorrhoid.      Comments: Urethral meatus: normal size, anterior vaginal wall with no prolapse, no lesions  Bladder: no fullness, masses or tenderness  Musculoskeletal:      Cervical back: Normal range of motion.   Neurological:      Mental Status: She is alert and oriented to person, place, and time.   Psychiatric:         Behavior: Behavior normal.         Thought Content: Thought content normal.         Judgment: Judgment normal.         Chaperoned by: n/a    ASSESSMENT:       ICD-10-CM ICD-9-CM    1. Well woman exam with routine gynecological exam  Z01.419 V72.31                Plan:      Johanna was seen today for annual exam.    Diagnoses and all orders for this visit:    Well woman exam with routine gynecological exam          No orders of the defined types were placed in this encounter.      Well Woman:  - Pap smear up to date  - Birth control: condoms- considering IUD  - GC/CT:n/a  - Mammogram: due age 41  - Smoking cessation: n/a  - Labs: none required   - Vaccines: none required  - Exercise counseling      Follow up in  one year for annual or prn.    Yennifer Garcia

## 2024-09-03 NOTE — PROGRESS NOTES
Physical Therapy Daily Treatment Note     Name: Johanna Ocasio  Clinic Number: [id number]    Therapy Diagnosis:   Encounter Diagnosis   Name Primary?    Chronic left-sided thoracic back pain Yes         Physician: Ivette Azul MD    Visit Date: 9/3/2024    Physician Orders: PT Eval and Treat   Medical Diagnosis from Referral:  Strain of left shoulder, subsequent encounter [S46.912D]   Evaluation Date: 7/16/2024  Authorization Period Expiration: 12/31/2024  Plan of Care Expiration: 9/16/2024  Progress Note Due: 8/16/2024     Visit # / Visits authorized: 7/20 (+ EVAL)  FOTO: 1/ 3     Precautions: Standard      Time In: 8:59 AM   Time Out: 10:00 AM  Total Billable Time: 61 minutes    Subjective     Pt reports:  feels like she aggravated it this weekend maybe sitting on the couch leaning to the right. Feels most pain with right rotation, on left mid back.     She was compliant with home exercise program.  Response to previous treatment: Fair  Functional change: Initial improvement, but lack of carry-over    Pain: 3/10  Location: left midscap region      Objective     (+) Pain with T-sp R SB   (+) Pain with T-sp R Rotation  (+) pain with T-sp EXT and L rotation    (-) pain with cervical spine all motions and quadrant motions    Treatment     Johanna received the treatments listed below:      Therapeutic exercises to develop strength, endurance, and ROM for 09 minutes, including:    Quadruped thoracic rotation 2 x 20 Bilat  Supine T spine extension over half foam roll at T3/T4 2 x 20     Johanna received the following manual therapy techniques: Joint mobilizations, Manual traction, Myofacial release, and Soft tissue Mobilization were applied to the: L midscap and t-sp for 09 minutes, including:    Upper t-sp P/A corkscrew Mobs  CT junction extension mobs, grades II/III/IV  Prone PA mobs grades III/IV at T4, T5, T6    Not performed:  L ribs 4-7 P/A Mobs with Rot and SB  Left C5 Cervical spine side glides, grades  "II/III      Johanna participated in neuromuscular re-education activities to improve: Balance, Coordination, Kinesthetic, Proprioception, and Posture for 43 minutes. The following activities were included:    Standing/seated Low trap activation YTB 2 x 15 3" holds  Low trap Level 1, 3 x 12 with 3" holds (cueing provided for form  Mid trap prone level 2 and level 1, 3 x 12 with 3" holds  Standing mid trap rows GTB 3 x 15 with 5" holds   Wall slides with chin tuck neutral, YTB at wrists 3 x 8       Not performed today:)   Quadruped Serratus rocks with Y, 2 x 12   Prone Press Up c Rot 2 x 15  Hand behind head L T-sp Rot 2 x 10  Prone A's 20 x 10" holds  Primal Position Serratus Quad Press 2 x 10 x 5"    Johanna participated in dynamic functional therapeutic activities to improve functional performance for 00 minutes, including:    UBE completed for 10 mins to increase ROM, endurance, and decrease pain to improve tolerance to ADLs and age-related activities    Education:  - POC/Prognosis   - Thoracic spine facet involvement  - Prolonged postures and work demands  - Activity modifications      Not performed today:  1/2 Kneel Unil CC Pulldown 5 x 12  Bent Over Unil Suitcase Row 5 x 12        Patient Education and Home Exercises      Education provided:   - HEP      Written Home Exercises Provided: yes. Exercises were reviewed and Johanna was able to demonstrate them prior to the end of the session.  Johanna demonstrated good  understanding of the education provided. See EMR under Patient Instructions for exercises provided during therapy sessions.    Assessment     Johanna had left sided thoracic facet region pain today with right sidebending, left rotation, and left rotation/left sidebend combination motion. Following mobility interventions, she was able to work on increasing periscapular and thoracic extensor endurance to further support her posture. Had a reduction in pain following manual interventions as well. Progressing well " and educated on prolonged posture mindfulness.     Johanna Is progressing well towards her goals.   Pt prognosis is Good.     Pt will continue to benefit from skilled outpatient physical therapy to address the deficits listed in the problem list box on initial evaluation, provide pt/family education and to maximize pt's level of independence in the home and community environment.     Pt's spiritual, cultural and educational needs considered and pt agreeable to plan of care and goals.    Anticipated barriers to physical therapy: work demands    Goals:  GOALS: Short Term Goals:  4 weeks  1.Report decreased left sided thoracic pain < / =  2/10  to increase tolerance for tolerating work activities and life demands.  2. Increase active ROM of thoracic spine to be 10-15% improved with left rotation and extension in order to show improvement in mobility.     3. Increased strength by 1/3 MMT grade in periscapular musculature and thoracic spine extensors to increase tolerance for ADL and work activities.  4. Pt to tolerate HEP to improve ROM and independence with ADL's     Long Term Goals: 8 weeks  1.Report decreased left sided thoracic spine pain  < / =  1/10  to increase tolerance for tolerating life demands and work demands such as overhead lifting and reaching.   2.Increase AROM of thoracic spine to be 100% pain-free and not limited with motion to show full functional motion improvement.   3.Increase strength to >/= 4/5 in periscapular musculature and thoracic spine extensors to increase tolerance for ADL and work activities.  4. Pt goal: be able to tolerate work demands of lifting overhead and picking up her daughter without pain in the shoulder/thoracic spine.   5. Pt will have improved score of >/= 77% on FOTO shoulder in order to demonstrate true functional improvement.     Plan     Continue per RAY Del Rosario, PT, DPT

## 2024-09-10 ENCOUNTER — CLINICAL SUPPORT (OUTPATIENT)
Dept: REHABILITATION | Facility: HOSPITAL | Age: 39
End: 2024-09-10
Payer: COMMERCIAL

## 2024-09-10 DIAGNOSIS — M54.6 CHRONIC LEFT-SIDED THORACIC BACK PAIN: Primary | ICD-10-CM

## 2024-09-10 DIAGNOSIS — G89.29 CHRONIC LEFT-SIDED THORACIC BACK PAIN: Primary | ICD-10-CM

## 2024-09-10 PROCEDURE — 97112 NEUROMUSCULAR REEDUCATION: CPT

## 2024-09-10 PROCEDURE — 97110 THERAPEUTIC EXERCISES: CPT

## 2024-09-10 PROCEDURE — 97140 MANUAL THERAPY 1/> REGIONS: CPT

## 2024-09-10 NOTE — PROGRESS NOTES
Physical Therapy Daily Treatment Note     Name: Johanna Ocasio  Clinic Number: [id number]    Therapy Diagnosis:   Encounter Diagnosis   Name Primary?    Chronic left-sided thoracic back pain Yes       Physician: Ivette Azul MD    Visit Date: 9/10/2024    Physician Orders: PT Eval and Treat   Medical Diagnosis from Referral:  Strain of left shoulder, subsequent encounter [S46.912D]   Evaluation Date: 7/16/2024  Authorization Period Expiration: 12/31/2024  Plan of Care Expiration: 9/16/2024  Progress Note Due: 8/16/2024     Visit # / Visits authorized: 8/20 (+ EVAL)  FOTO: 1/ 3     Precautions: Standard      Time In: 9:02 AM   Time Out: 10:00 AM  Total Billable Time: 55 minutes    Subjective     Pt reports:  feels like her back pain is a little bit improved but states she is still unsure if it is caused by her posture when sitting on the couch. States she has some difficulty remembering at the moment which positions cause her pain.     She was compliant with home exercise program.  Response to previous treatment: Fair  Functional change: Initial improvement, but lack of carry-over    Pain: 3/10  Location: left midscap region      Objective     (+) Pain with T-sp R SB   (+) Pain with T-sp L Rotation    Treatment     Johanna received the treatments listed below:      Therapeutic exercises to develop strength, endurance, and ROM for 08 minutes, including:    Quadruped thoracic rotation 2 x 20 Bilat  Supine T spine extension over half foam roll at T3/T4 2 x 20     Johanna received the following manual therapy techniques: Joint mobilizations, Manual traction, Myofacial release, and Soft tissue Mobilization were applied to the: L midscap and t-sp for 12  minutes, including:    Upper t-sp P/A corkscrew Mobs  CT junction extension mobs, grades II/III/IV  Prone PA mobs grades III/IV at T4, T5, T6  L ribs 4-7 P/A Mobs with Rot and SB  MWM for thoracic Left rotation    Not performed:  Left C5 Cervical spine side glides,  "grades II/III      Johanna participated in neuromuscular re-education activities to improve: Balance, Coordination, Kinesthetic, Proprioception, and Posture for 35 minutes. The following activities were included:    Thoracic UE extension in seated from table, 2 x 12   Standing/seated Low trap activation YTB 2 x 15 3" holds  Low trap Level 1, 3 x 12 with 3" holds (cueing provided for form  Mid trap prone level 2 and level 1, 3 x 12 with 3" holds  Wall slides with chin tuck neutral, YTB at wrists 3 x 8       Not performed today:  Standing mid trap rows GTB 3 x 15 with 5" holds   Quadruped Serratus rocks with Y, 2 x 12   Prone Press Up c Rot 2 x 15  Hand behind head L T-sp Rot 2 x 10  Prone A's 20 x 10" holds  Primal Position Serratus Quad Press 2 x 10 x 5"    Johanna participated in dynamic functional therapeutic activities to improve functional performance for 00 minutes, including:    UBE completed for 10 mins to increase ROM, endurance, and decrease pain to improve tolerance to ADLs and age-related activities    Education:  - POC/Prognosis   - Thoracic spine facet involvement  - Prolonged postures and work demands  - Activity modifications      Not performed today:  1/2 Kneel Unil CC Pulldown 5 x 12  Bent Over Unil Suitcase Row 5 x 12        Patient Education and Home Exercises      Education provided:   - HEP      Written Home Exercises Provided: yes. Exercises were reviewed and Johanna was able to demonstrate them prior to the end of the session.  Johanna demonstrated good  understanding of the education provided. See EMR under Patient Instructions for exercises provided during therapy sessions.    Assessment     Johanna demonstrated left sided thoracic facet/Rib region pain today with right sidebending and left rotation. Following mobility interventions, she was able to work on increasing periscapular and thoracic extensor endurance to further support her posture. Responded well to Mobs with movement for improving " rotation. Able to further progress with thoracic extensors however, has difficulty with coordination of some periscapular musculature regional movements. Educated patient on continued mindfulness for aggravating postural positions outside of therapy.     Johanna Is progressing well towards her goals.   Pt prognosis is Good.     Pt will continue to benefit from skilled outpatient physical therapy to address the deficits listed in the problem list box on initial evaluation, provide pt/family education and to maximize pt's level of independence in the home and community environment.     Pt's spiritual, cultural and educational needs considered and pt agreeable to plan of care and goals.    Anticipated barriers to physical therapy: work demands    Goals:  GOALS: Short Term Goals:  4 weeks  1.Report decreased left sided thoracic pain < / =  2/10  to increase tolerance for tolerating work activities and life demands.  2. Increase active ROM of thoracic spine to be 10-15% improved with left rotation and extension in order to show improvement in mobility.     3. Increased strength by 1/3 MMT grade in periscapular musculature and thoracic spine extensors to increase tolerance for ADL and work activities.  4. Pt to tolerate HEP to improve ROM and independence with ADL's     Long Term Goals: 8 weeks  1.Report decreased left sided thoracic spine pain  < / =  1/10  to increase tolerance for tolerating life demands and work demands such as overhead lifting and reaching.   2.Increase AROM of thoracic spine to be 100% pain-free and not limited with motion to show full functional motion improvement.   3.Increase strength to >/= 4/5 in periscapular musculature and thoracic spine extensors to increase tolerance for ADL and work activities.  4. Pt goal: be able to tolerate work demands of lifting overhead and picking up her daughter without pain in the shoulder/thoracic spine.   5. Pt will have improved score of >/= 77% on FOTO shoulder  in order to demonstrate true functional improvement.     Plan     Continue per POC    Alex Del Rosario, PT, DPT

## 2024-09-23 RX ORDER — MELOXICAM 7.5 MG/1
7.5 TABLET ORAL
Qty: 28 TABLET | Refills: 0 | Status: SHIPPED | OUTPATIENT
Start: 2024-09-23

## 2024-09-24 ENCOUNTER — CLINICAL SUPPORT (OUTPATIENT)
Dept: REHABILITATION | Facility: HOSPITAL | Age: 39
End: 2024-09-24
Payer: COMMERCIAL

## 2024-09-24 DIAGNOSIS — M54.6 CHRONIC LEFT-SIDED THORACIC BACK PAIN: Primary | ICD-10-CM

## 2024-09-24 DIAGNOSIS — G89.29 CHRONIC LEFT-SIDED THORACIC BACK PAIN: Primary | ICD-10-CM

## 2024-09-24 PROCEDURE — 97112 NEUROMUSCULAR REEDUCATION: CPT

## 2024-09-24 PROCEDURE — 97140 MANUAL THERAPY 1/> REGIONS: CPT

## 2024-09-24 PROCEDURE — 97530 THERAPEUTIC ACTIVITIES: CPT

## 2024-09-24 NOTE — PROGRESS NOTES
Physical Therapy Daily Treatment Note and Updated Plan of Care      Name: Johanna Ocasio  Clinic Number: [id number]    Therapy Diagnosis:   Encounter Diagnosis   Name Primary?    Chronic left-sided thoracic back pain Yes         Physician: Ivette Azul MD    Visit Date: 9/24/2024    Physician Orders: PT Eval and Treat   Medical Diagnosis from Referral:  Strain of left shoulder, subsequent encounter [S46.912D]   Evaluation Date: 7/16/2024  Authorization Period Expiration: 12/31/2024  Plan of Care Expiration:  update to 10/24/2024      Visit # / Visits authorized: 9/20 (+ EVAL)  FOTO: 1/ 3     Precautions: Standard      Time In: 9:03 AM   Time Out: 9:58 AM  Total Billable Time: 55 minutes    Subjective     Pt reports:  feels like her back pain is a little bit improved since last time. Feels better when she wears her posture support.     She was compliant with home exercise program.  Response to previous treatment: Fair  Functional change: Initial improvement, but lack of carry-over    Pain: 1/10  Location: left midscap region      Objective     (+) Left sided Pain with T-sp R SB     Treatment     Johanna received the treatments listed below:      Therapeutic exercises to develop strength, endurance, and ROM for 08 minutes, including:    Sidelying thoracic rotation 2 x 20 Bilat  Seated thoracic extension, 2 x 15     Johanna received the following manual therapy techniques: Joint mobilizations, Manual traction, Myofacial release, and Soft tissue Mobilization were applied to the: L midscap and t-sp for 12  minutes, including:    Upper t-sp P/A corkscrew Mobs  Seated assisted CT junction extension mobs, grades II/III/IV  Prone PA mobs grades III/IV at T4, T5, T6  L ribs 4-7 P/A Mobs with Rot and SB  MWM for thoracic Left rotation    Not performed:  Left C5 Cervical spine side glides, grades II/III      Johanna participated in neuromuscular re-education activities to improve: Balance, Coordination, Kinesthetic,  "Proprioception, and Posture for 25 minutes. The following activities were included:    Robots with YTB at wrists, chin tuck hold against wall, 3 x 8   Standing/seated Low trap activation YTB 2 x 15 3" holds  Low trap Level 2, 3 x 12 with 3" holds (cueing provided for form  Mid trap prone level 2, 3 x 12 with 3" holds  Wall slides with chin tuck neutral, YTB at wrists 3 x 8       Not performed today:  Standing mid trap rows GTB 3 x 15 with 5" holds   Quadruped Serratus rocks with Y, 2 x 12   Prone Press Up c Rot 2 x 15  Hand behind head L T-sp Rot 2 x 10  Prone A's 20 x 10" holds  Primal Position Serratus Quad Press 2 x 10 x 5"    Johanna participated in dynamic functional therapeutic activities to improve functional performance for 10 minutes, including:    UBE completed for 10 mins to increase ROM, endurance, and decrease pain to improve tolerance to ADLs and age-related activities    Education:  - POC/Prognosis   - Thoracic spine facet involvement  - Prolonged postures and work demands  - Activity modifications      Not performed today:  1/2 Kneel Unil CC Pulldown 5 x 12  Bent Over Unil Suitcase Row 5 x 12        Patient Education and Home Exercises      Education provided:   - HEP      Written Home Exercises Provided: yes. Exercises were reviewed and Johanna was able to demonstrate them prior to the end of the session.  Johanna demonstrated good  understanding of the education provided. See EMR under Patient Instructions for exercises provided during therapy sessions.    Assessment     Johanna demonstrated left sided thoracic facet/Rib region pain today with right side bending of her thoracic spine. She has shown improvement in recent visits and notes overall subjective improvement as she works through thoracic range of motion and periscapular stability to support this posture. Plan of care to be extended 4 weeks to continue working on postural stability. Educated patient on continued mindfulness for aggravating postural " positions outside of therapy.     Johanna Is progressing well towards her goals.   Pt prognosis is Good.     Pt will continue to benefit from skilled outpatient physical therapy to address the deficits listed in the problem list box on initial evaluation, provide pt/family education and to maximize pt's level of independence in the home and community environment.     Pt's spiritual, cultural and educational needs considered and pt agreeable to plan of care and goals.    Anticipated barriers to physical therapy: work demands    Goals:  GOALS: Short Term Goals:  4 weeks  1.Report decreased left sided thoracic pain < / =  2/10  to increase tolerance for tolerating work activities and life demands. - MET  2. Increase active ROM of thoracic spine to be 10-15% improved with left rotation and extension in order to show improvement in mobility.    MET  3. Increased strength by 1/3 MMT grade in periscapular musculature and thoracic spine extensors to increase tolerance for ADL and work activities. MET  4. Pt to tolerate HEP to improve ROM and independence with ADL's MET     Long Term Goals: 8 weeks  1.Report decreased left sided thoracic spine pain  < / =  1/10  to increase tolerance for tolerating life demands and work demands such as overhead lifting and reaching.  - Progressing, Not Met   2.Increase AROM of thoracic spine to be 100% pain-free and not limited with motion to show full functional motion improvement.    - Progressing, Not Met   3.Increase strength to >/= 4/5 in periscapular musculature and thoracic spine extensors to increase tolerance for ADL and work activities.   - Progressing, Not Met   4. Pt goal: be able to tolerate work demands of lifting overhead and picking up her daughter without pain in the shoulder/thoracic spine.    - Progressing, Not Met   5. Pt will have improved score of >/= 77% on FOTO shoulder in order to demonstrate true functional improvement.    - Progressing, Not Met     Updated Plan of  Care     Plan of Care:  9/24/2024 to 10/24/2024     Alex Del Rosario PT, DPT

## 2024-10-01 ENCOUNTER — CLINICAL SUPPORT (OUTPATIENT)
Dept: REHABILITATION | Facility: HOSPITAL | Age: 39
End: 2024-10-01
Payer: COMMERCIAL

## 2024-10-01 DIAGNOSIS — M54.6 CHRONIC LEFT-SIDED THORACIC BACK PAIN: Primary | ICD-10-CM

## 2024-10-01 DIAGNOSIS — G89.29 CHRONIC LEFT-SIDED THORACIC BACK PAIN: Primary | ICD-10-CM

## 2024-10-01 PROCEDURE — 97112 NEUROMUSCULAR REEDUCATION: CPT | Performed by: PHYSICAL THERAPIST

## 2024-10-01 PROCEDURE — 97530 THERAPEUTIC ACTIVITIES: CPT | Performed by: PHYSICAL THERAPIST

## 2024-10-04 RX ORDER — LIFITEGRAST 50 MG/ML
1 SOLUTION/ DROPS OPHTHALMIC 2 TIMES DAILY
Qty: 180 ML | Refills: 0 | Status: SHIPPED | OUTPATIENT
Start: 2024-10-04

## 2024-10-04 NOTE — TELEPHONE ENCOUNTER
Refill Routing Note   Medication(s) are not appropriate for processing by [facility] for the following reason(s):        Outside of protocol    [facility] action(s):  Route               Appointments  past 12m or future 3m with PCP    Date Provider   Last Visit   9/3/2024 Yennifer Garcia MD   Next Visit   Visit date not found Yennifer Garcia MD   ED visits in past 90 days: 0        Note composed:11:53 AM 10/04/2024

## 2024-10-04 NOTE — PROGRESS NOTES
Physical Therapy Daily Treatment Note and Updated Plan of Care      Name: Johanna Ocasio  Clinic Number: [id number]    Therapy Diagnosis:   Encounter Diagnosis   Name Primary?    Chronic left-sided thoracic back pain Yes     Physician: Ivette Azul MD    Visit Date: 10/1/2024    Physician Orders: PT Eval and Treat   Medical Diagnosis from Referral:  Strain of left shoulder, subsequent encounter [S46.912D]   Evaluation Date: 7/16/2024  Authorization Period Expiration: 12/31/2024  Plan of Care Expiration:  update to 10/24/2024      Visit # / Visits authorized: 10/20 (+ EVAL)  FOTO: 1/ 3     Precautions: Standard      Time In: 1000  Time Out: 1105  Total Billable Time: 65 minutes    Subjective     Pt reports: symptom behavior continues to wax and wane each day, but endorses it feels better with active activation of posterior upper back.  She was compliant with home exercise program.  Response to previous treatment: Fair  Functional change: Bouts of sx-free behavior    Pain: 1/10  Location: left midscap region      Objective     (+) Left sided Pain with T-sp R SB     Treatment     Johanna received the treatments listed below:      Therapeutic exercises to develop strength, endurance, and ROM for 00 minutes, including:  NP:  Sidelying thoracic rotation 2 x 20 Bilat  Seated thoracic extension, 2 x 15     Johanna received the following manual therapy techniques: Joint mobilizations, Manual traction, Myofacial release, and Soft tissue Mobilization were applied to the: L midscap and t-sp for 0  minutes, including:  NP:  Upper t-sp P/A corkscrew Mobs  Seated assisted CT junction extension mobs, grades II/III/IV  Prone PA mobs grades III/IV at T4, T5, T6  L ribs 4-7 P/A Mobs with Rot and SB  MWM for thoracic Left rotation    Not performed:  Left C5 Cervical spine side glides, grades II/III      Johanna participated in neuromuscular re-education activities to improve: Balance, Coordination, Kinesthetic, Proprioception, and  "Posture for 25 minutes. The following activities were included:    Robots with YTB at wrists, chin tuck hold against wall, 3 x 8   Standing/seated Low trap activation YTB 2 x 15 3" holds  Low trap Level 2, 3 x 12 with 3" holds (cueing provided for form  Mid trap prone level 2, 3 x 12 with 3" holds  Wall slides with chin tuck neutral, YTB at wrists 3 x 8       Not performed today:  Standing mid trap rows GTB 3 x 15 with 5" holds   Quadruped Serratus rocks with Y, 2 x 12   Prone Press Up c Rot 2 x 15  Hand behind head L T-sp Rot 2 x 10  Prone A's 20 x 10" holds  Primal Position Serratus Quad Press 2 x 10 x 5"    Johanna participated in dynamic functional therapeutic activities to improve functional performance for 40 minutes, including:    UBE completed for 10 mins to increase ROM, endurance, and decrease pain to improve tolerance to ADLs and age-related activities  DD>Plank Taps 3 x 15  SB T's and Y's 2# 4 x 15 ea    Education:  - POC/Prognosis   - Thoracic spine facet involvement  - Prolonged postures and work demands  - Activity modifications      Not performed today:  1/2 Kneel Unil CC Pulldown 5 x 12  Bent Over Unil Suitcase Row 5 x 12        Patient Education and Home Exercises      Education provided:   - HEP      Written Home Exercises Provided: yes. Exercises were reviewed and Johanna was able to demonstrate them prior to the end of the session.  Johanna demonstrated good  understanding of the education provided. See EMR under Patient Instructions for exercises provided during therapy sessions.    Assessment     Continue to suspect underlying L-sided rib hypermobility with subsequent tightening surrounding tissue. Emphasis remained on active interventions with progression of anti-gravity periscapular demands. Encourage carry-over of HEP outside of the clinic.    Johanna Is progressing well towards her goals.   Pt prognosis is Good.     Pt will continue to benefit from skilled outpatient physical therapy to address " the deficits listed in the problem list box on initial evaluation, provide pt/family education and to maximize pt's level of independence in the home and community environment.     Pt's spiritual, cultural and educational needs considered and pt agreeable to plan of care and goals.    Anticipated barriers to physical therapy: work demands    Goals:  GOALS: Short Term Goals:  4 weeks  1.Report decreased left sided thoracic pain < / =  2/10  to increase tolerance for tolerating work activities and life demands. - MET  2. Increase active ROM of thoracic spine to be 10-15% improved with left rotation and extension in order to show improvement in mobility.    MET  3. Increased strength by 1/3 MMT grade in periscapular musculature and thoracic spine extensors to increase tolerance for ADL and work activities. MET  4. Pt to tolerate HEP to improve ROM and independence with ADL's MET     Long Term Goals: 8 weeks  1.Report decreased left sided thoracic spine pain  < / =  1/10  to increase tolerance for tolerating life demands and work demands such as overhead lifting and reaching.  - Progressing, Not Met   2.Increase AROM of thoracic spine to be 100% pain-free and not limited with motion to show full functional motion improvement.    - Progressing, Not Met   3.Increase strength to >/= 4/5 in periscapular musculature and thoracic spine extensors to increase tolerance for ADL and work activities.   - Progressing, Not Met   4. Pt goal: be able to tolerate work demands of lifting overhead and picking up her daughter without pain in the shoulder/thoracic spine.    - Progressing, Not Met   5. Pt will have improved score of >/= 77% on FOTO shoulder in order to demonstrate true functional improvement.    - Progressing, Not Met     Updated Plan of Care     Plan of Care:  9/24/2024 to 10/24/2024     Kofi Morillo, PT, DPT, DPT

## 2024-10-08 RX ORDER — ONDANSETRON 4 MG/1
TABLET, FILM COATED ORAL
Qty: 30 TABLET | Refills: 0 | Status: SHIPPED | OUTPATIENT
Start: 2024-10-08

## 2024-10-21 ENCOUNTER — PATIENT MESSAGE (OUTPATIENT)
Dept: REHABILITATION | Facility: HOSPITAL | Age: 39
End: 2024-10-21
Payer: COMMERCIAL

## 2024-10-29 ENCOUNTER — CLINICAL SUPPORT (OUTPATIENT)
Dept: REHABILITATION | Facility: HOSPITAL | Age: 39
End: 2024-10-29
Payer: COMMERCIAL

## 2024-10-29 DIAGNOSIS — G89.29 CHRONIC LEFT-SIDED THORACIC BACK PAIN: Primary | ICD-10-CM

## 2024-10-29 DIAGNOSIS — M54.6 CHRONIC LEFT-SIDED THORACIC BACK PAIN: Primary | ICD-10-CM

## 2024-10-29 PROCEDURE — 97112 NEUROMUSCULAR REEDUCATION: CPT | Performed by: PHYSICAL THERAPIST

## 2024-10-29 PROCEDURE — 97530 THERAPEUTIC ACTIVITIES: CPT | Performed by: PHYSICAL THERAPIST

## 2024-10-30 RX ORDER — OMEPRAZOLE 20 MG/1
20 CAPSULE, DELAYED RELEASE ORAL
Qty: 90 CAPSULE | Refills: 2 | Status: SHIPPED | OUTPATIENT
Start: 2024-10-30

## 2024-11-22 ENCOUNTER — PATIENT MESSAGE (OUTPATIENT)
Dept: NEUROLOGY | Facility: CLINIC | Age: 39
End: 2024-11-22
Payer: COMMERCIAL

## 2024-12-03 NOTE — PROGRESS NOTES
Biometrics completed.    Results reviewed with a Registered Nurse; understanding of results and   educational materials was verbalized.   I was called to the ICU as Mr. Diaz was getting up to leave against medical advice. He is in the ICU currently for severe hyponatremia and alcohol withdrawal. He had rapid correction of his sodium and was placed in the ICU and we were able to correct this overcorrection. He is clearly still in alcohol withdrawal, but is Aox3. I asked if there was a reason why he wanted to leave [place] and he states his mother is on hospice and he wants to go be with her. I did ask if other family was with her and if I could call and check on her and he reports it will not matter he is leaving. I did explain that his sodium could worsen or if alcohol withdrawal could worsen and he could fall, or become much sicker requiring hospitalization again or even death. He reports understanding these complications and reports he is leaving. He reports he will call an [employer]. He has his clothes on and has signed the [place] paperwork and walked out of the ICU on his on accord.

## 2024-12-10 NOTE — TELEPHONE ENCOUNTER
Refill Routing Note   Medication(s) are not appropriate for processing by [facility] for the following reason(s):        Outside of protocol    [facility] action(s):  Route               Appointments  past 12m or future 3m with PCP    Date Provider   Last Visit   9/3/2024 Yennifer Garcia MD   Next Visit   Visit date not found Yennifer Garcia MD   ED visits in past 90 days: 0        Note composed:9:52 PM 12/09/2024

## 2024-12-11 RX ORDER — ONDANSETRON 4 MG/1
TABLET, FILM COATED ORAL
Qty: 30 TABLET | Refills: 0 | Status: SHIPPED | OUTPATIENT
Start: 2024-12-11

## 2025-02-04 ENCOUNTER — OFFICE VISIT (OUTPATIENT)
Dept: DERMATOLOGY | Facility: CLINIC | Age: 40
End: 2025-02-04
Payer: COMMERCIAL

## 2025-02-04 ENCOUNTER — LAB VISIT (OUTPATIENT)
Dept: LAB | Facility: HOSPITAL | Age: 40
End: 2025-02-04
Attending: STUDENT IN AN ORGANIZED HEALTH CARE EDUCATION/TRAINING PROGRAM
Payer: COMMERCIAL

## 2025-02-04 DIAGNOSIS — L64.9 ANDROGENETIC ALOPECIA: Primary | ICD-10-CM

## 2025-02-04 DIAGNOSIS — E55.9 VITAMIN D INSUFFICIENCY: ICD-10-CM

## 2025-02-04 DIAGNOSIS — L64.9 ANDROGENETIC ALOPECIA: ICD-10-CM

## 2025-02-04 LAB
25(OH)D3+25(OH)D2 SERPL-MCNC: 41 NG/ML (ref 30–96)
FERRITIN SERPL-MCNC: 24 NG/ML (ref 20–300)

## 2025-02-04 PROCEDURE — 1160F RVW MEDS BY RX/DR IN RCRD: CPT | Mod: CPTII,S$GLB,, | Performed by: STUDENT IN AN ORGANIZED HEALTH CARE EDUCATION/TRAINING PROGRAM

## 2025-02-04 PROCEDURE — 99999 PR PBB SHADOW E&M-EST. PATIENT-LVL III: CPT | Mod: PBBFAC,,, | Performed by: STUDENT IN AN ORGANIZED HEALTH CARE EDUCATION/TRAINING PROGRAM

## 2025-02-04 PROCEDURE — 1159F MED LIST DOCD IN RCRD: CPT | Mod: CPTII,S$GLB,, | Performed by: STUDENT IN AN ORGANIZED HEALTH CARE EDUCATION/TRAINING PROGRAM

## 2025-02-04 PROCEDURE — 82728 ASSAY OF FERRITIN: CPT | Performed by: STUDENT IN AN ORGANIZED HEALTH CARE EDUCATION/TRAINING PROGRAM

## 2025-02-04 PROCEDURE — 99214 OFFICE O/P EST MOD 30 MIN: CPT | Mod: S$GLB,,, | Performed by: STUDENT IN AN ORGANIZED HEALTH CARE EDUCATION/TRAINING PROGRAM

## 2025-02-04 PROCEDURE — 36415 COLL VENOUS BLD VENIPUNCTURE: CPT | Performed by: STUDENT IN AN ORGANIZED HEALTH CARE EDUCATION/TRAINING PROGRAM

## 2025-02-04 PROCEDURE — 82306 VITAMIN D 25 HYDROXY: CPT | Performed by: STUDENT IN AN ORGANIZED HEALTH CARE EDUCATION/TRAINING PROGRAM

## 2025-02-04 RX ORDER — KETOCONAZOLE 20 MG/ML
SHAMPOO, SUSPENSION TOPICAL
Qty: 120 ML | Refills: 11 | Status: SHIPPED | OUTPATIENT
Start: 2025-02-04

## 2025-02-04 RX ORDER — SPIRONOLACTONE 50 MG/1
50 TABLET, FILM COATED ORAL DAILY
Qty: 30 TABLET | Refills: 11 | Status: SHIPPED | OUTPATIENT
Start: 2025-02-04 | End: 2026-02-04

## 2025-02-04 NOTE — PROGRESS NOTES
"  Subjective:      Patient ID:  Johanna Ocasio is a 39 y.o. female who presents for   Chief Complaint   Patient presents with    Hair Loss     Scalp        39 y.o. female presents today for hair loss.    Last office visit with Dr. Live on 02/14/2023 for onychodystrophy which has resolved.     1. Hair loss  Duration: + 10 yr   Itching (scale 1-10): 0  Current tx: voluminous shampoo    Prior tx: Rogaine foam ( tried 10 yrs ago)   Personal or family history of autoimmune disease (i.e. thyroid, lupus): none.    Notes family history of hair thinning for women in family   Reports history of palpitations on Toprol-XL  Has two daughters, one has genetic condition, no plans for future pregnancy    Pertinent labs:  Lab Results       Component                Value               Date                       TSH                      2.314               06/28/2024            Lab Results       Component                Value               Date                       WBC                      4.57                06/28/2024                 HGB                      12.6                06/28/2024                 HCT                      37.6                06/28/2024                 MCV                      93                  06/28/2024                 PLT                      229                 06/28/2024              No results found for: "UIBC", "IRON", "TRANS", "TRANSFERRIN", "TIBC", "LABIRON", "FESATURATED"   Lab Results       Component                Value               Date                       [id number]             48                  09/21/2010                  Review of Systems   Constitutional:  Negative for fever.   HENT:  Negative for mouth sores.    Respiratory:  Negative for cough.      Objective:   Physical Exam   Constitutional: She appears well-developed and well-nourished. No distress.   Neurological: She is alert and oriented to person, place, and time. She is not disoriented.   Psychiatric: She has a normal " mood and affect.      Diagram Legend     Erythematous scaling macule/papule c/w actinic keratosis       Vascular papule c/w angioma      Pigmented verrucoid papule/plaque c/w seborrheic keratosis      Yellow umbilicated papule c/w sebaceous hyperplasia      Irregularly shaped tan macule c/w lentigo     1-2 mm smooth white papules consistent with Milia      Movable subcutaneous cyst with punctum c/w epidermal inclusion cyst      Subcutaneous movable cyst c/w pilar cyst      Firm pink to brown papule c/w dermatofibroma      Pedunculated fleshy papule(s) c/w skin tag(s)      Evenly pigmented macule c/w junctional nevus     Mildly variegated pigmented, slightly irregular-bordered macule c/w mildly atypical nevus      Flesh colored to evenly pigmented papule c/w intradermal nevus       Pink pearly papule/plaque c/w basal cell carcinoma      Erythematous hyperkeratotic cursted plaque c/w SCC      Surgical scar with no sign of skin cancer recurrence      Open and closed comedones      Inflammatory papules and pustules      Verrucoid papule consistent consistent with wart     Erythematous eczematous patches and plaques     Dystrophic onycholytic nail with subungual debris c/w onychomycosis     Umbilicated papule    Erythematous-base heme-crusted tan verrucoid plaque consistent with inflamed seborrheic keratosis     Erythematous Silvery Scaling Plaque c/w Psoriasis     See annotation                  Assessment / Plan:      Androgenetic alopecia  Part line/vertex thinning   Miniaturization of hair follicles under dermoscopy  Status: chronic condition flaring and/or not at treatment goal  - Reviewed natural history and etiology of condition. This is a form of hair loss mediated by dihydrotestosterone, a form of testosterone that induces miniaturization (thinning) of hair follicles in androgen-dependent areas creating a classic pattern of hair loss for men or women. This hair loss often has a genetic basis, and patients may  have a family history of early hair loss or thinning.  - This condition is progressive over time. Goals of treatment are mostly aimed at stopping further progression, and hair may sometimes regrow but not always   - Reviewed treatment options including topical and oral minoxidil, oral spironolactone, oral finasteride for post-menopausal women, procedures PRP, hair transplantation, over the counter supplements (Nutrafol, viviscal), low level laser light therapy    Plan:  Hx palpitations- would check with cardiologist prior to LDOM  - Start topical minoxidil 5% foam or solution (over the counter drug) once daily. In first 4 weeks may have paradoxical increased shedding, best to continue treatment. A good trial of treatment will be for at least 6 months. Treatment needs to be continued in order to maintain results.  - Start spironolactone 50 mg QD. Discussed benefits and risks of therapy including but not limited to breakthrough bleeding, breast tenderness, and elevated potassium levels. Patient should limit potassium intake - avoid potassium supplements or salt substitutes, limit bananas and citrus fruits. Pregnancy must be avoided while taking spironolactone.  - Start ketoconazole shampoo at least BIW, leave on scalp 2-3 minutes prior to rinsing  - check vitamin d, ferritin    RTC 6 months, sooner prn

## 2025-02-04 NOTE — PATIENT INSTRUCTIONS
- Minoxidil 5% solution or foam- daily to scalp- [facility], [employer], [place]  - Ketoconazole shampoo 2 x weekly  - Spironolactone 50 mg daily    Viviscal Professional Hair Growth Supplement  Cost: $43 for 60 tablets  Ingredients    Vitamin C (from Acerola Powder and as Ascorbic Acid), Biotin, AminoMar Marine Complex, Shark Powder, Mollusc Powder, Apple Extract Powder, Procyanidin B-2, L-Cystine, L-Methionine, Microcrystalline Cellulose, Maltodextrin, Hydroxypropyl Methyl Cellulose, Magnesium Stearate, Silicon Dioxide, Artificial Orange Flavoring, Modified Starch, Glycerol.    Allergy advice: Contains fish and shellfish, not recommended for those allergic to fish, shellfish, or seafood.    Direction for use  Recommended daily intake: 2 tablets  Use daily for a minimum of 3-6 months  Take 1 tablet in the morning and 1 tablet in the evening (with water, after eating)  Afterwards take 1-2 tablets daily as required to maintain healthy hair growth      Nutrafol Hair Growth Supplement  Cost: $68-88 for 120 capsules    Ingredients  Hydrolyzed fish collagen, ashwagandha, hyaluronic acid, bioperine, biotin, saw palmetto, amino acid blend    Allergy advice: Claims to be free from shellfish and wheat    Be cautious when using in patients on anti-platelet medications (Saw Palmetto is in ingredients)    Direction for use  Recommended daily intake: 4 capsules daily with a meal (2 per day is sufficient for most)  Use daily for a minimum of 3-6 months  Afterwards take 1-2 tablets daily as required to maintain healthy hair growth

## 2025-02-07 DIAGNOSIS — G43.009 MIGRAINE WITHOUT AURA AND WITHOUT STATUS MIGRAINOSUS, NOT INTRACTABLE: ICD-10-CM

## 2025-02-07 RX ORDER — UBROGEPANT 100 MG/1
TABLET ORAL
Qty: 16 TABLET | Refills: 5 | Status: SHIPPED | OUTPATIENT
Start: 2025-02-07 | End: 2025-08-07

## 2025-02-13 ENCOUNTER — TELEPHONE (OUTPATIENT)
Dept: INTERNAL MEDICINE | Facility: CLINIC | Age: 40
End: 2025-02-13
Payer: COMMERCIAL

## 2025-02-13 ENCOUNTER — HOSPITAL ENCOUNTER (OUTPATIENT)
Dept: RADIOLOGY | Facility: HOSPITAL | Age: 40
Discharge: HOME | End: 2025-02-13
Attending: NURSE PRACTITIONER
Payer: COMMERCIAL

## 2025-02-13 ENCOUNTER — OFFICE VISIT (OUTPATIENT)
Dept: INTERNAL MEDICINE | Facility: CLINIC | Age: 40
End: 2025-02-13
Payer: COMMERCIAL

## 2025-02-13 ENCOUNTER — PATIENT MESSAGE (OUTPATIENT)
Dept: INTERNAL MEDICINE | Facility: CLINIC | Age: 40
End: 2025-02-13

## 2025-02-13 VITALS
WEIGHT: 165.13 LBS | BODY MASS INDEX: 32.42 KG/M2 | DIASTOLIC BLOOD PRESSURE: 78 MMHG | HEART RATE: 88 BPM | OXYGEN SATURATION: 98 % | SYSTOLIC BLOOD PRESSURE: 110 MMHG | HEIGHT: 60 IN

## 2025-02-13 DIAGNOSIS — R10.9 ABDOMINAL PAIN, UNSPECIFIED ABDOMINAL LOCATION: ICD-10-CM

## 2025-02-13 DIAGNOSIS — R10.9 LEFT FLANK PAIN: Primary | ICD-10-CM

## 2025-02-13 DIAGNOSIS — R10.9 LEFT FLANK PAIN: ICD-10-CM

## 2025-02-13 LAB
BILIRUB UR QL STRIP: NEGATIVE
CLARITY UR REFRACT.AUTO: CLEAR
COLOR UR AUTO: YELLOW
GLUCOSE UR QL STRIP: NEGATIVE
HGB UR QL STRIP: NEGATIVE
KETONES UR QL STRIP: NEGATIVE
LEUKOCYTE ESTERASE UR QL STRIP: NEGATIVE
NITRITE UR QL STRIP: NEGATIVE
PH UR STRIP: 6 [PH] (ref 5–8)
PROT UR QL STRIP: NEGATIVE
SP GR UR STRIP: 1.02 (ref 1–1.03)
URN SPEC COLLECT METH UR: NORMAL

## 2025-02-13 PROCEDURE — 74176 CT ABD & PELVIS W/O CONTRAST: CPT | Mod: TC

## 2025-02-13 PROCEDURE — 99999 PR PBB SHADOW E&M-EST. PATIENT-LVL IV: CPT | Mod: PBBFAC,,, | Performed by: NURSE PRACTITIONER

## 2025-02-13 PROCEDURE — 99214 OFFICE O/P EST MOD 30 MIN: CPT | Mod: S$GLB,,, | Performed by: NURSE PRACTITIONER

## 2025-02-13 PROCEDURE — 87086 URINE CULTURE/COLONY COUNT: CPT | Performed by: NURSE PRACTITIONER

## 2025-02-13 PROCEDURE — 3078F DIAST BP <80 MM HG: CPT | Mod: CPTII,S$GLB,, | Performed by: NURSE PRACTITIONER

## 2025-02-13 PROCEDURE — 1159F MED LIST DOCD IN RCRD: CPT | Mod: CPTII,S$GLB,, | Performed by: NURSE PRACTITIONER

## 2025-02-13 PROCEDURE — 3008F BODY MASS INDEX DOCD: CPT | Mod: CPTII,S$GLB,, | Performed by: NURSE PRACTITIONER

## 2025-02-13 PROCEDURE — 3074F SYST BP LT 130 MM HG: CPT | Mod: CPTII,S$GLB,, | Performed by: NURSE PRACTITIONER

## 2025-02-13 PROCEDURE — 74176 CT ABD & PELVIS W/O CONTRAST: CPT | Mod: 26,,, | Performed by: RADIOLOGY

## 2025-02-13 PROCEDURE — 81003 URINALYSIS AUTO W/O SCOPE: CPT | Performed by: NURSE PRACTITIONER

## 2025-02-13 NOTE — TELEPHONE ENCOUNTER
----- Message from Med Assistant De La Torre sent at 2/13/2025  1:33 PM CST -----  Regarding: RE: Confirmation for apt at 2 with Taiwo Today - Imani  Contact: Pt [phone]    ----- Message -----  From: Roque Dumont  Sent: 2/13/2025  12:32 PM CST  To: [facility] Staff  Subject: Confirmation for apt at 2 with Taiwo Today -#    .[email or web address]     Patient is calling for Medical Advice regarding: Patient confirming she can come in at 2p today for appointment rescheduled for Taiwo. She said Imani from the office called to see if it would be okay and she was calling to confirm she will be seen today at 2 by Taiwo. Please call Patient back to confirm.    How long has patient had these symptoms:    Pharmacy name and phone#:    Patient wants a call back or thru [facility]: Call    Comments:    Please advise patient replies from provider may take up to 48 hours.

## 2025-02-13 NOTE — PROGRESS NOTES
Subjective     Patient ID: Johanna Ocasio is a 39 y.o. female.  /78 (BP Location: Right arm, Patient Position: Sitting)   Pulse 88   Ht 5' (1.524 m)   Wt 74.9 kg (165 lb 2 oz)   SpO2 98%   BMI 32.25 kg/m²      Chief Complaint: Back Pain    Presenting with L low back back pain that has been present since Saturday. Pain is intermittent, sharp/throbbing, and lasts 3-5 seconds. Possibly aggravated by exercise. Ibuprofen has helped some. Patient is concerned about possibility of a kidney stone. Denies fever and LUTS.       Patient Active Problem List   Diagnosis    History of migraine headaches    History of seizures as a child - last seizure age 14, history of venous hemangioma on brain    Brain venous angioma    Migraine without aura and without status migrainosus, not intractable    Thoracic back pain    Decreased strength of trunk and back    Decreased activity tolerance        Current Outpatient Medications   Medication Sig Dispense Refill    ketoconazole (NIZORAL) 2 % shampoo Wash scalp at least 2 x weekly, leave on 3-5 min prior to rinsing 120 mL 11    lifitegrast (XIIDRA) 5 % Dpet Place 1 drop into both eyes 2 (two) times daily. 180 mL 0    meloxicam (MOBIC) 7.5 MG tablet Take 1 tablet by mouth once daily 28 tablet 0    metoprolol succinate (TOPROL-XL) 25 MG 24 hr tablet Take 1 tablet by mouth once daily 90 tablet 3    omeprazole (PRILOSEC) 20 MG capsule Take 1 capsule by mouth once daily 90 capsule 2    ondansetron (ZOFRAN) 4 MG tablet TAKE 1 TABLET BY MOUTH EVERY 8 HOURS AS NEEDED 30 tablet 0    spironolactone (ALDACTONE) 50 MG tablet Take 1 tablet (50 mg total) by mouth once daily. 30 tablet 11    topiramate (TOPAMAX) 25 MG tablet Take 1 tablet (25 mg total) by mouth once daily. 30 tablet 5    ubrogepant (UBRELVY) 100 mg tablet Take 1 tablet by mouth at the onset of a headache. May repeat based on response and tolerability after more than 2 hours if needed. Do not take more than 200mg in a 24 hour  span. 16 tablet 5    albuterol (PROVENTIL/VENTOLIN HFA) 90 mcg/actuation inhaler Inhale 2 puffs into the lungs every 6 (six) hours as needed for Wheezing. Rescue 18 g 3    tiZANidine (ZANAFLEX) 2 MG tablet TAKE 1 TABLET BY MOUTH EVERY 8 HOURS AS NEEDED FOR PAIN (Patient not taking: Reported on 2/13/2025) 20 tablet 0    VIOS AEROSOL DELIVERY SYSTEM Martha  (Patient not taking: Reported on 2/13/2025)  0     No current facility-administered medications for this visit.        Review of Systems   Genitourinary:  Positive for flank pain.   Musculoskeletal:  Positive for back pain.   All other systems reviewed and are negative.         Objective     Physical Exam  Constitutional:       General: She is not in acute distress.     Appearance: Normal appearance. She is not ill-appearing, toxic-appearing or diaphoretic.   HENT:      Head: Normocephalic and atraumatic.   Cardiovascular:      Rate and Rhythm: Normal rate.   Pulmonary:      Effort: Pulmonary effort is normal.   Abdominal:      General: Abdomen is flat.      Palpations: Abdomen is soft.      Tenderness: There is left CVA tenderness.   Musculoskeletal:         General: Normal range of motion.   Skin:     General: Skin is warm and dry.   Neurological:      Mental Status: She is alert and oriented to person, place, and time.   Psychiatric:         Mood and Affect: Mood normal.         Behavior: Behavior normal.          Assessment and Plan     1. Left flank pain; new problem x 5 days. Presentation of pain could be colic or musculoskeletal pain. Will evaluate for kidney stones and UTI. If negative, will treat as musculoskeletal pain. May use otc aleve, prn pain, per package instructions. Stop OTC NSAIDs if urine output stops or decreases. Will follow up with results when available   -     CT Renal Stone Study ABD Pelvis WO; Future; Expected date: 02/13/2025  -     Urinalysis  -     Urine Culture High Risk    2. Abdominal pain, unspecified abdominal location; new problem  x 5 days. Presentation of pain could be colic or musculoskeletal pain. Will evaluate for kidney stones and UTI. If negative, will treat as musculoskeletal pain. May use otc aleve, prn pain, per package instructions. Stop OTC NSAIDs if urine output stops or decreases. Will follow up with results when available   -     CT Renal Stone Study ABD Pelvis WO; Future; Expected date: 02/13/2025  -     Urinalysis  -     Urine Culture High Risk          Taiwo Ny NP   Internal Medicine           Follow up if symptoms worsen or fail to improve.

## 2025-02-14 ENCOUNTER — PATIENT MESSAGE (OUTPATIENT)
Dept: INTERNAL MEDICINE | Facility: CLINIC | Age: 40
End: 2025-02-14
Payer: COMMERCIAL

## 2025-02-14 DIAGNOSIS — N20.0 BILATERAL NEPHROLITHIASIS: Primary | ICD-10-CM

## 2025-02-14 RX ORDER — TAMSULOSIN HYDROCHLORIDE 0.4 MG/1
0.4 CAPSULE ORAL DAILY
Qty: 30 CAPSULE | Refills: 0 | Status: SHIPPED | OUTPATIENT
Start: 2025-02-14

## 2025-02-14 NOTE — TELEPHONE ENCOUNTER
Called patient to discuss CT stone results. Bilateral nonobstructive nephrolithiasis. Will start daily flomax x 30 days

## 2025-02-15 LAB — BACTERIA UR CULT: NORMAL

## 2025-02-26 ENCOUNTER — CLINICAL SUPPORT (OUTPATIENT)
Dept: OTHER | Facility: CLINIC | Age: 40
End: 2025-02-26

## 2025-02-26 DIAGNOSIS — Z00.8 ENCOUNTER FOR OTHER GENERAL EXAMINATION: ICD-10-CM

## 2025-02-27 VITALS
HEIGHT: 62 IN | WEIGHT: 159 LBS | SYSTOLIC BLOOD PRESSURE: 116 MMHG | BODY MASS INDEX: 29.26 KG/M2 | DIASTOLIC BLOOD PRESSURE: 80 MMHG

## 2025-02-27 LAB
GLUCOSE SERPL-MCNC: 94 MG/DL (ref 60–140)
HDLC SERPL-MCNC: 69 MG/DL
POC CHOLESTEROL, LDL (DOCK): 111 MG/DL
POC CHOLESTEROL, TOTAL: 198 MG/DL
TRIGL SERPL-MCNC: 99 MG/DL

## 2025-04-14 ENCOUNTER — OFFICE VISIT (OUTPATIENT)
Dept: INTERNAL MEDICINE | Facility: CLINIC | Age: 40
End: 2025-04-14
Payer: COMMERCIAL

## 2025-04-14 VITALS
SYSTOLIC BLOOD PRESSURE: 116 MMHG | HEART RATE: 71 BPM | OXYGEN SATURATION: 97 % | HEIGHT: 60 IN | DIASTOLIC BLOOD PRESSURE: 82 MMHG | WEIGHT: 160.5 LBS | BODY MASS INDEX: 31.51 KG/M2

## 2025-04-14 DIAGNOSIS — Z13.220 SCREENING FOR LIPID DISORDERS: ICD-10-CM

## 2025-04-14 DIAGNOSIS — L98.9 SKIN LESION: Primary | ICD-10-CM

## 2025-04-14 DIAGNOSIS — Z13.1 SCREENING FOR DIABETES MELLITUS: ICD-10-CM

## 2025-04-14 DIAGNOSIS — I49.3 PVC (PREMATURE VENTRICULAR CONTRACTION): ICD-10-CM

## 2025-04-14 DIAGNOSIS — N20.0 BILATERAL NEPHROLITHIASIS: ICD-10-CM

## 2025-04-14 PROCEDURE — 1159F MED LIST DOCD IN RCRD: CPT | Mod: CPTII,S$GLB,, | Performed by: INTERNAL MEDICINE

## 2025-04-14 PROCEDURE — 99999 PR PBB SHADOW E&M-EST. PATIENT-LVL IV: CPT | Mod: PBBFAC,,, | Performed by: INTERNAL MEDICINE

## 2025-04-14 PROCEDURE — 3079F DIAST BP 80-89 MM HG: CPT | Mod: CPTII,S$GLB,, | Performed by: INTERNAL MEDICINE

## 2025-04-14 PROCEDURE — 99214 OFFICE O/P EST MOD 30 MIN: CPT | Mod: S$GLB,,, | Performed by: INTERNAL MEDICINE

## 2025-04-14 PROCEDURE — 3008F BODY MASS INDEX DOCD: CPT | Mod: CPTII,S$GLB,, | Performed by: INTERNAL MEDICINE

## 2025-04-14 PROCEDURE — 3074F SYST BP LT 130 MM HG: CPT | Mod: CPTII,S$GLB,, | Performed by: INTERNAL MEDICINE

## 2025-04-14 NOTE — PROGRESS NOTES
Subjective:       Patient ID: Johanna Ocasio is a 40 y.o. female.    Chief Complaint: nodule on back and Nephrolithiasis    Presents for evaluation of new skin lesion on her back. Noticed by her . Does not itch.     Also had pain in her back a month ago. Seen in urgent care. CT renal study ordered out of concern for stones. CT showed stones in her kidneys 2 and 3.9 cm but none in ureter or causing obstruction. She started flomax for 30 days. Pain has not returned. She also stopped taking the aldactone.     Past medical history:     PVCS:  Follows with cardiology.  Has had extensive cardiac workup which has all been normal.      Migraines: following with neurology. Now on topamax and Ubrelvy and has improved.      Health Maintenance:  HIV: negative 2019   Hep C: negative 2017   Lipids: normal lipids in 2022   Pap Smear: negative PAP and HPV in 2022   Vaccines:  up to date       Follow-up  Pertinent negatives include no abdominal pain, chest pain, fever, headaches, numbness or weakness.   Back Pain  This is a chronic problem. The current episode started more than 1 month ago. The problem occurs constantly. The problem has been waxing and waning since onset. The pain is present in the sacro-iliac. The quality of the pain is described as aching and shooting. The pain radiates to the right knee. The pain is at a severity of 5/10. The pain is mild. The pain is The same all the time. The symptoms are aggravated by position. Pertinent negatives include no abdominal pain, bladder incontinence, bowel incontinence, chest pain, dysuria, fever, headaches, leg pain, numbness, paresis, paresthesias, pelvic pain, perianal numbness, tingling, weakness or weight loss. Risk factors include lack of exercise and poor posture. The treatment provided mild relief.     Review of Systems   Constitutional:  Negative for fever and weight loss.   Cardiovascular:  Negative for chest pain.   Gastrointestinal:  Negative for abdominal pain  and bowel incontinence.   Genitourinary:  Negative for bladder incontinence, dysuria, hematuria and pelvic pain.   Musculoskeletal:  Positive for back pain. Negative for leg pain.   Neurological:  Negative for tingling, weakness, numbness, headaches and paresthesias.           Past Medical History:   Diagnosis Date    Abnormal Pap smear of cervix 2011    Colposcopy    Brain venous angioma 10/3/2018    Early childhood epilepsy, myoclonic     last seizure age 14; Venous hemangioma on MRI    History of migraine headaches 5/12/2016    Migraine headache      No past surgical history on file.   Patient Active Problem List   Diagnosis    History of migraine headaches    History of seizures as a child - last seizure age 14, history of venous hemangioma on brain    Brain venous angioma    Migraine without aura and without status migrainosus, not intractable    Thoracic back pain    Decreased strength of trunk and back    Decreased activity tolerance        Objective:      Physical Exam  Vitals reviewed.   Constitutional:       Appearance: Normal appearance.   HENT:      Head: Normocephalic.      Jaw: No tenderness, swelling, pain on movement or malocclusion.      Right Ear: No mastoid tenderness.      Left Ear: No mastoid tenderness.      Nose:      Right Sinus: No maxillary sinus tenderness or frontal sinus tenderness.      Left Sinus: No maxillary sinus tenderness or frontal sinus tenderness.      Mouth/Throat:      Dentition: Normal dentition. No gingival swelling, dental abscesses or gum lesions.      Pharynx: Uvula midline. No uvula swelling.   Cardiovascular:      Rate and Rhythm: Normal rate and regular rhythm.   Pulmonary:      Effort: Pulmonary effort is normal.      Breath sounds: Normal breath sounds.   Skin:     General: Skin is warm and dry.   Neurological:      General: No focal deficit present.      Mental Status: She is alert.   Psychiatric:         Mood and Affect: Mood normal.         Behavior: Behavior  normal.         Assessment:       Problem List Items Addressed This Visit    None        Plan:         Skin lesion  -     Ambulatory referral/consult to Dermatology; Future; Expected date: 04/21/2025  Exam consistent with seborrheic keratosis but will have her seen by derm.     Bilateral nephrolithiasis  -     E-Consult to Urology  Non obstructing stones seen on scan less than 10 mm.   Will e consult urology to see if anything else needs to be done.   Stop aldactone.   No longer on flomax.     PVC (premature ventricular contraction)  -     T4, FREE; Future; Expected date: 04/14/2025  -     Magnesium; Future; Expected date: 04/14/2025  -     TSH; Future; Expected date: 04/14/2025  -     CBC Auto Differential; Future; Expected date: 04/14/2025  -     Comprehensive Metabolic Panel; Future; Expected date: 04/14/2025  Continue metoprolol   Symptoms well controlled.     Screening for diabetes mellitus  -     Hemoglobin A1C; Future; Expected date: 04/14/2025    Screening for lipid disorders  -     Lipid Panel; Future; Expected date: 04/14/2025          Ivette Azul MD

## 2025-04-16 ENCOUNTER — E-CONSULT (OUTPATIENT)
Dept: UROLOGY | Facility: CLINIC | Age: 40
End: 2025-04-16
Payer: COMMERCIAL

## 2025-04-16 DIAGNOSIS — N20.0 KIDNEY STONES: Primary | ICD-10-CM

## 2025-04-16 NOTE — CONSULTS
[facility]  Response for E-Consult     Patient Name: Johanna Ocasio  MRN: [id number]  Primary Care Provider: Ivette Azul MD   Requesting Provider: Ivette Azul MD  E-Consult to Urology  Consult performed by: Rick Boyle MD  Consult ordered by: Ivette Azul MD          Recommendation:  Nonobstructing stones are unlikely to be a source of flank pain.  There was no evidence of hydronephrosis on the CT scan.  The stones are relatively small.  Observation would be reasonable unless she desired surgical intervention.  Ureteroscopy with stone removal would be an option.     Flomax is typically only indicated for ureteral stones, although technically off-label.    Additional future steps to consider:  Schedule a visit with Urology if she is interested in discussing stone removal in more detail, however again this is unlikely a source of pain.    Total time of Consultation: 10 minute    I did not speak to the requesting provider verbally about this.     *This eConsult is based on the clinical data available to me and is furnished without benefit of a physical examination. The eConsult will need to be interpreted in light of any clinical issues or changes in patient status not available to me at the time of filing this eConsults. Significant changes in patient condition or level of acuity should result in immediate formal consultation and reevaluation. Please alert me if you have further questions.    Thank you for this eConsult referral.     Rick Boyle MD  [facility]

## 2025-04-19 NOTE — TELEPHONE ENCOUNTER
Refill Routing Note   Medication(s) are not appropriate for processing by [facility] for the following reason(s):        Outside of protocol    [facility] action(s):  Route             Appointments  past 12m or future 3m with PCP    Date Provider   Last Visit   9/3/2024 Yennifer Garcia MD   Next Visit   Visit date not found Yennifer Garcia MD   ED visits in past 90 days: 0        Note composed:4:48 PM 04/19/2025

## 2025-04-21 RX ORDER — ONDANSETRON 4 MG/1
4 TABLET, FILM COATED ORAL EVERY 8 HOURS PRN
Qty: 30 TABLET | Refills: 0 | Status: SHIPPED | OUTPATIENT
Start: 2025-04-21

## 2025-04-22 ENCOUNTER — OFFICE VISIT (OUTPATIENT)
Dept: OPTOMETRY | Facility: CLINIC | Age: 40
End: 2025-04-22
Payer: COMMERCIAL

## 2025-04-22 ENCOUNTER — PATIENT MESSAGE (OUTPATIENT)
Dept: OPTOMETRY | Facility: CLINIC | Age: 40
End: 2025-04-22
Payer: COMMERCIAL

## 2025-04-22 DIAGNOSIS — H18.30 CORNEAL EPITHELIAL DEFECT: Primary | ICD-10-CM

## 2025-04-22 DIAGNOSIS — H11.431 CONJUNCTIVAL HYPEREMIA OF RIGHT EYE: ICD-10-CM

## 2025-04-22 PROCEDURE — 99999 PR PBB SHADOW E&M-EST. PATIENT-LVL II: CPT | Mod: PBBFAC,,, | Performed by: OPTOMETRIST

## 2025-04-22 PROCEDURE — 1159F MED LIST DOCD IN RCRD: CPT | Mod: CPTII,S$GLB,, | Performed by: OPTOMETRIST

## 2025-04-22 PROCEDURE — 99212 OFFICE O/P EST SF 10 MIN: CPT | Mod: S$GLB,,, | Performed by: OPTOMETRIST

## 2025-04-23 ENCOUNTER — OFFICE VISIT (OUTPATIENT)
Dept: DERMATOLOGY | Facility: CLINIC | Age: 40
End: 2025-04-23
Payer: COMMERCIAL

## 2025-04-23 DIAGNOSIS — L82.1 SEBORRHEIC KERATOSES: ICD-10-CM

## 2025-04-23 DIAGNOSIS — D22.9 NUMEROUS SKIN MOLES: Primary | ICD-10-CM

## 2025-04-23 PROCEDURE — 99999 PR PBB SHADOW E&M-EST. PATIENT-LVL III: CPT | Mod: PBBFAC,,, | Performed by: DERMATOLOGY

## 2025-04-23 NOTE — PROGRESS NOTES
Subjective:      Patient ID:  Johanna Ocasio is a 40 y.o. female who presents for   Chief Complaint   Patient presents with    Lesion     R shoulder     Patient with new area of concern:   Location: R post shoulder. Pt's  noticed a couple weeks ago.   Previous treatments: none    No h/o nmsc or mm      Review of Systems   Hematologic/Lymphatic: Bruises/bleeds easily (Bruises).       Objective:   Physical Exam   Constitutional: She appears well-developed and well-nourished. No distress.   Neurological: She is alert and oriented to person, place, and time. She is not disoriented.   Psychiatric: She has a normal mood and affect.   Skin:   Areas Examined (abnormalities noted in diagram):   Back Inspection Performed            Diagram Legend     Erythematous scaling macule/papule c/w actinic keratosis       Vascular papule c/w angioma      Pigmented verrucoid papule/plaque c/w seborrheic keratosis      Yellow umbilicated papule c/w sebaceous hyperplasia      Irregularly shaped tan macule c/w lentigo     1-2 mm smooth white papules consistent with Milia      Movable subcutaneous cyst with punctum c/w epidermal inclusion cyst      Subcutaneous movable cyst c/w pilar cyst      Firm pink to brown papule c/w dermatofibroma      Pedunculated fleshy papule(s) c/w skin tag(s)      Evenly pigmented macule c/w junctional nevus     Mildly variegated pigmented, slightly irregular-bordered macule c/w mildly atypical nevus      Flesh colored to evenly pigmented papule c/w intradermal nevus       Pink pearly papule/plaque c/w basal cell carcinoma      Erythematous hyperkeratotic cursted plaque c/w SCC      Surgical scar with no sign of skin cancer recurrence      Open and closed comedones      Inflammatory papules and pustules      Verrucoid papule consistent consistent with wart     Erythematous eczematous patches and plaques     Dystrophic onycholytic nail with subungual debris c/w onychomycosis     Umbilicated papule     Erythematous-base heme-crusted tan verrucoid plaque consistent with inflamed seborrheic keratosis     Erythematous Silvery Scaling Plaque c/w Psoriasis     See annotation                    Assessment / Plan:        Numerous skin moles    Seborrheic keratoses  -     Ambulatory referral/consult to Dermatology    The diagnoses, options, risks, benefits and alternatives were discussed with the patient.    Sufficient time was available for questions, and all questions were answered to the her satisfaction.    I discussed the diagnoses of seborrheic keratosis and melanocytic nevus with the patient, including the nature and natural history of such lesions, and anticipated behavior. I discussed the benign nature of these lesions, and the absence of any indication for treatment of most such lesions, unless they are symptomatic or troublesome.    Reassured; observe    Followup as needed for changes/symptoms.    Recommended she take a photo of the nevus on her lower back for reference; and have her  observe it every 2-3 months for changes    Followup prn             No follow-ups on file.

## 2025-04-24 ENCOUNTER — PATIENT MESSAGE (OUTPATIENT)
Dept: INTERNAL MEDICINE | Facility: CLINIC | Age: 40
End: 2025-04-24
Payer: COMMERCIAL

## 2025-05-06 ENCOUNTER — OFFICE VISIT (OUTPATIENT)
Dept: CARDIOLOGY | Facility: CLINIC | Age: 40
End: 2025-05-06
Payer: COMMERCIAL

## 2025-05-06 VITALS
OXYGEN SATURATION: 100 % | RESPIRATION RATE: 15 BRPM | HEIGHT: 60 IN | WEIGHT: 160.25 LBS | BODY MASS INDEX: 31.46 KG/M2 | HEART RATE: 85 BPM | DIASTOLIC BLOOD PRESSURE: 84 MMHG | SYSTOLIC BLOOD PRESSURE: 116 MMHG

## 2025-05-06 DIAGNOSIS — F41.9 ANXIETY: ICD-10-CM

## 2025-05-06 DIAGNOSIS — R00.2 PALPITATIONS: ICD-10-CM

## 2025-05-06 DIAGNOSIS — R00.2 HEART PALPITATIONS: ICD-10-CM

## 2025-05-06 DIAGNOSIS — G43.009 MIGRAINE WITHOUT AURA AND WITHOUT STATUS MIGRAINOSUS, NOT INTRACTABLE: ICD-10-CM

## 2025-05-06 DIAGNOSIS — I10 ESSENTIAL HYPERTENSION, BENIGN: ICD-10-CM

## 2025-05-06 DIAGNOSIS — D18.02 BRAIN VENOUS ANGIOMA: Primary | ICD-10-CM

## 2025-05-06 DIAGNOSIS — I49.3 PVC (PREMATURE VENTRICULAR CONTRACTION): ICD-10-CM

## 2025-05-06 PROCEDURE — 3079F DIAST BP 80-89 MM HG: CPT | Mod: CPTII,S$GLB,, | Performed by: INTERNAL MEDICINE

## 2025-05-06 PROCEDURE — 3008F BODY MASS INDEX DOCD: CPT | Mod: CPTII,S$GLB,, | Performed by: INTERNAL MEDICINE

## 2025-05-06 PROCEDURE — 99999 PR PBB SHADOW E&M-EST. PATIENT-LVL V: CPT | Mod: PBBFAC,,, | Performed by: INTERNAL MEDICINE

## 2025-05-06 PROCEDURE — 3074F SYST BP LT 130 MM HG: CPT | Mod: CPTII,S$GLB,, | Performed by: INTERNAL MEDICINE

## 2025-05-06 PROCEDURE — 1159F MED LIST DOCD IN RCRD: CPT | Mod: CPTII,S$GLB,, | Performed by: INTERNAL MEDICINE

## 2025-05-06 PROCEDURE — G2211 COMPLEX E/M VISIT ADD ON: HCPCS | Mod: S$GLB,,, | Performed by: INTERNAL MEDICINE

## 2025-05-06 PROCEDURE — 99214 OFFICE O/P EST MOD 30 MIN: CPT | Mod: S$GLB,,, | Performed by: INTERNAL MEDICINE

## 2025-05-06 NOTE — PROGRESS NOTES
CARDIOVASCULAR CONSULTATION    REASON FOR CONSULT:   Johanna Ocasio is a 40 y.o. female who presents for follow-up.      HISTORY OF PRESENT ILLNESS:     Patient is a pleasant 36-year-old lady.  Main complaint is palpitations.  Had echo and Holter done because of that.  Echo and Holter showed normal left ventricle systolic function.  Holter showed rare PACs and PVCs.  Denies orthopnea, PND, swelling of feet.    Notes from February 2022:  Patient here for follow-up.  States her palpitations have gone away and decreased significantly since starting the metoprolol.  Now she hardly feels them.    Notes from September 23:  Patient here for follow-up after a long hiatus.  States had an episode of chest pain a few weeks ago.  At rest.  Substernal.  Pressure-like.  Lasted an hour and then went away.  Has not had recurrent episodes.  Denies orthopnea, PND.  Palpitations have improved significantly since taking metoprolol    Notes from November 23:  Patient here for follow-up.  Denies any chest pains at rest on exertion, orthopnea, PND, swelling of feet.  Has been doing fine.  Coronary CTA did not show any significant coronary artery stenosis or calcium score was 0        Coronary angiography     Dominance: Left.     LM: The left main coronary artery arises from the left sinus of Valsalva. It divides into the LAD  and LCx arteries. It is normal.     LAD: The left anterior descending artery is a moderate size vessel that wraps around the apex.  It is normal.  Two diagonal branches are identified and appear normal.     LCx: The left circumflex artery is a moderate size vessel that runs in the AV groove. It is normal. Two marginal branches are identified.  The distal LCx extends up to the posterior interventricular groove.  The (L) -PDA is not identified.     RCA: The right coronary artery arises from the right sinus of Valsalva. It is a moderate size vessel.  It is normal.     Cardiac and great vessel morphology     The aortic  root and ascending aorta are normal in size.   No dissection is visualized within the field of view. The pulmonary artery is normal in size.     The aortic valve appears trileaflet and normal.  The pericardium is normal. Pulmonary venous drainage is normal.     CONCLUSION:     1. CAD-RADS 0.     2.  Normal coronary arteries.  Smaller distal vessels are not clearly visualized.     3. Coronary calcium score 0.     4.  Post processed images are available for review in PACS under the heading Recons: HKD.      Left Ventricle: There is normal systolic function with a visually estimated ejection fraction of 55 - 60%.    Right Ventricle: Normal right ventricular cavity size. Systolic function is normal.    Pulmonary Artery: The estimated pulmonary artery systolic pressure is 39 mmHg.    IVC/SVC: Normal venous pressure at 3 mmHg.    Stress Protocol: The patient exercised for 9 minutes 1 seconds on a Luis Miguel protocol, corresponding to a functional capacity of 10 METS, achieving a peak heart rate of 166 bpm, which is 96 % of the age predicted maximum heart rate.    Baseline ECG: The Baseline ECG reveals sinus rhythm.    Stress ECG: There is 1.0 mm of upward-sloping ST segment depression in the inferolateral leads (II, III, aVF, V5 and V6) noted during stress.    ECG Conclusion: The ECG portion of the study is negative for ischemia.    Post-stress Echo: The left ventricle systolic function is normal.    Post-stress Impression: The study is negative with no echocardiographic evidence of stress induced ischemia.         Results for orders placed during the hospital encounter of 11/17/21    Echo    Interpretation Summary  · The estimated ejection fraction is 55%.  · Normal systolic function.  · Normal left ventricular diastolic function.  · Normal right ventricular size with normal right ventricular systolic function.  · Mild left atrial enlargement.  · Normal central venous pressure (3 mmHg).  · The estimated PA systolic pressure  is 28 mmHg.    Notes from June 20, 2024: Patient here for follow-up.  No chest pains at rest on exertion, orthopnea, PND.  Main complaint is occasional palpitations.  Feels like her heart and then goes away.    May 25:    History of Present Illness    CHIEF COMPLAINT:  Johanna presents today for follow up of palpitations    CARDIAC HISTORY:  She experiences palpitations described as a slowing thud sensation occurring every 1-2 weeks while at rest. She denies dizziness or syncope with these episodes. She reports an episode of dyspnea that has since resolved. Previous cardiac monitoring in 2021 showed PVCs. Most recent echo was completed in 2023.    MEDICATIONS:  She continues metoprolol and denies taking any anxiety medications.         PAST MEDICAL HISTORY:     Past Medical History:   Diagnosis Date    Abnormal Pap smear of cervix 2011    Colposcopy    Brain venous angioma 10/3/2018    Early childhood epilepsy, myoclonic     last seizure age 14; Venous hemangioma on MRI    History of migraine headaches 5/12/2016    Migraine headache        PAST SURGICAL HISTORY:   No past surgical history on file.    ALLERGIES AND MEDICATION:   Review of patient's allergies indicates:  No Known Allergies     Medication List            Accurate as of May 6, 2025  9:35 AM. If you have any questions, ask your nurse or doctor.                CONTINUE taking these medications      albuterol 90 mcg/actuation inhaler  Commonly known as: PROVENTIL/VENTOLIN HFA  Inhale 2 puffs into the lungs every 6 (six) hours as needed for Wheezing. Rescue     ketoconazole 2 % shampoo  Commonly known as: NIZORAL  Wash scalp at least 2 x weekly, leave on 3-5 min prior to rinsing     metoprolol succinate 25 MG 24 hr tablet  Commonly known as: TOPROL-XL  Take 1 tablet by mouth once daily     omeprazole 20 MG capsule  Commonly known as: PRILOSEC  Take 1 capsule by mouth once daily     ondansetron 4 MG tablet  Commonly known as: ZOFRAN  TAKE 1 TABLET BY MOUTH  EVERY 8 HOURS AS NEEDED     tamsulosin 0.4 mg Cap  Commonly known as: FLOMAX  Take 1 capsule (0.4 mg total) by mouth once daily.     topiramate 25 MG tablet  Commonly known as: TOPAMAX  Take 1 tablet (25 mg total) by mouth once daily.     UBRELVY 100 mg tablet  Generic drug: ubrogepant  Take 1 tablet by mouth at the onset of a headache. May repeat based on response and tolerability after more than 2 hours if needed. Do not take more than 200mg in a 24 hour span.              SOCIAL HISTORY:     Social History     Socioeconomic History    Marital status:    Tobacco Use    Smoking status: Never    Smokeless tobacco: Never   Substance and Sexual Activity    Alcohol use: No     Comment: occ    Drug use: No    Sexual activity: Yes     Partners: Male     Birth control/protection: Condom     Social Drivers of Health     Financial Resource Strain: Low Risk  (7/12/2024)    Overall Financial Resource Strain (CARDIA)     Difficulty of Paying Living Expenses: Not hard at all   Food Insecurity: No Food Insecurity (7/12/2024)    Hunger Vital Sign     Worried About Running Out of Food in the Last Year: Never true     Ran Out of Food in the Last Year: Never true   Transportation Needs: No Transportation Needs (3/16/2024)    PRAPARE - Transportation     Lack of Transportation (Medical): No     Lack of Transportation (Non-Medical): No   Physical Activity: Sufficiently Active (7/12/2024)    Exercise Vital Sign     Days of Exercise per Week: 3 days     Minutes of Exercise per Session: 50 min   Stress: Stress Concern Present (7/12/2024)    [employer] [place] - Occupational Stress Questionnaire     Feeling of Stress : To some extent   Housing Stability: Low Risk  (3/16/2024)    Housing Stability Vital Sign     Unable to Pay for Housing in the Last Year: No     Number of Places Lived in the Last Year: 1     Unstable Housing in the Last Year: No       FAMILY HISTORY:     Family History   Problem Relation Name  Age of Onset    Hypertension Mother      Hypertension Father      Cataracts Maternal Grandfather      Diabetes Paternal Grandmother      Cancer Paternal Grandfather          colon cancer    Breast cancer Neg Hx      Colon cancer Neg Hx      Ovarian cancer Neg Hx      Melanoma Neg Hx      Lupus Neg Hx      Psoriasis Neg Hx         REVIEW OF SYSTEMS:   Review of Systems   Constitutional: Negative.   HENT: Negative.    Eyes: Negative.    Respiratory: Negative.    Endocrine: Negative.    Hematologic/Lymphatic: Negative.    Skin: Negative.    Musculoskeletal: Negative.    Gastrointestinal: Negative.    Genitourinary: Negative.    Neurological: Negative.    Psychiatric/Behavioral: Negative.    Allergic/Immunologic: Negative.        A 10 point review of systems was performed and all the pertinent positives have been mentioned. Rest of review of systems was negative.        PHYSICAL EXAM:     Vitals:    05/06/25 0837   BP: 116/84   Pulse: 85   Resp: 15    Body mass index is 31.3 kg/m².  Weight: 72.7 kg (160 lb 4.4 oz)   Height: 5' (152.4 cm)     Physical Exam  Constitutional:       Appearance: Normal appearance. She is well-developed.   HENT:      Head: Normocephalic.   Eyes:      Pupils: Pupils are equal, round, and reactive to light.   Cardiovascular:      Rate and Rhythm: Normal rate and regular rhythm.   Pulmonary:      Effort: Pulmonary effort is normal.      Breath sounds: Normal breath sounds.   Abdominal:      General: Bowel sounds are normal.      Palpations: Abdomen is soft.      Tenderness: There is no abdominal tenderness.   Musculoskeletal:         General: Normal range of motion.      Cervical back: Normal range of motion and neck supple.   Skin:     General: Skin is warm.   Neurological:      Mental Status: She is alert and oriented to person, place, and time.           DATA:     Laboratory:  CBC:  Recent Labs   Lab 07/26/23  0724 10/20/23  1705 06/28/24  0727   WBC 4.45 7.85 4.57   Hemoglobin 12.7 13.1 12.6    Hematocrit 40.2 40.5 37.6   Platelets 262 262 229       CHEMISTRIES:  Recent Labs   Lab 07/26/23  0724 10/20/23  1705 06/28/24 0727   Glucose 89 96 98   Sodium 140 139 140   Potassium 4.2 3.8 4.1   BUN 17 14 12   Creatinine 0.8 0.8 0.8   Calcium 9.5 9.6 9.1   Magnesium 1.9  --  2.0       CARDIAC BIOMARKERS:  Recent Labs   Lab 10/20/23  1705   Troponin I <0.006       COAGS:        LIPIDS/LFTS:  Recent Labs   Lab 07/26/23  0724 10/20/23  1705 06/28/24 0727   Cholesterol 190  --  163   Triglycerides 103  --  82   HDL 58  --  54   LDL Cholesterol 111.4  --  92.6   Non-HDL Cholesterol 132  --  109   AST 17 15 14   ALT 18 17 13       Hemoglobin A1C   Date Value Ref Range Status   06/28/2024 5.1 4.0 - 5.6 % Final     Comment:     ADA Screening Guidelines:  5.7-6.4%  Consistent with prediabetes  >or=6.5%  Consistent with diabetes    High levels of fetal hemoglobin interfere with the HbA1C  assay. Heterozygous hemoglobin variants (HbS, HgC, etc)do  not significantly interfere with this assay.   However, presence of multiple variants may affect accuracy.     07/26/2023 5.0 4.0 - 5.6 % Final     Comment:     ADA Screening Guidelines:  5.7-6.4%  Consistent with prediabetes  >or=6.5%  Consistent with diabetes    High levels of fetal hemoglobin interfere with the HbA1C  assay. Heterozygous hemoglobin variants (HbS, HgC, etc)do  not significantly interfere with this assay.   However, presence of multiple variants may affect accuracy.     04/28/2016 5.2 4.5 - 6.2 % Final       TSH  Recent Labs   Lab 07/26/23  0724 06/28/24 0727   TSH 2.476 2.314       The ASCVD Risk score (Evy DK, et al., 2019) failed to calculate for the following reasons:    The 2019 ASCVD risk score is only valid for ages 40 to 79             ASSESSMENT AND PLAN     Patient Active Problem List   Diagnosis    History of migraine headaches    History of seizures as a child - last seizure age 14, history of venous hemangioma on brain    Brain venous angioma     Migraine without aura and without status migrainosus, not intractable    Thoracic back pain    Decreased strength of trunk and back    Decreased activity tolerance    Kidney stones       Assessment & Plan    IMPRESSION:  Determined low level of concern for reported symptoms based on current presentation.  Considered anxiety as potential contributor to symptoms.    PLAN SUMMARY:  - Ordered event monitor for 1 month  - Continued metoprolol at current dose  - Referred to psychiatry for anxiety management  - Ordered echocardiogram  - Follow up after completion of event monitor testing  - Contact office if not contacted about psychiatry referral    VENTRICULAR PREMATURE DEPOLARIZATION:  - Ordered event monitor for 1 month to capture intermittent cardiac symptoms.  - Ordered echocardiogram to investigate intermittent symptoms.  - Explained proper use of event monitor, emphasizing importance of pressing button when symptoms occur.  - Continued metoprolol at current dose.  - Follow up after completion of event monitor testing.    ANXIETY DISORDER:  - Referred to psychiatry for anxiety management.  - Contact office if not contacted about psychiatry referral.    FOLLOW-UP AND GENERAL INSTRUCTIONS:  - Review After Visit Summary (AVS) for complete visit details and instructions.       Palpitations.  Echo showed normal left ventricle systolic function.  No significant arrhythmia noted on Holter apart from rare PACs and PVCs.  Toprol-XL has reduced palpitations significantly.     Chest pain.  Extensive workup has been done.  Stress echo has been negative.  CTA did not show any significant coronary artery stenosis.  Calcium score 0.  Noncardiac chest pain.  Currently chest pain-free.        Follow-up after 6 m          Thank you very much for involving me in the care of your patient.  Please do not hesitate to contact me if there are any questions.      Fartun Chapman MD, FACC, [facility]  Interventional Cardiologist, [facility].       This note was generated with the assistance of ambient listening technology. Verbal consent was obtained by the patient and accompanying visitor(s) for the recording of patient appointment to facilitate this note. I attest to having reviewed and edited the generated note for accuracy, though some syntax or spelling errors may persist. Please contact the author of this note for any clarification.    This note was dictated with the help of speech recognition software.  There might be un-intended errors and/or substitutions.

## 2025-05-07 LAB
OHS QRS DURATION: 92 MS
OHS QTC CALCULATION: 445 MS

## 2025-06-06 ENCOUNTER — LAB VISIT (OUTPATIENT)
Dept: LAB | Facility: HOSPITAL | Age: 40
End: 2025-06-06
Payer: COMMERCIAL

## 2025-06-06 DIAGNOSIS — I49.3 PVC (PREMATURE VENTRICULAR CONTRACTION): ICD-10-CM

## 2025-06-06 DIAGNOSIS — Z13.1 SCREENING FOR DIABETES MELLITUS: ICD-10-CM

## 2025-06-06 DIAGNOSIS — Z13.220 SCREENING FOR LIPID DISORDERS: ICD-10-CM

## 2025-06-06 LAB
ABSOLUTE EOSINOPHIL (OHS): 0.3 K/UL
ABSOLUTE MONOCYTE (OHS): 0.49 K/UL (ref 0.3–1)
ABSOLUTE NEUTROPHIL COUNT (OHS): 2.94 K/UL (ref 1.8–7.7)
ALBUMIN SERPL BCP-MCNC: 4.5 G/DL (ref 3.5–5.2)
ALP SERPL-CCNC: 52 UNIT/L (ref 40–150)
ALT SERPL W/O P-5'-P-CCNC: 28 UNIT/L (ref 10–44)
ANION GAP (OHS): 9 MMOL/L (ref 8–16)
AST SERPL-CCNC: 18 UNIT/L (ref 11–45)
BASOPHILS # BLD AUTO: 0.05 K/UL
BASOPHILS NFR BLD AUTO: 0.9 %
BILIRUB SERPL-MCNC: 0.7 MG/DL (ref 0.1–1)
BUN SERPL-MCNC: 15 MG/DL (ref 6–20)
CALCIUM SERPL-MCNC: 9.5 MG/DL (ref 8.7–10.5)
CHLORIDE SERPL-SCNC: 104 MMOL/L (ref 95–110)
CHOLEST SERPL-MCNC: 184 MG/DL (ref 120–199)
CHOLEST/HDLC SERPL: 3 {RATIO} (ref 2–5)
CO2 SERPL-SCNC: 27 MMOL/L (ref 23–29)
CREAT SERPL-MCNC: 0.6 MG/DL (ref 0.5–1.4)
EAG (OHS): 103 MG/DL (ref 68–131)
ERYTHROCYTE [DISTWIDTH] IN BLOOD BY AUTOMATED COUNT: 12.7 % (ref 11.5–14.5)
GFR SERPLBLD CREATININE-BSD FMLA CKD-EPI: >60 ML/MIN/1.73/M2
GLUCOSE SERPL-MCNC: 101 MG/DL (ref 70–110)
HBA1C MFR BLD: 5.2 % (ref 4–5.6)
HCT VFR BLD AUTO: 42.3 % (ref 37–48.5)
HDLC SERPL-MCNC: 62 MG/DL (ref 40–75)
HDLC SERPL: 33.7 % (ref 20–50)
HGB BLD-MCNC: 13.6 GM/DL (ref 12–16)
IMM GRANULOCYTES # BLD AUTO: 0.01 K/UL (ref 0–0.04)
IMM GRANULOCYTES NFR BLD AUTO: 0.2 % (ref 0–0.5)
LDLC SERPL CALC-MCNC: 97 MG/DL (ref 63–159)
LYMPHOCYTES # BLD AUTO: 1.61 K/UL (ref 1–4.8)
MAGNESIUM SERPL-MCNC: 1.8 MG/DL (ref 1.6–2.6)
MCH RBC QN AUTO: 30 PG (ref 27–31)
MCHC RBC AUTO-ENTMCNC: 32.2 G/DL (ref 32–36)
MCV RBC AUTO: 93 FL (ref 82–98)
NONHDLC SERPL-MCNC: 122 MG/DL
NUCLEATED RBC (/100WBC) (OHS): 0 /100 WBC
PLATELET # BLD AUTO: 265 K/UL (ref 150–450)
PMV BLD AUTO: 10.1 FL (ref 9.2–12.9)
POTASSIUM SERPL-SCNC: 4.1 MMOL/L (ref 3.5–5.1)
PROT SERPL-MCNC: 7.8 GM/DL (ref 6–8.4)
RBC # BLD AUTO: 4.53 M/UL (ref 4–5.4)
RELATIVE EOSINOPHIL (OHS): 5.6 %
RELATIVE LYMPHOCYTE (OHS): 29.8 % (ref 18–48)
RELATIVE MONOCYTE (OHS): 9.1 % (ref 4–15)
RELATIVE NEUTROPHIL (OHS): 54.4 % (ref 38–73)
SODIUM SERPL-SCNC: 140 MMOL/L (ref 136–145)
T4 FREE SERPL-MCNC: 0.97 NG/DL (ref 0.71–1.51)
TRIGL SERPL-MCNC: 125 MG/DL (ref 30–150)
TSH SERPL-ACNC: 2.38 UIU/ML (ref 0.4–4)
WBC # BLD AUTO: 5.4 K/UL (ref 3.9–12.7)

## 2025-06-06 PROCEDURE — 84460 ALANINE AMINO (ALT) (SGPT): CPT

## 2025-06-06 PROCEDURE — 83735 ASSAY OF MAGNESIUM: CPT

## 2025-06-06 PROCEDURE — 80061 LIPID PANEL: CPT

## 2025-06-06 PROCEDURE — 83036 HEMOGLOBIN GLYCOSYLATED A1C: CPT

## 2025-06-06 PROCEDURE — 85025 COMPLETE CBC W/AUTO DIFF WBC: CPT

## 2025-06-06 PROCEDURE — 36415 COLL VENOUS BLD VENIPUNCTURE: CPT

## 2025-06-06 PROCEDURE — 84439 ASSAY OF FREE THYROXINE: CPT

## 2025-06-06 PROCEDURE — 84443 ASSAY THYROID STIM HORMONE: CPT

## 2025-06-10 ENCOUNTER — OFFICE VISIT (OUTPATIENT)
Dept: INTERNAL MEDICINE | Facility: CLINIC | Age: 40
End: 2025-06-10
Payer: COMMERCIAL

## 2025-06-10 VITALS
BODY MASS INDEX: 31.82 KG/M2 | OXYGEN SATURATION: 99 % | WEIGHT: 162.06 LBS | HEART RATE: 78 BPM | HEIGHT: 60 IN | DIASTOLIC BLOOD PRESSURE: 72 MMHG | SYSTOLIC BLOOD PRESSURE: 100 MMHG

## 2025-06-10 DIAGNOSIS — Z00.00 ENCOUNTER FOR ANNUAL PHYSICAL EXAM: Primary | ICD-10-CM

## 2025-06-10 DIAGNOSIS — R09.A2 GLOBUS SENSATION: ICD-10-CM

## 2025-06-10 PROCEDURE — 3074F SYST BP LT 130 MM HG: CPT | Mod: CPTII,S$GLB,, | Performed by: INTERNAL MEDICINE

## 2025-06-10 PROCEDURE — 3044F HG A1C LEVEL LT 7.0%: CPT | Mod: CPTII,S$GLB,, | Performed by: INTERNAL MEDICINE

## 2025-06-10 PROCEDURE — 3008F BODY MASS INDEX DOCD: CPT | Mod: CPTII,S$GLB,, | Performed by: INTERNAL MEDICINE

## 2025-06-10 PROCEDURE — 99999 PR PBB SHADOW E&M-EST. PATIENT-LVL IV: CPT | Mod: PBBFAC,,, | Performed by: INTERNAL MEDICINE

## 2025-06-10 PROCEDURE — 1159F MED LIST DOCD IN RCRD: CPT | Mod: CPTII,S$GLB,, | Performed by: INTERNAL MEDICINE

## 2025-06-10 PROCEDURE — 99395 PREV VISIT EST AGE 18-39: CPT | Mod: S$GLB,,, | Performed by: INTERNAL MEDICINE

## 2025-06-10 PROCEDURE — 3078F DIAST BP <80 MM HG: CPT | Mod: CPTII,S$GLB,, | Performed by: INTERNAL MEDICINE

## 2025-06-10 NOTE — PROGRESS NOTES
Subjective:       Patient ID: Johanna Ocasio is a 40 y.o. female.    Chief Complaint: Annual Exam    HPI    Ms. Ocasio presents today for follow up for annual exan     She experiences intermittent lightheadedness throughout the day, described as a lightheaded sensation rather than jitteriness. She denies constant symptoms or vertigo-like spinning sensations. She also reports lower throat discomfort, describing a sensation of something present in her throat that becomes more noticeable with pressure. The throat discomfort improves when lying down. She denies pain with swallowing.    She has discontinued coffee consumption due to previous heart issues.      All labs were normal, including electrolytes and thyroid studies.      She has an upcoming psychiatry appointment for evaluation of anxiety. Referred by cardiologist due to palpations and normal cardiac workup. She expresses preference for minimal medication use but is open to considering medication if beneficial for anxiety management.       PVCS:  Follows with cardiology.  Has had extensive cardiac workup which has all been normal.      Migraines: following with neurology. Now on topamax and Ubrelvy and has improved.      Health Maintenance:  HIV: negative 2019   Hep C: negative 2017   Lipids: normal lipids in 2025   Pap Smear: negative PAP and HPV in 2022   Vaccines:  up to date         ROS:  General: -fever, -chills, -fatigue, -weight gain, -weight loss  Eyes: -vision changes, -redness, -discharge  ENT: -ear pain, -nasal congestion, -sore throat, +sense of lump/mass in throat when swallowing  Cardiovascular: -chest pain, +palpitations, -lower extremity edema  Respiratory: -cough, -shortness of breath  Gastrointestinal: -abdominal pain, -nausea, -vomiting, -diarrhea, -constipation, -blood in stool  Genitourinary: -dysuria, -hematuria, -frequency  Musculoskeletal: -joint pain, -muscle pain  Skin: -rash, -lesion  Neurological: -headache, -dizziness, -numbness,  -tingling, +lightheadedness  Psychiatric: +anxiety, -depression, -sleep difficulty                   Past Medical History:   Diagnosis Date    Abnormal Pap smear of cervix 2011    Colposcopy    Brain venous angioma 10/3/2018    Early childhood epilepsy, myoclonic     last seizure age 14; Venous hemangioma on MRI    History of migraine headaches 5/12/2016    Migraine headache      History reviewed. No pertinent surgical history.   Problem List[1]     Objective:      Physical Exam  Constitutional:       Appearance: Normal appearance.   HENT:      Head: Normocephalic.   Cardiovascular:      Rate and Rhythm: Normal rate and regular rhythm.      Pulses: Normal pulses.      Heart sounds: Normal heart sounds.   Pulmonary:      Effort: Pulmonary effort is normal.      Breath sounds: Normal breath sounds.   Abdominal:      General: Abdomen is flat. Bowel sounds are normal.      Palpations: Abdomen is soft.   Musculoskeletal:         General: Normal range of motion.      Cervical back: Normal range of motion and neck supple.   Skin:     General: Skin is warm and dry.   Neurological:      General: No focal deficit present.      Mental Status: She is alert and oriented to person, place, and time.   Psychiatric:         Mood and Affect: Mood normal.         Assessment:       Problem List Items Addressed This Visit    None  Visit Diagnoses         Encounter for annual physical exam    -  Primary      Globus sensation        Relevant Orders    US Soft Tissue Head Neck    Ambulatory referral/consult to ENT            Plan:         Johanna was seen today for annual exam.    Diagnoses and all orders for this visit:    Encounter for annual physical exam  HIV: negative 2019   Hep C: negative 2017   Lipids: normal lipids in 2025   Pap Smear: negative PAP and HPV in 2022   Vaccines:  up to date   Annual wellness exam completed.     All medications, histories, and concerns reviewed, reconciled, and addressed.     Appropriate Screenings per  pt's sex and age have been reviewed and discussed with pt.       Globus sensation  -     US Soft Tissue Head Neck; Future  -     Ambulatory referral/consult to ENT; Future     Check US and refer to ENT to schedule if no improvement.     IMPRESSION:  - Reviewed labs, noting all results including electrolytes and magnesium were normal  - Evaluated throat discomfort, deciding to investigate further with US and ENT referral despite normal thyroid labs.  - Discussed possibility of anxiety as underlying cause for some symptoms, particularly palpitations, given normal cardiac workup.      FOLLOW-UP:  - Referred to ENT for evaluation of throat discomfort.  - Ms. Ocasio to attend scheduled psychiatry appointment for further evaluation of anxiety.                      This note was generated with the assistance of ambient listening technology. Verbal consent was obtained by the patient and accompanying visitor(s) for the recording of patient appointment to facilitate this note. I attest to having reviewed and edited the generated note for accuracy, though some syntax or spelling errors may persist. Please contact the author of this note for any clarification.       Ivette Azul MD   Internal Medicine   Primary Care           [1]   Patient Active Problem List  Diagnosis    History of migraine headaches    History of seizures as a child - last seizure age 14, history of venous hemangioma on brain    Brain venous angioma    Migraine without aura and without status migrainosus, not intractable    Thoracic back pain    Decreased strength of trunk and back    Decreased activity tolerance    Kidney stones

## 2025-06-12 ENCOUNTER — HOSPITAL ENCOUNTER (OUTPATIENT)
Dept: RADIOLOGY | Facility: HOSPITAL | Age: 40
Discharge: HOME | End: 2025-06-12
Attending: INTERNAL MEDICINE
Payer: COMMERCIAL

## 2025-06-12 DIAGNOSIS — R09.A2 GLOBUS SENSATION: ICD-10-CM

## 2025-06-12 PROCEDURE — 76536 US EXAM OF HEAD AND NECK: CPT | Mod: 26,,, | Performed by: RADIOLOGY

## 2025-06-12 PROCEDURE — 76536 US EXAM OF HEAD AND NECK: CPT | Mod: TC

## 2025-06-13 ENCOUNTER — RESULTS FOLLOW-UP (OUTPATIENT)
Dept: INTERNAL MEDICINE | Facility: CLINIC | Age: 40
End: 2025-06-13

## 2025-06-19 ENCOUNTER — OFFICE VISIT (OUTPATIENT)
Dept: PSYCHIATRY | Facility: CLINIC | Age: 40
End: 2025-06-19
Payer: COMMERCIAL

## 2025-06-19 DIAGNOSIS — F41.9 ANXIETY: ICD-10-CM

## 2025-06-19 PROCEDURE — 90791 PSYCH DIAGNOSTIC EVALUATION: CPT | Mod: S$GLB,,, | Performed by: PSYCHOLOGIST

## 2025-06-19 PROCEDURE — 3044F HG A1C LEVEL LT 7.0%: CPT | Mod: CPTII,S$GLB,, | Performed by: PSYCHOLOGIST

## 2025-06-19 PROCEDURE — 99999 PR PBB SHADOW E&M-EST. PATIENT-LVL II: CPT | Mod: PBBFAC,,, | Performed by: PSYCHOLOGIST

## 2025-06-19 NOTE — PROGRESS NOTES
"Psychiatry Initial Visit (PhD/LCSW) Intake Coordination     Patient Name:  Johanna Ocasio    Date: 06/19/2025     Site:  [facility]     Referral source:  Fartun Chapman MD     Chief complaint/reason for encounter:  Psychological Evaluation to assess suitability for treatment options available within the department of psychiatry (STeP, BE[facility], psychoed courses, external referrals)    Clinical status of patient:  Outpatient   Met with:  Patient   CPT Code: 60391     Face to face time with patient: 39 minutes     Before this evaluation was initiated, the purposes and process of the assessment and the limits of confidentiality were discussed with the patient who expressed understanding of these issues and verbally consented to proceed with the evaluation.      History of present illness:  Ms. Johanna Ocasio is a 40 y.o. -year-old female who is pursuing psychotherapy to improve anxiety.  Patient states she started having heart palpitations. Had cardio workup and her provider believes it is anxiety. She does not feel anxious outside of the fluttering heart and occasionally short of breath. State she worries about her health to some degree since having these heart issues and worries some about her children, but not to the extent where she finds it bothersome or abnormal ("the usual mom worry"). States she has never considered herself to be an anxious person and thought she would attend this intake to see if there's anything she's not thinking of. She plans to pursue further cardio workup, has plans to get Holter monitor tomorrow. States she has had a monitor in the past that showed showed rare PACs and PVCs.        Medical history:  Problem List[1]    Psychiatric symptoms:   Depression - Denied depressed mood, loss of interest in pleasurable activities, anhedonia, sleep changes, decreased motivation, decreased concentration, feelings of excessive or irrational guilt, helplessness, hopelessness, increased or " decreased appetite, weight changes, increased or decreased motor activity, decreased energy, suicidal ideations/thoughts of death.  Melinda/Hypomania - Denied increased goal directed activity, decreased need for sleep, pressured speech or increased talkative, racing thoughts, increased risk-taking behavior, episodic elevated or irritable mood, flight of ideas, distractibility, inflated self-esteem, grandiosity  Anxiety - Endorsed occasional worry. Denied excessive worry, difficulty controlling worrying, feeling keyed up, easily fatigued, difficulty concentrating or mind going blank, irritability, muscle tension, sleep disturbance, racing thoughts, being unable to relax, specific phobia.  Panic Attacks- Endorsed palpitations. Denied sweating, trembling, dyspnea, choking sensation, chest pain/discomfort, nausea, dizziness, chills or hot flashes, tingling, derealization, fear of losing control, fear of dying.  Thoughts - Denied any AVH, paranoia, delusions, ideas of reference, thought insertion or thought broadcasting  Suicidal thoughts/behaviors - denied passive or active SI, denied suicidal plans or intent  Self-injury - denied.  Substance abuse - denied abuse or dependence.   Sleep - Denied increased sleep latency, frequent nighttime awakenings, or early morning awakening with inability to return to sleep.     Current psychosocial stressors:  trying to sell house, have special needs daughter   Report of coping skills:  watch TV  Support system:  , mother       Current and past substance use:   Alcohol:  Denied current use.  Denied history of abuse or dependency.   Drugs:  Denied current use.  Denied history of abuse or dependency.   Tobacco:  Denied current use.   Caffeine:  Denied current use.      Current Psychiatric Treatment:   Medications:  denies  Psychotherapy:  denies     Psychiatric history:   Previous diagnosis:  anxiety  Previous hospitalizations:  denies  History of outpatient treatment:   denies  Previous suicide attempt:  Denied.   Family history of psychiatric illness:  denies  Access to guns:  denies     Trauma history:  Denied.      Social history:  Ms. Johanna Ocasio was born and raised in [place], [place].  She described her childhood as average.  She denied childhood trauma, abuse, and neglect.  She has an associates degree in nursing.  She is currently a nurse in .  She denied  service.  She is not on disability.  She has been  to her  for 10 years.  She has 2 children (ages 8 and 6).  She currently lives with  and children.       Mental Status Exam:   General appearance:  Appears stated age, neatly dressed, well groomed   Speech:  Normal rate, normal tone, normal pitch, normal volume   Level of cooperation:  Cooperative   Thought processes:  Logical, goal-directed   Mood:  Euthymic   Thought content:  No illusions, no visual hallucinations, no auditory hallucinations, no delusions, no active or passive homicidal thoughts, no active or passive suicidal ideation, no obsessions, no compulsions, no violence   Affect:  Appropriate   Orientation:  Oriented to person, place, and date   Memory:   Recent memory:  Intact   Remote memory: Intact   Attention span and concentration:  Appropriate   Fund of general knowledge: Appropriate  Abstract reasoning:   Not Assessed  Judgment and insight: Fair   Language:  Intact      Diagnostic impression:     ICD-10-CM ICD-9-CM   1. Anxiety  F41.9 300.00       Plan:  Ms. Johanna Ocasio is interested in the  Stress Management  psychoeducation course and has been added to the waitlist for the next group cohort.                  [1]   Patient Active Problem List  Diagnosis    History of migraine headaches    History of seizures as a child - last seizure age 14, history of venous hemangioma on brain    Brain venous angioma    Migraine without aura and without status migrainosus, not intractable    Thoracic back pain    Decreased  strength of trunk and back    Decreased activity tolerance    Kidney stones

## 2025-06-20 ENCOUNTER — CLINICAL SUPPORT (OUTPATIENT)
Dept: CARDIOLOGY | Facility: HOSPITAL | Age: 40
End: 2025-06-20
Attending: INTERNAL MEDICINE
Payer: COMMERCIAL

## 2025-06-20 ENCOUNTER — HOSPITAL ENCOUNTER (OUTPATIENT)
Dept: CARDIOLOGY | Facility: HOSPITAL | Age: 40
Discharge: HOME | End: 2025-06-20
Attending: INTERNAL MEDICINE
Payer: COMMERCIAL

## 2025-06-20 DIAGNOSIS — R00.2 PALPITATIONS: ICD-10-CM

## 2025-06-20 PROCEDURE — 93271 ECG/MONITORING AND ANALYSIS: CPT

## 2025-06-20 PROCEDURE — 93306 TTE W/DOPPLER COMPLETE: CPT | Mod: 26,,, | Performed by: INTERNAL MEDICINE

## 2025-06-20 PROCEDURE — 93306 TTE W/DOPPLER COMPLETE: CPT

## 2025-06-21 LAB
AV INDEX (PROSTH): 1
AV MEAN GRADIENT: 4 MMHG
AV PEAK GRADIENT: 7 MMHG
AV VALVE AREA BY VELOCITY RATIO: 3.1 CM²
AV VALVE AREA: 3.1 CM²
AV VELOCITY RATIO: 1
CV ECHO LV RWT: 0.39 CM
DOP CALC AO PEAK VEL: 1.3 M/S
DOP CALC AO VTI: 24.2 CM
DOP CALC LVOT AREA: 3.1 CM2
DOP CALC LVOT DIAMETER: 2 CM
DOP CALC LVOT PEAK VEL: 1.3 M/S
DOP CALC LVOT STROKE VOLUME: 75.7 CM3
DOP CALC MV VTI: 22.6 CM
DOP CALCLVOT PEAK VEL VTI: 24.1 CM
E WAVE DECELERATION TIME: 142 MSEC
E/A RATIO: 2.18
E/E' RATIO: 5 M/S
ECHO LV POSTERIOR WALL: 0.9 CM (ref 0.6–1.1)
FRACTIONAL SHORTENING: 37 % (ref 28–44)
INTERVENTRICULAR SEPTUM: 1 CM (ref 0.6–1.1)
IVC DIAMETER: 1.72 CM
IVRT: 74 MSEC
LA MAJOR: 5.1 CM
LA MINOR: 4.6 CM
LA WIDTH: 3.8 CM
LEFT ATRIUM SIZE: 3.9 CM
LEFT ATRIUM VOLUME: 61 CM3
LEFT INTERNAL DIMENSION IN SYSTOLE: 2.9 CM (ref 2.1–4)
LEFT VENTRICLE DIASTOLIC VOLUME: 96 ML
LEFT VENTRICLE SYSTOLIC VOLUME: 33 ML
LEFT VENTRICULAR INTERNAL DIMENSION IN DIASTOLE: 4.6 CM (ref 3.5–6)
LEFT VENTRICULAR MASS: 148.1 G
LV LATERAL E/E' RATIO: 4 M/S
LV SEPTAL E/E' RATIO: 5.5 M/S
LVED V (TEICH): 95.85 ML
LVES V (TEICH): 32.56 ML
LVOT MG: 3.23 MMHG
LVOT MV: 0.84 CM/S
MV MEAN GRADIENT: 2 MMHG
MV PEAK A VEL: 0.38 M/S
MV PEAK E VEL: 0.83 M/S
MV PEAK GRADIENT: 3 MMHG
MV STENOSIS PRESSURE HALF TIME: 41.26 MS
MV VALVE AREA BY CONTINUITY EQUATION: 3.35 CM2
MV VALVE AREA P 1/2 METHOD: 5.33 CM2
OHS CV RV/LV RATIO: 0.7 CM
PISA TR MAX VEL: 2.7 M/S
PV PEAK GRADIENT: 4 MMHG
PV PEAK VELOCITY: 1.02 M/S
RA MAJOR: 4.88 CM
RA PRESSURE ESTIMATED: 8 MMHG
RA WIDTH: 3.8 CM
RIGHT VENTRICLE DIASTOLIC BASEL DIMENSION: 3.2 CM
RIGHT VENTRICULAR END-DIASTOLIC DIMENSION: 3.2 CM
RV TB RVSP: 11 MMHG
RV TISSUE DOPPLER FREE WALL SYSTOLIC VELOCITY 1 (APICAL 4 CHAMBER VIEW): 11.14 CM/S
SINUS: 3.14 CM
STJ: 2.1 CM
TDI LATERAL: 0.21 M/S
TDI SEPTAL: 0.15 M/S
TDI: 0.18 M/S
TR MAX PG: 29 MMHG
TRICUSPID ANNULAR PLANE SYSTOLIC EXCURSION: 2.2 CM
TV REST PULMONARY ARTERY PRESSURE: 37 MMHG

## 2025-07-15 ENCOUNTER — OFFICE VISIT (OUTPATIENT)
Dept: OPTOMETRY | Facility: CLINIC | Age: 40
End: 2025-07-15
Payer: COMMERCIAL

## 2025-07-15 DIAGNOSIS — H21.301 CYST OF IRIS OF RIGHT EYE: ICD-10-CM

## 2025-07-15 DIAGNOSIS — H04.123 DRY EYE SYNDROME, BILATERAL: ICD-10-CM

## 2025-07-15 DIAGNOSIS — H52.13 MYOPIA, BILATERAL: Primary | ICD-10-CM

## 2025-07-15 DIAGNOSIS — H49.9 EXTRAOCULAR MUSCLE PALSY OF LEFT EYE: ICD-10-CM

## 2025-07-15 PROCEDURE — 99999 PR PBB SHADOW E&M-EST. PATIENT-LVL III: CPT | Mod: PBBFAC,,, | Performed by: OPTOMETRIST

## 2025-07-15 PROCEDURE — 92015 DETERMINE REFRACTIVE STATE: CPT | Mod: S$GLB,,, | Performed by: OPTOMETRIST

## 2025-07-15 PROCEDURE — 92014 COMPRE OPH EXAM EST PT 1/>: CPT | Mod: S$GLB,,, | Performed by: OPTOMETRIST

## 2025-07-15 RX ORDER — CYCLOSPORINE 0.5 MG/ML
1 EMULSION OPHTHALMIC 2 TIMES DAILY
Qty: 60 EACH | Refills: 11 | Status: SHIPPED | OUTPATIENT
Start: 2025-07-15 | End: 2026-07-15

## 2025-07-17 NOTE — PROGRESS NOTES
HPI    Last eye exam was 4/22/25 with Dr. Mariano.  Patient states recently started seeing flashes of light after bending over   and then standing up-lasts about a minute before going away and only   occurred 2 times. Overall, no vision changes.  Patient denies diplopia, headaches, and pain.    Xiidra QHS OU    Last edited by Theresa Bey MA on 7/15/2025  1:05 PM.            Assessment /Plan     For exam results, see Encounter Report.        Myopia, bilateral   Rx specs    Extraocular muscle palsy of left eye              Pt unable to look to left with OS              Longstanding, stable     History of seizures as a child - last seizure age 14, history of venous hemangioma on brain  History of migraine headaches  Brain venous angioma       Cyst of iris, right              Stable, per gonio 2017 :normal angle      Good internal ocular health, monitor yearly     Dry eye syndrome, bilateral  Rce (recurrent corneal erosion), right               H/o corneal abrasion OD in May 2024               Continue with PF art tears and dionicio QHS  -    Failed lifitegrast (XIIDRA) 2/2 burning   Start   cycloSPORINE (RESTASIS) 0.05 % ophthalmic emulsion; Place 1 drop into the left eye 2 (two) times daily.  Dispense: 60 each; Refill: 11               RTC 1 year, Gonio OD Cyst before DFE next visit

## 2025-07-22 ENCOUNTER — OFFICE VISIT (OUTPATIENT)
Dept: OTOLARYNGOLOGY | Facility: CLINIC | Age: 40
End: 2025-07-22
Payer: COMMERCIAL

## 2025-07-22 VITALS — BODY MASS INDEX: 32.27 KG/M2 | WEIGHT: 165.25 LBS

## 2025-07-22 DIAGNOSIS — R09.A2 GLOBUS SENSATION: ICD-10-CM

## 2025-07-22 DIAGNOSIS — K21.9 LARYNGOPHARYNGEAL REFLUX (LPR): Primary | ICD-10-CM

## 2025-07-22 PROCEDURE — 99999 PR PBB SHADOW E&M-EST. PATIENT-LVL III: CPT | Mod: PBBFAC,,, | Performed by: OTOLARYNGOLOGY

## 2025-07-22 PROCEDURE — 1159F MED LIST DOCD IN RCRD: CPT | Mod: CPTII,S$GLB,, | Performed by: OTOLARYNGOLOGY

## 2025-07-22 PROCEDURE — 99203 OFFICE O/P NEW LOW 30 MIN: CPT | Mod: 25,S$GLB,, | Performed by: OTOLARYNGOLOGY

## 2025-07-22 PROCEDURE — 31575 DIAGNOSTIC LARYNGOSCOPY: CPT | Mod: S$GLB,,, | Performed by: OTOLARYNGOLOGY

## 2025-07-22 PROCEDURE — 3044F HG A1C LEVEL LT 7.0%: CPT | Mod: CPTII,S$GLB,, | Performed by: OTOLARYNGOLOGY

## 2025-07-22 PROCEDURE — 3008F BODY MASS INDEX DOCD: CPT | Mod: CPTII,S$GLB,, | Performed by: OTOLARYNGOLOGY

## 2025-07-22 NOTE — PROGRESS NOTES
HEAD [facility] N[facility]K [facility]GICAL [facility]Y [facility]INIC    Subjective:       Patient ID: Johanna Ocasio is a 40 y.o. female.    Chief Complaint: gloubus sensastion         Johanna Ocasio is a 40 y.o. female who presents today for evaluation of globus sensation. She reports globus sensation initially occurring 5 months ago, lasting approximately 1 week and associated with panic symptoms. She described feeling a lump-like sensation in her throat, accompanied by episodes of feeling compelled to roll down car windows. A second episode occurred in April/May, lasting longer with throat sensation but without panic symptoms. She experienced intermittent lightheadedness during this episode. Currently, symptoms are less intense and primarily noticeable when she deliberately focuses on the sensation. When distracted during daily activities, she is largely unaware of the globus sensation. She reports several years of intermittent episodes where food becomes stuck during swallowing, requiring her to slow down and causing pain. These episodes can result in food regurgitation. She denies making significant dietary modifications due to these symptoms. She continues to experience reflux symptoms with certain dietary triggers, specifically noting reflux symptoms after consuming a christopher, characterized by a burning sensation. She denies reflux with spicy foods but reports ongoing intermittent symptoms despite daily Prilosec.     She takes Metoprolol daily for premature ventricular contractions (PVCs) and Prilosec daily for GERD management. A prior thyroid ultrasound showed small nodules, deemed clinically insignificant and not requiring further follow-up.     She denies tobacco use.     Past Medical History:   Diagnosis Date    Abnormal Pap smear of cervix 2011    Colposcopy    Brain venous angioma 10/03/2018    Early childhood epilepsy, myoclonic     last seizure age 14; Venous hemangioma on MRI    History of migraine headaches 05/12/2016     Laryngopharyngeal reflux (LPR) 07/28/2025    Migraine headache        No past surgical history on file.    Current Medications[1]    Review of patient's allergies indicates:  No Known Allergies    Social History[2]    Family History   Problem Relation Name Age of Onset    Hypertension Mother      Hypertension Father      Cataracts Maternal Grandfather      Diabetes Paternal Grandmother      Cancer Paternal Grandfather          colon cancer    Breast cancer Neg Hx      Colon cancer Neg Hx      Ovarian cancer Neg Hx      Melanoma Neg Hx      Lupus Neg Hx      Psoriasis Neg Hx         Review of Systems   Constitutional:  Negative for chills, fatigue, fever and unexpected weight change.   HENT:  Positive for trouble swallowing. Negative for postnasal drip, sore throat and voice change.    Respiratory:  Positive for choking. Negative for cough and shortness of breath.    Cardiovascular:  Negative for chest pain and leg swelling.   Skin:  Negative for wound.   Neurological:  Negative for facial asymmetry and speech difficulty.   Hematological:  Negative for adenopathy. Does not bruise/bleed easily.   Psychiatric/Behavioral:  The patient is not nervous/anxious.          Objective:     Physical Exam  Vitals reviewed.   Constitutional:       General: She is not in acute distress.     Appearance: She is well-developed.   HENT:      Head: Normocephalic and atraumatic.      Jaw: No trismus.      Salivary Glands: Right salivary gland is not diffusely enlarged or tender. Left salivary gland is not diffusely enlarged or tender.      Comments: Salivary glands - there are no lesions or asymmetric findings in the submandibular or parotid glands     Right Ear: Hearing, tympanic membrane, ear canal and external ear normal.      Left Ear: Hearing, tympanic membrane, ear canal and external ear normal.      Nose: No nasal deformity or rhinorrhea.      Mouth/Throat:      Mouth: No oral lesions.      Tongue: No lesions.      Palate: No mass  and lesions.      Pharynx: Uvula midline.      Comments: Procedure: Flexible laryngoscopy  In order to fully examine the upper aerodigestive tract, including the larynx, in a patient with a hyperactive gag reflex, flexible endoscopy is required.  After explaining the procedure and obtaining verbal consent, a timeout was performed with the patient's participation according to the universal protocol. Both nasal cavities were anesthetized with 4% Xylocaine spray mixed with [place]-Synephrine. The flexible laryngoscope (#[id number]) was inserted into the nasal cavity and advanced to visualize the nasal cavity, nasopharynx, the posterior oropharynx, hypopharynx, and the endolarynx with the above findings noted. The scope was removed and the procedure terminated. The patient tolerated this procedure well without apparent complication.      FINDINGS  Nasopharynx - the torus is clear. There are no lesions of the posterior wall.   Oropharynx - no lesions of the tongue base. There is no obvious fullness or asymmetry.  Hypopharynx - there are no lesions of the pyriform sinuses or postcricoid region   Larynx - there are no lesions of the supraglottic or glottic larynx. Vocal fold mobility is normal with complete closure.     Eyes:      General: Lids are normal.      Extraocular Movements: Extraocular movements intact.      Conjunctiva/sclera:      Right eye: Right conjunctiva is not injected. No chemosis.     Left eye: Left conjunctiva is not injected. No chemosis.  Neck:      Thyroid: No thyroid mass or thyromegaly.      Vascular: No JVD.      Trachea: Phonation normal. No tracheal deviation.   Pulmonary:      Effort: Pulmonary effort is normal. No accessory muscle usage or respiratory distress.      Breath sounds: No stridor.   Musculoskeletal:         General: No tenderness.      Cervical back: Full passive range of motion without pain.   Lymphadenopathy:      Cervical: No cervical adenopathy.      Right cervical: No deep or  posterior cervical adenopathy.     Left cervical: No deep or posterior cervical adenopathy.   Skin:     Findings: No erythema, lesion or rash.      Nails: There is no clubbing.   Neurological:      Mental Status: She is alert and oriented to person, place, and time.      Cranial Nerves: No cranial nerve deficit or facial asymmetry.      Motor: No weakness.      Gait: Gait normal.   Psychiatric:         Speech: Speech normal.         Behavior: Behavior is cooperative.            Assessment & Plan:     Problem List Items Addressed This Visit       Laryngopharyngeal reflux (LPR) - Primary    Johanna Ocasio is a 40 y.o. female with intermittent globus sensation in the setting of rare dysphagia and known reflux symptoms. Her exam is normal, as there there is no evidence of posterior glottic pachydermia or other laryngeal manifestations - but that is the more common scenario. I explained to the patient how it is common to experience throat discomfort, a foreign body sensation, problems swallowing, and hoarseness among other things due to reflux, even in the absence of heartburn. Smaller volumes of gastric contents are required to irritate the pharynx than the lower esophagus. Often patients with significant reflux and heartburn will seek medical treatment earlier. I recommended that the patient continue taking prilosec 20 mg (or a generic equivalent) daily and talk with her GI physician about further testing because of the abilio reflux and her concerns that she may have a hiatal hernia. I also recommended that the patient refrain from eating within 3 hours of going to bed at night and that the patient place a 2 x 4 underneath the head board of the bed to elevate the head of bed very subtly and optimize the impact of gravity on the potential reflux. I recommended that the patient avoid alcohol, caffeine, tobacco, tomato sauce, spicy foods, fried food, and chocolate. There is a potential long-term risk of reflux for the  development of esophageal cancer, and the data on head and neck malignancy is unclear. Therefore it is important for the patient to be compliant with these recommendations. The patient may benefit from an evaluation by Gastroenterology if the symptoms fail to improve or worsen. Reassurance was provided. She may follow up as needed.             Other Visit Diagnoses         Globus sensation                         [1]   Current Outpatient Medications:     albuterol (PROVENTIL/VENTOLIN HFA) 90 mcg/actuation inhaler, Inhale 2 puffs into the lungs every 6 (six) hours as needed for Wheezing. Rescue, Disp: 18 g, Rfl: 3    cycloSPORINE (RESTASIS) 0.05 % ophthalmic emulsion, Place 1 drop into the left eye 2 (two) times daily., Disp: 60 each, Rfl: 11    ketoconazole (NIZORAL) 2 % shampoo, Wash scalp at least 2 x weekly, leave on 3-5 min prior to rinsing, Disp: 120 mL, Rfl: 11    metoprolol succinate (TOPROL-XL) 25 MG 24 hr tablet, Take 1 tablet by mouth once daily, Disp: 90 tablet, Rfl: 3    omeprazole (PRILOSEC) 20 MG capsule, Take 1 capsule by mouth once daily, Disp: 90 capsule, Rfl: 2    ondansetron (ZOFRAN) 4 MG tablet, TAKE 1 TABLET BY MOUTH EVERY 8 HOURS AS NEEDED, Disp: 30 tablet, Rfl: 0    topiramate (TOPAMAX) 25 MG tablet, Take 1 tablet (25 mg total) by mouth once daily., Disp: 30 tablet, Rfl: 5    ubrogepant (UBRELVY) 100 mg tablet, Take 1 tablet by mouth at the onset of a headache. May repeat based on response and tolerability after more than 2 hours if needed. Do not take more than 200mg in a 24 hour span., Disp: 16 tablet, Rfl: 5  [2]   Social History  Socioeconomic History    Marital status:    Tobacco Use    Smoking status: Never    Smokeless tobacco: Never   Substance and Sexual Activity    Alcohol use: No     Comment: occ    Drug use: No    Sexual activity: Yes     Partners: Male     Birth control/protection: Condom     Social Drivers of Health     Financial Resource Strain: Low Risk  (7/12/2024)     Overall Financial Resource Strain (CARDIA)     Difficulty of Paying Living Expenses: Not hard at all   Food Insecurity: No Food Insecurity (7/12/2024)    Hunger Vital Sign     Worried About Running Out of Food in the Last Year: Never true     Ran Out of Food in the Last Year: Never true   Transportation Needs: No Transportation Needs (3/16/2024)    PRAPARE - Transportation     Lack of Transportation (Medical): No     Lack of Transportation (Non-Medical): No   Physical Activity: Sufficiently Active (7/12/2024)    Exercise Vital Sign     Days of Exercise per Week: 3 days     Minutes of Exercise per Session: 50 min   Stress: Stress Concern Present (7/12/2024)    [employer] [place] [employer] - Occupational Stress Questionnaire     Feeling of Stress : To some extent   Housing Stability: Low Risk  (3/16/2024)    Housing Stability Vital Sign     Unable to Pay for Housing in the Last Year: No     Number of Places Lived in the Last Year: 1     Unstable Housing in the Last Year: No

## 2025-07-28 PROBLEM — K21.9 LARYNGOPHARYNGEAL REFLUX (LPR): Status: ACTIVE | Noted: 2025-07-28

## 2025-07-28 NOTE — ASSESSMENT & PLAN NOTE
Johanna Ocasio is a 40 y.o. female with intermittent globus sensation in the setting of rare dysphagia and known reflux symptoms. Her exam is normal, as there there is no evidence of posterior glottic pachydermia or other laryngeal manifestations - but that is the more common scenario. I explained to the patient how it is common to experience throat discomfort, a foreign body sensation, problems swallowing, and hoarseness among other things due to reflux, even in the absence of heartburn. Smaller volumes of gastric contents are required to irritate the pharynx than the lower esophagus. Often patients with significant reflux and heartburn will seek medical treatment earlier. I recommended that the patient continue taking prilosec 20 mg (or a generic equivalent) daily and talk with her GI physician about further testing because of the abilio reflux and her concerns that she may have a hiatal hernia. I also recommended that the patient refrain from eating within 3 hours of going to bed at night and that the patient place a 2 x 4 underneath the head board of the bed to elevate the head of bed very subtly and optimize the impact of gravity on the potential reflux. I recommended that the patient avoid alcohol, caffeine, tobacco, tomato sauce, spicy foods, fried food, and chocolate. There is a potential long-term risk of reflux for the development of esophageal cancer, and the data on head and neck malignancy is unclear. Therefore it is important for the patient to be compliant with these recommendations. The patient may benefit from an evaluation by Gastroenterology if the symptoms fail to improve or worsen. Reassurance was provided. She may follow up as needed.

## 2025-08-02 DIAGNOSIS — I10 ESSENTIAL HYPERTENSION, BENIGN: ICD-10-CM

## 2025-08-02 NOTE — TELEPHONE ENCOUNTER
No care due was identified.  [facility] Embedded Care Due Messages. Reference number: [id number].   8/02/2025 9:11:26 AM CDT

## 2025-08-03 RX ORDER — OMEPRAZOLE 20 MG/1
20 CAPSULE, DELAYED RELEASE ORAL
Qty: 90 CAPSULE | Refills: 3 | Status: SHIPPED | OUTPATIENT
Start: 2025-08-03

## 2025-08-04 NOTE — TELEPHONE ENCOUNTER
Refill Decision Note   Johanna Ninfa  is requesting a refill authorization.  Brief Assessment and Rationale for Refill:  Approve     Medication Therapy Plan:         Comments:     Note composed:9:13 PM 08/03/2025

## 2025-08-05 ENCOUNTER — PATIENT MESSAGE (OUTPATIENT)
Dept: CARDIOLOGY | Facility: CLINIC | Age: 40
End: 2025-08-05
Payer: COMMERCIAL

## 2025-08-07 RX ORDER — METOPROLOL SUCCINATE 25 MG/1
25 TABLET, EXTENDED RELEASE ORAL
Qty: 90 TABLET | Refills: 0 | Status: SHIPPED | OUTPATIENT
Start: 2025-08-07

## 2025-08-19 RX ORDER — ONDANSETRON 4 MG/1
4 TABLET, FILM COATED ORAL
Qty: 30 TABLET | Refills: 0 | Status: SHIPPED | OUTPATIENT
Start: 2025-08-19

## 2025-08-21 ENCOUNTER — PATIENT MESSAGE (OUTPATIENT)
Dept: PSYCHIATRY | Facility: CLINIC | Age: 40
End: 2025-08-21
Payer: COMMERCIAL

## 2025-09-02 ENCOUNTER — OFFICE VISIT (OUTPATIENT)
Dept: CARDIOLOGY | Facility: CLINIC | Age: 40
End: 2025-09-02
Payer: COMMERCIAL

## 2025-09-02 VITALS
HEIGHT: 60 IN | DIASTOLIC BLOOD PRESSURE: 84 MMHG | WEIGHT: 163.13 LBS | BODY MASS INDEX: 32.02 KG/M2 | SYSTOLIC BLOOD PRESSURE: 127 MMHG | OXYGEN SATURATION: 99 % | RESPIRATION RATE: 15 BRPM | HEART RATE: 75 BPM

## 2025-09-02 DIAGNOSIS — R00.2 HEART PALPITATIONS: ICD-10-CM

## 2025-09-02 DIAGNOSIS — R00.2 PALPITATIONS: ICD-10-CM

## 2025-09-02 DIAGNOSIS — I10 ESSENTIAL HYPERTENSION, BENIGN: Primary | ICD-10-CM

## 2025-09-02 PROCEDURE — 99214 OFFICE O/P EST MOD 30 MIN: CPT | Mod: S$GLB,,, | Performed by: INTERNAL MEDICINE

## 2025-09-02 PROCEDURE — 3074F SYST BP LT 130 MM HG: CPT | Mod: CPTII,S$GLB,, | Performed by: INTERNAL MEDICINE

## 2025-09-02 PROCEDURE — 3044F HG A1C LEVEL LT 7.0%: CPT | Mod: CPTII,S$GLB,, | Performed by: INTERNAL MEDICINE

## 2025-09-02 PROCEDURE — 3079F DIAST BP 80-89 MM HG: CPT | Mod: CPTII,S$GLB,, | Performed by: INTERNAL MEDICINE

## 2025-09-02 PROCEDURE — 99999 PR PBB SHADOW E&M-EST. PATIENT-LVL IV: CPT | Mod: PBBFAC,,, | Performed by: INTERNAL MEDICINE

## 2025-09-02 PROCEDURE — G2211 COMPLEX E/M VISIT ADD ON: HCPCS | Mod: S$GLB,,, | Performed by: INTERNAL MEDICINE

## 2025-09-02 PROCEDURE — 1159F MED LIST DOCD IN RCRD: CPT | Mod: CPTII,S$GLB,, | Performed by: INTERNAL MEDICINE

## 2025-09-02 PROCEDURE — 93000 ELECTROCARDIOGRAM COMPLETE: CPT | Mod: S$GLB,,, | Performed by: INTERNAL MEDICINE

## 2025-09-02 PROCEDURE — 3008F BODY MASS INDEX DOCD: CPT | Mod: CPTII,S$GLB,, | Performed by: INTERNAL MEDICINE

## 2025-09-02 RX ORDER — METOPROLOL SUCCINATE 25 MG/1
25 TABLET, EXTENDED RELEASE ORAL DAILY
Qty: 90 TABLET | Refills: 3 | Status: SHIPPED | OUTPATIENT
Start: 2025-09-02

## 2025-09-03 LAB
OHS QRS DURATION: 94 MS
OHS QTC CALCULATION: 425 MS